# Patient Record
Sex: FEMALE | Race: WHITE | NOT HISPANIC OR LATINO | Employment: OTHER | URBAN - METROPOLITAN AREA
[De-identification: names, ages, dates, MRNs, and addresses within clinical notes are randomized per-mention and may not be internally consistent; named-entity substitution may affect disease eponyms.]

---

## 2018-05-07 LAB
T4 FREE SERPL-MCNC: 1.77 NG/DL (ref 0.6–1.7)
TSH SERPL DL<=0.05 MIU/L-ACNC: 0.84 UIU/M (ref 0.45–5.33)

## 2018-08-21 ENCOUNTER — APPOINTMENT (OUTPATIENT)
Dept: LAB | Facility: MEDICAL CENTER | Age: 73
End: 2018-08-21
Payer: MEDICARE

## 2018-08-21 ENCOUNTER — TRANSCRIBE ORDERS (OUTPATIENT)
Dept: LAB | Facility: MEDICAL CENTER | Age: 73
End: 2018-08-21

## 2018-08-21 DIAGNOSIS — E07.9 THYROID DYSFUNCTION: ICD-10-CM

## 2018-08-21 DIAGNOSIS — R73.9 HYPERGLYCEMIA: ICD-10-CM

## 2018-08-21 DIAGNOSIS — E78.5 HYPERLIPIDEMIA, UNSPECIFIED HYPERLIPIDEMIA TYPE: ICD-10-CM

## 2018-08-21 DIAGNOSIS — E78.5 HYPERLIPIDEMIA, UNSPECIFIED HYPERLIPIDEMIA TYPE: Primary | ICD-10-CM

## 2018-08-21 LAB
ALBUMIN SERPL BCP-MCNC: 4 G/DL (ref 3.5–5)
ALP SERPL-CCNC: 54 U/L (ref 46–116)
ALT SERPL W P-5'-P-CCNC: 28 U/L (ref 12–78)
ANION GAP SERPL CALCULATED.3IONS-SCNC: 6 MMOL/L (ref 4–13)
AST SERPL W P-5'-P-CCNC: 26 U/L (ref 5–45)
BILIRUB SERPL-MCNC: 0.93 MG/DL (ref 0.2–1)
BUN SERPL-MCNC: 14 MG/DL (ref 5–25)
CALCIUM SERPL-MCNC: 9.8 MG/DL (ref 8.3–10.1)
CHLORIDE SERPL-SCNC: 97 MMOL/L (ref 100–108)
CHOLEST SERPL-MCNC: 217 MG/DL (ref 50–200)
CO2 SERPL-SCNC: 28 MMOL/L (ref 21–32)
CREAT SERPL-MCNC: 1.23 MG/DL (ref 0.6–1.3)
ERYTHROCYTE [DISTWIDTH] IN BLOOD BY AUTOMATED COUNT: 11.9 % (ref 11.6–15.1)
EST. AVERAGE GLUCOSE BLD GHB EST-MCNC: 117 MG/DL
GFR SERPL CREATININE-BSD FRML MDRD: 43 ML/MIN/1.73SQ M
GLUCOSE P FAST SERPL-MCNC: 112 MG/DL (ref 65–99)
HBA1C MFR BLD: 5.7 % (ref 4.2–6.3)
HCT VFR BLD AUTO: 41.9 % (ref 34.8–46.1)
HDLC SERPL-MCNC: 84 MG/DL (ref 40–60)
HGB BLD-MCNC: 13.7 G/DL (ref 11.5–15.4)
LDLC SERPL CALC-MCNC: 89 MG/DL (ref 0–100)
MCH RBC QN AUTO: 33.4 PG (ref 26.8–34.3)
MCHC RBC AUTO-ENTMCNC: 32.7 G/DL (ref 31.4–37.4)
MCV RBC AUTO: 102 FL (ref 82–98)
NONHDLC SERPL-MCNC: 133 MG/DL
PLATELET # BLD AUTO: 238 THOUSANDS/UL (ref 149–390)
PMV BLD AUTO: 9.3 FL (ref 8.9–12.7)
POTASSIUM SERPL-SCNC: 4.1 MMOL/L (ref 3.5–5.3)
PROT SERPL-MCNC: 7.4 G/DL (ref 6.4–8.2)
RBC # BLD AUTO: 4.1 MILLION/UL (ref 3.81–5.12)
SODIUM SERPL-SCNC: 131 MMOL/L (ref 136–145)
T4 FREE SERPL-MCNC: 1.28 NG/DL (ref 0.76–1.46)
TRIGL SERPL-MCNC: 222 MG/DL
TSH SERPL DL<=0.05 MIU/L-ACNC: 0.2 UIU/ML (ref 0.36–3.74)
WBC # BLD AUTO: 6.11 THOUSAND/UL (ref 4.31–10.16)

## 2018-08-21 PROCEDURE — 80053 COMPREHEN METABOLIC PANEL: CPT

## 2018-08-21 PROCEDURE — 83036 HEMOGLOBIN GLYCOSYLATED A1C: CPT

## 2018-08-21 PROCEDURE — 84439 ASSAY OF FREE THYROXINE: CPT

## 2018-08-21 PROCEDURE — 80061 LIPID PANEL: CPT

## 2018-08-21 PROCEDURE — 85027 COMPLETE CBC AUTOMATED: CPT

## 2018-08-21 PROCEDURE — 84443 ASSAY THYROID STIM HORMONE: CPT

## 2018-08-21 PROCEDURE — 36415 COLL VENOUS BLD VENIPUNCTURE: CPT

## 2018-09-28 ENCOUNTER — APPOINTMENT (OUTPATIENT)
Dept: LAB | Facility: MEDICAL CENTER | Age: 73
End: 2018-09-28
Payer: MEDICARE

## 2018-09-28 ENCOUNTER — TRANSCRIBE ORDERS (OUTPATIENT)
Dept: LAB | Facility: MEDICAL CENTER | Age: 73
End: 2018-09-28

## 2018-09-28 DIAGNOSIS — E07.9 THYROID DISORDER: ICD-10-CM

## 2018-09-28 DIAGNOSIS — E07.9 THYROID DISORDER: Primary | ICD-10-CM

## 2018-09-28 LAB
T4 FREE SERPL-MCNC: 1.14 NG/DL (ref 0.76–1.46)
TSH SERPL DL<=0.05 MIU/L-ACNC: 0.23 UIU/ML (ref 0.36–3.74)

## 2018-09-28 PROCEDURE — 84443 ASSAY THYROID STIM HORMONE: CPT

## 2018-09-28 PROCEDURE — 84439 ASSAY OF FREE THYROXINE: CPT

## 2018-09-28 PROCEDURE — 36415 COLL VENOUS BLD VENIPUNCTURE: CPT

## 2018-11-12 ENCOUNTER — TRANSCRIBE ORDERS (OUTPATIENT)
Dept: LAB | Facility: MEDICAL CENTER | Age: 73
End: 2018-11-12

## 2018-11-12 ENCOUNTER — APPOINTMENT (OUTPATIENT)
Dept: LAB | Facility: MEDICAL CENTER | Age: 73
End: 2018-11-12
Payer: MEDICARE

## 2018-11-12 DIAGNOSIS — E07.9 THYROID DYSFUNCTION: ICD-10-CM

## 2018-11-12 DIAGNOSIS — E78.5 HYPERLIPIDEMIA, UNSPECIFIED HYPERLIPIDEMIA TYPE: ICD-10-CM

## 2018-11-12 DIAGNOSIS — E13.8 OTHER SPECIFIED DIABETES MELLITUS WITH COMPLICATION, WITHOUT LONG-TERM CURRENT USE OF INSULIN: ICD-10-CM

## 2018-11-12 DIAGNOSIS — E13.8 OTHER SPECIFIED DIABETES MELLITUS WITH COMPLICATION, WITHOUT LONG-TERM CURRENT USE OF INSULIN: Primary | ICD-10-CM

## 2018-11-12 LAB
EST. AVERAGE GLUCOSE BLD GHB EST-MCNC: 123 MG/DL
HBA1C MFR BLD: 5.9 % (ref 4.2–6.3)
T4 FREE SERPL-MCNC: 1.1 NG/DL (ref 0.76–1.46)
TSH SERPL DL<=0.05 MIU/L-ACNC: 0.94 UIU/ML (ref 0.36–3.74)

## 2018-11-12 PROCEDURE — 83036 HEMOGLOBIN GLYCOSYLATED A1C: CPT

## 2018-11-12 PROCEDURE — 84439 ASSAY OF FREE THYROXINE: CPT

## 2018-11-12 PROCEDURE — 84443 ASSAY THYROID STIM HORMONE: CPT

## 2018-11-12 PROCEDURE — 36415 COLL VENOUS BLD VENIPUNCTURE: CPT

## 2019-02-06 ENCOUNTER — APPOINTMENT (OUTPATIENT)
Dept: LAB | Facility: MEDICAL CENTER | Age: 74
End: 2019-02-06
Payer: MEDICARE

## 2019-02-06 ENCOUNTER — TRANSCRIBE ORDERS (OUTPATIENT)
Dept: LAB | Facility: MEDICAL CENTER | Age: 74
End: 2019-02-06

## 2019-02-06 DIAGNOSIS — E07.9 THYROID DISORDER: Primary | ICD-10-CM

## 2019-02-06 DIAGNOSIS — E07.9 THYROID DISORDER: ICD-10-CM

## 2019-02-06 LAB
T4 FREE SERPL-MCNC: 1.09 NG/DL (ref 0.76–1.46)
TSH SERPL DL<=0.05 MIU/L-ACNC: 2.04 UIU/ML (ref 0.36–3.74)

## 2019-02-06 PROCEDURE — 36415 COLL VENOUS BLD VENIPUNCTURE: CPT

## 2019-02-06 PROCEDURE — 84439 ASSAY OF FREE THYROXINE: CPT

## 2019-02-06 PROCEDURE — 84443 ASSAY THYROID STIM HORMONE: CPT

## 2019-05-10 ENCOUNTER — APPOINTMENT (OUTPATIENT)
Dept: LAB | Facility: MEDICAL CENTER | Age: 74
End: 2019-05-10
Payer: MEDICARE

## 2019-05-10 ENCOUNTER — TRANSCRIBE ORDERS (OUTPATIENT)
Dept: LAB | Facility: MEDICAL CENTER | Age: 74
End: 2019-05-10

## 2019-05-10 DIAGNOSIS — E07.9 THYROID DYSFUNCTION: Primary | ICD-10-CM

## 2019-05-10 DIAGNOSIS — E07.9 THYROID DYSFUNCTION: ICD-10-CM

## 2019-05-10 LAB
T4 FREE SERPL-MCNC: 1.26 NG/DL (ref 0.76–1.46)
TSH SERPL DL<=0.05 MIU/L-ACNC: 1.46 UIU/ML (ref 0.36–3.74)

## 2019-05-10 PROCEDURE — 84443 ASSAY THYROID STIM HORMONE: CPT

## 2019-05-10 PROCEDURE — 84439 ASSAY OF FREE THYROXINE: CPT

## 2019-05-10 PROCEDURE — 36415 COLL VENOUS BLD VENIPUNCTURE: CPT

## 2019-07-31 ENCOUNTER — APPOINTMENT (OUTPATIENT)
Dept: LAB | Facility: MEDICAL CENTER | Age: 74
End: 2019-07-31
Payer: MEDICARE

## 2019-07-31 ENCOUNTER — TRANSCRIBE ORDERS (OUTPATIENT)
Dept: LAB | Facility: MEDICAL CENTER | Age: 74
End: 2019-07-31

## 2019-07-31 DIAGNOSIS — E07.9 THYROID DYSFUNCTION: ICD-10-CM

## 2019-07-31 DIAGNOSIS — E07.9 THYROID DYSFUNCTION: Primary | ICD-10-CM

## 2019-07-31 LAB
T4 FREE SERPL-MCNC: 0.99 NG/DL (ref 0.76–1.46)
TSH SERPL DL<=0.05 MIU/L-ACNC: 0.67 UIU/ML (ref 0.36–3.74)

## 2019-07-31 PROCEDURE — 84443 ASSAY THYROID STIM HORMONE: CPT

## 2019-07-31 PROCEDURE — 84439 ASSAY OF FREE THYROXINE: CPT

## 2019-07-31 PROCEDURE — 36415 COLL VENOUS BLD VENIPUNCTURE: CPT

## 2019-11-13 ENCOUNTER — APPOINTMENT (OUTPATIENT)
Dept: LAB | Facility: MEDICAL CENTER | Age: 74
End: 2019-11-13
Payer: MEDICARE

## 2019-11-13 ENCOUNTER — TRANSCRIBE ORDERS (OUTPATIENT)
Dept: LAB | Facility: MEDICAL CENTER | Age: 74
End: 2019-11-13

## 2019-11-13 DIAGNOSIS — E78.5 HYPERLIPIDEMIA, UNSPECIFIED HYPERLIPIDEMIA TYPE: ICD-10-CM

## 2019-11-13 DIAGNOSIS — R73.09 ABNORMAL GLUCOSE: ICD-10-CM

## 2019-11-13 DIAGNOSIS — R73.09 ABNORMAL GLUCOSE: Primary | ICD-10-CM

## 2019-11-13 DIAGNOSIS — E07.9 THYROID DYSFUNCTION: ICD-10-CM

## 2019-11-13 LAB
ALBUMIN SERPL BCP-MCNC: 4.4 G/DL (ref 3.5–5)
ALP SERPL-CCNC: 62 U/L (ref 46–116)
ALT SERPL W P-5'-P-CCNC: 28 U/L (ref 12–78)
ANION GAP SERPL CALCULATED.3IONS-SCNC: 7 MMOL/L (ref 4–13)
AST SERPL W P-5'-P-CCNC: 29 U/L (ref 5–45)
BILIRUB SERPL-MCNC: 0.57 MG/DL (ref 0.2–1)
BUN SERPL-MCNC: 14 MG/DL (ref 5–25)
CALCIUM SERPL-MCNC: 9.7 MG/DL (ref 8.3–10.1)
CHLORIDE SERPL-SCNC: 103 MMOL/L (ref 100–108)
CHOLEST SERPL-MCNC: 194 MG/DL (ref 50–200)
CO2 SERPL-SCNC: 29 MMOL/L (ref 21–32)
CREAT SERPL-MCNC: 1.27 MG/DL (ref 0.6–1.3)
ERYTHROCYTE [DISTWIDTH] IN BLOOD BY AUTOMATED COUNT: 12.6 % (ref 11.6–15.1)
EST. AVERAGE GLUCOSE BLD GHB EST-MCNC: 111 MG/DL
GFR SERPL CREATININE-BSD FRML MDRD: 41 ML/MIN/1.73SQ M
GLUCOSE P FAST SERPL-MCNC: 107 MG/DL (ref 65–99)
HBA1C MFR BLD: 5.5 % (ref 4.2–6.3)
HCT VFR BLD AUTO: 41.7 % (ref 34.8–46.1)
HDLC SERPL-MCNC: 82 MG/DL
HGB BLD-MCNC: 13.6 G/DL (ref 11.5–15.4)
LDLC SERPL CALC-MCNC: 75 MG/DL (ref 0–100)
MCH RBC QN AUTO: 34.3 PG (ref 26.8–34.3)
MCHC RBC AUTO-ENTMCNC: 32.6 G/DL (ref 31.4–37.4)
MCV RBC AUTO: 105 FL (ref 82–98)
NONHDLC SERPL-MCNC: 112 MG/DL
PLATELET # BLD AUTO: 233 THOUSANDS/UL (ref 149–390)
PMV BLD AUTO: 9.2 FL (ref 8.9–12.7)
POTASSIUM SERPL-SCNC: 4.2 MMOL/L (ref 3.5–5.3)
PROT SERPL-MCNC: 7.4 G/DL (ref 6.4–8.2)
RBC # BLD AUTO: 3.97 MILLION/UL (ref 3.81–5.12)
SODIUM SERPL-SCNC: 139 MMOL/L (ref 136–145)
T4 FREE SERPL-MCNC: 1 NG/DL (ref 0.76–1.46)
TRIGL SERPL-MCNC: 183 MG/DL
TSH SERPL DL<=0.05 MIU/L-ACNC: 4.76 UIU/ML (ref 0.36–3.74)
WBC # BLD AUTO: 4.77 THOUSAND/UL (ref 4.31–10.16)

## 2019-11-13 PROCEDURE — 80061 LIPID PANEL: CPT

## 2019-11-13 PROCEDURE — 80053 COMPREHEN METABOLIC PANEL: CPT

## 2019-11-13 PROCEDURE — 36415 COLL VENOUS BLD VENIPUNCTURE: CPT

## 2019-11-13 PROCEDURE — 84443 ASSAY THYROID STIM HORMONE: CPT

## 2019-11-13 PROCEDURE — 85027 COMPLETE CBC AUTOMATED: CPT

## 2019-11-13 PROCEDURE — 83036 HEMOGLOBIN GLYCOSYLATED A1C: CPT

## 2019-11-13 PROCEDURE — 84439 ASSAY OF FREE THYROXINE: CPT

## 2020-02-25 ENCOUNTER — APPOINTMENT (OUTPATIENT)
Dept: LAB | Facility: MEDICAL CENTER | Age: 75
End: 2020-02-25
Payer: MEDICARE

## 2020-02-25 ENCOUNTER — TRANSCRIBE ORDERS (OUTPATIENT)
Dept: LAB | Facility: MEDICAL CENTER | Age: 75
End: 2020-02-25

## 2020-02-25 DIAGNOSIS — E07.9 THYROID DYSFUNCTION: Primary | ICD-10-CM

## 2020-02-25 DIAGNOSIS — E07.9 THYROID DYSFUNCTION: ICD-10-CM

## 2020-02-25 LAB
T4 FREE SERPL-MCNC: 0.91 NG/DL (ref 0.76–1.46)
TSH SERPL DL<=0.05 MIU/L-ACNC: 1.71 UIU/ML (ref 0.36–3.74)

## 2020-02-25 PROCEDURE — 84439 ASSAY OF FREE THYROXINE: CPT

## 2020-02-25 PROCEDURE — 36415 COLL VENOUS BLD VENIPUNCTURE: CPT

## 2020-02-25 PROCEDURE — 84443 ASSAY THYROID STIM HORMONE: CPT

## 2020-10-28 ENCOUNTER — TRANSCRIBE ORDERS (OUTPATIENT)
Dept: LAB | Facility: MEDICAL CENTER | Age: 75
End: 2020-10-28

## 2020-10-28 ENCOUNTER — LAB (OUTPATIENT)
Dept: LAB | Facility: MEDICAL CENTER | Age: 75
End: 2020-10-28
Payer: MEDICARE

## 2020-10-28 DIAGNOSIS — E03.9 HYPOTHYROIDISM, UNSPECIFIED TYPE: ICD-10-CM

## 2020-10-28 DIAGNOSIS — E78.5 HYPERLIPIDEMIA, UNSPECIFIED HYPERLIPIDEMIA TYPE: ICD-10-CM

## 2020-10-28 DIAGNOSIS — R73.9 HYPERGLYCEMIA: ICD-10-CM

## 2020-10-28 DIAGNOSIS — E78.5 HYPERLIPIDEMIA, UNSPECIFIED HYPERLIPIDEMIA TYPE: Primary | ICD-10-CM

## 2020-10-28 LAB
ALBUMIN SERPL BCP-MCNC: 4.4 G/DL (ref 3.5–5)
ALP SERPL-CCNC: 55 U/L (ref 46–116)
ALT SERPL W P-5'-P-CCNC: 31 U/L (ref 12–78)
ANION GAP SERPL CALCULATED.3IONS-SCNC: 5 MMOL/L (ref 4–13)
AST SERPL W P-5'-P-CCNC: 29 U/L (ref 5–45)
BILIRUB SERPL-MCNC: 0.64 MG/DL (ref 0.2–1)
BUN SERPL-MCNC: 14 MG/DL (ref 5–25)
CALCIUM SERPL-MCNC: 9.4 MG/DL (ref 8.3–10.1)
CHLORIDE SERPL-SCNC: 100 MMOL/L (ref 100–108)
CHOLEST SERPL-MCNC: 355 MG/DL (ref 50–200)
CO2 SERPL-SCNC: 28 MMOL/L (ref 21–32)
CREAT SERPL-MCNC: 1.14 MG/DL (ref 0.6–1.3)
ERYTHROCYTE [DISTWIDTH] IN BLOOD BY AUTOMATED COUNT: 12.9 % (ref 11.6–15.1)
GFR SERPL CREATININE-BSD FRML MDRD: 46 ML/MIN/1.73SQ M
GLUCOSE P FAST SERPL-MCNC: 114 MG/DL (ref 65–99)
HCT VFR BLD AUTO: 43.9 % (ref 34.8–46.1)
HDLC SERPL-MCNC: 91 MG/DL
HGB BLD-MCNC: 14.7 G/DL (ref 11.5–15.4)
LDLC SERPL CALC-MCNC: 224 MG/DL (ref 0–100)
MCH RBC QN AUTO: 35.3 PG (ref 26.8–34.3)
MCHC RBC AUTO-ENTMCNC: 33.5 G/DL (ref 31.4–37.4)
MCV RBC AUTO: 105 FL (ref 82–98)
NONHDLC SERPL-MCNC: 264 MG/DL
PLATELET # BLD AUTO: 216 THOUSANDS/UL (ref 149–390)
PMV BLD AUTO: 9.1 FL (ref 8.9–12.7)
POTASSIUM SERPL-SCNC: 3.9 MMOL/L (ref 3.5–5.3)
PROT SERPL-MCNC: 7.5 G/DL (ref 6.4–8.2)
RBC # BLD AUTO: 4.17 MILLION/UL (ref 3.81–5.12)
SODIUM SERPL-SCNC: 133 MMOL/L (ref 136–145)
T4 FREE SERPL-MCNC: 0.9 NG/DL (ref 0.76–1.46)
TRIGL SERPL-MCNC: 200 MG/DL
TSH SERPL DL<=0.05 MIU/L-ACNC: 2.48 UIU/ML (ref 0.36–3.74)
WBC # BLD AUTO: 6.77 THOUSAND/UL (ref 4.31–10.16)

## 2020-10-28 PROCEDURE — 84439 ASSAY OF FREE THYROXINE: CPT

## 2020-10-28 PROCEDURE — 85027 COMPLETE CBC AUTOMATED: CPT

## 2020-10-28 PROCEDURE — 83036 HEMOGLOBIN GLYCOSYLATED A1C: CPT

## 2020-10-28 PROCEDURE — 80061 LIPID PANEL: CPT

## 2020-10-28 PROCEDURE — 80053 COMPREHEN METABOLIC PANEL: CPT

## 2020-10-28 PROCEDURE — 84443 ASSAY THYROID STIM HORMONE: CPT

## 2020-10-28 PROCEDURE — 36415 COLL VENOUS BLD VENIPUNCTURE: CPT

## 2020-10-29 LAB
EST. AVERAGE GLUCOSE BLD GHB EST-MCNC: 114 MG/DL
HBA1C MFR BLD: 5.6 %

## 2021-01-20 DIAGNOSIS — Z23 ENCOUNTER FOR IMMUNIZATION: ICD-10-CM

## 2021-03-15 ENCOUNTER — IMMUNIZATIONS (OUTPATIENT)
Dept: FAMILY MEDICINE CLINIC | Facility: HOSPITAL | Age: 76
End: 2021-03-15

## 2021-03-15 DIAGNOSIS — Z23 ENCOUNTER FOR IMMUNIZATION: Primary | ICD-10-CM

## 2021-03-15 PROCEDURE — 91301 SARS-COV-2 / COVID-19 MRNA VACCINE (MODERNA) 100 MCG: CPT

## 2021-03-15 PROCEDURE — 0011A SARS-COV-2 / COVID-19 MRNA VACCINE (MODERNA) 100 MCG: CPT

## 2021-04-14 ENCOUNTER — IMMUNIZATIONS (OUTPATIENT)
Dept: FAMILY MEDICINE CLINIC | Facility: HOSPITAL | Age: 76
End: 2021-04-14

## 2021-04-14 DIAGNOSIS — Z23 ENCOUNTER FOR IMMUNIZATION: Primary | ICD-10-CM

## 2021-04-14 PROCEDURE — 91301 SARS-COV-2 / COVID-19 MRNA VACCINE (MODERNA) 100 MCG: CPT

## 2021-04-14 PROCEDURE — 0012A SARS-COV-2 / COVID-19 MRNA VACCINE (MODERNA) 100 MCG: CPT

## 2021-11-04 ENCOUNTER — APPOINTMENT (OUTPATIENT)
Dept: LAB | Facility: MEDICAL CENTER | Age: 76
End: 2021-11-04
Payer: MEDICARE

## 2021-11-04 DIAGNOSIS — E78.5 HYPERLIPIDEMIA, UNSPECIFIED HYPERLIPIDEMIA TYPE: ICD-10-CM

## 2021-11-04 DIAGNOSIS — Z00.00 GENERAL MEDICAL EXAM: ICD-10-CM

## 2021-11-04 DIAGNOSIS — R73.09 ABNORMAL GLUCOSE: ICD-10-CM

## 2021-11-04 DIAGNOSIS — E03.9 HYPOTHYROIDISM, UNSPECIFIED TYPE: ICD-10-CM

## 2021-11-04 LAB
ALBUMIN SERPL BCP-MCNC: 4 G/DL (ref 3.5–5)
ALP SERPL-CCNC: 56 U/L (ref 46–116)
ALT SERPL W P-5'-P-CCNC: 33 U/L (ref 12–78)
ANION GAP SERPL CALCULATED.3IONS-SCNC: 4 MMOL/L (ref 4–13)
AST SERPL W P-5'-P-CCNC: 35 U/L (ref 5–45)
BILIRUB SERPL-MCNC: 0.52 MG/DL (ref 0.2–1)
BUN SERPL-MCNC: 12 MG/DL (ref 5–25)
CALCIUM SERPL-MCNC: 9.7 MG/DL (ref 8.3–10.1)
CHLORIDE SERPL-SCNC: 100 MMOL/L (ref 100–108)
CHOLEST SERPL-MCNC: 273 MG/DL (ref 50–200)
CO2 SERPL-SCNC: 30 MMOL/L (ref 21–32)
CREAT SERPL-MCNC: 1.29 MG/DL (ref 0.6–1.3)
ERYTHROCYTE [DISTWIDTH] IN BLOOD BY AUTOMATED COUNT: 12.8 % (ref 11.6–15.1)
EST. AVERAGE GLUCOSE BLD GHB EST-MCNC: 117 MG/DL
GFR SERPL CREATININE-BSD FRML MDRD: 40 ML/MIN/1.73SQ M
GLUCOSE P FAST SERPL-MCNC: 98 MG/DL (ref 65–99)
HBA1C MFR BLD: 5.7 %
HCT VFR BLD AUTO: 42.3 % (ref 34.8–46.1)
HDLC SERPL-MCNC: 90 MG/DL
HGB BLD-MCNC: 14.2 G/DL (ref 11.5–15.4)
LDLC SERPL CALC-MCNC: 146 MG/DL (ref 0–100)
MCH RBC QN AUTO: 33.3 PG (ref 26.8–34.3)
MCHC RBC AUTO-ENTMCNC: 33.6 G/DL (ref 31.4–37.4)
MCV RBC AUTO: 99 FL (ref 82–98)
NONHDLC SERPL-MCNC: 183 MG/DL
PLATELET # BLD AUTO: 229 THOUSANDS/UL (ref 149–390)
PMV BLD AUTO: 9.3 FL (ref 8.9–12.7)
POTASSIUM SERPL-SCNC: 3.6 MMOL/L (ref 3.5–5.3)
PROT SERPL-MCNC: 7.8 G/DL (ref 6.4–8.2)
RBC # BLD AUTO: 4.27 MILLION/UL (ref 3.81–5.12)
SODIUM SERPL-SCNC: 134 MMOL/L (ref 136–145)
T4 FREE SERPL-MCNC: 0.96 NG/DL (ref 0.76–1.46)
TRIGL SERPL-MCNC: 183 MG/DL
TSH SERPL DL<=0.05 MIU/L-ACNC: 2.38 UIU/ML (ref 0.36–3.74)
WBC # BLD AUTO: 4.91 THOUSAND/UL (ref 4.31–10.16)

## 2021-11-04 PROCEDURE — 84439 ASSAY OF FREE THYROXINE: CPT

## 2021-11-04 PROCEDURE — 85027 COMPLETE CBC AUTOMATED: CPT

## 2021-11-04 PROCEDURE — 83036 HEMOGLOBIN GLYCOSYLATED A1C: CPT

## 2021-11-04 PROCEDURE — 80053 COMPREHEN METABOLIC PANEL: CPT

## 2021-11-04 PROCEDURE — 36415 COLL VENOUS BLD VENIPUNCTURE: CPT

## 2021-11-04 PROCEDURE — 80061 LIPID PANEL: CPT

## 2021-11-04 PROCEDURE — 84443 ASSAY THYROID STIM HORMONE: CPT

## 2022-05-11 ENCOUNTER — HOSPITAL ENCOUNTER (EMERGENCY)
Facility: HOSPITAL | Age: 77
Discharge: HOME | End: 2022-05-11
Attending: EMERGENCY MEDICINE
Payer: MEDICARE

## 2022-05-11 ENCOUNTER — APPOINTMENT (OUTPATIENT)
Dept: RADIOLOGY | Facility: CLINIC | Age: 77
End: 2022-05-11
Payer: MEDICARE

## 2022-05-11 ENCOUNTER — OFFICE VISIT (OUTPATIENT)
Dept: URGENT CARE | Facility: CLINIC | Age: 77
End: 2022-05-11
Payer: MEDICARE

## 2022-05-11 VITALS
OXYGEN SATURATION: 100 % | RESPIRATION RATE: 18 BRPM | DIASTOLIC BLOOD PRESSURE: 65 MMHG | HEART RATE: 78 BPM | SYSTOLIC BLOOD PRESSURE: 146 MMHG | TEMPERATURE: 97.8 F

## 2022-05-11 VITALS
TEMPERATURE: 98 F | HEART RATE: 76 BPM | DIASTOLIC BLOOD PRESSURE: 80 MMHG | SYSTOLIC BLOOD PRESSURE: 130 MMHG | OXYGEN SATURATION: 98 % | RESPIRATION RATE: 20 BRPM | BODY MASS INDEX: 20.49 KG/M2 | WEIGHT: 130.8 LBS

## 2022-05-11 DIAGNOSIS — R23.0 EXTREMITY CYANOSIS: Primary | ICD-10-CM

## 2022-05-11 DIAGNOSIS — M79.641 BILATERAL HAND PAIN: ICD-10-CM

## 2022-05-11 DIAGNOSIS — M79.642 BILATERAL HAND PAIN: ICD-10-CM

## 2022-05-11 DIAGNOSIS — M79.642 BILATERAL HAND PAIN: Primary | ICD-10-CM

## 2022-05-11 DIAGNOSIS — E08.40 DIABETES MELLITUS DUE TO UNDERLYING CONDITION WITH DIABETIC NEUROPATHY, WITHOUT LONG-TERM CURRENT USE OF INSULIN (HCC): ICD-10-CM

## 2022-05-11 DIAGNOSIS — Z86.39 HISTORY OF DIABETES MELLITUS: ICD-10-CM

## 2022-05-11 DIAGNOSIS — M79.641 BILATERAL HAND PAIN: Primary | ICD-10-CM

## 2022-05-11 DIAGNOSIS — Z86.79 HISTORY OF RAYNAUD'S SYNDROME: ICD-10-CM

## 2022-05-11 LAB
GLUCOSE SERPL-MCNC: 134 MG/DL (ref 65–140)
SL AMB POCT GLUCOSE BLD: 134

## 2022-05-11 PROCEDURE — 99284 EMERGENCY DEPT VISIT MOD MDM: CPT | Performed by: EMERGENCY MEDICINE

## 2022-05-11 PROCEDURE — G0463 HOSPITAL OUTPT CLINIC VISIT: HCPCS | Performed by: NURSE PRACTITIONER

## 2022-05-11 PROCEDURE — 99214 OFFICE O/P EST MOD 30 MIN: CPT | Performed by: NURSE PRACTITIONER

## 2022-05-11 PROCEDURE — 82948 REAGENT STRIP/BLOOD GLUCOSE: CPT | Performed by: NURSE PRACTITIONER

## 2022-05-11 PROCEDURE — 99283 EMERGENCY DEPT VISIT LOW MDM: CPT

## 2022-05-11 PROCEDURE — 73130 X-RAY EXAM OF HAND: CPT

## 2022-05-11 RX ORDER — ATORVASTATIN CALCIUM 40 MG/1
TABLET, FILM COATED ORAL
COMMUNITY
Start: 2022-04-29

## 2022-05-11 RX ORDER — LEVOTHYROXINE SODIUM 0.07 MG/1
TABLET ORAL
COMMUNITY
Start: 2022-04-29

## 2022-05-11 RX ORDER — GABAPENTIN 100 MG/1
100 CAPSULE ORAL ONCE
Status: COMPLETED | OUTPATIENT
Start: 2022-05-11 | End: 2022-05-11

## 2022-05-11 RX ORDER — GABAPENTIN 100 MG/1
100 CAPSULE ORAL 3 TIMES DAILY
Qty: 42 CAPSULE | Refills: 0 | Status: SHIPPED | OUTPATIENT
Start: 2022-05-11 | End: 2022-05-25

## 2022-05-11 RX ADMIN — GABAPENTIN 100 MG: 100 CAPSULE ORAL at 12:45

## 2022-05-11 NOTE — PATIENT INSTRUCTIONS
Your xray was preliminarily read by your provider  A radiologist will read the xray and you will be notified if it is abnormal     You are to rest, use warm compress, compression (ace wrap, splint), elevate  Take tylenol or motrin as needed for pain  You are to see your PCP TODAY at 3:15pm  To enter the office, go around the back of the building to the ramp  You may need to see rheumatology for further treatment and management  Go to the ED if symptoms worsen  You have decided while in the office to go to the ED  Follow-up with your PCP after the ED visit

## 2022-05-11 NOTE — ED PROVIDER NOTES
History  Chief Complaint   Patient presents with    Hand Pain     Patient reports bilateral hand pain for two weeks  Patient is a 70-year-old female presents for evaluation of bilateral hand pain  Patient says she has had the symptoms for the past 1-2 weeks  She says that she feels like she is not able to use her hands due to the pain   She also admits to some paresthesias in the bilateral hands  She denies any swelling, redness of the hands  She also admits to some pain in her bilateral shoulders  She denies any chest pain, shortness of breath, lightheadedness, dizziness, nausea, vomiting  She denies a history of diabetes  According to lab work, she had an A1c done recently that was 5 7  On exam, there is no redness, swelling or deformities to the hands  Her bilateral extremities are neurovascularly intact  Prior to Admission Medications   Prescriptions Last Dose Informant Patient Reported? Taking?   atorvastatin (LIPITOR) 40 mg tablet   Yes No   levothyroxine 75 mcg tablet   Yes No   sertraline (ZOLOFT) 50 mg tablet   Yes No      Facility-Administered Medications: None       History reviewed  No pertinent past medical history  Past Surgical History:   Procedure Laterality Date    BOWEL RESECTION         History reviewed  No pertinent family history  I have reviewed and agree with the history as documented  E-Cigarette/Vaping    E-Cigarette Use Never User      E-Cigarette/Vaping Substances     Social History     Tobacco Use    Smoking status: Never Smoker    Smokeless tobacco: Never Used   Vaping Use    Vaping Use: Never used   Substance Use Topics    Drug use: Never       Review of Systems   Constitutional: Negative for chills, diaphoresis and fever  HENT: Negative for congestion, sinus pressure, sore throat and trouble swallowing  Eyes: Negative for pain, discharge and itching  Respiratory: Negative for cough, chest tightness, shortness of breath and wheezing  Cardiovascular: Negative for chest pain, palpitations and leg swelling  Gastrointestinal: Negative for abdominal distention, abdominal pain, blood in stool, diarrhea, nausea and vomiting  Endocrine: Negative for polyphagia and polyuria  Genitourinary: Negative for difficulty urinating, dysuria, flank pain, hematuria, pelvic pain and vaginal bleeding  Musculoskeletal: Positive for arthralgias (b/l hands)  Negative for back pain  Skin: Negative for rash  Neurological: Negative for dizziness, syncope, weakness, light-headedness and headaches  Physical Exam  Physical Exam  Vitals and nursing note reviewed  Constitutional:       General: She is not in acute distress  Appearance: She is well-developed  HENT:      Head: Normocephalic and atraumatic  Right Ear: External ear normal       Left Ear: External ear normal       Nose: Nose normal       Mouth/Throat:      Mouth: Mucous membranes are moist       Pharynx: No oropharyngeal exudate  Eyes:      Conjunctiva/sclera: Conjunctivae normal       Pupils: Pupils are equal, round, and reactive to light  Cardiovascular:      Rate and Rhythm: Normal rate and regular rhythm  Heart sounds: Normal heart sounds  No murmur heard  No friction rub  No gallop  Pulmonary:      Effort: Pulmonary effort is normal  No respiratory distress  Breath sounds: Normal breath sounds  No wheezing or rales  Abdominal:      General: There is no distension  Palpations: Abdomen is soft  Tenderness: There is no abdominal tenderness  There is no guarding  Musculoskeletal:         General: No swelling, tenderness or deformity  Normal range of motion  Cervical back: Normal range of motion and neck supple  Lymphadenopathy:      Cervical: No cervical adenopathy  Skin:     General: Skin is warm and dry  Neurological:      General: No focal deficit present  Mental Status: She is alert and oriented to person, place, and time   Mental status is at baseline  Cranial Nerves: No cranial nerve deficit  Sensory: No sensory deficit  Motor: No weakness or abnormal muscle tone  Coordination: Coordination normal          Vital Signs  ED Triage Vitals [05/11/22 1139]   Temperature Pulse Respirations Blood Pressure SpO2   97 8 °F (36 6 °C) 78 18 146/65 100 %      Temp Source Heart Rate Source Patient Position - Orthostatic VS BP Location FiO2 (%)   Oral Monitor Sitting Left arm --      Pain Score       10 - Worst Possible Pain           Vitals:    05/11/22 1139   BP: 146/65   Pulse: 78   Patient Position - Orthostatic VS: Sitting         Visual Acuity      ED Medications  Medications   gabapentin (NEURONTIN) capsule 100 mg (has no administration in time range)       Diagnostic Studies  Results Reviewed     None                 No orders to display              Procedures  Procedures         ED Course                                             MDM  Number of Diagnoses or Management Options  Bilateral hand pain  Diagnosis management comments: 61-year-old female presenting for bilateral hand pain x2 weeks  Burning in nature  Also admits to paresthesias  Denies any trauma to the hands  Arms are neurovascularly intact  No redness, swelling of the hands  No history of diabetes  Vitals within normal limits  Do not believe imaging or lab work is required at this time  Believe symptoms are neuropathic in nature  Will start on gabapentin, will have patient follow up with family doctor          Disposition  Final diagnoses:   Bilateral hand pain     Time reflects when diagnosis was documented in both MDM as applicable and the Disposition within this note     Time User Action Codes Description Comment    5/11/2022 12:36 PM Zoey Richey [I98 [id number],  [id number]] Bilateral hand pain       ED Disposition     ED Disposition   Discharge    Condition   Stable    Date/Time   Wed May 11, 2022 12:35 PM    Andra Bustamante discharge to home/self care  Follow-up Information     Follow up With Specialties Details Why Contact Info    your family doctor  Schedule an appointment as soon as possible for a visit  For follow up of hand pain           Patient's Medications   Discharge Prescriptions    GABAPENTIN (NEURONTIN) 100 MG CAPSULE    Take 1 capsule (100 mg total) by mouth in the morning and 1 capsule (100 mg total) in the evening and 1 capsule (100 mg total) before bedtime  Do all this for 14 days  Start Date: 5/11/2022 End Date: 5/25/2022       Order Dose: 100 mg       Quantity: 42 capsule    Refills: 0       No discharge procedures on file      PDMP Review       Value Time User    PDMP Reviewed  Yes 5/11/2022 10:12 AM Velvet Melton          ED Provider  Electronically Signed by           Mikal Burgos DO  05/11/22 0980

## 2022-05-11 NOTE — PROGRESS NOTES
[facility]        NAME: Rabia Knapp is a 64 y o  female  : 1945    MRN: [id number]  DATE: May 11, 2022  TIME: 11:16 AM    Assessment and Plan   Extremity cyanosis [R23 0]  1  Extremity cyanosis  Transfer to other facility    of bilateral hands   2  History of Raynaud's syndrome  Transfer to other facility    personal   3  Bilateral hand pain  XR hand 3+ vw right    XR hand 3+ vw left    Transfer to other facility   4  History of diabetes mellitus  POCT blood glucose    Transfer to other facility   5  Diabetes mellitus due to underlying condition with diabetic neuropathy, without long-term current use of insulin ([facility])   POCT blood glucose    Transfer to other facility         Patient Instructions   Your xray was preliminarily read by your provider  A radiologist will read the xray and you will be notified if it is abnormal     You are to rest, use warm compress, compression (ace wrap, splint), elevate  Take tylenol or motrin as needed for pain  You are to see your PCP TODAY at 3:15pm  To enter the office, go around the back of the building to the ramp  You may need to see rheumatology for further treatment and management  Go to the ED if symptoms worsen  Follow up with PCP in 3-5 days  Proceed to  ER if symptoms worsen  Chief Complaint     Chief Complaint   Patient presents with    Hand Pain     Gage hand pain x 2 weeks          History of Present Illness       This is a 64year old female who presents for evaluation of bilateral hand pain x1 week  Reports sudden onset of constant burning, stinging pain rated 8/10 that starts in elbows and radiates to fingertips  Pain in right hand is worse than left  Patient states pain makes it difficult for her to hold a pen and use hairbrush  There is an associated cold feeling to both hands  She has tried Tylenol 1,000mg by mouth with no relief  No known injury         Review of Systems   Review of Systems   Constitutional: Positive for activity change  Negative for chills, diaphoresis and fever  HENT: Negative for congestion, rhinorrhea and sore throat  Eyes: Negative  Respiratory: Negative for cough and shortness of breath  Cardiovascular: Negative for chest pain  Gastrointestinal: Negative for abdominal pain, diarrhea, nausea and vomiting  Endocrine: Negative  Genitourinary: Negative  Musculoskeletal:        Bilateral hand pain     Skin: Positive for color change (blue in color)  Allergic/Immunologic: Negative  Neurological: Negative  Hematological: Negative  Psychiatric/Behavioral: The patient is nervous/anxious  Current Medications       Current Outpatient Medications:     atorvastatin (LIPITOR) 40 mg tablet, , Disp: , Rfl:     levothyroxine 75 mcg tablet, , Disp: , Rfl:     sertraline (ZOLOFT) 50 mg tablet, , Disp: , Rfl:     Current Allergies     Allergies as of 05/11/2022    (No Known Allergies)            The following portions of the patient's history were reviewed and updated as appropriate: allergies, current medications, past family history, past medical history, past social history, past surgical history and problem list      History reviewed  No pertinent past medical history  History reviewed  No pertinent surgical history  History reviewed  No pertinent family history  Medications have been verified  Objective   /80   Pulse 76   Temp 98 °F (36 7 °C)   Resp 20   Wt 59 3 kg (130 lb 12 8 oz)   SpO2 98%   BMI 20 49 kg/m²   No LMP recorded  Physical Exam     Physical Exam  Vitals and nursing note reviewed  Constitutional:       General: She is not in acute distress  Appearance: She is normal weight  She is not toxic-appearing  HENT:      Head: Normocephalic and atraumatic  Right Ear: External ear normal       Left Ear: External ear normal       Nose: Nose normal    Eyes:      Extraocular Movements: Extraocular movements intact        Conjunctiva/sclera: Conjunctivae normal       Pupils: Pupils are equal, round, and reactive to light  Cardiovascular:      Rate and Rhythm: Normal rate and regular rhythm  Pulses: Normal pulses  Heart sounds: Normal heart sounds  Pulmonary:      Effort: Pulmonary effort is normal  No respiratory distress  Breath sounds: Normal breath sounds  No wheezing  Musculoskeletal:         General: No deformity or signs of injury  Normal range of motion  Right hand: Bony tenderness present  No swelling or deformity  Normal strength  Normal capillary refill  Normal pulse  Left hand: Bony tenderness present  No swelling or deformity  Normal strength  Normal capillary refill  Normal pulse  Cervical back: Normal range of motion and neck supple  Skin:     General: Skin is dry  Capillary Refill: Capillary refill takes less than 2 seconds  Coloration: Skin is cyanotic (bilateral hands) and pale  Comments: Bilateral hands pale, cool, dry  Neurological:      General: No focal deficit present  Mental Status: She is alert and oriented to person, place, and time  Mental status is at baseline  Psychiatric:         Attention and Perception: Attention and perception normal          Mood and Affect: Mood is depressed  Affect is tearful  Speech: Speech normal          Cognition and Memory: Cognition and memory normal          Judgment: Judgment normal       Comments: Patient anxious, tearful, histrionic, demanding pain medication however refused Tylenol and Motrin in office  NP discussed narcotic safe use with patient and informed patient that x-rays were normal  Hand assessment is normal with no indication for narcotic use  Patient breaks down, cries, and pleads with NP for narcotic prescription  Preliminary XR read - questionable gas to right hand 3rd finger  Left hand negative for acute process  Waiting for rad read         Spoke with Chapo Diez from Dr Martín Lomas office   Discussed patient with her as this is a mutual patient  Last visit on 11/21/21  Their office also recommends no narcotic prescription at this time  Appt for 3:15 today made, patient refused to go and begs  to take her to the ED   agreeable to take her by private car to ED for pain control  Patient had originally told NP there were numerous steps she needed to climb to get into PCP office, however, office has 5 steps to enter the front and a ramp in the back for entrance

## 2022-09-09 ENCOUNTER — APPOINTMENT (OUTPATIENT)
Dept: RADIOLOGY | Facility: HOSPITAL | Age: 77
End: 2022-09-09
Payer: MEDICARE

## 2022-09-09 ENCOUNTER — HOSPITAL ENCOUNTER (EMERGENCY)
Facility: HOSPITAL | Age: 77
Discharge: HOME | End: 2022-09-09
Attending: EMERGENCY MEDICINE
Payer: MEDICARE

## 2022-09-09 VITALS
OXYGEN SATURATION: 98 % | HEIGHT: 67 IN | SYSTOLIC BLOOD PRESSURE: 150 MMHG | WEIGHT: 130 LBS | HEART RATE: 67 BPM | BODY MASS INDEX: 20.4 KG/M2 | RESPIRATION RATE: 18 BRPM | DIASTOLIC BLOOD PRESSURE: 67 MMHG | TEMPERATURE: 97.6 F

## 2022-09-09 DIAGNOSIS — S62.101A CLOSED FRACTURE OF RIGHT WRIST, INITIAL ENCOUNTER: ICD-10-CM
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
## 2022-09-09 PROCEDURE — 99284 EMERGENCY DEPT VISIT MOD MDM: CPT

## 2022-09-09 PROCEDURE — 25605 CLTX DST RDL FX/EPHYS SEP W/: CPT

## 2022-09-09 PROCEDURE — 73110 X-RAY EXAM OF WRIST: CPT

## 2022-09-09 PROCEDURE — 96374 THER/PROPH/DIAG INJ IV PUSH: CPT

## 2022-09-09 RX ORDER — BUPIVACAINE HYDROCHLORIDE 2.5 MG/ML
15 INJECTION, SOLUTION EPIDURAL; INFILTRATION; INTRACAUDAL ONCE
Status: COMPLETED | OUTPATIENT
Start: 2022-09-09 | End: 2022-09-09

## 2022-09-09 RX ORDER — LIDOCAINE HYDROCHLORIDE 10 MG/ML
10 INJECTION, SOLUTION EPIDURAL; INFILTRATION; INTRACAUDAL; PERINEURAL ONCE
Status: COMPLETED | OUTPATIENT
Start: 2022-09-09 | End: 2022-09-09

## 2022-09-09 RX ORDER — OXYCODONE HYDROCHLORIDE AND ACETAMINOPHEN 5; 325 MG/1; MG/1
1 TABLET ORAL EVERY 4 HOURS PRN
Qty: 18 TABLET | Refills: 0 | Status: SHIPPED | OUTPATIENT
Start: 2022-09-09 | End: 2022-09-12

## 2022-09-09 RX ORDER — FENTANYL CITRATE 50 UG/ML
100 INJECTION, SOLUTION INTRAMUSCULAR; INTRAVENOUS ONCE
Status: COMPLETED | OUTPATIENT
Start: 2022-09-09 | End: 2022-09-09

## 2022-09-09 RX ADMIN — LIDOCAINE HYDROCHLORIDE 10 ML: 10 INJECTION, SOLUTION EPIDURAL; INFILTRATION; INTRACAUDAL; PERINEURAL at 13:17

## 2022-09-09 RX ADMIN — FENTANYL CITRATE 50 MCG: 50 INJECTION, SOLUTION INTRAMUSCULAR; INTRAVENOUS at 14:26

## 2022-09-09 RX ADMIN — BUPIVACAINE HYDROCHLORIDE 15 ML: 2.5 INJECTION, SOLUTION EPIDURAL; INFILTRATION; INTRACAUDAL; PERINEURAL at 13:30

## 2022-09-09 NOTE — ED NOTES
Per provider, only 50mcg to be given at this time for reduction       Malgorzata García RN  09/09/22 6634

## 2022-09-09 NOTE — DISCHARGE INSTRUCTIONS
Please follow-up with orthopedics for further evaluation symptoms  Continue wearing the splint and sling until cleared by Orthopedics  If you develop any new, concerning, worsening symptoms please return to the emergency department for re-evaluation

## 2022-09-09 NOTE — ED PROVIDER NOTES
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 and was reaching for her refrigerator but missed and fell backwards  She fell on her hands and buttocks  Patient does report that she hit her head slightly but denies headache, dizziness, vision disturbances, LOC, amnesia, N/V  No thinners or aspirin  Patient has an obvious right wrist deformity that is splinted by EMS  Patient has not had anything for pain  Denies abdominal pain, back pain, neck pain, chest pain, SOB, difficulty breathing, bladder/bowel incontinence, nausea, vomiting  History provided by:  Patient   used: No    Fall  Mechanism of injury: fall    Injury location:  Hand  Hand injury location:  R wrist  Incident location:  Home  Arrived directly from scene: yes    Fall:     Fall occurred:  Standing    Height of fall:  From standing    Impact surface:  Hard floor    Point of impact:  Outstretched arms and buttocks    Entrapped after fall: no    Suspicion of alcohol use: no    Suspicion of drug use: no    Prior to arrival data:     Bystander interventions:  Splinting    Patient ambulatory at scene: yes      Blood loss:  None    Responsiveness at scene:  Alert    Loss of consciousness: no      Amnesic to event: no    Associated symptoms: no abdominal pain, no back pain, no chest pain, no difficulty breathing, no headaches, no loss of consciousness, no nausea, no neck pain, no seizures and no vomiting    Risk factors: no anticoagulation therapy        Prior to Admission Medications   Prescriptions Last Dose Informant Patient Reported?  Taking?   atorvastatin (LIPITOR) 40 mg tablet   Yes No   gabapentin (Neurontin) 100 mg capsule   No No   Sig: Take 1 capsule (100 mg total) by mouth in the morning and 1 capsule (100 mg total) in the evening and 1 capsule (100 mg total) before bedtime  Do all this for 14 days  levothyroxine 75 mcg tablet   Yes No   sertraline (ZOLOFT) 50 mg tablet   Yes No      Facility-Administered Medications: None       Past Medical History:   Diagnosis Date    Depression     Disease of thyroid gland     Hyperlipidemia     Neuropathy        Past Surgical History:   Procedure Laterality Date    BOWEL RESECTION         History reviewed  No pertinent family history  I have reviewed and agree with the history as documented  E-Cigarette/Vaping    E-Cigarette Use Never User      E-Cigarette/Vaping Substances     Social History     Tobacco Use    Smoking status: Never Smoker    Smokeless tobacco: Never Used   Vaping Use    Vaping Use: Never used   Substance Use Topics    Drug use: Never       Review of Systems   Constitutional: Negative for chills and fever  HENT: Negative for ear pain and sore throat  Eyes: Negative for pain and visual disturbance  Respiratory: Negative for cough and shortness of breath  Cardiovascular: Negative for chest pain and palpitations  Gastrointestinal: Negative for abdominal pain, nausea and vomiting  Genitourinary: Negative for dysuria and hematuria  Musculoskeletal: Positive for arthralgias (Right wrist)  Negative for back pain and neck pain  Skin: Negative for color change and rash  Neurological: Negative for seizures, loss of consciousness, syncope and headaches  All other systems reviewed and are negative  Physical Exam  Physical Exam  Vitals and nursing note reviewed  Constitutional:       General: She is not in acute distress  Appearance: Normal appearance  She is well-developed and normal weight  HENT:      Head: Normocephalic and atraumatic        Right Ear: Tympanic membrane and external ear normal       Left Ear: Tympanic membrane and external ear normal       Nose: Nose normal       Mouth/Throat:      Mouth: Mucous membranes are moist       Pharynx: Oropharynx is clear  Eyes:      General: No scleral icterus  Right eye: No discharge  Left eye: No discharge  Conjunctiva/sclera: Conjunctivae normal       Pupils: Pupils are equal, round, and reactive to light  Cardiovascular:      Rate and Rhythm: Normal rate and regular rhythm  Pulses: Normal pulses  Heart sounds: Normal heart sounds  No murmur heard  Pulmonary:      Effort: Pulmonary effort is normal  No respiratory distress  Breath sounds: Normal breath sounds  No wheezing or rales  Chest:      Chest wall: No tenderness  Abdominal:      Palpations: Abdomen is soft  Tenderness: There is no abdominal tenderness  Musculoskeletal:         General: Deformity (Obvious deformity to right wrist concerning for fracture; neurovascularly intact proximal and distal to the affected joint) and signs of injury present  Cervical back: Normal range of motion and neck supple  No tenderness  Skin:     General: Skin is warm and dry  Capillary Refill: Capillary refill takes less than 2 seconds  Neurological:      General: No focal deficit present  Mental Status: She is alert and oriented to person, place, and time  Mental status is at baseline  Motor: No weakness  Psychiatric:         Mood and Affect: Mood normal          Behavior: Behavior normal          Thought Content:  Thought content normal          Vital Signs  ED Triage Vitals [09/09/22 1259]   Temperature Pulse Respirations Blood Pressure SpO2   97 6 °F (36 4 °C) 74 16 (!) 174/74 98 %      Temp Source Heart Rate Source Patient Position - Orthostatic VS BP Location FiO2 (%)   Tympanic Monitor Lying Left arm --      Pain Score       10 - Worst Possible Pain           Vitals:    09/09/22 1343 09/09/22 1400 09/09/22 1430 09/09/22 1500   BP: 168/78 166/73 158/72 150/67   Pulse: 66 72 76 67   Patient Position - Orthostatic VS: Sitting Sitting Sitting Sitting         Visual Acuity  Visual Acuity    Flowsheet Row Most Recent Value   L Pupil Size (mm) 3   R Pupil Size (mm) 3          ED Medications  Medications   bupivacaine (PF) (MARCAINE) 0 25 % injection 15 mL (15 mL Intra-articular Given by Other 9/9/22 1330)   lidocaine (PF) (XYLOCAINE-MPF) 1 % injection 10 mL (10 mL Infiltration Given by Other 9/9/22 1317)   fentanyl citrate (PF) 100 MCG/2ML 100 mcg (50 mcg Intravenous Given 9/9/22 1426)       Diagnostic Studies  Results Reviewed     None                 XR wrist 3+ views RIGHT   Final Result by Thor Jama MD (09/09 1517)      Unchanged alignment at the site of acute dorsally displaced, dorsally angulated, slightly impacted fracture of the right distal radial metaphysis with intra-articular extension of fracture fragments  As per comments in electronic health record, findings are concordant with preliminary interpretation provided by the emergency room physician  Workstation performed: [id number]         XR wrist 3+ views RIGHT   Final Result by Thor Jama MD (09/09 1516)      Slightly improved alignment at the site of acute dorsally displaced, dorsally angulated, slightly impacted fracture of the right distal radial metaphysis with intra-articular extension of fracture fragments  As per comments in electronic health record, findings are concordant with preliminary interpretation provided by the emergency room physician  Workstation performed: [id number]         XR wrist 3+ views RIGHT   Final Result by Thor Jama MD (09/09 1515)      Acute dorsally displaced, dorsally angulated, slightly impacted fracture of the right distal radial metaphysis with intra-articular extension of fracture fragments  An ulnar styloid fracture is also noted        As per comments in electronic health record, findings are concordant with preliminary interpretation provided by the emergency room physician  Workstation performed: [id number]                    Procedures  Orthopedic injury treatment    Date/Time: 9/11/2022 3:52 PM  Performed by: Kojo Norman PA-C  Authorized by: Kojo Norman PA-C     Patient Location:  ED  [place] Protocol:  Consent: The procedure was performed in an emergent situation  Risks and benefits: risks, benefits and alternatives were discussed    Injury location:  Wrist  Location details:  Right wrist  Injury type:  Fracture  Fracture type: distal radius and ulnar styloid    Fracture type: distal radius and ulnar styloid    Neurovascular status: Neurovascularly intact    Distal perfusion: normal    Neurological function: normal    Range of motion: reduced    Local anesthesia used?: Yes    Anesthesia:  Hematoma block  Local anesthetic:  Lidocaine 1% with epinephrine  Anesthetic total (ml):  5  Manipulation performed?: Yes    Confirmation: Reduction confirmed by x-ray    Immobilization:  Splint  Splint type:  Sugar tong  Supplies used:  Cotton padding and Ortho-Glass  Neurovascular status: Neurovascularly intact    Distal perfusion: normal    Neurological function: normal    Patient tolerance:  Patient tolerated the procedure well with no immediate complications             ED Course  ED Course as of 09/11/22 1603   Fri Sep 09, 2022   1330 From Dr Baires in ortho: Doesn't change anything at this point  Yes do a reduction, place in a sugar tong splint, and follow up as an outpatient office  Thank you                               SBIRT 22yo+    Flowsheet Row Most Recent Value   SBIRT (23 yo +)    In order to provide better care to our patients, we are screening all of our patients for alcohol and drug use  Would it be okay to ask you these screening questions?  Yes Filed at: 09/09/2022 1305   Initial Alcohol Screen: US AUDIT-C     1  How often do you have a drink containing alcohol? 0 Filed at: 09/09/2022 1305   2  How many drinks containing alcohol do you have on a typical day you are drinking? 0 Filed at: 09/09/2022 1305   3a  Male UNDER 65: How often do you have five or more drinks on one occasion? 0 Filed at: 09/09/2022 1305   3b  FEMALE Any Age, or MALE 65+: How often do you have 4 or more drinks on one occassion? 0 Filed at: 09/09/2022 1305   Audit-C Score 0 Filed at: 09/09/2022 1305   [place]: How many times in the past year have you    Used an illegal drug or used a prescription medication for non-medical reasons? Never Filed at: 09/09/2022 1305                    MDM  Number of Diagnoses or Management Options  Closed fracture of right wrist, initial encounter: new and requires workup  Fall, initial encounter: new and requires workup  Diagnosis management comments: 57-year-old female presents emergency department with the right wrist deformity and pain status post fall on outstretched hand  Patient does not meet criteria for trauma activation at this time  Neurological exam unremarkable  Obvious deformity noted to the right wrist concerning for fracture  Confirmed distal radial fracture with plain films  Distal radial fracture displaced dorsally with ulnar styloid fracture  Discussed with Dr Jabari Dominique in Orthopedics who recommended reduction and splinting in the emergency department and sent for outpatient follow-up with Orthopedics  Slightly improved alignment of the fracture status post reduction  Splinted the right wrist in a sugar-tong splint and placed patient in an arm sling  Neurovascularly intact proximal and distal to effect weight before and after the procedure  Patient tolerated the procedure well with no immediate complications  Patient verbalizes understanding the plan for outpatient follow-up with Orthopedics  Discussed that she should remain in the splint until cleared by Orthopedics  Strict return precautions discussed  Sent a small course of narcotics to patient's pharmacy at the patient's request   Recommended she use Tylenol and Motrin for mild-to-moderate pain and save narcotics for severe or refractory pain  Patient stable at time of discharge  Amount and/or Complexity of Data Reviewed  Tests in the radiology section of CPT®: ordered and reviewed  Review and summarize past medical records: yes  Discuss the patient with other providers: yes  Independent visualization of images, tracings, or specimens: yes    Patient Progress  Patient progress: improved      Disposition  Final diagnoses:   Fall, initial encounter   Closed fracture of right wrist, initial encounter     Time reflects when diagnosis was documented in both MDM as applicable and the Disposition within this note     Time User Action Codes Description Comment    9/9/2022  3:08 PM Freddrick Bunting Add [W19  RZSH] Fall, initial encounter     9/9/2022  3:13 PM Freddrick Bunting Add [S62 101A] Closed fracture of right wrist, initial encounter     9/9/2022  3:14 PM Freddrick Bunting Modify [A83  EBUF] Fall, initial encounter     9/9/2022  3:14 PM Freddrick Bunting Modify [S62 101A] Closed fracture of right wrist, initial encounter       ED Disposition     ED Disposition   Discharge    Condition   Stable    Date/Time   Fri Sep 9, 2022  3:08 PM    Comment   Anny Tomlinson discharge to home/self care                 Follow-up Information     Follow up With Specialties Details Why Contact Info Additional [address]  Emergency Department Emergency Medicine Go to  If symptoms worsen [address] Aletha Ley[place] [id number] [phone] [facility] Emergency Department, [address] [place] [address] [address] [facility] Specialists [facility] [facility] Orthopedic Surgery Call in 3 days follow up for further evaluation of symptoms [address] [address]  [address] [place] [address] Specialists [place], [address] [facility] [place], [place], [place], [place] [address]          Discharge Medication List as of 9/9/2022  3:15 PM      START taking these medications    Details   oxyCODONE-acetaminophen (Percocet) 5-325 mg per tablet Take 1 tablet by mouth every 4 (four) hours as needed for moderate pain for up to 3 days Max Daily Amount: 6 tablets, Starting Fri 9/9/2022, Until Mon 9/12/2022 at 2359, Normal         CONTINUE these medications which have NOT CHANGED    Details   atorvastatin (LIPITOR) 40 mg tablet Starting Fri 4/29/2022, Historical Med      gabapentin (Neurontin) 100 mg capsule Take 1 capsule (100 mg total) by mouth in the morning and 1 capsule (100 mg total) in the evening and 1 capsule (100 mg total) before bedtime  Do all this for 14 days  , Starting Wed 5/11/2022, Until Wed 5/25/2022, Normal      levothyroxine 75 mcg tablet Starting Fri 4/29/2022, Historical Med      sertraline (ZOLOFT) 50 mg tablet Starting Fri 4/29/2022, Historical Med                 PDMP Review       Value Time User    PDMP Reviewed  Yes 5/11/2022 10:12 AM Velvet Lim          ED Provider  Electronically Signed by           Joanne Hunt PA-C  09/11/22 3386

## 2022-09-15 ENCOUNTER — TELEPHONE (OUTPATIENT)
Dept: OBGYN CLINIC | Facility: HOSPITAL | Age: 77
End: 2022-09-15

## 2022-09-25 NOTE — ED PROCEDURE NOTE
The orthopedic wrist reduction in the emergency department was performed on 9/9/22  Please see separate ED note on that date for further description of the procedure        Esteban Evangelista PA-C  09/24/22 1396

## 2022-10-03 ENCOUNTER — APPOINTMENT (OUTPATIENT)
Dept: RADIOLOGY | Facility: CLINIC | Age: 77
End: 2022-10-03
Payer: MEDICARE

## 2022-10-03 ENCOUNTER — OFFICE VISIT (OUTPATIENT)
Dept: OBGYN CLINIC | Facility: CLINIC | Age: 77
End: 2022-10-03
Payer: MEDICARE

## 2022-10-03 VITALS
DIASTOLIC BLOOD PRESSURE: 68 MMHG | WEIGHT: 137 LBS | BODY MASS INDEX: 21.5 KG/M2 | HEIGHT: 67 IN | SYSTOLIC BLOOD PRESSURE: 128 MMHG | HEART RATE: 83 BPM
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
## 2022-10-03 DIAGNOSIS — S52.501A FRACTURE OF LOWER END OF BOTH ULNA AND RADIUS, RIGHT, CLOSED, INITIAL ENCOUNTER: Primary | ICD-10-CM

## 2022-10-03 DIAGNOSIS — S52.601A FRACTURE OF LOWER END OF BOTH ULNA AND RADIUS, RIGHT, CLOSED, INITIAL ENCOUNTER: Primary | ICD-10-CM

## 2022-10-03 DIAGNOSIS — S62.101A CLOSED FRACTURE OF RIGHT WRIST, INITIAL ENCOUNTER: ICD-10-CM

## 2022-10-03 PROCEDURE — 99204 OFFICE O/P NEW MOD 45 MIN: CPT | Performed by: FAMILY MEDICINE

## 2022-10-03 PROCEDURE — 73110 X-RAY EXAM OF WRIST: CPT

## 2022-10-03 NOTE — PATIENT INSTRUCTIONS
F/u 3 wks  Begin wearing wrist brace  May take off for bathing  Icing/heat/OTC pain meds as needed  D/w Dr Mckeon Aubrie- not surgical at this time due to length of time in splint  Patient will have some ulnar sided wrist pain due to shortening due to fracture  Patient had weakness in fingers before the fall

## 2022-10-03 NOTE — PROGRESS NOTES
[facility] SPECIALISTS [place] [address]  [facility] [address] [phone]      Chief Complaint:  Chief Complaint   Patient presents with    Right Wrist - Pain       Vitals:  /68   Pulse 83   Ht 5' 7" (1 702 m)   Wt 62 1 kg (137 lb)   BMI 21 46 kg/m²     The following portions of the patient's history were reviewed and updated as appropriate: allergies, current medications, past family history, past medical history, past social history, past surgical history, and problem list       Subjective:   Patient ID: Saroj Mohan is a 76 y o  female  Here for f/u R wrist fx  Seen in ER- XR shows displaced fx  She fell on her R hand 4 wks later  Splinted and here wanting splint removed  Denies n/t  No pain  No pain meds  RIGHT WRIST     INDICATION:   fall, injury      COMPARISON:  May 11, 2022     VIEWS:  XR WRIST 3+ VW RIGHT         FINDINGS:     There is an acute dorsally displaced, dorsally angulated, slightly impacted fracture of the right distal radial metaphysis with intra-articular extension of fracture fragments  An ulnar styloid fracture is also noted  Soft tissue deformity and soft   tissue swelling is noted         IMPRESSION:     Acute dorsally displaced, dorsally angulated, slightly impacted fracture of the right distal radial metaphysis with intra-articular extension of fracture fragments  An ulnar styloid fracture is also noted      As per comments in electronic health record, findings are concordant with preliminary interpretation provided by the emergency room physician          Review of Systems   Constitutional: Negative for fatigue and fever  Respiratory: Negative for shortness of breath  Cardiovascular: Negative for chest pain  Gastrointestinal: Negative for abdominal pain and nausea  Genitourinary: Negative for dysuria  Musculoskeletal: Negative for arthralgias  Skin: Negative for rash and wound     Neurological: Negative for weakness and headaches  Objective:  Ortho Exam    Physical Exam  Vitals and nursing note reviewed  Constitutional:       Appearance: Normal appearance  She is well-developed  HENT:      Head: Normocephalic  Mouth/Throat:      Mouth: Mucous membranes are moist    Eyes:      Extraocular Movements: Extraocular movements intact  Cardiovascular:      Rate and Rhythm: Normal rate and regular rhythm  Heart sounds: Normal heart sounds  Pulmonary:      Effort: Pulmonary effort is normal       Breath sounds: Normal breath sounds  Abdominal:      General: Bowel sounds are normal       Palpations: Abdomen is soft  Musculoskeletal:         General: No tenderness  Normal range of motion  Cervical back: Normal range of motion  Comments: R hand- dec motor with finger abduction  NVI   no TTP   Skin:     General: Skin is warm and dry  Neurological:      General: No focal deficit present  Mental Status: She is alert and oriented to person, place, and time  Psychiatric:         Mood and Affect: Mood normal          Behavior: Behavior normal          Thought Content: Thought content normal          I have personally reviewed pertinent films in PACS and my interpretation is XR-  R wrist- no change in alignment of distal radius fx/ulnar styloid fx   some callus formation  Assessment/Plan:  Assessment/Plan   Diagnoses and all orders for this visit:    Fracture of lower end of both ulna and radius, right, closed, initial encounter  -     XR wrist 3+ vw right; Future  -     Brace    Fall, initial encounter  -     Ambulatory Referral to Orthopedic Surgery  -     XR wrist 3+ vw right; Future  -     Brace        Return in about 3 weeks (around 10/24/2022) for Recheck       Milton Rodriguez

## 2022-10-24 ENCOUNTER — OFFICE VISIT (OUTPATIENT)
Dept: OBGYN CLINIC | Facility: CLINIC | Age: 77
End: 2022-10-24
Payer: MEDICARE

## 2022-10-24 ENCOUNTER — APPOINTMENT (OUTPATIENT)
Dept: RADIOLOGY | Facility: CLINIC | Age: 77
End: 2022-10-24
Payer: MEDICARE

## 2022-10-24 VITALS
WEIGHT: 137 LBS | HEIGHT: 67 IN | BODY MASS INDEX: 21.5 KG/M2 | HEART RATE: 78 BPM | SYSTOLIC BLOOD PRESSURE: 136 MMHG | DIASTOLIC BLOOD PRESSURE: 72 MMHG

## 2022-10-24 DIAGNOSIS — S52.601D FRACTURE OF RADIUS, DISTAL, WITH ULNA, RIGHT, CLOSED, WITH ROUTINE HEALING, SUBSEQUENT ENCOUNTER: Primary | ICD-10-CM

## 2022-10-24 DIAGNOSIS — S52.501A FRACTURE OF LOWER END OF BOTH ULNA AND RADIUS, RIGHT, CLOSED, INITIAL ENCOUNTER: ICD-10-CM

## 2022-10-24 DIAGNOSIS — S52.501D FRACTURE OF RADIUS, DISTAL, WITH ULNA, RIGHT, CLOSED, WITH ROUTINE HEALING, SUBSEQUENT ENCOUNTER: Primary | ICD-10-CM

## 2022-10-24 DIAGNOSIS — S52.601A FRACTURE OF LOWER END OF BOTH ULNA AND RADIUS, RIGHT, CLOSED, INITIAL ENCOUNTER: ICD-10-CM

## 2022-10-24 PROCEDURE — 73110 X-RAY EXAM OF WRIST: CPT

## 2022-10-24 PROCEDURE — 99214 OFFICE O/P EST MOD 30 MIN: CPT | Performed by: FAMILY MEDICINE

## 2022-10-24 NOTE — PROGRESS NOTES
[facility] SPECIALISTS [place]  [address]  [facility] [address]  [phone]      Chief Complaint:  Chief Complaint   Patient presents with   • Right Wrist - Follow-up       Vitals:  /72 (BP Location: Left arm, Patient Position: Sitting, Cuff Size: Standard)   Pulse 78   Ht 5' 7" (1 702 m)   Wt 62 1 kg (137 lb)   BMI 21 46 kg/m²     The following portions of the patient's history were reviewed and updated as appropriate: allergies, current medications, past family history, past medical history, past social history, past surgical history, and problem list       Subjective:   Patient ID: Anna Ewing is a 76 y o  female  Here for f/u R wrist fx    She fell on her R hand 4 wks later  Wearing splint  Denies n/t  Chronic weakness  No pain  No pain meds  Review of Systems   Constitutional: Negative for fatigue and fever  Respiratory: Negative for shortness of breath  Cardiovascular: Negative for chest pain  Gastrointestinal: Negative for abdominal pain and nausea  Genitourinary: Negative for dysuria  Musculoskeletal: Positive for arthralgias  Skin: Negative for rash and wound  Neurological: Negative for weakness and headaches  Objective:  Ortho Exam    Physical Exam  Constitutional:       Appearance: Normal appearance  She is normal weight  HENT:      Head: Normocephalic  Eyes:      Extraocular Movements: Extraocular movements intact  Pulmonary:      Effort: Pulmonary effort is normal    Musculoskeletal:      Cervical back: Normal range of motion  Comments: R wrist- distal ulna TTP  Dec strength- chronic  Vascularly intact  Skin:     General: Skin is warm and dry  Neurological:      General: No focal deficit present  Mental Status: She is alert and oriented to person, place, and time  Mental status is at baseline     Psychiatric:         Mood and Affect: Mood normal          Behavior: Behavior normal  Thought Content: Thought content normal          Judgment: Judgment normal          I have personally reviewed pertinent films in PACS and my interpretation is XR-  R wrist- healing distal displaced radial fx, avulsion fx ulna styoid  Assessment/Plan:  Assessment/Plan   Diagnoses and all orders for this visit:    Fracture of radius, distal, with ulna, right, closed, with routine healing, subsequent encounter  -     XR wrist 3+ vw right; Future  -     Ambulatory Referral to Occupational Therapy; Future        Return in about 4 weeks (around 11/21/2022) for Recheck       Sukhdev Zelaya

## 2022-10-24 NOTE — PATIENT INSTRUCTIONS
F/u 4 wks  Begin occupational therapy  Icing/heat/OTC pain meds as needed  Begin taking off the brace for 1/2 day for 1 wk, then begin wearing only as needed  She has been casted and braced for 7 wks- want to avoid stiffness    DEXA scan

## 2022-12-29 ENCOUNTER — APPOINTMENT (OUTPATIENT)
Dept: LAB | Facility: CLINIC | Age: 77
End: 2022-12-29

## 2022-12-29 DIAGNOSIS — E78.5 HYPERLIPIDEMIA, UNSPECIFIED HYPERLIPIDEMIA TYPE: ICD-10-CM

## 2022-12-29 DIAGNOSIS — R73.01 IMPAIRED FASTING GLUCOSE: ICD-10-CM

## 2022-12-29 DIAGNOSIS — E03.9 MYXEDEMA HEART DISEASE: ICD-10-CM

## 2022-12-29 DIAGNOSIS — G62.9 NEUROPATHY: ICD-10-CM

## 2022-12-29 DIAGNOSIS — I51.9 MYXEDEMA HEART DISEASE: ICD-10-CM

## 2022-12-29 LAB
BASOPHILS # BLD AUTO: 0.05 THOUSANDS/ÂΜL (ref 0–0.1)
BASOPHILS NFR BLD AUTO: 1 % (ref 0–1)
CHOLEST SERPL-MCNC: 192 MG/DL
EOSINOPHIL # BLD AUTO: 0.33 THOUSAND/ÂΜL (ref 0–0.61)
EOSINOPHIL NFR BLD AUTO: 5 % (ref 0–6)
ERYTHROCYTE [DISTWIDTH] IN BLOOD BY AUTOMATED COUNT: 12.6 % (ref 11.6–15.1)
FERRITIN SERPL-MCNC: 89 NG/ML (ref 8–388)
FOLATE SERPL-MCNC: >20 NG/ML (ref 3.1–17.5)
HCT VFR BLD AUTO: 44.2 % (ref 34.8–46.1)
HDLC SERPL-MCNC: 91 MG/DL
HGB BLD-MCNC: 14.4 G/DL (ref 11.5–15.4)
IMM GRANULOCYTES # BLD AUTO: 0.01 THOUSAND/UL (ref 0–0.2)
IMM GRANULOCYTES NFR BLD AUTO: 0 % (ref 0–2)
IRON SATN MFR SERPL: 49 % (ref 15–50)
IRON SERPL-MCNC: 157 UG/DL (ref 50–170)
LDLC SERPL CALC-MCNC: 72 MG/DL (ref 0–100)
LYMPHOCYTES # BLD AUTO: 3.33 THOUSANDS/ÂΜL (ref 0.6–4.47)
LYMPHOCYTES NFR BLD AUTO: 54 % (ref 14–44)
MCH RBC QN AUTO: 32.4 PG (ref 26.8–34.3)
MCHC RBC AUTO-ENTMCNC: 32.6 G/DL (ref 31.4–37.4)
MCV RBC AUTO: 99 FL (ref 82–98)
MONOCYTES # BLD AUTO: 0.65 THOUSAND/ÂΜL (ref 0.17–1.22)
MONOCYTES NFR BLD AUTO: 10 % (ref 4–12)
NEUTROPHILS # BLD AUTO: 1.91 THOUSANDS/ÂΜL (ref 1.85–7.62)
NEUTS SEG NFR BLD AUTO: 30 % (ref 43–75)
NONHDLC SERPL-MCNC: 101 MG/DL
NRBC BLD AUTO-RTO: 0 /100 WBCS
PLATELET # BLD AUTO: 282 THOUSANDS/UL (ref 149–390)
PMV BLD AUTO: 9.2 FL (ref 8.9–12.7)
RBC # BLD AUTO: 4.45 MILLION/UL (ref 3.81–5.12)
T4 FREE SERPL-MCNC: 1.24 NG/DL (ref 0.76–1.46)
TIBC SERPL-MCNC: 321 UG/DL (ref 250–450)
TRIGL SERPL-MCNC: 147 MG/DL
TSH SERPL DL<=0.05 MIU/L-ACNC: 1.53 UIU/ML (ref 0.45–4.5)
VIT B12 SERPL-MCNC: 639 PG/ML (ref 100–900)
WBC # BLD AUTO: 6.28 THOUSAND/UL (ref 4.31–10.16)

## 2022-12-30 LAB
EST. AVERAGE GLUCOSE BLD GHB EST-MCNC: 131 MG/DL
HBA1C MFR BLD: 6.2 %

## 2023-02-17 ENCOUNTER — HOSPITAL ENCOUNTER (OUTPATIENT)
Dept: NON INVASIVE DIAGNOSTICS | Facility: CLINIC | Age: 78
Discharge: HOME | End: 2023-02-17

## 2023-02-17 VITALS
WEIGHT: 137 LBS | HEART RATE: 72 BPM | BODY MASS INDEX: 21.5 KG/M2 | SYSTOLIC BLOOD PRESSURE: 136 MMHG | DIASTOLIC BLOOD PRESSURE: 72 MMHG | HEIGHT: 67 IN

## 2023-02-17 DIAGNOSIS — R01.1 MURMUR: ICD-10-CM

## 2023-02-17 DIAGNOSIS — R06.02 SHORTNESS OF BREATH: ICD-10-CM

## 2023-02-17 LAB
AORTIC ROOT: 2.8 CM
AORTIC VALVE MEAN VELOCITY: 26.7 M/S
ASCENDING AORTA: 2.2 CM
AV AREA BY CONTINUOUS VTI: 1 CM2
AV AREA PEAK VELOCITY: 0.9 CM2
AV LVOT MEAN GRADIENT: 3 MMHG
AV LVOT PEAK GRADIENT: 5 MMHG
AV MEAN GRADIENT: 32 MMHG
AV PEAK GRADIENT: 55 MMHG
AV VALVE AREA: 1.02 CM2
AV VELOCITY RATIO: 0.29
DOP CALC AO PEAK VEL: 3.71 M/S
DOP CALC AO VTI: 85.95 CM
DOP CALC LVOT AREA: 3.14 CM2
DOP CALC LVOT DIAMETER: 2 CM
DOP CALC LVOT PEAK VEL VTI: 27.91 CM
DOP CALC LVOT PEAK VEL: 1.07 M/S
DOP CALC LVOT STROKE INDEX: 48.3 ML/M2
DOP CALC LVOT STROKE VOLUME: 87.64 CM3
E WAVE DECELERATION TIME: 279 MS
FRACTIONAL SHORTENING: 36 % (ref 28–44)
INTERVENTRICULAR SEPTUM IN DIASTOLE (PARASTERNAL SHORT AXIS VIEW): 1.1 CM
INTERVENTRICULAR SEPTUM: 1.1 CM (ref 0.6–1.1)
IVC: 13 MM
LAAS-AP2: 15 CM2
LAAS-AP4: 18.4 CM2
LEFT ATRIUM SIZE: 3.6 CM
LEFT INTERNAL DIMENSION IN SYSTOLE: 2.7 CM (ref 2.1–4)
LEFT VENTRICULAR INTERNAL DIMENSION IN DIASTOLE: 4.2 CM (ref 3.5–6)
LEFT VENTRICULAR POSTERIOR WALL IN END DIASTOLE: 1.1 CM
LEFT VENTRICULAR STROKE VOLUME: 54 ML
LVSV (TEICH): 54 ML
MV E'TISSUE VEL-SEP: 8 CM/S
MV PEAK A VEL: 1.19 M/S
MV PEAK E VEL: 98 CM/S
MV STENOSIS PRESSURE HALF TIME: 81 MS
MV VALVE AREA P 1/2 METHOD: 2.72 CM2
RIGHT ATRIUM AREA SYSTOLE A4C: 8.1 CM2
RIGHT VENTRICLE ID DIMENSION: 1.8 CM
SL CV LEFT ATRIUM LENGTH A2C: 4.2 CM
SL CV LV EF: 70
SL CV PED ECHO LEFT VENTRICLE DIASTOLIC VOLUME (MOD BIPLANE) 2D: 81 ML
SL CV PED ECHO LEFT VENTRICLE SYSTOLIC VOLUME (MOD BIPLANE) 2D: 27 ML
TR MAX PG: 16 MMHG
TR PEAK VELOCITY: 2 M/S
TRICUSPID VALVE PEAK REGURGITATION VELOCITY: 2.03 M/S

## 2023-03-21 ENCOUNTER — OFFICE VISIT (OUTPATIENT)
Dept: CARDIOLOGY CLINIC | Facility: CLINIC | Age: 78
End: 2023-03-21

## 2023-03-21 VITALS
WEIGHT: 143 LBS | DIASTOLIC BLOOD PRESSURE: 70 MMHG | BODY MASS INDEX: 22.44 KG/M2 | SYSTOLIC BLOOD PRESSURE: 142 MMHG | HEIGHT: 67 IN

## 2023-03-21 DIAGNOSIS — I35.0 NONRHEUMATIC AORTIC (VALVE) STENOSIS: Primary | ICD-10-CM

## 2023-03-21 DIAGNOSIS — E78.5 DYSLIPIDEMIA: ICD-10-CM

## 2023-03-21 RX ORDER — PREGABALIN 100 MG/1
100 CAPSULE ORAL 3 TIMES DAILY
COMMUNITY
Start: 2023-02-27

## 2023-03-21 NOTE — PATIENT INSTRUCTIONS
Call me for a visit if you develop exertional shortness of breath, chest pain or pressure, or lightheadedness/near fainting/fainting  Otherwise 1 year visit preceded by a repeat echocardiogram will be arranged

## 2023-03-21 NOTE — PROGRESS NOTES
Patient ID: Saroj Mohan is a 76 y o  female  Plan:      Nonrheumatic aortic (valve) stenosis  Not quite severe  No current symptoms  Will reassess by exam and echo in 1 year  Dyslipidemia  Tolerating statin therapy  Follow up Plan/Other summary comments:  Return in about 11 months (around 2/26/2024)  HPI: Patient is seen in consultation from Dr Olinda Ayala regarding aortic stenosis  Recent echo revealed moderate aortic stenosis  There has been no chest pain, chest pressure, shortness of breath, syncope or near syncope  Ambulation however is limited by neuropathy which she has developed within the past year  Results for orders placed or performed in visit on 03/21/23   POCT ECG    Impression    Normal sinus rhythm  Normal          Most recent or relevant cardiac/vascular testing:    TTE 2/17/2023: Normal LVEF  3 7 m/s flow across the aortic valve  Past Surgical History:   Procedure Laterality Date   • BOWEL RESECTION         Lipid Profile:   Lab Results   Component Value Date    TRIG 147 12/29/2022    HDL 91 12/29/2022         Review of Systems   10  point ROS  was otherwise non pertinent or negative except as per HPI or as below  Gait: Uses a walker  Objective:     /70   Ht 5' 7" (1 702 m)   Wt 64 9 kg (143 lb)   BMI 22 40 kg/m²     PHYSICAL EXAM:    General:  Normal appearance in no distress  Eyes:  Anicteric  Oral mucosa:  Moist   Neck:  No JVD  Carotid upstrokes are brisk but with transmitted murmur  No masses  Chest:  Clear to auscultation  Cardiac:  No palpable PMI  Normal S1 and S2   Grade 3-4 systolic murmur loudest at the base radiating to the neck  No gallop or rub  Abdomen:  Soft and nontender  No palpable organomegaly or aortic enlargement  Extremities:  No peripheral edema  Musculoskeletal:  Symmetric  Vascular:  Femoral pulses are brisk without bruits  Popliteal pulses are intact bilaterally  Pedal pulses are intact    Neuro: Grossly symmetric  Psych:  Alert and oriented x3          Current Outpatient Medications:   •  atorvastatin (LIPITOR) 40 mg tablet, Take 40 mg by mouth daily, Disp: , Rfl:   •  levothyroxine 75 mcg tablet, Take 1 tablet (75 mg) by oral route five days a week , Disp: , Rfl:   •  pregabalin (LYRICA) 100 mg capsule, Take 100 mg by mouth 3 (three) times a day, Disp: , Rfl:   No Known Allergies  Past Medical History:   Diagnosis Date   • Aortic stenosis    • Depression    • Disease of thyroid gland    • Hyperlipidemia    • Peripheral neuropathy            Social History     Tobacco Use   Smoking Status Never   Smokeless Tobacco Never

## 2023-09-21 ENCOUNTER — OFFICE VISIT (OUTPATIENT)
Dept: FAMILY MEDICINE CLINIC | Facility: CLINIC | Age: 78
End: 2023-09-21
Payer: MEDICARE

## 2023-09-21 ENCOUNTER — LAB (OUTPATIENT)
Age: 78
End: 2023-09-21
Payer: MEDICARE

## 2023-09-21 VITALS
BODY MASS INDEX: 23.14 KG/M2 | SYSTOLIC BLOOD PRESSURE: 112 MMHG | DIASTOLIC BLOOD PRESSURE: 80 MMHG | WEIGHT: 144 LBS | HEIGHT: 66 IN | TEMPERATURE: 95.1 F

## 2023-09-21 DIAGNOSIS — I35.0 NONRHEUMATIC AORTIC (VALVE) STENOSIS: ICD-10-CM

## 2023-09-21 DIAGNOSIS — E03.9 HYPOTHYROIDISM, UNSPECIFIED TYPE: ICD-10-CM

## 2023-09-21 DIAGNOSIS — K50.919 CROHN'S DISEASE WITH COMPLICATION, UNSPECIFIED GASTROINTESTINAL TRACT LOCATION (HCC): ICD-10-CM

## 2023-09-21 DIAGNOSIS — R73.09 ABNORMAL GLUCOSE: ICD-10-CM

## 2023-09-21 DIAGNOSIS — N18.30 STAGE 3 CHRONIC KIDNEY DISEASE, UNSPECIFIED WHETHER STAGE 3A OR 3B CKD (HCC): ICD-10-CM

## 2023-09-21 DIAGNOSIS — D53.9 ANEMIA ASSOCIATED WITH NUTRITIONAL DEFICIENCY: ICD-10-CM

## 2023-09-21 DIAGNOSIS — G62.9 NEUROPATHY: ICD-10-CM

## 2023-09-21 DIAGNOSIS — G62.9 NEUROPATHY: Primary | ICD-10-CM

## 2023-09-21 PROBLEM — E78.5 HYPERLIPIDEMIA: Status: ACTIVE | Noted: 2017-05-09

## 2023-09-21 LAB
ALBUMIN SERPL BCP-MCNC: 4.3 G/DL (ref 3.5–5)
ALP SERPL-CCNC: 56 U/L (ref 34–104)
ALT SERPL W P-5'-P-CCNC: 23 U/L (ref 7–52)
ANION GAP SERPL CALCULATED.3IONS-SCNC: 11 MMOL/L
AST SERPL W P-5'-P-CCNC: 37 U/L (ref 13–39)
BASOPHILS # BLD AUTO: 0.06 THOUSANDS/ÂΜL (ref 0–0.1)
BASOPHILS NFR BLD AUTO: 1 % (ref 0–1)
BILIRUB SERPL-MCNC: 0.88 MG/DL (ref 0.2–1)
BUN SERPL-MCNC: 17 MG/DL (ref 5–25)
CALCIUM SERPL-MCNC: 10.1 MG/DL (ref 8.4–10.2)
CHLORIDE SERPL-SCNC: 104 MMOL/L (ref 96–108)
CHOLEST SERPL-MCNC: 207 MG/DL
CO2 SERPL-SCNC: 26 MMOL/L (ref 21–32)
CREAT SERPL-MCNC: 1.13 MG/DL (ref 0.6–1.3)
EOSINOPHIL # BLD AUTO: 0.21 THOUSAND/ÂΜL (ref 0–0.61)
EOSINOPHIL NFR BLD AUTO: 4 % (ref 0–6)
ERYTHROCYTE [DISTWIDTH] IN BLOOD BY AUTOMATED COUNT: 13.9 % (ref 11.6–15.1)
EST. AVERAGE GLUCOSE BLD GHB EST-MCNC: 128 MG/DL
GFR SERPL CREATININE-BSD FRML MDRD: 46 ML/MIN/1.73SQ M
GLUCOSE P FAST SERPL-MCNC: 111 MG/DL (ref 65–99)
HBA1C MFR BLD: 6.1 %
HCT VFR BLD AUTO: 46.6 % (ref 34.8–46.1)
HDLC SERPL-MCNC: 72 MG/DL
HGB BLD-MCNC: 15.6 G/DL (ref 11.5–15.4)
IMM GRANULOCYTES # BLD AUTO: 0.01 THOUSAND/UL (ref 0–0.2)
IMM GRANULOCYTES NFR BLD AUTO: 0 % (ref 0–2)
LDLC SERPL CALC-MCNC: 103 MG/DL (ref 0–100)
LYMPHOCYTES # BLD AUTO: 2.69 THOUSANDS/ÂΜL (ref 0.6–4.47)
LYMPHOCYTES NFR BLD AUTO: 45 % (ref 14–44)
MCH RBC QN AUTO: 32.1 PG (ref 26.8–34.3)
MCHC RBC AUTO-ENTMCNC: 33.5 G/DL (ref 31.4–37.4)
MCV RBC AUTO: 96 FL (ref 82–98)
MONOCYTES # BLD AUTO: 0.58 THOUSAND/ÂΜL (ref 0.17–1.22)
MONOCYTES NFR BLD AUTO: 10 % (ref 4–12)
NEUTROPHILS # BLD AUTO: 2.35 THOUSANDS/ÂΜL (ref 1.85–7.62)
NEUTS SEG NFR BLD AUTO: 40 % (ref 43–75)
NRBC BLD AUTO-RTO: 0 /100 WBCS
PLATELET # BLD AUTO: 202 THOUSANDS/UL (ref 149–390)
PMV BLD AUTO: 10.1 FL (ref 8.9–12.7)
POTASSIUM SERPL-SCNC: 3.6 MMOL/L (ref 3.5–5.3)
PROT SERPL-MCNC: 6.8 G/DL (ref 6.4–8.4)
RBC # BLD AUTO: 4.86 MILLION/UL (ref 3.81–5.12)
SODIUM SERPL-SCNC: 141 MMOL/L (ref 135–147)
T4 FREE SERPL-MCNC: 1.18 NG/DL (ref 0.61–1.12)
TRIGL SERPL-MCNC: 159 MG/DL
TSH SERPL DL<=0.05 MIU/L-ACNC: 4.5 UIU/ML (ref 0.45–4.5)
VIT B12 SERPL-MCNC: 426 PG/ML (ref 180–914)
WBC # BLD AUTO: 5.9 THOUSAND/UL (ref 4.31–10.16)

## 2023-09-21 PROCEDURE — 83036 HEMOGLOBIN GLYCOSYLATED A1C: CPT

## 2023-09-21 PROCEDURE — 36415 COLL VENOUS BLD VENIPUNCTURE: CPT

## 2023-09-21 PROCEDURE — 99214 OFFICE O/P EST MOD 30 MIN: CPT | Performed by: FAMILY MEDICINE

## 2023-09-21 PROCEDURE — 80053 COMPREHEN METABOLIC PANEL: CPT

## 2023-09-21 PROCEDURE — 80061 LIPID PANEL: CPT

## 2023-09-21 PROCEDURE — 84443 ASSAY THYROID STIM HORMONE: CPT

## 2023-09-21 PROCEDURE — 84439 ASSAY OF FREE THYROXINE: CPT

## 2023-09-21 PROCEDURE — 82607 VITAMIN B-12: CPT

## 2023-09-21 PROCEDURE — 85025 COMPLETE CBC W/AUTO DIFF WBC: CPT

## 2023-09-21 RX ORDER — NORTRIPTYLINE HYDROCHLORIDE 25 MG/1
25 CAPSULE ORAL
Qty: 60 CAPSULE | Refills: 3 | Status: SHIPPED | OUTPATIENT
Start: 2023-09-21

## 2023-09-21 NOTE — ASSESSMENT & PLAN NOTE
Per patient request, switch Lyrica to nortriptyline. Start with low-dose at bedtime and titrate up to either twice daily dosing or 2 pills nightly at bedtime after 1 week. Discussed new findings today; hands appear cyanotic, cool to touch. Further work-up is indicated to rule out diagnoses such as thoracic outlet syndrome. Consider CT angiogram chest in addition to EMG studies of upper extremities that we had previously discussed. Patient declines at this time. Recheck symptoms, will revisit options for work-up at the time of the follow-up appointment--2 to 3 weeks.

## 2023-09-21 NOTE — PROGRESS NOTES
Name: Sosa Severino      : 1945      MRN: [id number]  Encounter Provider: Fish Vera DO  Encounter Date: 2023   Encounter department: [address] [facility]    Assessment & Plan     1. Neuropathy  Assessment & Plan:  Per patient request, switch Lyrica to nortriptyline. Start with low-dose at bedtime and titrate up to either twice daily dosing or 2 pills nightly at bedtime after 1 week. Discussed new findings today; hands appear cyanotic, cool to touch. Further work-up is indicated to rule out diagnoses such as thoracic outlet syndrome. Consider CT angiogram chest in addition to EMG studies of upper extremities that we had previously discussed. Patient declines at this time. Recheck symptoms, will revisit options for work-up at the time of the follow-up appointment--2 to 3 weeks. Orders:  -     nortriptyline (PAMELOR) 25 mg capsule; Take 1 capsule (25 mg total) by mouth daily at bedtime 1 PO QHS x 1 week then increase to 1 PO BID  -     Vitamin B12; Future    2. Hypothyroidism, unspecified type  Assessment & Plan:  Drawing thyroid function tests today. Orders:  -     TSH, 3rd generation; Future  -     T4, free; Future  -     Lipid Panel with Direct LDL reflex; Future    3. Nonrheumatic aortic (valve) stenosis  Assessment & Plan:  Discussed most recent echo. Again, patient hesitant to consider having a valve replaced. Upon further questioning she is more notably fatigued now versus 6 or 12 months ago. She will discuss management with her  and family. Can discuss management options again at her follow-up appointment in several weeks.       4. Stage 3 chronic kidney disease, unspecified whether stage 3a or 3b CKD [facility])  Assessment & Plan:  Lab Results   Component Value Date    EGFR 40 2021    EGFR 46 10/28/2020    EGFR 41 2019    CREATININE 1.29 2021    CREATININE 1.14 10/28/2020    CREATININE 1.27 2019   Order for routine labs placed today, review at follow-up appointment. Orders:  -     CBC and differential; Future  -     Comprehensive metabolic panel; Future    5. Crohn's disease with complication, unspecified gastrointestinal tract location ([address])    6. Abnormal glucose  -     Hemoglobin A1C; Future    7. Anemia associated with nutritional deficiency  -     Vitamin B12; Future        Depression Screening and Follow-up Plan: Patient was screened for depression during today's encounter. They screened negative with a PHQ-2 score of 2. Subjective      B/L UE pain/neuropathy-patient returns with symmetric bilateral upper extremity neuropathic pain. Complains of numbness tingling and pain bilateral hands for several years, progressive. Initially tried gabapentin at varying dosages, eventually ineffective and then switched to Lyrica. Lyrica dose has been adjusted several times, reports initially feels effective then gradually loses efficacy over time. Currently taking 150 mg 3 times daily. Patient's  has done some reading on nortriptyline and wondering if this is an option to treat her discomfort. Discussed in the past obtaining EMGs of upper extremities and/or imaging, Dopplers. Patient has always declined further work-up. Symptoms interfere with her quality of life at this point, hands feel weak, could no longer sew, fine motor skills are poor. AS-history of aortic stenosis, most recent echo performed spring this year. Mod to severe. Pt very hesitant to have valve replaced, sees Dr. Kenan White. Cardiology did advise she would need a valve replacement within 3 years. Patient denies significant dyspnea on exertion, palpitations, chest pain, presyncopal symptoms. Need to repeat echo in roughly 6 months. Review of Systems   Constitutional: Positive for activity change and fatigue. Negative for appetite change and fever. Respiratory: Negative for cough, chest tightness, shortness of breath and stridor. Cardiovascular: Negative for chest pain. Gastrointestinal: Negative for abdominal pain. Musculoskeletal: Positive for gait problem. Negative for joint swelling, myalgias, neck pain and neck stiffness. Neurological: Positive for numbness. Negative for dizziness and seizures. Psychiatric/Behavioral: Positive for decreased concentration and dysphoric mood. The patient is not nervous/anxious. Current Outpatient Medications on File Prior to Visit   Medication Sig   • atorvastatin (LIPITOR) 40 mg tablet Take 40 mg by mouth daily   • levothyroxine 75 mcg tablet Take 1 tablet (75 mg) by oral route five days a week. • [DISCONTINUED] pregabalin (LYRICA) 100 mg capsule Take 100 mg by mouth 3 (three) times a day       Objective     /80 (BP Location: Left arm, Patient Position: Sitting, Cuff Size: Standard)   Temp (!) 95.1 °F (35.1 °C)   Ht 5' 5.5" (1.664 m)   Wt 65.3 kg (144 lb)   BMI 23.60 kg/m²     Physical Exam  Vitals and nursing note reviewed. Constitutional:       General: She is not in acute distress. Appearance: Normal appearance. She is normal weight. She is not toxic-appearing. HENT:      Head: Normocephalic. Cardiovascular:      Rate and Rhythm: Normal rate and regular rhythm. Heart sounds: Murmur heard. Pulmonary:      Effort: Pulmonary effort is normal.      Breath sounds: Normal breath sounds. Musculoskeletal:      Right hand: No swelling. Normal range of motion. Decreased strength. Decreased capillary refill. Normal pulse. Left hand: No swelling. Normal range of motion. Decreased strength. Decreased capillary refill. Normal pulse. Cervical back: Normal range of motion. Comments:  strength decreased. Symmetric/intact radial pulses. Dusky appearance of hands, cyanotic nail beds. Neurological:      Mental Status: She is alert.    Psychiatric:         Mood and Affect: Mood normal.       Nadia Tomlinson DO

## 2023-09-21 NOTE — ASSESSMENT & PLAN NOTE
Discussed most recent echo. Again, patient hesitant to consider having a valve replaced. Upon further questioning she is more notably fatigued now versus 6 or 12 months ago. She will discuss management with her  and family. Can discuss management options again at her follow-up appointment in several weeks.

## 2023-09-21 NOTE — ASSESSMENT & PLAN NOTE
Lab Results   Component Value Date    EGFR 40 11/04/2021    EGFR 46 10/28/2020    EGFR 41 11/13/2019    CREATININE 1.29 11/04/2021    CREATININE 1.14 10/28/2020    CREATININE 1.27 11/13/2019   Order for routine labs placed today, review at follow-up appointment.

## 2023-10-03 ENCOUNTER — TELEPHONE (OUTPATIENT)
Dept: FAMILY MEDICINE CLINIC | Facility: CLINIC | Age: 78
End: 2023-10-03

## 2023-10-03 DIAGNOSIS — G62.9 NEUROPATHY: Primary | ICD-10-CM

## 2023-10-03 RX ORDER — PREGABALIN 200 MG/1
200 CAPSULE ORAL 3 TIMES DAILY
Qty: 90 CAPSULE | Refills: 1 | Status: SHIPPED | OUTPATIENT
Start: 2023-10-03

## 2023-10-03 NOTE — TELEPHONE ENCOUNTER
Spouse called and stated since starting the nortriptyline,she has been sleeping way to much, she's not eating,just totally out of it and also hallucinating. He read this can also interact with her thyroid meds. He stopped the medication yesterday.  He wants to know if they can restart her old medication and or increase that

## 2023-10-04 ENCOUNTER — RA CDI HCC (OUTPATIENT)
Dept: OTHER | Facility: HOSPITAL | Age: 78
End: 2023-10-04

## 2023-10-04 NOTE — PROGRESS NOTES
e11.22  [address] [facility] coding opportunities          Chart Reviewed number of suggestions sent to Provider: 1     Patients Insurance     Medicare Insurance: [employer]

## 2023-10-09 RX ORDER — PREGABALIN 150 MG/1
CAPSULE ORAL
COMMUNITY
Start: 2023-08-03 | End: 2023-10-12

## 2023-10-10 ENCOUNTER — TELEPHONE (OUTPATIENT)
Dept: ADMINISTRATIVE | Facility: OTHER | Age: 78
End: 2023-10-10

## 2023-10-10 NOTE — TELEPHONE ENCOUNTER
Upon review of the request/inquiry, we are reaching out to you to ask for assistance. We have reviewed all Chart Review tabs, Care Everywhere (CE), completed a chart search and were unable to locate requested item(s). If you feel this was an oversight, please respond with with location and date of service of the document(s). To respond with requested information, open this Encounter, navigate to the Routing section, add your response to the routing comments, add my name to the Recipient field, and select Send and Close Workspace.     Thank you  Lexi Lynch

## 2023-10-10 NOTE — TELEPHONE ENCOUNTER
----- Message from Ethel Little MA sent at 10/9/2023  2:51 PM EDT -----  Regarding: AWV  10/09/23 2:52 PM    Hello, our patient Vu Collier has had Medicare AWV completed/performed. Please assist in updating the patient chart by pulling the Care Everywhere (CE) document. The date of service is 12/08/2022.      Thank you,  [facility]

## 2023-10-12 ENCOUNTER — OFFICE VISIT (OUTPATIENT)
Dept: FAMILY MEDICINE CLINIC | Facility: CLINIC | Age: 78
End: 2023-10-12
Payer: MEDICARE

## 2023-10-12 VITALS
HEIGHT: 67 IN | SYSTOLIC BLOOD PRESSURE: 132 MMHG | DIASTOLIC BLOOD PRESSURE: 64 MMHG | WEIGHT: 141.2 LBS | TEMPERATURE: 97.5 F | BODY MASS INDEX: 22.16 KG/M2

## 2023-10-12 DIAGNOSIS — G62.9 NEUROPATHY: Primary | ICD-10-CM

## 2023-10-12 DIAGNOSIS — Z23 ENCOUNTER FOR IMMUNIZATION: ICD-10-CM

## 2023-10-12 PROCEDURE — 90471 IMMUNIZATION ADMIN: CPT

## 2023-10-12 PROCEDURE — 90662 IIV NO PRSV INCREASED AG IM: CPT

## 2023-10-12 PROCEDURE — 99213 OFFICE O/P EST LOW 20 MIN: CPT | Performed by: FAMILY MEDICINE

## 2023-10-12 PROCEDURE — G0008 ADMIN INFLUENZA VIRUS VAC: HCPCS

## 2023-10-12 NOTE — PROGRESS NOTES
Name: Oly Jauregui      : 1945      MRN: [id number]  Encounter Provider: Bo James DO  Encounter Date: 10/12/2023   Encounter department: [facility]     1. Neuropathy  Assessment & Plan:  I was able to talk patient into some EMG studies today. She declines further work-up at this time. Her symptoms are fairly stable on Lyrica 200 mg 3 times daily, however I did stress that we really cannot go any higher than this dose. Would be prudent to confirm a diagnosis so we can see if she has other options as far as treatment. Consider CT angiogram, further rheum w/u, UE arterial dopplers. Orders:  -     EMG 2 Limb Upper Extremity; Future    2. Encounter for immunization  -     influenza vaccine, high-dose, PF 0.7 mL (FLUZONE HIGH-DOSE)        Depression Screening and Follow-up Plan: Patient was screened for depression during today's encounter. They screened negative with a PHQ-2 score of 0. Subjective      Neuropathic pain B/L UEs-    here for recheck. At her last appointment Lyrica was switched to nortriptyline at the patient and 's request.  Had some side effects to nortriptyline (hallucinations, lethargy). Stopped the med immediately. Restarted the lyrica at a higher dose, 200mg TID. Seems to be helping some. Patient reports she is able to write with her right hand again and her  strength is a little better, however her hands still feel cool and clammy with some weakness. Up until this point patient has been very hesitant to allow me to work-up her neuropathic pain further. Review of Systems   Constitutional:  Positive for fatigue. Negative for activity change, appetite change and fever. HENT:  Negative for trouble swallowing. Respiratory:  Negative for apnea, cough, chest tightness, shortness of breath and stridor. Cardiovascular:  Negative for chest pain, palpitations and leg swelling. Gastrointestinal:  Negative for abdominal pain. Endocrine: Positive for cold intolerance. Musculoskeletal:  Positive for arthralgias. Negative for back pain, gait problem, joint swelling, myalgias, neck pain and neck stiffness. Skin:  Positive for pallor. Neurological:  Positive for numbness. Negative for dizziness, seizures and light-headedness. Psychiatric/Behavioral:  Positive for decreased concentration and dysphoric mood. The patient is not nervous/anxious. Current Outpatient Medications on File Prior to Visit   Medication Sig   • atorvastatin (LIPITOR) 40 mg tablet Take 40 mg by mouth daily   • levothyroxine 75 mcg tablet Take 1 tablet (75 mg) by oral route five days a week. • pregabalin (LYRICA) 200 MG capsule Take 1 capsule (200 mg total) by mouth 3 (three) times a day   • [DISCONTINUED] pregabalin (LYRICA) 150 mg capsule    • [DISCONTINUED] nortriptyline (PAMELOR) 25 mg capsule Take 1 capsule (25 mg total) by mouth daily at bedtime 1 PO QHS x 1 week then increase to 1 PO BID       Objective     /64 (BP Location: Left arm, Patient Position: Sitting, Cuff Size: Adult)   Temp 97.5 °F (36.4 °C) (Tympanic)   Ht 5' 7" (1.702 m)   Wt 64 kg (141 lb 3.2 oz)   BMI 22.12 kg/m²     Physical Exam  Vitals and nursing note reviewed. Constitutional:       General: She is not in acute distress. Appearance: Normal appearance. She is normal weight. She is not toxic-appearing. HENT:      Head: Normocephalic. Cardiovascular:      Rate and Rhythm: Normal rate and regular rhythm. Heart sounds: Murmur heard. Pulmonary:      Effort: Pulmonary effort is normal.      Breath sounds: Normal breath sounds. Musculoskeletal:      Right hand: No swelling. Normal range of motion. Decreased strength. Decreased capillary refill. Normal pulse. Left hand: No swelling. Normal range of motion. Decreased strength. Decreased capillary refill. Normal pulse.       Cervical back: Normal range of motion. Comments:  strength decreased. Symmetric/intact radial pulses. Dusky appearance of hands, cyanotic nail beds. Skin:     Capillary Refill: Capillary refill takes 2 to 3 seconds. Neurological:      General: No focal deficit present. Mental Status: She is alert. Psychiatric:         Mood and Affect: Mood normal.         Thought Content:  Thought content normal.         Judgment: Judgment normal.       Nadia Tomlinson, DO

## 2023-10-12 NOTE — ASSESSMENT & PLAN NOTE
I was able to talk patient into some EMG studies today. She declines further work-up at this time. Her symptoms are fairly stable on Lyrica 200 mg 3 times daily, however I did stress that we really cannot go any higher than this dose. Would be prudent to confirm a diagnosis so we can see if she has other options as far as treatment. Consider CT angiogram, further rheum w/u, UE arterial dopplers.

## 2023-11-04 DIAGNOSIS — G62.9 NEUROPATHY: ICD-10-CM

## 2023-11-06 RX ORDER — PREGABALIN 200 MG/1
200 CAPSULE ORAL 3 TIMES DAILY
Qty: 90 CAPSULE | Refills: 5 | Status: SHIPPED | OUTPATIENT
Start: 2023-11-06

## 2023-11-30 ENCOUNTER — RA CDI HCC (OUTPATIENT)
Dept: OTHER | Facility: HOSPITAL | Age: 78
End: 2023-11-30

## 2023-11-30 NOTE — PROGRESS NOTES
E11.22  [address] [facility] coding opportunities          Chart Reviewed number of suggestions sent to Provider: 1     Patients Insurance     Medicare Insurance: [employer]

## 2023-12-24 ENCOUNTER — HOSPITAL ENCOUNTER (INPATIENT)
Facility: HOSPITAL | Age: 78
LOS: 1 days | Discharge: HOME HEALTH | DRG: 690 | End: 2023-12-26
Attending: EMERGENCY MEDICINE | Admitting: HOSPITALIST
Payer: MEDICARE

## 2023-12-24 ENCOUNTER — APPOINTMENT (EMERGENCY)
Dept: CT IMAGING | Facility: HOSPITAL | Age: 78
DRG: 690 | End: 2023-12-24
Payer: MEDICARE

## 2023-12-24 DIAGNOSIS — N30.00 ACUTE CYSTITIS WITHOUT HEMATURIA: Primary | ICD-10-CM

## 2023-12-24 PROBLEM — R93.5 ABNORMAL CT SCAN, PELVIS: Status: ACTIVE | Noted: 2023-12-24

## 2023-12-24 PROBLEM — E83.52 HYPERCALCEMIA: Status: ACTIVE | Noted: 2023-12-24

## 2023-12-24 LAB
ALBUMIN SERPL BCP-MCNC: 4.3 G/DL (ref 3.5–5)
ALP SERPL-CCNC: 44 U/L (ref 34–104)
ALT SERPL W P-5'-P-CCNC: 15 U/L (ref 7–52)
ANION GAP SERPL CALCULATED.3IONS-SCNC: 10 MMOL/L
AST SERPL W P-5'-P-CCNC: 29 U/L (ref 13–39)
BACTERIA UR QL AUTO: ABNORMAL /HPF
BACTERIA UR QL AUTO: ABNORMAL /HPF
BASOPHILS # BLD AUTO: 0.03 THOUSANDS/ÂΜL (ref 0–0.1)
BASOPHILS NFR BLD AUTO: 0 % (ref 0–1)
BILIRUB SERPL-MCNC: 0.7 MG/DL (ref 0.2–1)
BILIRUB UR QL STRIP: ABNORMAL
BILIRUB UR QL STRIP: ABNORMAL
BUN SERPL-MCNC: 25 MG/DL (ref 5–25)
CALCIUM SERPL-MCNC: 10.6 MG/DL (ref 8.4–10.2)
CHLORIDE SERPL-SCNC: 103 MMOL/L (ref 96–108)
CLARITY UR: ABNORMAL
CLARITY UR: ABNORMAL
CO2 SERPL-SCNC: 24 MMOL/L (ref 21–32)
COLOR UR: ABNORMAL
COLOR UR: ABNORMAL
CREAT SERPL-MCNC: 1.06 MG/DL (ref 0.6–1.3)
EOSINOPHIL # BLD AUTO: 0.21 THOUSAND/ÂΜL (ref 0–0.61)
EOSINOPHIL NFR BLD AUTO: 3 % (ref 0–6)
ERYTHROCYTE [DISTWIDTH] IN BLOOD BY AUTOMATED COUNT: 12.8 % (ref 11.6–15.1)
GFR SERPL CREATININE-BSD FRML MDRD: 49 ML/MIN/1.73SQ M
GLUCOSE SERPL-MCNC: 108 MG/DL (ref 65–140)
GLUCOSE UR STRIP-MCNC: ABNORMAL MG/DL
GLUCOSE UR STRIP-MCNC: ABNORMAL MG/DL
HCT VFR BLD AUTO: 45.2 % (ref 34.8–46.1)
HGB BLD-MCNC: 14.9 G/DL (ref 11.5–15.4)
HGB UR QL STRIP.AUTO: ABNORMAL
HGB UR QL STRIP.AUTO: ABNORMAL
IMM GRANULOCYTES # BLD AUTO: 0.01 THOUSAND/UL (ref 0–0.2)
IMM GRANULOCYTES NFR BLD AUTO: 0 % (ref 0–2)
KETONES UR STRIP-MCNC: ABNORMAL MG/DL
KETONES UR STRIP-MCNC: ABNORMAL MG/DL
LEUKOCYTE ESTERASE UR QL STRIP: ABNORMAL
LEUKOCYTE ESTERASE UR QL STRIP: ABNORMAL
LIPASE SERPL-CCNC: 27 U/L (ref 11–82)
LYMPHOCYTES # BLD AUTO: 2.09 THOUSANDS/ÂΜL (ref 0.6–4.47)
LYMPHOCYTES NFR BLD AUTO: 25 % (ref 14–44)
MCH RBC QN AUTO: 32.5 PG (ref 26.8–34.3)
MCHC RBC AUTO-ENTMCNC: 33 G/DL (ref 31.4–37.4)
MCV RBC AUTO: 99 FL (ref 82–98)
MONOCYTES # BLD AUTO: 0.88 THOUSAND/ÂΜL (ref 0.17–1.22)
MONOCYTES NFR BLD AUTO: 10 % (ref 4–12)
NEUTROPHILS # BLD AUTO: 5.29 THOUSANDS/ÂΜL (ref 1.85–7.62)
NEUTS SEG NFR BLD AUTO: 62 % (ref 43–75)
NITRITE UR QL STRIP: ABNORMAL
NITRITE UR QL STRIP: ABNORMAL
NON-SQ EPI CELLS URNS QL MICRO: ABNORMAL /HPF
NON-SQ EPI CELLS URNS QL MICRO: ABNORMAL /HPF
NRBC BLD AUTO-RTO: 0 /100 WBCS
PH UR STRIP.AUTO: ABNORMAL [PH]
PH UR STRIP.AUTO: ABNORMAL [PH]
PLATELET # BLD AUTO: 188 THOUSANDS/UL (ref 149–390)
PMV BLD AUTO: 9.8 FL (ref 8.9–12.7)
POTASSIUM SERPL-SCNC: 3.7 MMOL/L (ref 3.5–5.3)
PROT SERPL-MCNC: 7 G/DL (ref 6.4–8.4)
PROT UR STRIP-MCNC: ABNORMAL MG/DL
PROT UR STRIP-MCNC: ABNORMAL MG/DL
RBC # BLD AUTO: 4.59 MILLION/UL (ref 3.81–5.12)
RBC #/AREA URNS AUTO: ABNORMAL /HPF
RBC #/AREA URNS AUTO: ABNORMAL /HPF
SODIUM SERPL-SCNC: 137 MMOL/L (ref 135–147)
SP GR UR STRIP.AUTO: 1.01
SP GR UR STRIP.AUTO: 1.03
URINE COMMENT: ABNORMAL
URINE COMMENT: ABNORMAL
UROBILINOGEN UR QL STRIP.AUTO: ABNORMAL E.U./DL
UROBILINOGEN UR QL STRIP.AUTO: ABNORMAL E.U./DL
WBC # BLD AUTO: 8.51 THOUSAND/UL (ref 4.31–10.16)
WBC #/AREA URNS AUTO: ABNORMAL /HPF
WBC #/AREA URNS AUTO: ABNORMAL /HPF

## 2023-12-24 PROCEDURE — 99285 EMERGENCY DEPT VISIT HI MDM: CPT | Performed by: EMERGENCY MEDICINE

## 2023-12-24 PROCEDURE — 85025 COMPLETE CBC W/AUTO DIFF WBC: CPT | Performed by: EMERGENCY MEDICINE

## 2023-12-24 PROCEDURE — 74177 CT ABD & PELVIS W/CONTRAST: CPT

## 2023-12-24 PROCEDURE — 96375 TX/PRO/DX INJ NEW DRUG ADDON: CPT

## 2023-12-24 PROCEDURE — 87086 URINE CULTURE/COLONY COUNT: CPT | Performed by: EMERGENCY MEDICINE

## 2023-12-24 PROCEDURE — 87040 BLOOD CULTURE FOR BACTERIA: CPT | Performed by: HOSPITALIST

## 2023-12-24 PROCEDURE — 87086 URINE CULTURE/COLONY COUNT: CPT | Performed by: HOSPITALIST

## 2023-12-24 PROCEDURE — 83690 ASSAY OF LIPASE: CPT | Performed by: EMERGENCY MEDICINE

## 2023-12-24 PROCEDURE — 36415 COLL VENOUS BLD VENIPUNCTURE: CPT | Performed by: EMERGENCY MEDICINE

## 2023-12-24 PROCEDURE — 81001 URINALYSIS AUTO W/SCOPE: CPT | Performed by: HOSPITALIST

## 2023-12-24 PROCEDURE — 99223 1ST HOSP IP/OBS HIGH 75: CPT | Performed by: HOSPITALIST

## 2023-12-24 PROCEDURE — 80053 COMPREHEN METABOLIC PANEL: CPT | Performed by: EMERGENCY MEDICINE

## 2023-12-24 PROCEDURE — 1124F ACP DISCUSS-NO DSCNMKR DOCD: CPT | Performed by: EMERGENCY MEDICINE

## 2023-12-24 PROCEDURE — 96361 HYDRATE IV INFUSION ADD-ON: CPT

## 2023-12-24 PROCEDURE — 96365 THER/PROPH/DIAG IV INF INIT: CPT

## 2023-12-24 PROCEDURE — 81001 URINALYSIS AUTO W/SCOPE: CPT | Performed by: EMERGENCY MEDICINE

## 2023-12-24 PROCEDURE — 99285 EMERGENCY DEPT VISIT HI MDM: CPT

## 2023-12-24 RX ORDER — ACETAMINOPHEN 325 MG/1
650 TABLET ORAL EVERY 6 HOURS PRN
Status: DISCONTINUED | OUTPATIENT
Start: 2023-12-24 | End: 2023-12-26 | Stop reason: HOSPADM

## 2023-12-24 RX ORDER — LEVOTHYROXINE SODIUM 0.07 MG/1
75 TABLET ORAL
Status: DISCONTINUED | OUTPATIENT
Start: 2023-12-25 | End: 2023-12-26 | Stop reason: HOSPADM

## 2023-12-24 RX ORDER — CEFTRIAXONE 1 G/50ML
1000 INJECTION, SOLUTION INTRAVENOUS EVERY 24 HOURS
Status: DISCONTINUED | OUTPATIENT
Start: 2023-12-25 | End: 2023-12-26

## 2023-12-24 RX ORDER — ATORVASTATIN CALCIUM 40 MG/1
40 TABLET, FILM COATED ORAL DAILY
Status: DISCONTINUED | OUTPATIENT
Start: 2023-12-24 | End: 2023-12-26 | Stop reason: HOSPADM

## 2023-12-24 RX ORDER — CEFTRIAXONE 1 G/50ML
1000 INJECTION, SOLUTION INTRAVENOUS ONCE
Status: COMPLETED | OUTPATIENT
Start: 2023-12-24 | End: 2023-12-24

## 2023-12-24 RX ORDER — PREGABALIN 100 MG/1
200 CAPSULE ORAL 3 TIMES DAILY
Status: DISCONTINUED | OUTPATIENT
Start: 2023-12-24 | End: 2023-12-26 | Stop reason: HOSPADM

## 2023-12-24 RX ORDER — SODIUM CHLORIDE, SODIUM GLUCONATE, SODIUM ACETATE, POTASSIUM CHLORIDE, MAGNESIUM CHLORIDE, SODIUM PHOSPHATE, DIBASIC, AND POTASSIUM PHOSPHATE .53; .5; .37; .037; .03; .012; .00082 G/100ML; G/100ML; G/100ML; G/100ML; G/100ML; G/100ML; G/100ML
75 INJECTION, SOLUTION INTRAVENOUS CONTINUOUS
Status: DISCONTINUED | OUTPATIENT
Start: 2023-12-24 | End: 2023-12-26

## 2023-12-24 RX ORDER — ENOXAPARIN SODIUM 100 MG/ML
40 INJECTION SUBCUTANEOUS DAILY
Status: DISCONTINUED | OUTPATIENT
Start: 2023-12-24 | End: 2023-12-26 | Stop reason: HOSPADM

## 2023-12-24 RX ORDER — HYDROMORPHONE HCL IN WATER/PF 6 MG/30 ML
0.2 PATIENT CONTROLLED ANALGESIA SYRINGE INTRAVENOUS ONCE
Status: COMPLETED | OUTPATIENT
Start: 2023-12-24 | End: 2023-12-24

## 2023-12-24 RX ORDER — OXYCODONE HYDROCHLORIDE AND ACETAMINOPHEN 5; 325 MG/1; MG/1
1 TABLET ORAL EVERY 4 HOURS PRN
Status: DISCONTINUED | OUTPATIENT
Start: 2023-12-24 | End: 2023-12-26 | Stop reason: HOSPADM

## 2023-12-24 RX ADMIN — PREGABALIN 200 MG: 100 CAPSULE ORAL at 20:02

## 2023-12-24 RX ADMIN — IOHEXOL 100 ML: 350 INJECTION, SOLUTION INTRAVENOUS at 14:36

## 2023-12-24 RX ADMIN — ATORVASTATIN CALCIUM 40 MG: 40 TABLET, FILM COATED ORAL at 15:59

## 2023-12-24 RX ADMIN — SODIUM CHLORIDE, SODIUM GLUCONATE, SODIUM ACETATE, POTASSIUM CHLORIDE, MAGNESIUM CHLORIDE, SODIUM PHOSPHATE, DIBASIC, AND POTASSIUM PHOSPHATE 75 ML/HR: .53; .5; .37; .037; .03; .012; .00082 INJECTION, SOLUTION INTRAVENOUS at 16:39

## 2023-12-24 RX ADMIN — HYDROMORPHONE HYDROCHLORIDE 0.2 MG: 0.2 INJECTION, SOLUTION INTRAMUSCULAR; INTRAVENOUS; SUBCUTANEOUS at 14:18

## 2023-12-24 RX ADMIN — OXYCODONE HYDROCHLORIDE AND ACETAMINOPHEN 1 TABLET: 5; 325 TABLET ORAL at 16:55

## 2023-12-24 RX ADMIN — CEFTRIAXONE 1000 MG: 1 INJECTION, SOLUTION INTRAVENOUS at 15:01

## 2023-12-24 RX ADMIN — ENOXAPARIN SODIUM 40 MG: 100 INJECTION SUBCUTANEOUS at 15:59

## 2023-12-24 RX ADMIN — SODIUM CHLORIDE 500 ML: 0.9 INJECTION, SOLUTION INTRAVENOUS at 14:11

## 2023-12-24 NOTE — PLAN OF CARE

## 2023-12-24 NOTE — ASSESSMENT & PLAN NOTE
Mild hypercalcemia noted-arrival calcium level 10.6  Question secondary to the possibility of dehydration  Will treat with IV fluid  We will recheck a calcium level in the a.m., if it is still abnormal, will start a more detailed workup

## 2023-12-24 NOTE — ASSESSMENT & PLAN NOTE
CT pelvis with the following results-Thickened endometrial stripe measuring 15 mm in a postmenopausal female. Consider pelvic ultrasound on an outpatient basis for further evaluation.  Findings of pelvic floor laxity and multicompartment pelvic prolapse.   Can follow-up in the outpatient setting with PCP, and/or GYN

## 2023-12-24 NOTE — ED NOTES
Patient encouraged to provide urine sample at this time. Patient reports unable, will reassess.     Allison A Schoener, RN  12/24/23 [id number]

## 2023-12-24 NOTE — H&P
[facility]  H&P  Name: Daily Schaeffer 78 y.o. female I MRN: [id number]  Unit/Bed#: ED 27 I Date of Admission: 12/24/2023   Date of Service: 12/24/2023 I Hospital Day: 0      Assessment/Plan   * Acute cystitis without hematuria  Assessment & Plan  Admit to [facility] observation  Initial CT abdomen-Thickened hyperemic urinary bladder concerning for acute cystitis.   Will repeat the initial urinalysis since parts of the initial urinalysis were unable to be analyzed due to interference  Will check 2 sets of blood cultures  Continue ceftriaxone as started in the ER  Will treat with IV fluid  Repeat blood work in the a.m.    Hypercalcemia  Assessment & Plan  Mild hypercalcemia noted-arrival calcium level 10.6  Question secondary to the possibility of dehydration  Will treat with IV fluid  We will recheck a calcium level in the a.m., if it is still abnormal, will start a more detailed workup    Dyslipidemia  Assessment & Plan  Continue Lipitor as the patient was taking at home    Hypothyroidism  Assessment & Plan  Continue Synthroid as the patient was taking at home    Neuropathy  Assessment & Plan  Continue home dosing of Lyrica    Abnormal CT scan, pelvis  Assessment & Plan  CT pelvis with the following results-Thickened endometrial stripe measuring 15 mm in a postmenopausal female. Consider pelvic ultrasound on an outpatient basis for further evaluation.  Findings of pelvic floor laxity and multicompartment pelvic prolapse.   Can follow-up in the outpatient setting with PCP, and/or GYN         VTE Pharmacologic Prophylaxis: VTE Score: 5 High Risk (Score >/= 5) - Pharmacological DVT Prophylaxis Ordered: enoxaparin (Lovenox). Sequential Compression Devices Ordered.  Code Status: Level 1 - Full Code full CODE STATUS level 1-reviewed with patient  Discussion with family: Patient declined call to .     Anticipated Length of Stay: Patient will be admitted on an observation basis with an  anticipated length of stay of less than 2 midnights secondary to need for IV fluid, and IV antibiotics.    Total Time Spent on Date of Encounter in care of patient: 65 mins. This time was spent on one or more of the following: performing physical exam; counseling and coordination of care; obtaining or reviewing history; documenting in the medical record; reviewing/ordering tests, medications or procedures; communicating with other healthcare professionals and discussing with patient's family/caregivers.    Chief Complaint: Generalized weakness, and burning with urination x 2 days    History of Present Illness:  Daily Schaeffer is a 78 y.o. female with a PMH as outlined below who presents with the aforementioned chief complaint.  Patient reports that she was in her usual state of health up until approximately 2 days ago.  2 days ago, she started noticing that she was having some painful urination and specifically burning with urination.  She also noted over the past 2 days that she was becoming more and more fatigued.  She denies any chest pain, nausea, vomiting, diarrhea, fevers, but endorses chills.  She denies any numbness, tingling, and/or palpitations.  She reports that her urine is a little darker than usual.  Today, she was having a difficult time completing her activities of daily living, secondary to fatigue and weakness.  She also complains of some suprapubic tenderness/abdominal discomfort that she rates as a 6 out of 10.  Quality is described as dull.  No aggravating, alleviating factors, and/or radiation.  She came into the emergency room to be evaluated, and was subsequently diagnosed with a suspected UTI, and a mild hypercalcemia hence the reason for admission.    Review of Systems:  Review of Systems   Constitutional:  Positive for chills and fatigue.   Genitourinary:  Positive for dysuria, frequency and urgency.   Musculoskeletal:  Positive for arthralgias.   All other systems reviewed and are  "negative.      Past Medical and Surgical History:   Past Medical History:   Diagnosis Date    Aortic stenosis     Depression     Disease of thyroid gland     Hyperlipidemia     Peripheral neuropathy        Past Surgical History:   Procedure Laterality Date    BOWEL RESECTION         Meds/Allergies:  Prior to Admission medications    Medication Sig Start Date End Date Taking? Authorizing Provider   atorvastatin (LIPITOR) 40 mg tablet Take 40 mg by mouth daily 4/29/22   Historical Provider, MD   levothyroxine 75 mcg tablet Take 1 tablet (75 mg) by oral route five days a week. 4/29/22   Historical Provider, MD   pregabalin (LYRICA) 200 MG capsule Take 1 capsule (200 mg total) by mouth 3 (three) times a day 11/6/23   Alanna Moreau,      I have reviewed home medications with patient personally.    Allergies: No Known Allergies    Social History:  Marital Status: /Civil Union   Occupation: Retired  Patient Pre-hospital Living Situation: Home  Patient Pre-hospital Level of Mobility: walks  Patient Pre-hospital Diet Restrictions: None  Substance Use History:   Social History     Substance and Sexual Activity   Alcohol Use Not Currently    Comment: occasional     Social History     Tobacco Use   Smoking Status Never   Smokeless Tobacco Never     Social History     Substance and Sexual Activity   Drug Use Never       Family History:  History reviewed. No pertinent family history.    Physical Exam:     Vitals:   Blood Pressure: 142/62 (12/24/23 1400)  Pulse: 79 (12/24/23 1400)  Temperature: (!) 97.4 °F (36.3 °C) (12/24/23 1310)  Temp Source: Temporal (12/24/23 1310)  Respirations: 20 (12/24/23 1400)  Height: 5' 7\" (170.2 cm) (12/24/23 1310)  Weight - Scale: 61.2 kg (135 lb) (12/24/23 1310)  SpO2: 93 % (12/24/23 1400)    Physical Exam  Constitutional:       General: She is not in acute distress.     Appearance: Normal appearance. She is normal weight. She is not ill-appearing.   HENT:      Head: " Normocephalic and atraumatic.      Nose: Nose normal.      Mouth/Throat:      Mouth: Mucous membranes are moist.   Eyes:      Extraocular Movements: Extraocular movements intact.      Pupils: Pupils are equal, round, and reactive to light.   Cardiovascular:      Rate and Rhythm: Normal rate and regular rhythm.      Pulses: Normal pulses.      Heart sounds: Normal heart sounds. No murmur heard.     No friction rub. No gallop.   Pulmonary:      Effort: Pulmonary effort is normal. No respiratory distress.      Breath sounds: Normal breath sounds. No wheezing, rhonchi or rales.   Abdominal:      General: There is no distension.      Palpations: Abdomen is soft. There is no mass.      Tenderness: There is no abdominal tenderness.      Hernia: No hernia is present.   Musculoskeletal:         General: No swelling or tenderness. Normal range of motion.      Cervical back: Normal range of motion and neck supple. No rigidity.      Right lower leg: No edema.      Left lower leg: No edema.   Skin:     General: Skin is warm.      Capillary Refill: Capillary refill takes less than 2 seconds.      Findings: No erythema or rash.   Neurological:      General: No focal deficit present.      Mental Status: She is alert and oriented to person, place, and time. Mental status is at baseline.      Cranial Nerves: No cranial nerve deficit.      Motor: No weakness.   Psychiatric:         Mood and Affect: Mood normal.         Behavior: Behavior normal.          Additional Data:     Lab Results:  Results from last 7 days   Lab Units 12/24/23  1318   WBC Thousand/uL 8.51   HEMOGLOBIN g/dL 14.9   HEMATOCRIT % 45.2   PLATELETS Thousands/uL 188   NEUTROS PCT % 62   LYMPHS PCT % 25   MONOS PCT % 10   EOS PCT % 3     Results from last 7 days   Lab Units 12/24/23  1318   SODIUM mmol/L 137   POTASSIUM mmol/L 3.7   CHLORIDE mmol/L 103   CO2 mmol/L 24   BUN mg/dL 25   CREATININE mg/dL 1.06   ANION GAP mmol/L 10   CALCIUM mg/dL 10.6*   ALBUMIN g/dL 4.3    TOTAL BILIRUBIN mg/dL 0.70   ALK PHOS U/L 44   ALT U/L 15   AST U/L 29   GLUCOSE RANDOM mg/dL 108                       Lines/Drains:  Invasive Devices       Peripheral Intravenous Line  Duration             Peripheral IV 12/24/23 Right Antecubital <1 day                        Imaging: Reviewed radiology reports from this admission including: abdominal/pelvic CT  CT abdomen pelvis with contrast   Final Result by Sang Haney MD (12/24 7481)      1.  Thickened hyperemic urinary bladder concerning for acute cystitis.   2.  Thickened endometrial stripe measuring 15 mm in a postmenopausal female. Consider pelvic ultrasound on an outpatient basis for further evaluation.   3.  Findings of pelvic floor laxity and multicompartment pelvic prolapse.      The study was marked in EPIC for significant notification.      Workstation performed: [id number]             EKG and Other Studies Reviewed on Admission:   EKG: No EKG obtained.    ** Please Note: This note has been constructed using a voice recognition system. **

## 2023-12-24 NOTE — ASSESSMENT & PLAN NOTE
Admit to [facility] observation  Initial CT abdomen-Thickened hyperemic urinary bladder concerning for acute cystitis.   Will repeat the initial urinalysis since parts of the initial urinalysis were unable to be analyzed due to interference  Will check 2 sets of blood cultures  Continue ceftriaxone as started in the ER  Will treat with IV fluid  Repeat blood work in the a.m.

## 2023-12-25 PROBLEM — E83.42 HYPOMAGNESEMIA: Status: ACTIVE | Noted: 2023-12-25

## 2023-12-25 LAB
ALBUMIN SERPL BCP-MCNC: 3.6 G/DL (ref 3.5–5)
ALP SERPL-CCNC: 36 U/L (ref 34–104)
ALT SERPL W P-5'-P-CCNC: 11 U/L (ref 7–52)
ANION GAP SERPL CALCULATED.3IONS-SCNC: 8 MMOL/L
AST SERPL W P-5'-P-CCNC: 22 U/L (ref 13–39)
BASOPHILS # BLD AUTO: 0.03 THOUSANDS/ÂΜL (ref 0–0.1)
BASOPHILS NFR BLD AUTO: 0 % (ref 0–1)
BILIRUB SERPL-MCNC: 0.64 MG/DL (ref 0.2–1)
BUN SERPL-MCNC: 20 MG/DL (ref 5–25)
CALCIUM SERPL-MCNC: 9.3 MG/DL (ref 8.4–10.2)
CHLORIDE SERPL-SCNC: 105 MMOL/L (ref 96–108)
CO2 SERPL-SCNC: 25 MMOL/L (ref 21–32)
CREAT SERPL-MCNC: 0.9 MG/DL (ref 0.6–1.3)
EOSINOPHIL # BLD AUTO: 0.33 THOUSAND/ÂΜL (ref 0–0.61)
EOSINOPHIL NFR BLD AUTO: 5 % (ref 0–6)
ERYTHROCYTE [DISTWIDTH] IN BLOOD BY AUTOMATED COUNT: 13 % (ref 11.6–15.1)
GFR SERPL CREATININE-BSD FRML MDRD: 60 ML/MIN/1.73SQ M
GLUCOSE SERPL-MCNC: 80 MG/DL (ref 65–140)
HCT VFR BLD AUTO: 40.4 % (ref 34.8–46.1)
HGB BLD-MCNC: 13.2 G/DL (ref 11.5–15.4)
IMM GRANULOCYTES # BLD AUTO: 0.01 THOUSAND/UL (ref 0–0.2)
IMM GRANULOCYTES NFR BLD AUTO: 0 % (ref 0–2)
LYMPHOCYTES # BLD AUTO: 2.92 THOUSANDS/ÂΜL (ref 0.6–4.47)
LYMPHOCYTES NFR BLD AUTO: 42 % (ref 14–44)
MAGNESIUM SERPL-MCNC: 1.6 MG/DL (ref 1.9–2.7)
MCH RBC QN AUTO: 32.8 PG (ref 26.8–34.3)
MCHC RBC AUTO-ENTMCNC: 32.7 G/DL (ref 31.4–37.4)
MCV RBC AUTO: 100 FL (ref 82–98)
MONOCYTES # BLD AUTO: 0.83 THOUSAND/ÂΜL (ref 0.17–1.22)
MONOCYTES NFR BLD AUTO: 12 % (ref 4–12)
NEUTROPHILS # BLD AUTO: 2.84 THOUSANDS/ÂΜL (ref 1.85–7.62)
NEUTS SEG NFR BLD AUTO: 41 % (ref 43–75)
NRBC BLD AUTO-RTO: 0 /100 WBCS
PHOSPHATE SERPL-MCNC: 3.7 MG/DL (ref 2.3–4.1)
PLATELET # BLD AUTO: 154 THOUSANDS/UL (ref 149–390)
PMV BLD AUTO: 9.5 FL (ref 8.9–12.7)
POTASSIUM SERPL-SCNC: 3.5 MMOL/L (ref 3.5–5.3)
PROT SERPL-MCNC: 6 G/DL (ref 6.4–8.4)
RBC # BLD AUTO: 4.03 MILLION/UL (ref 3.81–5.12)
SODIUM SERPL-SCNC: 138 MMOL/L (ref 135–147)
WBC # BLD AUTO: 6.96 THOUSAND/UL (ref 4.31–10.16)

## 2023-12-25 PROCEDURE — 84100 ASSAY OF PHOSPHORUS: CPT | Performed by: HOSPITALIST

## 2023-12-25 PROCEDURE — 83735 ASSAY OF MAGNESIUM: CPT | Performed by: HOSPITALIST

## 2023-12-25 PROCEDURE — 80053 COMPREHEN METABOLIC PANEL: CPT | Performed by: HOSPITALIST

## 2023-12-25 PROCEDURE — 99232 SBSQ HOSP IP/OBS MODERATE 35: CPT | Performed by: HOSPITALIST

## 2023-12-25 PROCEDURE — 85025 COMPLETE CBC W/AUTO DIFF WBC: CPT | Performed by: HOSPITALIST

## 2023-12-25 RX ORDER — LANOLIN ALCOHOL/MO/W.PET/CERES
400 CREAM (GRAM) TOPICAL 2 TIMES DAILY
Status: DISCONTINUED | OUTPATIENT
Start: 2023-12-25 | End: 2023-12-26 | Stop reason: HOSPADM

## 2023-12-25 RX ADMIN — Medication 400 MG: at 17:26

## 2023-12-25 RX ADMIN — OXYCODONE HYDROCHLORIDE AND ACETAMINOPHEN 1 TABLET: 5; 325 TABLET ORAL at 08:11

## 2023-12-25 RX ADMIN — PREGABALIN 200 MG: 100 CAPSULE ORAL at 17:26

## 2023-12-25 RX ADMIN — ACETAMINOPHEN 650 MG: 325 TABLET ORAL at 22:07

## 2023-12-25 RX ADMIN — LEVOTHYROXINE SODIUM 75 MCG: 75 TABLET ORAL at 05:04

## 2023-12-25 RX ADMIN — PREGABALIN 200 MG: 100 CAPSULE ORAL at 08:44

## 2023-12-25 RX ADMIN — ATORVASTATIN CALCIUM 40 MG: 40 TABLET, FILM COATED ORAL at 08:44

## 2023-12-25 RX ADMIN — CEFTRIAXONE 1000 MG: 1 INJECTION, SOLUTION INTRAVENOUS at 15:23

## 2023-12-25 RX ADMIN — PREGABALIN 200 MG: 100 CAPSULE ORAL at 22:00

## 2023-12-25 RX ADMIN — OXYCODONE HYDROCHLORIDE AND ACETAMINOPHEN 1 TABLET: 5; 325 TABLET ORAL at 03:37

## 2023-12-25 RX ADMIN — Medication 400 MG: at 11:03

## 2023-12-25 RX ADMIN — ENOXAPARIN SODIUM 40 MG: 100 INJECTION SUBCUTANEOUS at 08:44

## 2023-12-25 RX ADMIN — SODIUM CHLORIDE, SODIUM GLUCONATE, SODIUM ACETATE, POTASSIUM CHLORIDE, MAGNESIUM CHLORIDE, SODIUM PHOSPHATE, DIBASIC, AND POTASSIUM PHOSPHATE 75 ML/HR: .53; .5; .37; .037; .03; .012; .00082 INJECTION, SOLUTION INTRAVENOUS at 11:03

## 2023-12-25 NOTE — ASSESSMENT & PLAN NOTE
Mild hypercalcemia noted-arrival calcium level 10.6  Secondary to dehydration and decreased p.o. intake  Resolved with IV fluid

## 2023-12-25 NOTE — PLAN OF CARE
Problem: Potential for Falls  Goal: Patient will remain free of falls  Description: INTERVENTIONS:  - Educate patient/family on patient safety including physical limitations  - Instruct patient to call for assistance with activity   - Consult OT/PT to assist with strengthening/mobility   - Keep Call bell within reach  - Keep bed low and locked with side rails adjusted as appropriate  - Keep care items and personal belongings within reach  - Initiate and maintain comfort rounds  - Make Fall Risk Sign visible to staff  - Offer Toileting every 2 Hours, in advance of need  - Initiate/Maintain bed alarm  - Obtain necessary fall risk management equipment: walker  - Apply yellow socks and bracelet for high fall risk patients  - Consider moving patient to room near nurses station  Outcome: Progressing

## 2023-12-25 NOTE — ASSESSMENT & PLAN NOTE
Initial CT abdomen-Thickened hyperemic urinary bladder concerning for acute cystitis.   Continue IV ceftriaxone day 2  Patient is still unable to urinate, she has been straight cathed once and now once again has 550 cc of residual urine on bladder scan but is unable to urinate  Nursing to follow protocol-patient may need eventual King catheter placement  Will consult urology  Will check an ultrasound of the kidney and bladder with a postvoid residual  Since the patient will need to remain in-house for greater than 2 midnights, I will switch her from the observation to the inpatient classification  Will repeat blood work in the a.m.

## 2023-12-25 NOTE — ED PROVIDER NOTES
History  Chief Complaint   Patient presents with    Fatigue    Painful Urination     Patient states she is having lower abdominal pain, worsening for the past few days.  Was 7 out of 10 earlier.  Some nausea.  Patient reports significant increase in weakness and that she is having difficulty ambulating.  Patient states that she has a history of Crohn's on her chart but that she does not actually have Crohn's disease.  Pain worse with palpation.  Nothing makes it feel better.        Prior to Admission Medications   Prescriptions Last Dose Informant Patient Reported? Taking?   atorvastatin (LIPITOR) 40 mg tablet 12/23/2023  Yes Yes   Sig: Take 40 mg by mouth daily   levothyroxine 75 mcg tablet 12/24/2023  Yes Yes   Sig: Take 1 tablet (75 mg) by oral route five days a week.   pregabalin (LYRICA) 200 MG capsule Not Taking  No No   Sig: Take 1 capsule (200 mg total) by mouth 3 (three) times a day   Patient not taking: Reported on 12/24/2023      Facility-Administered Medications: None       Past Medical History:   Diagnosis Date    Aortic stenosis     Depression     Disease of thyroid gland     Hyperlipidemia     Peripheral neuropathy        Past Surgical History:   Procedure Laterality Date    BOWEL RESECTION         History reviewed. No pertinent family history.  I have reviewed and agree with the history as documented.    E-Cigarette/Vaping    E-Cigarette Use Never User      E-Cigarette/Vaping Substances    Nicotine No     THC No     CBD No     Flavoring No      Social History     Tobacco Use    Smoking status: Never    Smokeless tobacco: Never   Vaping Use    Vaping status: Never Used   Substance Use Topics    Alcohol use: Not Currently     Comment: occasional    Drug use: Never       Review of Systems   Constitutional:  Positive for activity change, chills and fatigue.   HENT:  Negative for congestion.    Eyes:  Negative for visual disturbance.   Respiratory:  Negative for cough, chest tightness and shortness of  breath.    Cardiovascular:  Negative for chest pain.   Gastrointestinal:  Negative for abdominal pain, diarrhea and vomiting.   Endocrine: Positive for polyuria.   Genitourinary:  Positive for dysuria.   Skin:  Negative for rash.   Neurological:  Negative for dizziness, weakness and numbness.       Physical Exam  Physical Exam  Constitutional:       General: She is not in acute distress.     Appearance: She is well-developed. She is ill-appearing. She is not toxic-appearing.   HENT:      Head: Normocephalic and atraumatic.      Right Ear: External ear normal.      Left Ear: External ear normal.      Nose: Nose normal.      Mouth/Throat:      Mouth: Mucous membranes are moist.      Pharynx: Oropharynx is clear.   Eyes:      Conjunctiva/sclera: Conjunctivae normal.      Pupils: Pupils are equal, round, and reactive to light.   Cardiovascular:      Rate and Rhythm: Normal rate and regular rhythm.      Heart sounds: Normal heart sounds.   Pulmonary:      Effort: Pulmonary effort is normal. No respiratory distress.      Breath sounds: Normal breath sounds.   Abdominal:      General: Bowel sounds are normal. There is no distension.      Palpations: Abdomen is soft.      Tenderness: There is abdominal tenderness. There is no right CVA tenderness, left CVA tenderness, guarding or rebound.   Musculoskeletal:         General: Normal range of motion.      Cervical back: Normal range of motion and neck supple.   Skin:     General: Skin is warm and dry.      Capillary Refill: Capillary refill takes less than 2 seconds.   Neurological:      General: No focal deficit present.      Mental Status: She is alert and oriented to person, place, and time.   Psychiatric:         Behavior: Behavior normal.         Vital Signs  ED Triage Vitals [12/24/23 1310]   Temperature Pulse Respirations Blood Pressure SpO2   (!) 97.4 °F (36.3 °C) 87 20 148/73 96 %      Temp Source Heart Rate Source Patient Position - Orthostatic VS BP Location FiO2  (%)   Temporal Monitor Lying Left arm --      Pain Score       No Pain           Vitals:    12/24/23 1310 12/24/23 1400 12/24/23 1615 12/24/23 2155   BP: 148/73 142/62 152/63 120/66   Pulse: 87 79 66 69   Patient Position - Orthostatic VS: Lying Lying           Visual Acuity  Visual Acuity      Flowsheet Row Most Recent Value   L Pupil Size (mm) 2   R Pupil Size (mm) 2            ED Medications  Medications   atorvastatin (LIPITOR) tablet 40 mg (40 mg Oral Given 12/24/23 1559)   levothyroxine tablet 75 mcg (has no administration in time range)   pregabalin (LYRICA) capsule 200 mg (200 mg Oral Given 12/24/23 2002)   multi-electrolyte (PLASMALYTE-A/ISOLYTE-S PH 7.4) IV solution (75 mL/hr Intravenous New Bag 12/24/23 1639)   enoxaparin (LOVENOX) subcutaneous injection 40 mg (40 mg Subcutaneous Given 12/24/23 1559)   cefTRIAXone (ROCEPHIN) IVPB (premix in dextrose) 1,000 mg 50 mL (has no administration in time range)   acetaminophen (TYLENOL) tablet 650 mg (has no administration in time range)   oxyCODONE-acetaminophen (PERCOCET) 5-325 mg per tablet 1 tablet (1 tablet Oral Given 12/24/23 1655)   sodium chloride 0.9 % bolus 500 mL (0 mL Intravenous Stopped 12/24/23 1604)   HYDROmorphone HCl (DILAUDID) injection 0.2 mg (0.2 mg Intravenous Given 12/24/23 1418)   iohexol (OMNIPAQUE) 350 MG/ML injection (MULTI-DOSE) 100 mL (100 mL Intravenous Given 12/24/23 1436)   cefTRIAXone (ROCEPHIN) IVPB (premix in dextrose) 1,000 mg 50 mL (0 mg Intravenous Stopped 12/24/23 1604)       Diagnostic Studies  Results Reviewed       Procedure Component Value Units Date/Time    UA w Reflex to Microscopic w Reflex to Culture [[id number]]  (Abnormal) Collected: 12/24/23 2007    Lab Status: Final result Specimen: Urine, Clean Catch Updated: 12/24/23 2019     Color, UA Orange     Clarity, UA Hazy     Specific Gravity, UA 1.010     pH, UA Interference-unable to analyze     Leukocytes, UA Interference- unable to analyze     Nitrite, UA Interference-  unable to analyze     Protein, UA       Interference- unable to analyze     mg/dl     Glucose, UA       Interference- unable to analyze     mg/dl     Ketones, UA       Interference- unable to analyze     mg/dl     Urobilinogen, UA       Interference-unable to analyze     E.U./dl     Bilirubin, UA Interference- unable to analyze     Occult Blood, UA Interference- unable to analyze     URINE COMMENT Color interference unable to analyze    Blood culture [[id number]] Collected: 12/24/23 1651    Lab Status: In process Specimen: Blood from Arm, Right Updated: 12/24/23 1656    Blood culture [[id number]] Collected: 12/24/23 1317    Lab Status: In process Specimen: Blood from Arm, Right Updated: 12/24/23 1609    Urine Microscopic [[id number]]  (Abnormal) Collected: 12/24/23 1407    Lab Status: Final result Specimen: Urine, Straight Cath Updated: 12/24/23 1445     RBC, UA 4-10 /hpf      WBC, UA 30-50 /hpf      Epithelial Cells Occasional /hpf      Bacteria, UA Occasional /hpf     Urine culture [[id number]] Collected: 12/24/23 1407    Lab Status: In process Specimen: Urine, Straight Cath Updated: 12/24/23 1445    UA w Reflex to Microscopic w Reflex to Culture [[id number]]  (Abnormal) Collected: 12/24/23 1407    Lab Status: Final result Specimen: Urine, Straight Cath Updated: 12/24/23 1429     Color, UA Orange     Clarity, UA Hazy     Specific Gravity, UA 1.030     pH, UA Interference-unable to analyze     Leukocytes, UA Interference- unable to analyze     Nitrite, UA Interference- unable to analyze     Protein, UA       Interference- unable to analyze     mg/dl     Glucose, UA       Interference- unable to analyze     mg/dl     Ketones, UA       Interference- unable to analyze     mg/dl     Urobilinogen, UA       Interference-unable to analyze     E.U./dl     Bilirubin, UA Interference- unable to analyze     Occult Blood, UA Interference- unable to analyze    CMP [[id number]]  (Abnormal) Collected: 12/24/23 1318    Lab Status: Final  result Specimen: Blood from Arm, Right Updated: 12/24/23 1347     Sodium 137 mmol/L      Potassium 3.7 mmol/L      Chloride 103 mmol/L      CO2 24 mmol/L      ANION GAP 10 mmol/L      BUN 25 mg/dL      Creatinine 1.06 mg/dL      Glucose 108 mg/dL      Calcium 10.6 mg/dL      AST 29 U/L      ALT 15 U/L      Alkaline Phosphatase 44 U/L      Total Protein 7.0 g/dL      Albumin 4.3 g/dL      Total Bilirubin 0.70 mg/dL      eGFR 49 ml/min/1.73sq m     Narrative:      National Kid[employer] Foundation guidelines for Chronic Kidney Disease (CKD):     Stage 1 with normal or high GFR (GFR > 90 mL/min/1.73 square meters)    Stage 2 Mild CKD (GFR = 60-89 mL/min/1.73 square meters)    Stage 3A Moderate CKD (GFR = 45-59 mL/min/1.73 square meters)    Stage 3B Moderate CKD (GFR = 30-44 mL/min/1.73 square meters)    Stage 4 Severe CKD (GFR = 15-29 mL/min/1.73 square meters)    Stage 5 End Stage CKD (GFR <15 mL/min/1.73 square meters)  Note: GFR calculation is accurate only with a steady state creatinine    Lipase [[id number]]  (Normal) Collected: 12/24/23 1318    Lab Status: Final result Specimen: Blood from Arm, Right Updated: 12/24/23 1347     Lipase 27 u/L     CBC and differential [[id number]]  (Abnormal) Collected: 12/24/23 1318    Lab Status: Final result Specimen: Blood from Arm, Right Updated: 12/24/23 1328     WBC 8.51 Thousand/uL      RBC 4.59 Million/uL      Hemoglobin 14.9 g/dL      Hematocrit 45.2 %      MCV 99 fL      MCH 32.5 pg      MCHC 33.0 g/dL      RDW 12.8 %      MPV 9.8 fL      Platelets 188 Thousands/uL      nRBC 0 /100 WBCs      Neutrophils Relative 62 %      Immat GRANS % 0 %      Lymphocytes Relative 25 %      Monocytes Relative 10 %      Eosinophils Relative 3 %      Basophils Relative 0 %      Neutrophils Absolute 5.29 Thousands/µL      Immature Grans Absolute 0.01 Thousand/uL      Lymphocytes Absolute 2.09 Thousands/µL      Monocytes Absolute 0.88 Thousand/µL      Eosinophils Absolute 0.21 Thousand/µL       Basophils Absolute 0.03 Thousands/µL                    CT abdomen pelvis with contrast   Final Result by Sang Haney MD (12/24 7384)      1.  Thickened hyperemic urinary bladder concerning for acute cystitis.   2.  Thickened endometrial stripe measuring 15 mm in a postmenopausal female. Consider pelvic ultrasound on an outpatient basis for further evaluation.   3.  Findings of pelvic floor laxity and multicompartment pelvic prolapse.      The study was marked in EPIC for significant notification.      Workstation performed: [id number]                    Procedures  Procedures         ED Course                               SBIRT 20yo+      Flowsheet Row Most Recent Value   Initial Alcohol Screen: US AUDIT-C     1. How often do you have a drink containing alcohol? 0 Filed at: 12/24/2023 1323   2. How many drinks containing alcohol do you have on a typical day you are drinking?  0 Filed at: 12/24/2023 1323   3a. Male UNDER 65: How often do you have five or more drinks on one occasion? 0 Filed at: 12/24/2023 1323   3b. FEMALE Any Age, or MALE 65+: How often do you have 4 or more drinks on one occassion? 0 Filed at: 12/24/2023 1323   Audit-C Score 0 Filed at: 12/24/2023 1323   [place]: How many times in the past year have you...    Used an illegal drug or used a prescription medication for non-medical reasons? Never Filed at: 12/24/2023 1323                      Medical Decision Making  78-year-old female with UTI and weakness.  Patient appears to be unable to care for herself safely.  Be observed for PT OT evaluation.  It is possible that she will improve with some fluids and UTI treatment.  Stable at time of admission.  States understanding and agreement with the plan.    Amount and/or Complexity of Data Reviewed  External Data Reviewed: notes.  Labs: ordered.  Radiology: ordered.    Risk  Prescription drug management.             Disposition  Final diagnoses:   None     ED Disposition       None           Follow-up Information    None         Current Discharge Medication List        CONTINUE these medications which have NOT CHANGED    Details   atorvastatin (LIPITOR) 40 mg tablet Take 40 mg by mouth daily      levothyroxine 75 mcg tablet Take 1 tablet (75 mg) by oral route five days a week.      pregabalin (LYRICA) 200 MG capsule Take 1 capsule (200 mg total) by mouth 3 (three) times a day  Qty: 90 capsule, Refills: 5    Associated Diagnoses: Neuropathy             No discharge procedures on file.    PDMP Review         Value Time User    PDMP Reviewed  Yes 5/11/2022 10:12 AM MAJOR Church            ED Provider  Electronically Signed by             Elton Aponte MD  12/25/23 0019

## 2023-12-25 NOTE — PLAN OF CARE
Problem: INFECTION - ADULT  Goal: Absence or prevention of progression during hospitalization  Description: INTERVENTIONS:  - Assess and monitor for signs and symptoms of infection  - Monitor lab/diagnostic results  - Monitor all insertion sites, i.e. indwelling lines, tubes, and drains  - Monitor endotracheal if appropriate and nasal secretions for changes in amount and color  - [place] appropriate cooling/warming therapies per order  - Administer medications as ordered  - Instruct and encourage patient and family to use good hand hygiene technique  - Identify and instruct in appropriate isolation precautions for identified infection/condition  Outcome: Progressing     Problem: Knowledge Deficit  Goal: Patient/family/caregiver demonstrates understanding of disease process, treatment plan, medications, and discharge instructions  Description: Complete learning assessment and assess knowledge base.  Interventions:  - Provide teaching at level of understanding  - Provide teaching via preferred learning methods  Outcome: Progressing     Problem: GASTROINTESTINAL - ADULT  Goal: Minimal or absence of nausea and/or vomiting  Description: INTERVENTIONS:  - Administer IV fluids if ordered to ensure adequate hydration  - Maintain NPO status until nausea and vomiting are resolved  - Nasogastric tube if ordered  - Administer ordered antiemetic medications as needed  - Provide nonpharmacologic comfort measures as appropriate  - Advance diet as tolerated, if ordered  - Consider nutrition services referral to assist patient with adequate nutrition and appropriate food choices  Outcome: Progressing     Problem: METABOLIC, FLUID AND ELECTROLYTES - ADULT  Goal: Fluid balance maintained  Description: INTERVENTIONS:  - Monitor labs   - Monitor I/O and WT  - Instruct patient on fluid and nutrition as appropriate  - Assess for signs & symptoms of volume excess or deficit  Outcome: Progressing

## 2023-12-25 NOTE — PROGRESS NOTES
[facility]  Progress Note  Name: Daily Schaeffer I  MRN: [id number]  Unit/Bed#: -01 I Date of Admission: 12/24/2023   Date of Service: 12/25/2023 I Hospital Day: 0    Assessment/Plan   * Acute cystitis without hematuria  Assessment & Plan  Initial CT abdomen-Thickened hyperemic urinary bladder concerning for acute cystitis.   Continue IV ceftriaxone day 2  Patient is still unable to urinate, she has been straight cathed once and now once again has 550 cc of residual urine on bladder scan but is unable to urinate  Nursing to follow protocol-patient may need eventual King catheter placement  Will consult urology  Will check an ultrasound of the kidney and bladder with a postvoid residual  Since the patient will need to remain in-house for greater than 2 midnights, I will switch her from the observation to the inpatient classification  Will repeat blood work in the a.m.    Hypercalcemia  Assessment & Plan  Mild hypercalcemia noted-arrival calcium level 10.6  Secondary to dehydration and decreased p.o. intake  Resolved with IV fluid    Dyslipidemia  Assessment & Plan  Continue Lipitor as the patient was taking at home    Hypothyroidism  Assessment & Plan  Continue Synthroid as the patient was taking at home    Neuropathy  Assessment & Plan  Continue home dosing of Lyrica    Hypomagnesemia  Assessment & Plan  Replete and recheck    Abnormal CT scan, pelvis  Assessment & Plan  CT pelvis with the following results-Thickened endometrial stripe measuring 15 mm in a postmenopausal female. Consider pelvic ultrasound on an outpatient basis for further evaluation.  Findings of pelvic floor laxity and multicompartment pelvic prolapse.   Can follow-up in the outpatient setting with PCP, and/or GYN               VTE Pharmacologic Prophylaxis: VTE Score: 5 High Risk (Score >/= 5) - Pharmacological DVT Prophylaxis Ordered: enoxaparin (Lovenox). Sequential Compression Devices Ordered.    Mobility:    Basic Mobility Inpatient Raw Score: 15  -HLM Goal: 4: Move to chair/commode  JH-HLM Achieved: 4: Move to chair/commode  HLM Goal NOT achieved. Continue with multidisciplinary rounding and encourage appropriate mobility to improve upon HLM goals.    Patient Centered Rounds: I performed bedside rounds with nursing staff today.   Discussions with Specialists or Other Care Team Provider: Urology    Education and Discussions with Family / Patient: Patient declined call to .     Total Time Spent on Date of Encounter in care of patient: 40 mins. This time was spent on one or more of the following: performing physical exam; counseling and coordination of care; obtaining or reviewing history; documenting in the medical record; reviewing/ordering tests, medications or procedures; communicating with other healthcare professionals and discussing with patient's family/caregivers.    Current Length of Stay: 0 day(s)  Current Patient Status: Inpatient   Certification Statement: The patient, admitted on an observation basis, will now require > 2 midnight hospital stay due to need for continued IV antibiotics, and a urology evaluation  Discharge Plan: Anticipate discharge in 24-48 hrs to home.    Code Status: Level 1 - Full Code    Subjective:   Patient seen, complains of urinary retention but has no other complaints.  Continues to feel weak as she was feeling at time of arrival.    Objective:     Vitals:   Temp (24hrs), Av.6 °F (36.4 °C), Min:97.4 °F (36.3 °C), Max:97.8 °F (36.6 °C)    Temp:  [97.4 °F (36.3 °C)-97.8 °F (36.6 °C)] 97.4 °F (36.3 °C)  HR:  [53-87] 53  Resp:  [16-20] 16  BP: (120-152)/(58-73) 127/58  SpO2:  [93 %-98 %] 98 %  Body mass index is 21.49 kg/m².     Input and Output Summary (last 24 hours):     Intake/Output Summary (Last 24 hours) at 2023 1219  Last data filed at 2023 1103  Gross per 24 hour   Intake 1930 ml   Output 850 ml   Net 1080 ml       Physical Exam:   Physical  Exam  Constitutional:       General: She is not in acute distress.     Appearance: Normal appearance. She is normal weight. She is not ill-appearing.   HENT:      Head: Normocephalic and atraumatic.      Nose: Nose normal.      Mouth/Throat:      Mouth: Mucous membranes are moist.   Eyes:      Extraocular Movements: Extraocular movements intact.      Pupils: Pupils are equal, round, and reactive to light.   Cardiovascular:      Rate and Rhythm: Normal rate and regular rhythm.      Pulses: Normal pulses.      Heart sounds: Normal heart sounds. No murmur heard.     No friction rub. No gallop.   Pulmonary:      Effort: Pulmonary effort is normal. No respiratory distress.      Breath sounds: Normal breath sounds. No wheezing, rhonchi or rales.   Abdominal:      General: There is no distension.      Palpations: Abdomen is soft. There is no mass.      Tenderness: There is no abdominal tenderness.      Hernia: No hernia is present.   Musculoskeletal:         General: No swelling or tenderness. Normal range of motion.      Cervical back: Normal range of motion and neck supple. No rigidity.      Right lower leg: No edema.      Left lower leg: No edema.   Skin:     General: Skin is warm.      Capillary Refill: Capillary refill takes less than 2 seconds.      Findings: No erythema or rash.   Neurological:      General: No focal deficit present.      Mental Status: She is alert and oriented to person, place, and time. Mental status is at baseline.      Cranial Nerves: No cranial nerve deficit.      Motor: No weakness.   Psychiatric:         Mood and Affect: Mood normal.         Behavior: Behavior normal.          Additional Data:     Labs:  Results from last 7 days   Lab Units 12/25/23  0436   WBC Thousand/uL 6.96   HEMOGLOBIN g/dL 13.2   HEMATOCRIT % 40.4   PLATELETS Thousands/uL 154   NEUTROS PCT % 41*   LYMPHS PCT % 42   MONOS PCT % 12   EOS PCT % 5     Results from last 7 days   Lab Units 12/25/23  0436   SODIUM mmol/L 138    POTASSIUM mmol/L 3.5   CHLORIDE mmol/L 105   CO2 mmol/L 25   BUN mg/dL 20   CREATININE mg/dL 0.90   ANION GAP mmol/L 8   CALCIUM mg/dL 9.3   ALBUMIN g/dL 3.6   TOTAL BILIRUBIN mg/dL 0.64   ALK PHOS U/L 36   ALT U/L 11   AST U/L 22   GLUCOSE RANDOM mg/dL 80                       Lines/Drains:  Invasive Devices       Peripheral Intravenous Line  Duration             Peripheral IV 12/24/23 Right Antecubital <1 day                          Imaging: No pertinent imaging reviewed.    Recent Cultures (last 7 days):   Results from last 7 days   Lab Units 12/24/23  1651 12/24/23  1317   BLOOD CULTURE  Received in Microbiology Lab. Culture in Progress. Received in Microbiology Lab. Culture in Progress.       Last 24 Hours Medication List:   Current Facility-Administered Medications   Medication Dose Route Frequency Provider Last Rate    acetaminophen  650 mg Oral Q6H PRN Delmis Chan MD      atorvastatin  40 mg Oral Daily Delmis Chan MD      cefTRIAXone  1,000 mg Intravenous Q24H Delmis Chan MD      enoxaparin  40 mg Subcutaneous Daily Delmis Chan MD      levothyroxine  75 mcg Oral Early Morning Delmis Chan MD      magnesium Oxide  400 mg Oral BID Delmis Chan MD      multi-electrolyte  75 mL/hr Intravenous Continuous Delmis Chan MD 75 mL/hr (12/25/23 1103)    oxyCODONE-acetaminophen  1 tablet Oral Q4H PRN Delmis Chan MD      pregabalin  200 mg Oral TID Delmis Chan MD          Today, Patient Was Seen By: Delmis Chan MD    **Please Note: This note may have been constructed using a voice recognition system.**

## 2023-12-26 ENCOUNTER — APPOINTMENT (INPATIENT)
Dept: ULTRASOUND IMAGING | Facility: HOSPITAL | Age: 78
DRG: 690 | End: 2023-12-26
Payer: MEDICARE

## 2023-12-26 ENCOUNTER — HOME HEALTH ADMISSION (OUTPATIENT)
Dept: HOME HEALTH SERVICES | Facility: HOME HEALTHCARE | Age: 78
End: 2023-12-26
Payer: MEDICARE

## 2023-12-26 VITALS
OXYGEN SATURATION: 99 % | HEIGHT: 66 IN | DIASTOLIC BLOOD PRESSURE: 67 MMHG | TEMPERATURE: 97.5 F | BODY MASS INDEX: 21.54 KG/M2 | WEIGHT: 134.04 LBS | SYSTOLIC BLOOD PRESSURE: 137 MMHG | HEART RATE: 71 BPM | RESPIRATION RATE: 18 BRPM

## 2023-12-26 LAB
ANION GAP SERPL CALCULATED.3IONS-SCNC: 8 MMOL/L
BACTERIA UR CULT: NORMAL
BACTERIA UR CULT: NORMAL
BASOPHILS # BLD AUTO: 0.03 THOUSANDS/ÂΜL (ref 0–0.1)
BASOPHILS NFR BLD AUTO: 1 % (ref 0–1)
BUN SERPL-MCNC: 15 MG/DL (ref 5–25)
CALCIUM SERPL-MCNC: 9.1 MG/DL (ref 8.4–10.2)
CHLORIDE SERPL-SCNC: 105 MMOL/L (ref 96–108)
CO2 SERPL-SCNC: 26 MMOL/L (ref 21–32)
CREAT SERPL-MCNC: 0.72 MG/DL (ref 0.6–1.3)
EOSINOPHIL # BLD AUTO: 0.27 THOUSAND/ÂΜL (ref 0–0.61)
EOSINOPHIL NFR BLD AUTO: 5 % (ref 0–6)
ERYTHROCYTE [DISTWIDTH] IN BLOOD BY AUTOMATED COUNT: 12.9 % (ref 11.6–15.1)
GFR SERPL CREATININE-BSD FRML MDRD: 79 ML/MIN/1.73SQ M
GLUCOSE SERPL-MCNC: 103 MG/DL (ref 65–140)
HCT VFR BLD AUTO: 39.7 % (ref 34.8–46.1)
HGB BLD-MCNC: 12.9 G/DL (ref 11.5–15.4)
IMM GRANULOCYTES # BLD AUTO: 0.01 THOUSAND/UL (ref 0–0.2)
IMM GRANULOCYTES NFR BLD AUTO: 0 % (ref 0–2)
LYMPHOCYTES # BLD AUTO: 2.53 THOUSANDS/ÂΜL (ref 0.6–4.47)
LYMPHOCYTES NFR BLD AUTO: 43 % (ref 14–44)
MCH RBC QN AUTO: 32.7 PG (ref 26.8–34.3)
MCHC RBC AUTO-ENTMCNC: 32.5 G/DL (ref 31.4–37.4)
MCV RBC AUTO: 101 FL (ref 82–98)
MONOCYTES # BLD AUTO: 0.63 THOUSAND/ÂΜL (ref 0.17–1.22)
MONOCYTES NFR BLD AUTO: 11 % (ref 4–12)
NEUTROPHILS # BLD AUTO: 2.35 THOUSANDS/ÂΜL (ref 1.85–7.62)
NEUTS SEG NFR BLD AUTO: 40 % (ref 43–75)
NRBC BLD AUTO-RTO: 0 /100 WBCS
PLATELET # BLD AUTO: 150 THOUSANDS/UL (ref 149–390)
PMV BLD AUTO: 9.7 FL (ref 8.9–12.7)
POTASSIUM SERPL-SCNC: 3.9 MMOL/L (ref 3.5–5.3)
RBC # BLD AUTO: 3.95 MILLION/UL (ref 3.81–5.12)
SODIUM SERPL-SCNC: 139 MMOL/L (ref 135–147)
WBC # BLD AUTO: 5.82 THOUSAND/UL (ref 4.31–10.16)

## 2023-12-26 PROCEDURE — 97167 OT EVAL HIGH COMPLEX 60 MIN: CPT

## 2023-12-26 PROCEDURE — 85025 COMPLETE CBC W/AUTO DIFF WBC: CPT | Performed by: HOSPITALIST

## 2023-12-26 PROCEDURE — 80048 BASIC METABOLIC PNL TOTAL CA: CPT | Performed by: HOSPITALIST

## 2023-12-26 PROCEDURE — 0T9B70Z DRAINAGE OF BLADDER WITH DRAINAGE DEVICE, VIA NATURAL OR ARTIFICIAL OPENING: ICD-10-PCS | Performed by: HOSPITALIST

## 2023-12-26 PROCEDURE — 99239 HOSP IP/OBS DSCHRG MGMT >30: CPT | Performed by: HOSPITALIST

## 2023-12-26 PROCEDURE — 97163 PT EVAL HIGH COMPLEX 45 MIN: CPT

## 2023-12-26 PROCEDURE — 88112 CYTOPATH CELL ENHANCE TECH: CPT | Performed by: PATHOLOGY

## 2023-12-26 PROCEDURE — 76770 US EXAM ABDO BACK WALL COMP: CPT

## 2023-12-26 RX ADMIN — ATORVASTATIN CALCIUM 40 MG: 40 TABLET, FILM COATED ORAL at 09:06

## 2023-12-26 RX ADMIN — Medication 400 MG: at 09:06

## 2023-12-26 RX ADMIN — ENOXAPARIN SODIUM 40 MG: 100 INJECTION SUBCUTANEOUS at 09:06

## 2023-12-26 RX ADMIN — LEVOTHYROXINE SODIUM 75 MCG: 75 TABLET ORAL at 05:27

## 2023-12-26 RX ADMIN — PREGABALIN 200 MG: 100 CAPSULE ORAL at 09:06

## 2023-12-26 NOTE — PROGRESS NOTES
Patient:    MRN:  [id number]    Denis Request ID:  [id number]    Level of care reserved:  Home Health Agency    Partner Reserved:  [facility], [place] [address] [phone]    Clinical needs requested:    Geography searched:  [phone]    Start of Service:    Request sent:  2:47pm EST on 12/26/2023 by Estefanía Melchor    Partner reserved:  3:29pm EST on 12/26/2023 by Estefanía Melchor    Choice list shared:  3:21pm EST on 12/26/2023 by Estefanía Melchor

## 2023-12-26 NOTE — CONSULTS
Consultation - Urology   Daily Schaeffer 78 y.o. female MRN: [id number]  Unit/Bed#: -01 Encounter: [id number]      Assessment/Plan      Assessment:  Cystitis with abnormal urinalysis, but urine culture negative. Urinary retention with moderate PVRs on urinary retention protocol. CT with contrast shows NUTS but thickened hyperemic bladder. Symptoms improved today.  Plan:  Continue complete course of antibiotics empirically. Continue retention protocol. Urine cytology. I will follow with you both as inpatient and outpatient to continue lower urinary tract evaluation.    History of Present Illness   Attending: Delmis Chan MD  Reason for Consult / Principal Problem: urinary retention, cystitis  HPI: Daily Schaeffer is a 78 y.o. year old female who presents with two days of dysuria and SP discomfort. Denies difficulty voiding, frequency, urgency, nocturia or flank pain. Denies fever or chills. Urinalysis with RBCs and WBCs but only occasional bacteria, and culture coming back negative. Initially required straight cath for 525 ml, but since then, bladder scan PVRs 212-290 ml. She was started on ceftriaxone. Today, she feels much better, her dysuria has improved and her SP discomfort has resolved. She has a history of only a remote UTI.     Inpatient consult to Urology  Consult performed by: Brodie Tomas MD  Consult ordered by: Delmis Chan MD          Review of Systems   Gastrointestinal:  Negative for abdominal distention and abdominal pain.   Genitourinary:  Positive for dysuria. Negative for difficulty urinating, flank pain, frequency, hematuria and urgency.       Historical Information   Past Medical History:   Diagnosis Date    Aortic stenosis     Depression     Disease of thyroid gland     Hyperlipidemia     Peripheral neuropathy      Past Surgical History:   Procedure Laterality Date    BOWEL RESECTION       Social History   Social History     Substance and Sexual Activity   Alcohol Use  "Not Currently    Comment: occasional     @DRUGHX  E-Cigarette/Vaping    E-Cigarette Use Never User      E-Cigarette/Vaping Substances    Nicotine No     THC No     CBD No     Flavoring No      Social History     Tobacco Use   Smoking Status Never   Smokeless Tobacco Never     Family History: non-contributory    Meds/Allergies   current meds:   Current Facility-Administered Medications   Medication Dose Route Frequency    acetaminophen (TYLENOL) tablet 650 mg  650 mg Oral Q6H PRN    atorvastatin (LIPITOR) tablet 40 mg  40 mg Oral Daily    cefTRIAXone (ROCEPHIN) IVPB (premix in dextrose) 1,000 mg 50 mL  1,000 mg Intravenous Q24H    enoxaparin (LOVENOX) subcutaneous injection 40 mg  40 mg Subcutaneous Daily    levothyroxine tablet 75 mcg  75 mcg Oral Early Morning    magnesium Oxide (MAG-OX) tablet 400 mg  400 mg Oral BID    multi-electrolyte (PLASMALYTE-A/ISOLYTE-S PH 7.4) IV solution  75 mL/hr Intravenous Continuous    oxyCODONE-acetaminophen (PERCOCET) 5-325 mg per tablet 1 tablet  1 tablet Oral Q4H PRN    pregabalin (LYRICA) capsule 200 mg  200 mg Oral TID     No Known Allergies    Objective   Vitals: Blood pressure 137/67, pulse 71, temperature 97.5 °F (36.4 °C), resp. rate 18, height 5' 6\" (1.676 m), weight 60.8 kg (134 lb 0.6 oz), SpO2 99%.    I/O last 24 hours:  In: 1380 [I.V.:1380]  Out: 2475 [Urine:2475]    Invasive Devices       Peripheral Intravenous Line  Duration             Peripheral IV 12/24/23 Right Antecubital 1 day                  Lab Results: CBC:   Lab Results   Component Value Date    WBC 5.82 12/26/2023    HGB 12.9 12/26/2023    HCT 39.7 12/26/2023     (H) 12/26/2023     12/26/2023    RBC 3.95 12/26/2023    MCH 32.7 12/26/2023    MCHC 32.5 12/26/2023    RDW 12.9 12/26/2023    MPV 9.7 12/26/2023    NRBC 0 12/26/2023     CMP:   Lab Results   Component Value Date    SODIUM 139 12/26/2023     12/26/2023    CO2 26 12/26/2023    BUN 15 12/26/2023    CREATININE 0.72 12/26/2023    " "CALCIUM 9.1 12/26/2023    EGFR 79 12/26/2023     Urinalysis: No results found for: \"COLORU\", \"CLARITYU\", \"SPECGRAV\", \"PHUR\", \"LEUKOCYTESUR\", \"NITRITE\", \"PROTEINUA\", \"GLUCOSEU\", \"KETONESU\", \"BILIRUBINUR\", \"BLOODU\"  Urine Culture: No results found for: \"URINECX\"  Imaging Studies: I have personally reviewed pertinent reports.    EKG, Pathology, and Other Studies: I have personally reviewed pertinent reports.    VTE Prophylaxis: Sequential compression device (Venodyne)     Code Status: Level 1 - Full Code  Advance Directive and Living Will:      Power of :    POLST:      Counseling / Coordination of Care  Total floor / unit time spent today 30 minutes. Greater than 50% of total time was spent with the patient and / or family counseling and / or coordination of care. A description of the counseling / coordination of care: I have discussed the case with the hospitalist.  "

## 2023-12-26 NOTE — DISCHARGE SUMMARY
[facility]  Discharge- Daily Schaeffer 1945, 78 y.o. female MRN: [id number]  Unit/Bed#: -Be Encounter: [id number]  Primary Care Provider: Alanna Moreau DO   Date and time admitted to hospital: 12/24/2023  1:07 PM    * Acute cystitis without hematuria  Assessment & Plan  Initial CT abdomen-Thickened hyperemic urinary bladder concerning for acute cystitis.   Final urine culture revealed no growth  No further antibiotics needed  King catheter placed for recurrent urinary retention  Patient seen by Dr. Tomas of urology, pura for discharge with King in place, his office will be contacting the patient in the near future to set up an outpatient follow-up appointment for voiding trial  No further inpatient testing, treatment, and/or workup is needed  Renal ultrasound is grossly within normal limits  Discharge blood work is reassuring  DC home today  Outpatient follow-up with PCP also    Hypercalcemia  Assessment & Plan  Mild hypercalcemia noted-arrival calcium level 10.6  Secondary to dehydration and decreased p.o. intake  Resolved with IV fluid  Will need continued periodic outpatient BMP monitoring with her PCP    Dyslipidemia  Assessment & Plan  Continue Lipitor as the patient was taking at home post discharge    Hypothyroidism  Assessment & Plan  Continue Synthroid as the patient was taking at home    Neuropathy  Assessment & Plan  Continue home dosing of Lyrica    Hypomagnesemia  Assessment & Plan  Repleted    Abnormal CT scan, pelvis  Assessment & Plan  CT pelvis with the following results-Thickened endometrial stripe measuring 15 mm in a postmenopausal female. Consider pelvic ultrasound on an outpatient basis for further evaluation.  Findings of pelvic floor laxity and multicompartment pelvic prolapse.   Can follow-up in the outpatient setting with PCP, and/or GYN        Medical Problems       Resolved Problems  Date Reviewed: 9/21/2023   None       Discharging  Physician / Practitioner: Delmis Chan MD  PCP: Alanna Moreau DO  Admission Date:   Admission Orders (From admission, onward)       Ordered        12/25/23 1213  Inpatient Admission  Once            12/24/23 1532  Place in Observation  Once                          Discharge Date: 12/26/23    Consultations During Hospital Stay:  Urology    Procedures Performed:   None    Significant Findings / Test Results:   CT abdomen pelvis with contrast-1.  Thickened hyperemic urinary bladder concerning for acute cystitis.   2.  Thickened endometrial stripe measuring 15 mm in a postmenopausal female. Consider pelvic ultrasound on an outpatient basis for further evaluation.   3.  Findings of pelvic floor laxity and multicompartment pelvic prolapse.   Ultrasound kidney and bladder-Sonographically normal kidneys. Overdistended bladder with trabeculated wall. Large prevoid volume, 478 mL.         Incidental Findings:   Thickened endometrial stripe measuring 15 mm in a postmenopausal female as outlined above  I reviewed the above mentioned incidental findings with the patient and/or family and they expressed understanding.    Test Results Pending at Discharge (will require follow up):   None     Outpatient Tests Requested:  None    Complications: None    Reason for Admission: Acute cystitis, urinary retention, generalized weakness    Hospital Course:   Daily Schaeffer is a 78 y.o. female patient who originally presented to the hospital on 12/24/2023 due to generalized weakness, and UTI-like symptoms.  Please refer to my initial history and physical examination for the initial presenting features and complaints.  In brief, the patient is an 78-year-old female, who was admitted to the hospital after she presented to the ER with the complaints as outlined above.  She was diagnosed with a suspected UTI bacterial etiology and started on IV ceftriaxone.  She was having recurrent periods of urinary retention.  Imaging  "on this admission included a CAT scan of the abdomen and pelvis, along with an ultrasound of the kidney and bladder with the results as outlined above.  Patient was seen in conjunction with urology.  Ultimately, the patient was discharged home with a King catheter, and urology will be following up in the near future in the outpatient setting for a voiding trial.  Antibiotics were discontinued since the final urine culture revealed no growth.  No other changes were made to any of her preadmission medications, and/or to the preadmission doses.  Please refer to the assessment/plan portion of this discharge summary as outlined above for additional details regarding her stay.        Please see above list of diagnoses and related plan for additional information.     Condition at Discharge: good    Discharge Day Visit / Exam:   Subjective: Patient seen, reports feeling okay, no new complaints  Vitals: Blood Pressure: 137/67 (12/26/23 0932)  Pulse: 71 (12/26/23 0932)  Temperature: 97.5 °F (36.4 °C) (12/26/23 0932)  Temp Source: Temporal (12/24/23 1310)  Respirations: 18 (12/26/23 0932)  Height: 5' 6\" (167.6 cm) (12/24/23 1616)  Weight - Scale: 60.8 kg (134 lb 0.6 oz) (12/26/23 0600)  SpO2: 99 % (12/26/23 0932)  Exam:   Physical Exam  Constitutional:       General: She is not in acute distress.     Appearance: Normal appearance. She is normal weight. She is not ill-appearing.   HENT:      Head: Normocephalic and atraumatic.      Nose: Nose normal.      Mouth/Throat:      Mouth: Mucous membranes are moist.   Eyes:      Extraocular Movements: Extraocular movements intact.      Pupils: Pupils are equal, round, and reactive to light.   Cardiovascular:      Rate and Rhythm: Normal rate and regular rhythm.      Pulses: Normal pulses.      Heart sounds: Normal heart sounds. No murmur heard.     No friction rub. No gallop.   Pulmonary:      Effort: Pulmonary effort is normal. No respiratory distress.      Breath sounds: Normal " breath sounds. No wheezing, rhonchi or rales.   Abdominal:      General: There is no distension.      Palpations: Abdomen is soft. There is no mass.      Tenderness: There is no abdominal tenderness.      Hernia: No hernia is present.   Musculoskeletal:         General: No swelling or tenderness. Normal range of motion.      Cervical back: Normal range of motion and neck supple. No rigidity.      Right lower leg: No edema.      Left lower leg: No edema.   Skin:     General: Skin is warm.      Capillary Refill: Capillary refill takes less than 2 seconds.      Findings: No erythema or rash.   Neurological:      General: No focal deficit present.      Mental Status: She is alert and oriented to person, place, and time. Mental status is at baseline.      Cranial Nerves: No cranial nerve deficit.      Motor: No weakness.   Psychiatric:         Mood and Affect: Mood normal.         Behavior: Behavior normal.          Discussion with Family: Patient declined call to .     Discharge instructions/Information to patient and family:   See after visit summary for information provided to patient and family.      Provisions for Follow-Up Care:  See after visit summary for information related to follow-up care and any pertinent home health orders.      Mobility at time of Discharge:   Basic Mobility Inpatient Raw Score: 14  -HLM Goal: 4: Move to chair/commode  JH-HLM Achieved: 6: Walk 10 steps or more  HLM Goal achieved. Continue to encourage appropriate mobility.     Disposition:   Home    Planned Readmission: None     Discharge Statement:  I spent 45 minutes discharging the patient. This time was spent on the day of discharge. I had direct contact with the patient on the day of discharge. Greater than 50% of the total time was spent examining patient, answering all patient questions, arranging and discussing plan of care with patient as well as directly providing post-discharge instructions.  Additional time then  spent on discharge activities.    Discharge Medications:  See after visit summary for reconciled discharge medications provided to patient and/or family.      **Please Note: This note may have been constructed using a voice recognition system**

## 2023-12-26 NOTE — CASE MANAGEMENT
Case Management Assessment & Discharge Planning Note    Patient name Daily Schaeffer  Location /-01 MRN [id number]  : 1945 Date 2023       Current Admission Date: 2023  Current Admission Diagnosis:Acute cystitis without hematuria   Patient Active Problem List    Diagnosis Date Noted    Hypomagnesemia 2023    Acute cystitis without hematuria 2023    Hypercalcemia 2023    Abnormal CT scan, pelvis 2023    Stage 3 chronic kidney disease, unspecified whether stage 3a or 3b CKD (HCC) 2023    Neuropathy 2023    Nonrheumatic aortic (valve) stenosis 2023    Dyslipidemia 2023    Hyperlipidemia 2017    Hypothyroidism 2017    Crohn disease (HCC) 2016    Generalized anxiety disorder 2016    Hyperglycemia 2016      LOS (days): 1  Geometric Mean LOS (GMLOS) (days): 2.9  Days to GMLOS:1.8     OBJECTIVE:    Risk of Unplanned Readmission Score: 7.21         Current admission status: Inpatient  Referral Reason: Other (d/c planning)    Preferred Pharmacy:   [facility] - [facility] [place] [address] [address]  Phone: [phone] Fax: [phone]    Primary Care Provider: Alanna Moreau DO    Primary Insurance: MEDICARE  Secondary Insurance: CIGNA    ASSESSMENT:  Active Health Care Proxies       [facility] Representative - Spouse   Primary Phone: [phone] (Home)                 Advance Directives  Does patient have a Health Care POA?: Yes (family needs to bring in paperwork)  Does patient have Advance Directives?: Yes (family needs to bring in paperwork)         Readmission Root Cause  30 Day Readmission: No    Patient Information  Admitted from:: Home  Mental Status: Alert  During Assessment patient was accompanied by: Not accompanied during assessment  Assessment information provided by:: Patient  Primary Caregiver: Self  Support Systems:  Spouse/significant other  County of Residence: [place]  What city do you live in?: [place]  Home entry access options. Select all that apply.: Stairs  Number of steps to enter home.: 2  Do the steps have railings?: Yes  Living Arrangements: Lives w/ Spouse/significant other  Is patient a ?: No    Activities of Daily Living Prior to Admission  Functional Status: Independent  Completes ADLs independently?: Yes  Ambulates independently?: Yes  Does patient use assisted devices?: Yes  Assisted Devices (DME) used: Rollator, Walker  Does patient currently own DME?: Yes  What DME does the patient currently own?: Walker, Rollator, Straight Cane, Shower Chair, Other (Comment) (bp cuff)  Does patient have a history of Outpatient Therapy (PT/OT)?: No  Does the patient have a history of Short-Term Rehab?: No  Does patient have a history of HHC?: No  Does patient currently have HHC?: No         Patient Information Continued  Income Source: Pension/shelter  Does patient have prescription coverage?: Yes (Ottoniel Zendejas)  Does patient have a history of substance abuse?: No  Does patient have a history of Mental Health Diagnosis?: No         Means of Transportation  Means of Transport to Appts:: Family transport      Housing Stability: Low Risk  (12/26/2023)    Housing Stability Vital Sign     Unable to Pay for Housing in the Last Year: No     Number of Places Lived in the Last Year: 1     Unstable Housing in the Last Year: No   Food Insecurity: No Food Insecurity (12/26/2023)    Hunger Vital Sign     Worried About Running Out of Food in the Last Year: Never true     Ran Out of Food in the Last Year: Never true   Transportation Needs: No Transportation Needs (12/26/2023)    PRAPARE - Transportation     Lack of Transportation (Medical): No     Lack of Transportation (Non-Medical): No   Utilities: Not At Risk (12/26/2023)    [facility] Utilities     Threatened with loss of utilities: No       DISCHARGE DETAILS:    Discharge planning  discussed with:: murray & spouse at the bedside  Freedom of Choice: Yes  Comments - Freedom of Choice: recommendation is for hhc - cm met earlier mckenzie herrerah pt & she had declined hhc- she would like hhc now- cm was given permission to place a blanket referral for hhc  CM contacted family/caregiver?: Yes  Were Treatment Team discharge recommendations reviewed with patient/caregiver?: Yes  Did patient/caregiver verbalize understanding of patient care needs?: Yes  Were patient/caregiver advised of the risks associated with not following Treatment Team discharge recommendations?: Yes    Contacts  Patient Contacts: Gilmar Schaeffer  Relationship to Patient:: Family (spouse)  Contact Method: Phone  Phone Number: [phone]  Reason/Outcome: Discharge Planning    Requested Home Health Care         Is the patient interested in HHC at discharge?: Yes (pt is now interested)  Home Health Discipline requested:: Nursing, Occupational Therapy, Physical Therapy  Home Health Agency Name:: Other  Home Health Services Needed:: Strengthening/Theraputic Exercises to Improve Function, Urinary Incontinence Catheter Management  Homebound Criteria Met:: Requires the Assistance of Another Person for Safe Ambulation or to Leave the Home, Uses an Assist Device (i.e. cane, walker, etc)  Supporting Clincal Findings:: Limited Endurance         Other Referral/Resources/Interventions Provided:  Interventions: HHC  Referral Comments: pt changed her mind about hhc- she had declined hhc when asked and when I met with her spouse the pt did request  for hhc- referrals were sent via tony with permission    Would you like to participate in our [facility] service program?  : No - Declined    Treatment Team Recommendation: Home with Home Health Care (home with spouse & hhc & outp follow up-briana)                                         Family notified:: spouse  at the bedside

## 2023-12-26 NOTE — DISCHARGE INSTR - AVS FIRST PAGE
Dear Daily Schaeffer,     It was our pleasure to care for you here at [facility].  It is our hope that we were always able to exceed the expected standards for your care during your stay.  You were hospitalized due to urinary retention and a suspected UTI.  You were cared for on the medical/surgical floor by the [facility] who covers for your primary care physician (PCP), Alanna Moreau DO, while you were hospitalized.  You were additionally seen by Dr. Brodie Tomas of urology.  If you have any questions or concerns related to this hospitalization, you may contact us at .  For follow up as well as any medication refills, we recommend that you follow up with your primary care physician.  A registered nurse will reach out to you by phone within a few days after your discharge to answer any additional questions that you may have after going home.  However, at this time we provide for you here, the most important instructions / recommendations at discharge:     Notable Medication Adjustments -   Okay to resume all preadmission medications at the preadmission doses  Testing Required after Discharge -   To be further determined in the outpatient setting by your primary care provider, and/or urology  Important follow up information -   Please follow-up with your PCP, your PCP will guide you towards a local outpatient OB/GYN doctor  Other Instructions -   Please keep the King catheter in place, a voiding trial will be performed in Dr. Tomas's office in the near future  Please review this entire after visit summary as additional general instructions including medication list, appointments, activity, diet, any pertinent wound care, and other additional recommendations from your care team that may be provided for you.      Sincerely,     Delmis Chan MD

## 2023-12-26 NOTE — PHYSICAL THERAPY NOTE
"Physical Therapy Evaluation     Patient's Name: Daily Schaeffer    Admitting Diagnosis  Fatigue [R53.83]  Painful urination [R30.9]    Problem List  Patient Active Problem List   Diagnosis    Nonrheumatic aortic (valve) stenosis    Dyslipidemia    Crohn disease (HCC)    Generalized anxiety disorder    Hyperlipidemia    Hyperglycemia    Hypothyroidism    Stage 3 chronic kidney disease, unspecified whether stage 3a or 3b CKD (HCC)    Neuropathy    Acute cystitis without hematuria    Hypercalcemia    Abnormal CT scan, pelvis    Hypomagnesemia       Past Medical History  Past Medical History:   Diagnosis Date    Aortic stenosis     Depression     Disease of thyroid gland     Hyperlipidemia     Peripheral neuropathy        Past Surgical History  Past Surgical History:   Procedure Laterality Date    BOWEL RESECTION          12/26/23 0759   PT Last Visit   PT Visit Date 12/26/23   Note Type   Note type Evaluation   Pain Assessment   Pain Assessment Tool 0-10   Pain Score No Pain  (denies at rest, reports some discomfort when pressure applied to lower abdomen)   Restrictions/Precautions   Weight Bearing Precautions Per Order No   Braces or Orthoses   (R wrist brace)   Other Precautions Chair Alarm;Bed Alarm;Fall Risk;Multiple lines   Home Living   Type of Home House   Home Layout Two level;Able to live on main level with bedroom/bathroom;Performs ADLs on one level;Stairs to enter with rails   Bathroom Shower/Tub Tub/shower unit   Bathroom Toilet Standard   Bathroom Equipment Other (Comment)  (electric shower chair that raises/lowers)   Bathroom Accessibility Accessible   Home Equipment Walker  (baseline RW usage)   Prior Function   Level of [place] Needs assistance with ADLs;Needs assistance with functional mobility;Needs assistance with IADLS   Lives With Spouse  (\"caretaker\", \"he takes good care of me\")   Receives Help From Family   IADLs Family/Friend/Other provides transportation;Family/Friend/Other provides " meals;Family/Friend/Other provides medication management   Falls in the last 6 months 0  (none in last 6 months, however fell approximately 1 year ago)   Vocational Retired   General   Additional Pertinent History Myrisa OT present for co-assessment due to medical complexity, required skilled interventions of 2 clinicians for care delivery.   Family/Caregiver Present No   Cognition   Overall Cognitive Status Impaired   Arousal/Participation Alert   Orientation Level Oriented to person;Oriented to time;Disoriented to situation;Disoriented to place   Memory Within functional limits   Following Commands Follows one step commands with increased time or repetition   Comments Daily was agreeable to PT assessment.   RLE Assessment   RLE Assessment X  (3/5 gross musculature)   LLE Assessment   LLE Assessment X  (3/5 gross musculature)   Vision-Basic Assessment   Current Vision Wears glasses only for reading   Vestibular   Spontaneous Nystagmus (-) no evidence of nystagmus at rest in room light   Gaze Holding Nystagmus (-) no evidence of nystagmus   Coordination   Movements are Fluid and Coordinated 0   Coordination and Movement Description Incremental mobility requiring increased time and tactile facilitation.   Sharp/Dull   RLE Sharp/Dull Impaired  (B hand and feet neuropathy)   LLE Sharp/Dull Impaired   Bed Mobility   Supine to Sit 4  Minimal assistance   Additional items Assist x 1;HOB elevated;Bedrails;Increased time required;Verbal cues;LE management   Sit to Supine   (DNT as Daily was sitting out of bed on the recliner upon conclusion.)   Additional Comments Verbal cues for bedrail utilization and proper body mechanics.   Transfers   Sit to Stand 4  Minimal assistance   Additional items Assist x 1;Bedrails;Increased time required;Verbal cues  (RW utilization)   Stand to Sit 4  Minimal assistance   Additional items Assist x 1;Bedrails;Increased time required;Verbal cues   Stand pivot 4  Minimal assistance    Additional items Assist x 1;Increased time required;Verbal cues   Toilet transfer 4  Minimal assistance   Additional items Assist x 1;Armrests;Increased time required;Verbal cues;Commode   Additional Comments Verbal cues for proper BUE placement with transitional movements, safety while turning.   Ambulation/Elevation   Gait pattern Improper Weight shift;Narrow ANAID;Forward Flexion;Decreased foot clearance;Short stride;Step to   Gait Assistance 4  Minimal assist   Additional items Assist x 1;Verbal cues;Tactile cues   Assistive Device Rolling walker   Distance 20 feet total  (to bedside commode and then around bed to recliner)   Stair Management Assistance Not tested   Ambulation/Elevation Additional Comments Verbal cues for base of support widening, environmental awareness, and proper AD utilization.   Balance   Static Sitting Good   Dynamic Sitting Fair +   Static Standing Fair -   Dynamic Standing Poor +   Ambulatory Poor +   Endurance Deficit   Endurance Deficit Yes   Activity Tolerance   Activity Tolerance Patient limited by fatigue   Medical Staff Made Aware Yes, CM was informed of d/c disposition recommendation.   Nurse Made Aware yesFabby PCA   Assessment   Prognosis Fair   Problem List Decreased strength;Decreased endurance;Impaired balance;Decreased mobility;Decreased coordination;Decreased cognition;Impaired judgement;Decreased safety awareness;Impaired sensation   Assessment Pt is 78 y.o. female seen for PT evaluation s/p admit to  [facility]  on 12/24/2023 w/ Acute cystitis without hematuria. PT consulted to assess pt's functional mobility and d/c needs. Order placed for PT eval and tx, w/ up and OOB as tolerated order. Comorbidities affecting pt's physical performance at time of assessment include: weakness, acute cystitis without hematuria,hypercalcemia, hypomagnesemia,neuropathy,hypothyroidism. PTA, pt was independent w/ all functional mobility w/ AD usage . Personal factors affecting  pt at time of IE include: stairs to enter home, inability to navigate community distances, inability to navigate level surfaces w/o external assistance, unable to perform dynamic tasks in community, decreased cognition, positive fall history, unable to perform physical activity, limited insight into impairments, and inability to perform ADLs. Please find objective findings from PT assessment regarding body systems outlined above with impairments and limitations including weakness, impaired balance, decreased endurance, impaired coordination, gait deviations, decreased activity tolerance, decreased functional mobility tolerance, decreased safety awareness, impaired judgement, fall risk, and decreased cognition. The following objective measures performed on IE also reveal limitations: AM-PAC 6-Clicks: 14/24. From PT/mobility standpoint, recommendation at time of d/c would be of minimal resource intensity pending progress in order to facilitate return to PLOF.   Goals   Patient Goals to use the bathroom  (assisted during session)   LTG Expiration Date 01/05/24   Long Term Goal #1 1.)Patient will complete bed mobility supervision of 1 for decrease need for caregiver assistance, decrease burden of care. 2.) Patient will complete transfers supervision of 1 to decrease risk of falls, facilitate upright standing posture. 3.) BLE strength to greater than/equal to 4/5 gross musculature to increase ability to safely transfer, control descent to chair. 4.) Patient will exhibit increase dynamic standing to Good 3-5 minutes without LOB supervision of 1 to improve activity tolerance. 5.) Patient will exhibit increase dynamic ambulatory balance to Fair > 50 feet w/AD supervision of 1 to improve ability to mobilize to toilet, chair and decrease risk for additional medical complications. 6.) Patient will exhibit good self monitoring and ability to follow 2 step commands to increase complexity of tasks and resume ADL's without LOB.    PT Treatment Day 0   Plan   Treatment/Interventions Functional transfer training;LE strengthening/ROM;Elevations;Therapeutic exercise;Endurance training;Cognitive reorientation;Patient/family training;Equipment eval/education;Bed mobility;Gait training;Spoke to nursing;Spoke to case management   PT Frequency 2-3x/wk   Discharge Recommendation   Rehab Resource Intensity Level, PT III (Minimum Resource Intensity)   Equipment Recommended Walker  (patient has own)   Walker Package Recommended Wheeled walker   Change/add to Walker Package? No   Additional Comments Upon conclusion, Daily was resting out of bed on the recliner. The chair alarm was engaged and all needs within reach.   AM-PAC Basic Mobility Inpatient   Turning in Flat Bed Without Bedrails 3   Lying on Back to Sitting on Edge of Flat Bed Without Bedrails 3   Moving Bed to Chair 2   Standing Up From Chair Using Arms 2   Walk in Room 2   Climb 3-5 Stairs With Railing 2   Basic Mobility Inpatient Raw Score 14   Basic Mobility Standardized Score 35.55   Highest Level Of Mobility   -HLM Goal 4: Move to chair/commode   JH-HLM Achieved 6: Walk 10 steps or more     History/Personal Factors/Comorbidities: : weakness, acute cystitis without hematuria,hypercalcemia, hypomagnesemia,neuropathy,hypothyroidism    # of body structures/limitations: muscle weakness, activity intolerance,decreased endurance, impaired balance, gait deviations,impaired cognition    Clinical presentation: unstable as seen in impaired sensation, high fall risk, progressive symptoms prior to hospitalization, impaired cognition    Initial Assessment Time: 4825-4665    Yvonne Gilmore, PT

## 2023-12-26 NOTE — PLAN OF CARE
Problem: PHYSICAL THERAPY ADULT  Goal: Performs mobility at highest level of function for planned discharge setting.  See evaluation for individualized goals.  Description: Treatment/Interventions: Functional transfer training, LE strengthening/ROM, Elevations, Therapeutic exercise, Endurance training, Cognitive reorientation, Patient/family training, Equipment eval/education, Bed mobility, Gait training, Spoke to nursing, Spoke to case management  Equipment Recommended: Walker (patient has own)       See flowsheet documentation for full assessment, interventions and recommendations.  Note: Prognosis: Fair  Problem List: Decreased strength, Decreased endurance, Impaired balance, Decreased mobility, Decreased coordination, Decreased cognition, Impaired judgement, Decreased safety awareness, Impaired sensation  Assessment: Pt is 78 y.o. female seen for PT evaluation s/p admit to  [facility]  on 12/24/2023 w/ Acute cystitis without hematuria. PT consulted to assess pt's functional mobility and d/c needs. Order placed for PT eval and tx, w/ up and OOB as tolerated order. Comorbidities affecting pt's physical performance at time of assessment include: weakness, acute cystitis without hematuria,hypercalcemia, hypomagnesemia,neuropathy,hypothyroidism. PTA, pt was independent w/ all functional mobility w/ AD usage . Personal factors affecting pt at time of IE include: stairs to enter home, inability to navigate community distances, inability to navigate level surfaces w/o external assistance, unable to perform dynamic tasks in community, decreased cognition, positive fall history, unable to perform physical activity, limited insight into impairments, and inability to perform ADLs. Please find objective findings from PT assessment regarding body systems outlined above with impairments and limitations including weakness, impaired balance, decreased endurance, impaired coordination, gait deviations, decreased  activity tolerance, decreased functional mobility tolerance, decreased safety awareness, impaired judgement, fall risk, and decreased cognition. The following objective measures performed on IE also reveal limitations: AM-PAC 6-Clicks: 14/24. From PT/mobility standpoint, recommendation at time of d/c would be of minimal resource intensity pending progress in order to facilitate return to PLOF.        Rehab Resource Intensity Level, PT: III (Minimum Resource Intensity)    See flowsheet documentation for full assessment.

## 2023-12-26 NOTE — INCIDENTAL FINDINGS
The following findings require follow up:  Radiographic finding   Finding: Thickened endometrial stripe measuring 15 mm in a postmenopausal female. Consider pelvic ultrasound on an outpatient basis for further evaluation. Findings of pelvic floor laxity and multicompartment pelvic prolapse.    Follow up required: Pelvic ultrasound, and GYN follow-up   Follow up should be done within 1 month   Please notify the following clinician to assist with the follow up:   Dr. Alanna Moreau

## 2023-12-26 NOTE — CASE MANAGEMENT
Case Management Discharge Planning Note    Patient name Daily Schaeffer  Location /-01 MRN [id number]  : 1945 Date 2023       Current Admission Date: 2023  Current Admission Diagnosis:Acute cystitis without hematuria   Patient Active Problem List    Diagnosis Date Noted    Hypomagnesemia 2023    Acute cystitis without hematuria 2023    Hypercalcemia 2023    Abnormal CT scan, pelvis 2023    Stage 3 chronic kidney disease, unspecified whether stage 3a or 3b CKD (HCC) 2023    Neuropathy 2023    Nonrheumatic aortic (valve) stenosis 2023    Dyslipidemia 2023    Hyperlipidemia 2017    Hypothyroidism 2017    Crohn disease (HCC) 2016    Generalized anxiety disorder 2016    Hyperglycemia 2016      LOS (days): 1  Geometric Mean LOS (GMLOS) (days): 2.9  Days to GMLOS:1.8     OBJECTIVE:  Risk of Unplanned Readmission Score: 7.21         Current admission status: Inpatient   Preferred Pharmacy:   [facility] - [place] [address] [address]  Phone: [phone] Fax: [phone]    Primary Care Provider: Alanna Moreau DO    Primary Insurance: MEDICARE  Secondary Insurance: CIGNA    DISCHARGE DETAILS:    Discharge planning discussed with:: patient & spouse at the bedside  Freedom of Choice: Yes  Comments - Freedom of Choice: cm reviewed the the list of accepting hhc- family & pt's choice is [facility]  CM contacted family/caregiver?: Yes  Were Treatment Team discharge recommendations reviewed with patient/caregiver?: Yes  Did patient/caregiver verbalize understanding of patient care needs?: Yes  Were patient/caregiver advised of the risks associated with not following Treatment Team discharge recommendations?: Yes    Contacts  Patient Contacts: Gilmar Schaeffer  Relationship to Patient:: Family  Contact Method: In Person  Phone Number:  [phone]  Reason/Outcome: Discharge Planning    Requested Home Health Care         Is the patient interested in HHC at discharge?: Yes  Home Health Discipline requested:: Nursing, Occupational Therapy, Physical Therapy  Home Health Agency Name:: Other  Home Health Services Needed:: Strengthening/Theraputic Exercises to Improve Function, Urinary Incontinence Catheter Management  Homebound Criteria Met:: Requires the Assistance of Another Person for Safe Ambulation or to Leave the Home, Uses an Assist Device (i.e. cane, walker, etc)  Supporting Clincal Findings:: Limited Endurance         Other Referral/Resources/Interventions Provided:  Interventions: HHC  Referral Comments: pt accepted by [facility] - radha bee give orellana teaching    Would you like to participate in our [facility] service program?  : No - Declined    Treatment Team Recommendation: Home with Home Health Care (home with spouse & [facility]& outp t follow up- spouse)  Discharge Destination Plan:: Home with Home Health Care (home with spouse & [facility] & outpt follow up)   Pt & family are in agreement with the d/c & d/c plan.                                   Family notified:: spouse  at the bedside

## 2023-12-26 NOTE — PLAN OF CARE
Problem: OCCUPATIONAL THERAPY ADULT  Goal: Performs self-care activities at highest level of function for planned discharge setting.  See evaluation for individualized goals.  Description: Treatment Interventions: ADL retraining, Functional transfer training, Endurance training, Cognitive reorientation, Patient/family training, Equipment evaluation/education, Compensatory technique education, Energy conservation, Activityengagement    See flowsheet documentation for full assessment, interventions and recommendations.   Note: Limitation: Decreased ADL status, Decreased Safe judgement during ADL, Decreased cognition, Decreased endurance, Decreased self-care trans, Decreased high-level ADLs  Prognosis: Good  Assessment: Pt is a 78 y.o. female seen for OT evaluation s/p admit to [facility] on 12/24/2023 w/ Acute cystitis without hematuria.  Comorbidities affecting pt's functional performance at time of assessment include:  dyslipidemia, Hypothyroidism, Hypomagnesemia . Personal factors affecting pt at time of IE include:steps to enter environment, difficulty performing ADLS, and limited insight into deficits. Prior to admission, pt required A with ADLs. Upon evaluation: the following deficits impact occupational performance: decreased balance, decreased tolerance, impaired initiation, impaired memory, impaired sequencing, and impaired problem solving. Pt to benefit from continued skilled OT tx while in the hospital to address deficits as defined above and maximize level of functional independence w ADL's and functional mobility. Occupational Performance areas to address include: bathing/shower, toilet hygiene, dressing, functional mobility, and clothing management. From OT standpoint, recommendation at time of d/c would be Level III (Minimum Resource Intensity).     Rehab Resource Intensity Level, OT: III (Minimum Resource Intensity)     George Tapia MS, OTR/L

## 2023-12-26 NOTE — OCCUPATIONAL THERAPY NOTE
"Occupational Therapy Evaluation      Daily Darbyth    12/26/2023    Principal Problem:    Acute cystitis without hematuria  Active Problems:    Dyslipidemia    Hypothyroidism    Neuropathy    Hypercalcemia    Abnormal CT scan, pelvis    Hypomagnesemia      Past Medical History:   Diagnosis Date    Aortic stenosis     Depression     Disease of thyroid gland     Hyperlipidemia     Peripheral neuropathy        Past Surgical History:   Procedure Laterality Date    BOWEL RESECTION          12/26/23 0816   OT Last Visit   OT Visit Date 12/26/23   Note Type   Note type Evaluation   Pain Assessment   Pain Assessment Tool 0-10   Pain Score No Pain  (denies at rest, reports some discomfort when pressure applied to lower abdomen)   Restrictions/Precautions   Weight Bearing Precautions Per Order No   Braces or Orthoses   (R wrist brace, attmepted to place correctly however Pt declines. Wrist brace left on forearm)   Other Precautions Chair Alarm;Bed Alarm;Fall Risk;Multiple lines;Cognitive   Home Living   Type of Home House   Home Layout Two level;Able to live on main level with bedroom/bathroom;Performs ADLs on one level;Stairs to enter with rails   Bathroom Shower/Tub Tub/shower unit   Bathroom Toilet Standard   Bathroom Equipment Other (Comment)  (electric shower chair that raises/lowers)   Bathroom Accessibility Accessible   Home Equipment Walker  (baseline RW usage)   Prior Function   Level of [place] Needs assistance with ADLs;Needs assistance with functional mobility;Needs assistance with IADLS   Lives With Spouse  (\"caretaker\", \"he takes good care of me\")   Receives Help From Family   IADLs Family/Friend/Other provides transportation;Family/Friend/Other provides meals;Family/Friend/Other provides medication management   Falls in the last 6 months 0  (none in last 6 months, however fell approximately 1 year ago)   Vocational Retired   ADL   UB Bathing Assistance 5  Supervision/Setup   LB Bathing Assistance 3  " Moderate Assistance   UB Dressing Assistance 5  Supervision/Setup   LB Dressing Assistance 3  Moderate Assistance   Toileting Assistance  4  Minimal Assistance   Bed Mobility   Supine to Sit 4  Minimal assistance   Additional items Assist x 1;HOB elevated;Bedrails;Increased time required;Verbal cues   Additional Comments Pt remained OOB in recliner upon conclusion   Transfers   Sit to Stand 4  Minimal assistance   Additional items Assist x 1;Bedrails;Increased time required;Verbal cues   Stand to Sit 4  Minimal assistance   Additional items Assist x 1;Armrests;Increased time required;Verbal cues   Stand pivot 4  Minimal assistance   Additional items Assist x 1;Increased time required;Verbal cues  (RW)   Toilet transfer 4  Minimal assistance   Additional items Assist x 1;Increased time required;Commode   Functional Mobility   Functional Mobility 4  Minimal assistance   Additional Comments A x1   Additional items Rolling walker   Balance   Static Sitting Good   Dynamic Sitting Fair +   Static Standing Fair -   Dynamic Standing Poor +   Ambulatory Poor +   Activity Tolerance   Activity Tolerance Patient limited by fatigue   Medical Staff Made Aware CM notified   Nurse Made Aware PCA Fab[place]   RUE Assessment   RUE Assessment WFL   LUE Assessment   LUE Assessment WFL   Vision-Basic Assessment   Current Vision Wears glasses only for reading   Cognition   Overall Cognitive Status Impaired   Arousal/Participation Alert;Cooperative   Attention Attends with cues to redirect   Orientation Level Oriented to person;Oriented to time;Disoriented to situation;Disoriented to place   Memory Within functional limits   Following Commands Follows one step commands with increased time or repetition   Assessment   Limitation Decreased ADL status;Decreased Safe judgement during ADL;Decreased cognition;Decreased endurance;Decreased self-care trans;Decreased high-level ADLs   Prognosis Good   Assessment Pt is a 78 y.o. female seen for OT  evaluation s/p admit to [facility] on 12/24/2023 w/ Acute cystitis without hematuria.  Comorbidities affecting pt's functional performance at time of assessment include:  dyslipidemia, Hypothyroidism, Hypomagnesemia . Personal factors affecting pt at time of IE include:steps to enter environment, difficulty performing ADLS, and limited insight into deficits. Prior to admission, pt required A with ADLs. Upon evaluation: the following deficits impact occupational performance: decreased balance, decreased tolerance, impaired initiation, impaired memory, impaired sequencing, and impaired problem solving. Pt to benefit from continued skilled OT tx while in the hospital to address deficits as defined above and maximize level of functional independence w ADL's and functional mobility. Occupational Performance areas to address include: bathing/shower, toilet hygiene, dressing, functional mobility, and clothing management. From OT standpoint, recommendation at time of d/c would be Level III (Minimum Resource Intensity).   Goals   Patient Goals To go to the bathroom  (completed during session)   Plan   Treatment Interventions ADL retraining;Functional transfer training;Endurance training;Cognitive reorientation;Patient/family training;Equipment evaluation/education;Compensatory technique education;Energy conservation;Activityengagement   Goal Expiration Date 01/05/24   OT Treatment Day 0   OT Frequency 1-2x/wk   Discharge Recommendation   Rehab Resource Intensity Level, OT III (Minimum Resource Intensity)   Additional Comments  Pt seen as a co-eval with PT due to the patient's co-morbidities, clinically unstable presentation, and present impairments which are a regression from the patient's baseline.   Additional Comments 2 The patient's raw score on the AM-PAC Daily Activity Inpatient Short Form is 18. A raw score of less than 19 suggests the patient may benefit from discharge to post-acute rehabilitation services, however  Pt received A with ADLs prior. Please refer to the recommendation of the Occupational Therapist for safe discharge planning.   AM-PAC Daily Activity Inpatient   Lower Body Dressing 2   Bathing 2   Toileting 3   Upper Body Dressing 3   Grooming 4   Eating 4   Daily Activity Raw Score 18   Daily Activity Standardized Score (Calc for Raw Score >=11) 38.66   AM-PAC Applied Cognition Inpatient   Following a Speech/Presentation 3   Understanding Ordinary Conversation 3   Taking Medications 2   Remembering Where Things Are Placed or Put Away 2   Remembering List of 4-5 Errands 2   Taking Care of Complicated Tasks 2   Applied Cognition Raw Score 14   Applied Cognition Standardized Score 32.02     GOALS:    Pt will achieve the following within specified time frame: STG  Pt will achieve the following goals within 5 days    *ADL transfers with CGA for inc'd independence with ADLs/purposeful tasks    *UB ADL with (I) for inc'd independence with self cares    *LB ADL with Min (A) using AE prn for inc'd independence with self cares    *Toileting with CGA for clothing management and hygiene for return to PLOF with personal care    *Increase static stand balance to F and dyn stand balance to F- for inc'd safety with standing purposeful tasks    *Increase stand tolerance x3 m for inc'd tolerance with standing purposeful tasks    *Participate in 10m UE therex to increase overall stamina/activity tolerance for purposeful tasks    *Bed mobility- CGA for inc'd independence to manage own comfort and initiate EOB & OOB purposeful tasks    Pt will achieve the following within specified time frame: LTG  Pt will achieve the following goals within 10 days    *ADL transfers with (S) for inc'd independence with ADLs/purposeful tasks    *LB ADL with CGA using AE prn for inc'd independence with self cares    *Toileting with (S) for clothing management and hygiene for return to PLOF with personal care    *Increase static stand balance to F+ and  dyn stand balance to F for inc'd safety with standing purposeful tasks    *Increase stand tolerance x5 m for inc'd tolerance with standing purposeful tasks    *Bed mobility- (S) for inc'd independence to manage own comfort and initiate EOB & OOB purposeful tasks      George Tapia MS, OTR/L

## 2023-12-26 NOTE — ASSESSMENT & PLAN NOTE
Initial CT abdomen-Thickened hyperemic urinary bladder concerning for acute cystitis.   Final urine culture revealed no growth  No further antibiotics needed  King catheter placed for recurrent urinary retention  Patient seen by Dr. Tomas of urology, pura for discharge with King in place, his office will be contacting the patient in the near future to set up an outpatient follow-up appointment for voiding trial  No further inpatient testing, treatment, and/or workup is needed  Renal ultrasound is grossly within normal limits  Discharge blood work is reassuring  DC home today  Outpatient follow-up with PCP also

## 2023-12-26 NOTE — ASSESSMENT & PLAN NOTE
Mild hypercalcemia noted-arrival calcium level 10.6  Secondary to dehydration and decreased p.o. intake  Resolved with IV fluid  Will need continued periodic outpatient BMP monitoring with her PCP

## 2023-12-26 NOTE — PLAN OF CARE
Problem: INFECTION - ADULT  Goal: Absence or prevention of progression during hospitalization  Description: INTERVENTIONS:  - Assess and monitor for signs and symptoms of infection  - Monitor lab/diagnostic results  - Monitor all insertion sites, i.e. indwelling lines, tubes, and drains  - Monitor endotracheal if appropriate and nasal secretions for changes in amount and color  - [place] appropriate cooling/warming therapies per order  - Administer medications as ordered  - Instruct and encourage patient and family to use good hand hygiene technique  - Identify and instruct in appropriate isolation precautions for identified infection/condition  Outcome: Progressing  Goal: Absence of fever/infection during neutropenic period  Description: INTERVENTIONS:  - Monitor WBC    Outcome: Progressing     Problem: PAIN - ADULT  Goal: Verbalizes/displays adequate comfort level or baseline comfort level  Description: Interventions:  - Encourage patient to monitor pain and request assistance  - Assess pain using appropriate pain scale  - Administer analgesics based on type and severity of pain and evaluate response  - Implement non-pharmacological measures as appropriate and evaluate response  - Consider cultural and social influences on pain and pain management  - Notify physician/advanced practitioner if interventions unsuccessful or patient reports new pain  Outcome: Progressing

## 2023-12-27 ENCOUNTER — HOME CARE VISIT (OUTPATIENT)
Dept: HOME HEALTH SERVICES | Facility: HOME HEALTHCARE | Age: 78
End: 2023-12-27

## 2023-12-27 ENCOUNTER — TRANSITIONAL CARE MANAGEMENT (OUTPATIENT)
Dept: FAMILY MEDICINE CLINIC | Facility: CLINIC | Age: 78
End: 2023-12-27

## 2023-12-28 ENCOUNTER — HOME CARE VISIT (OUTPATIENT)
Dept: HOME HEALTH SERVICES | Facility: HOME HEALTHCARE | Age: 78
End: 2023-12-28
Payer: MEDICARE

## 2023-12-28 ENCOUNTER — HOME CARE VISIT (OUTPATIENT)
Dept: HOME HEALTH SERVICES | Facility: HOME HEALTHCARE | Age: 78
End: 2023-12-28

## 2023-12-28 PROCEDURE — 10330081 VN NO-PAY CLAIM PROCEDURE

## 2023-12-28 PROCEDURE — 400013 VN SOC

## 2023-12-28 PROCEDURE — G0299 HHS/HOSPICE OF RN EA 15 MIN: HCPCS

## 2023-12-30 LAB
BACTERIA BLD CULT: NORMAL
BACTERIA BLD CULT: NORMAL

## 2024-01-01 ENCOUNTER — HOSPITAL ENCOUNTER (EMERGENCY)
Facility: HOSPITAL | Age: 79
Discharge: HOME | End: 2024-01-01
Attending: EMERGENCY MEDICINE
Payer: MEDICARE

## 2024-01-01 VITALS
HEART RATE: 72 BPM | TEMPERATURE: 98.3 F | BODY MASS INDEX: 20.98 KG/M2 | WEIGHT: 130 LBS | RESPIRATION RATE: 22 BRPM | OXYGEN SATURATION: 98 % | DIASTOLIC BLOOD PRESSURE: 63 MMHG | SYSTOLIC BLOOD PRESSURE: 133 MMHG

## 2024-01-01 DIAGNOSIS — R20.2 PARESTHESIAS: Primary | ICD-10-CM

## 2024-01-01 DIAGNOSIS — N30.90 CYSTITIS: ICD-10-CM

## 2024-01-01 LAB
ALBUMIN SERPL BCP-MCNC: 4.4 G/DL (ref 3.5–5)
ALP SERPL-CCNC: 46 U/L (ref 34–104)
ALT SERPL W P-5'-P-CCNC: 20 U/L (ref 7–52)
ANION GAP SERPL CALCULATED.3IONS-SCNC: 9 MMOL/L
AST SERPL W P-5'-P-CCNC: 29 U/L (ref 13–39)
BACTERIA UR QL AUTO: ABNORMAL /HPF
BASOPHILS # BLD AUTO: 0.04 THOUSANDS/ÂΜL (ref 0–0.1)
BASOPHILS NFR BLD AUTO: 1 % (ref 0–1)
BILIRUB SERPL-MCNC: 0.76 MG/DL (ref 0.2–1)
BILIRUB UR QL STRIP: NEGATIVE
BUN SERPL-MCNC: 12 MG/DL (ref 5–25)
CALCIUM SERPL-MCNC: 10.1 MG/DL (ref 8.4–10.2)
CHLORIDE SERPL-SCNC: 101 MMOL/L (ref 96–108)
CLARITY UR: ABNORMAL
CO2 SERPL-SCNC: 28 MMOL/L (ref 21–32)
COLOR UR: YELLOW
CREAT SERPL-MCNC: 0.83 MG/DL (ref 0.6–1.3)
EOSINOPHIL # BLD AUTO: 0.21 THOUSAND/ÂΜL (ref 0–0.61)
EOSINOPHIL NFR BLD AUTO: 3 % (ref 0–6)
ERYTHROCYTE [DISTWIDTH] IN BLOOD BY AUTOMATED COUNT: 12.4 % (ref 11.6–15.1)
GFR SERPL CREATININE-BSD FRML MDRD: 66 ML/MIN/1.73SQ M
GLUCOSE SERPL-MCNC: 101 MG/DL (ref 65–140)
GLUCOSE UR STRIP-MCNC: NEGATIVE MG/DL
HCT VFR BLD AUTO: 45.3 % (ref 34.8–46.1)
HGB BLD-MCNC: 14.9 G/DL (ref 11.5–15.4)
HGB UR QL STRIP.AUTO: NEGATIVE
IMM GRANULOCYTES # BLD AUTO: 0.02 THOUSAND/UL (ref 0–0.2)
IMM GRANULOCYTES NFR BLD AUTO: 0 % (ref 0–2)
KETONES UR STRIP-MCNC: NEGATIVE MG/DL
LEUKOCYTE ESTERASE UR QL STRIP: ABNORMAL
LYMPHOCYTES # BLD AUTO: 2.34 THOUSANDS/ÂΜL (ref 0.6–4.47)
LYMPHOCYTES NFR BLD AUTO: 30 % (ref 14–44)
MAGNESIUM SERPL-MCNC: 1.8 MG/DL (ref 1.9–2.7)
MCH RBC QN AUTO: 32.5 PG (ref 26.8–34.3)
MCHC RBC AUTO-ENTMCNC: 32.9 G/DL (ref 31.4–37.4)
MCV RBC AUTO: 99 FL (ref 82–98)
MONOCYTES # BLD AUTO: 0.65 THOUSAND/ÂΜL (ref 0.17–1.22)
MONOCYTES NFR BLD AUTO: 8 % (ref 4–12)
NEUTROPHILS # BLD AUTO: 4.46 THOUSANDS/ÂΜL (ref 1.85–7.62)
NEUTS SEG NFR BLD AUTO: 58 % (ref 43–75)
NITRITE UR QL STRIP: NEGATIVE
NON-SQ EPI CELLS URNS QL MICRO: ABNORMAL /HPF
NRBC BLD AUTO-RTO: 0 /100 WBCS
OTHER STN SPEC: ABNORMAL
PH UR STRIP.AUTO: 6.5 [PH]
PHOSPHATE SERPL-MCNC: 3 MG/DL (ref 2.3–4.1)
PLATELET # BLD AUTO: 190 THOUSANDS/UL (ref 149–390)
PMV BLD AUTO: 9.8 FL (ref 8.9–12.7)
POTASSIUM SERPL-SCNC: 4 MMOL/L (ref 3.5–5.3)
PROT SERPL-MCNC: 7.4 G/DL (ref 6.4–8.4)
PROT UR STRIP-MCNC: NEGATIVE MG/DL
RBC # BLD AUTO: 4.58 MILLION/UL (ref 3.81–5.12)
RBC #/AREA URNS AUTO: ABNORMAL /HPF
SODIUM SERPL-SCNC: 138 MMOL/L (ref 135–147)
SP GR UR STRIP.AUTO: 1.02
UROBILINOGEN UR QL STRIP.AUTO: 0.2 E.U./DL
WBC # BLD AUTO: 7.72 THOUSAND/UL (ref 4.31–10.16)
WBC #/AREA URNS AUTO: ABNORMAL /HPF

## 2024-01-01 PROCEDURE — 81001 URINALYSIS AUTO W/SCOPE: CPT | Performed by: EMERGENCY MEDICINE

## 2024-01-01 PROCEDURE — 81003 URINALYSIS AUTO W/O SCOPE: CPT | Performed by: EMERGENCY MEDICINE

## 2024-01-01 PROCEDURE — 93005 ELECTROCARDIOGRAM TRACING: CPT

## 2024-01-01 PROCEDURE — 85025 COMPLETE CBC W/AUTO DIFF WBC: CPT | Performed by: EMERGENCY MEDICINE

## 2024-01-01 PROCEDURE — 83735 ASSAY OF MAGNESIUM: CPT | Performed by: EMERGENCY MEDICINE

## 2024-01-01 PROCEDURE — 99285 EMERGENCY DEPT VISIT HI MDM: CPT

## 2024-01-01 PROCEDURE — 96365 THER/PROPH/DIAG IV INF INIT: CPT

## 2024-01-01 PROCEDURE — 87077 CULTURE AEROBIC IDENTIFY: CPT | Performed by: EMERGENCY MEDICINE

## 2024-01-01 PROCEDURE — 99285 EMERGENCY DEPT VISIT HI MDM: CPT | Performed by: EMERGENCY MEDICINE

## 2024-01-01 PROCEDURE — 87147 CULTURE TYPE IMMUNOLOGIC: CPT | Performed by: EMERGENCY MEDICINE

## 2024-01-01 PROCEDURE — 84100 ASSAY OF PHOSPHORUS: CPT | Performed by: EMERGENCY MEDICINE

## 2024-01-01 PROCEDURE — 87186 SC STD MICRODIL/AGAR DIL: CPT | Performed by: EMERGENCY MEDICINE

## 2024-01-01 PROCEDURE — 36415 COLL VENOUS BLD VENIPUNCTURE: CPT | Performed by: EMERGENCY MEDICINE

## 2024-01-01 PROCEDURE — 87086 URINE CULTURE/COLONY COUNT: CPT | Performed by: EMERGENCY MEDICINE

## 2024-01-01 PROCEDURE — 96361 HYDRATE IV INFUSION ADD-ON: CPT

## 2024-01-01 PROCEDURE — 80053 COMPREHEN METABOLIC PANEL: CPT | Performed by: EMERGENCY MEDICINE

## 2024-01-01 RX ORDER — PHENAZOPYRIDINE HYDROCHLORIDE 200 MG/1
200 TABLET, FILM COATED ORAL 3 TIMES DAILY
Qty: 6 TABLET | Refills: 0 | Status: SHIPPED | OUTPATIENT
Start: 2024-01-01

## 2024-01-01 RX ORDER — CEFPODOXIME PROXETIL 200 MG/1
200 TABLET, FILM COATED ORAL ONCE
Status: COMPLETED | OUTPATIENT
Start: 2024-01-01 | End: 2024-01-01

## 2024-01-01 RX ORDER — CEFPODOXIME PROXETIL 200 MG/1
200 TABLET, FILM COATED ORAL 2 TIMES DAILY
Qty: 10 TABLET | Refills: 0 | Status: SHIPPED | OUTPATIENT
Start: 2024-01-01 | End: 2024-01-06

## 2024-01-01 RX ORDER — MAGNESIUM SULFATE 1 G/100ML
1 INJECTION INTRAVENOUS ONCE
Status: COMPLETED | OUTPATIENT
Start: 2024-01-01 | End: 2024-01-01

## 2024-01-01 RX ORDER — PHENAZOPYRIDINE HYDROCHLORIDE 100 MG/1
100 TABLET, FILM COATED ORAL ONCE
Status: COMPLETED | OUTPATIENT
Start: 2024-01-01 | End: 2024-01-01

## 2024-01-01 RX ADMIN — SODIUM CHLORIDE 1000 ML: 0.9 INJECTION, SOLUTION INTRAVENOUS at 14:34

## 2024-01-01 RX ADMIN — MAGNESIUM SULFATE HEPTAHYDRATE 1 G: 1 INJECTION, SOLUTION INTRAVENOUS at 15:34

## 2024-01-01 RX ADMIN — PHENAZOPYRIDINE 100 MG: 100 TABLET ORAL at 16:57

## 2024-01-01 RX ADMIN — CEFPODOXIME PROXETIL 200 MG: 200 TABLET, FILM COATED ORAL at 16:57

## 2024-01-01 NOTE — ED NOTES
Patient refusing to ambulate in ER stating she just doesn't feel like it but would like to go home. Patient's  agreeable at bedside. Dr. Branch aware.      Rosangela Wei RN  01/01/24 [id number]

## 2024-01-01 NOTE — ED PROVIDER NOTES
History  Chief Complaint   Patient presents with    Numbness     Patient arrives with complaints of bilateral numbness and tingling to arms. States has been an on going issue for 2 years but states it felt worse upon awakening and felt scared. Was recently admitted for UTI, orellana cath in place and draining.      78-year-old female notes that she was recently hospitalized around [place] for cystitis.  Patient had a Orellana catheter placed and had antibiotics while in the hospital, but is no longer on antibiotics.  The patient feels that over the past few days the neuropathy that she has in her hands and feet has gotten worse, and it is now becoming difficult to walk with her walker.  Patient feels that her skin is wrinkly and that she needs fluid, and is not sure if her urinary infection is still needing care.  Patient notes she is weak and is having difficulty walking.        Prior to Admission Medications   Prescriptions Last Dose Informant Patient Reported? Taking?   atorvastatin (LIPITOR) 40 mg tablet   Yes No   Sig: Take 40 mg by mouth daily   levothyroxine 75 mcg tablet   Yes No   Sig: Take 1 tablet (75 mg) by oral route five days a week.   pregabalin (LYRICA) 200 MG capsule   No No   Sig: Take 1 capsule (200 mg total) by mouth 3 (three) times a day   Patient not taking: Reported on 12/24/2023      Facility-Administered Medications: None       Past Medical History:   Diagnosis Date    Aortic stenosis     Depression     Disease of thyroid gland     Hyperlipidemia     Peripheral neuropathy        Past Surgical History:   Procedure Laterality Date    BOWEL RESECTION         History reviewed. No pertinent family history.  I have reviewed and agree with the history as documented.    E-Cigarette/Vaping    E-Cigarette Use Never User      E-Cigarette/Vaping Substances    Nicotine No     THC No     CBD No     Flavoring No      Social History     Tobacco Use    Smoking status: Never    Smokeless tobacco: Never   Vaping  Use    Vaping status: Never Used   Substance Use Topics    Alcohol use: Not Currently     Comment: occasional    Drug use: Never       Review of Systems   Constitutional:  Positive for activity change. Negative for chills and fever.   HENT:  Negative for ear pain and sore throat.    Eyes:  Negative for pain and visual disturbance.   Respiratory:  Negative for cough and shortness of breath.    Cardiovascular:  Negative for chest pain and palpitations.   Gastrointestinal:  Negative for abdominal pain and vomiting.   Genitourinary:  Negative for dysuria and hematuria.   Musculoskeletal:  Negative for arthralgias and back pain.   Skin:  Negative for color change and rash.   Neurological:  Positive for weakness and numbness. Negative for dizziness, seizures and syncope.   All other systems reviewed and are negative.      Physical Exam  Physical Exam  Vitals and nursing note reviewed.   Constitutional:       General: She is not in acute distress.     Appearance: Normal appearance. She is well-developed. She is ill-appearing.   HENT:      Head: Normocephalic and atraumatic.      Right Ear: External ear normal.      Left Ear: External ear normal.      Nose: Nose normal.      Mouth/Throat:      Mouth: Mucous membranes are moist.   Eyes:      Conjunctiva/sclera: Conjunctivae normal.   Cardiovascular:      Rate and Rhythm: Normal rate and regular rhythm.      Pulses: Normal pulses.      Heart sounds: Normal heart sounds. No murmur heard.  Pulmonary:      Effort: Pulmonary effort is normal. No respiratory distress.      Breath sounds: Normal breath sounds.   Abdominal:      General: Bowel sounds are normal.      Palpations: Abdomen is soft.      Tenderness: There is no abdominal tenderness.   Musculoskeletal:         General: No swelling or deformity.      Cervical back: Neck supple.   Skin:     General: Skin is warm and dry.      Capillary Refill: Capillary refill takes less than 2 seconds.      Coloration: Skin is pale.    Neurological:      General: No focal deficit present.      Mental Status: She is alert and oriented to person, place, and time. Mental status is at baseline.      Sensory: Sensory deficit present.      Comments: Patient complains of sensory deficits from her fingertips to her elbows on both arms.  Patient notes that she has a history of chronic upper and lower extremity neuropathy, but she notes that this seems more prominent than usual.         Vital Signs  ED Triage Vitals [01/01/24 1327]   Temperature Pulse Respirations Blood Pressure SpO2   98.3 °F (36.8 °C) 83 18 150/68 90 %      Temp Source Heart Rate Source Patient Position - Orthostatic VS BP Location FiO2 (%)   Temporal Monitor Sitting Left arm --      Pain Score       No Pain           Vitals:    01/01/24 1327 01/01/24 1500 01/01/24 1530 01/01/24 1600   BP: 150/68 141/65 152/68 133/63   Pulse: 83 74 64 72   Patient Position - Orthostatic VS: Sitting            Visual Acuity      ED Medications  Medications   sodium chloride 0.9 % bolus 1,000 mL (0 mL Intravenous Stopped 1/1/24 1536)   magnesium sulfate IVPB (premix) SOLN 1 g (0 g Intravenous Stopped 1/1/24 1616)   cefpodoxime (VANTIN) tablet 200 mg (200 mg Oral Given 1/1/24 1657)   phenazopyridine (PYRIDIUM) tablet 100 mg (100 mg Oral Given 1/1/24 1657)       Diagnostic Studies  Results Reviewed       Procedure Component Value Units Date/Time    Urine Microscopic [[id number]]  (Abnormal) Collected: 01/01/24 1432    Lab Status: Final result Specimen: Urine, Indwelling King Catheter Updated: 01/01/24 1616     RBC, UA 2-4 /hpf      WBC, UA 30-50 /hpf      Epithelial Cells Moderate /hpf      Bacteria, UA Innumerable /hpf      OTHER OBSERVATIONS Yeast Cells Present    Urine culture [[id number]] Collected: 01/01/24 1432    Lab Status: In process Specimen: Urine, Indwelling King Catheter Updated: 01/01/24 1616    Comprehensive metabolic panel [[id number]] Collected: 01/01/24 1432    Lab Status: Final result  Specimen: Blood from Arm, Right Updated: 01/01/24 1504     Sodium 138 mmol/L      Potassium 4.0 mmol/L      Chloride 101 mmol/L      CO2 28 mmol/L      ANION GAP 9 mmol/L      BUN 12 mg/dL      Creatinine 0.83 mg/dL      Glucose 101 mg/dL      Calcium 10.1 mg/dL      AST 29 U/L      ALT 20 U/L      Alkaline Phosphatase 46 U/L      Total Protein 7.4 g/dL      Albumin 4.4 g/dL      Total Bilirubin 0.76 mg/dL      eGFR 66 ml/min/1.73sq m     Narrative:      National Kidney Disease Foundation guidelines for Chronic Kidney Disease (CKD):     Stage 1 with normal or high GFR (GFR > 90 mL/min/1.73 square meters)    Stage 2 Mild CKD (GFR = 60-89 mL/min/1.73 square meters)    Stage 3A Moderate CKD (GFR = 45-59 mL/min/1.73 square meters)    Stage 3B Moderate CKD (GFR = 30-44 mL/min/1.73 square meters)    Stage 4 Severe CKD (GFR = 15-29 mL/min/1.73 square meters)    Stage 5 End Stage CKD (GFR <15 mL/min/1.73 square meters)  Note: GFR calculation is accurate only with a steady state creatinine    Magnesium [[id number]]  (Abnormal) Collected: 01/01/24 1432    Lab Status: Final result Specimen: Blood from Arm, Right Updated: 01/01/24 1504     Magnesium 1.8 mg/dL     Phosphorus [[id number]]  (Normal) Collected: 01/01/24 1432    Lab Status: Final result Specimen: Blood from Arm, Right Updated: 01/01/24 1504     Phosphorus 3.0 mg/dL     UA w Reflex to Microscopic w Reflex to Culture [[id number]]  (Abnormal) Collected: 01/01/24 1432    Lab Status: Final result Specimen: Urine, Indwelling King Catheter Updated: 01/01/24 1447     Color, UA Yellow     Clarity, UA Cloudy     Specific Gravity, UA 1.025     pH, UA 6.5     Leukocytes, UA 1+     Nitrite, UA Negative     Protein, UA Negative mg/dl      Glucose, UA Negative mg/dl      Ketones, UA Negative mg/dl      Urobilinogen, UA 0.2 E.U./dl      Bilirubin, UA Negative     Occult Blood, UA Negative    CBC and differential [[id number]]  (Abnormal) Collected: 01/01/24 1432    Lab Status: Final  result Specimen: Blood from Arm, Right Updated: 01/01/24 1445     WBC 7.72 Thousand/uL      RBC 4.58 Million/uL      Hemoglobin 14.9 g/dL      Hematocrit 45.3 %      MCV 99 fL      MCH 32.5 pg      MCHC 32.9 g/dL      RDW 12.4 %      MPV 9.8 fL      Platelets 190 Thousands/uL      nRBC 0 /100 WBCs      Neutrophils Relative 58 %      Immat GRANS % 0 %      Lymphocytes Relative 30 %      Monocytes Relative 8 %      Eosinophils Relative 3 %      Basophils Relative 1 %      Neutrophils Absolute 4.46 Thousands/µL      Immature Grans Absolute 0.02 Thousand/uL      Lymphocytes Absolute 2.34 Thousands/µL      Monocytes Absolute 0.65 Thousand/µL      Eosinophils Absolute 0.21 Thousand/µL      Basophils Absolute 0.04 Thousands/µL                    No orders to display              Procedures  ECG 12 Lead Documentation Only    Date/Time: 1/1/2024 4:56 PM    Performed by: Raza Branch Jr., DO  Authorized by: Raza Branch Jr., DO    ECG reviewed by me, the ED Provider: yes    Patient location:  ED  Comments:      EKG is a sinus rhythm at 77 beats a minute with a indeterminate axis.  There is no definitive acute ST or T wave changes present.  There is first-degree AV block noted.           ED Course  ED Course as of 01/02/24 0006   Mon Jan 01, 2024   1629 WBC, UA(!): 30-50   1644 Had a long discussion with the patient/family.  Cameron with her that her urine tests, although they look infected have not grown out any bacteria.  The patient though is having significant discomfort, and is unclear whether it is due to the cystitis versus the King catheter.  The patient has not heard back from Dr. Tomas as of yet.  The patient, with IV fluids states that her neuropathy sensation is improved.  The patient does not want to be admitted.  I discussed that I will put the patient on antibiotics for 3 days although it will likely not show an infection again.  I will also start the patient on Pyridium.  The patient needs to  follow-up with urology as soon as possible.  Patient will be discharged                               SBIRT 22yo+      Flowsheet Row Most Recent Value   Initial Alcohol Screen: US AUDIT-C     1. How often do you have a drink containing alcohol? 0 Filed at: 01/01/2024 1329   2. How many drinks containing alcohol do you have on a typical day you are drinking?  0 Filed at: 01/01/2024 1329   3a. Male UNDER 65: How often do you have five or more drinks on one occasion? 0 Filed at: 01/01/2024 1329   3b. FEMALE Any Age, or MALE 65+: How often do you have 4 or more drinks on one occassion? 0 Filed at: 01/01/2024 1329   Audit-C Score 0 Filed at: 01/01/2024 1329   [place]: How many times in the past year have you...    Used an illegal drug or used a prescription medication for non-medical reasons? Never Filed at: 01/01/2024 1329                      Medical Decision Making  Patient is an 78-year-old female who presented to the emergency department complaining of worsening upper extremity neuropathy.  Patient already has paresthesias of her hands but this is slowly progressed up towards her elbows.  Patient notes that she was also treated for a bladder infection and has a catheter for urinary retention.  The patient was discharged not long ago but notes that she feels weak and dehydrated.  The patient notes that with her neuropathy it is difficult for her to hold onto her walker.  Patient feels that she is having pain in her suprapubic region and is concerned that she may be having worsening infection.  Differential diagnosis at the time of evaluation is urinary tract infection, cystitis, dehydration, electrolyte abnormalities, or an infection.  Lab work shows no significant acute abnormalities with exception of elevated white blood cells in the patient's catheterized urine sample.  Patient's old lab work was reviewed and the patient had a similar urine prior to her catheterization however the urine did not grow out any bacteria.   This is the reason that the patient was not discharged on antibiotics.  The patient's cystitis is likely caused by a different etiology other than infectious.  The patient did get a liter of fluids and felt much better and states that it feels like her neuropathy has improved after fluids.  The patient was offered admission due to having some difficulty with walking however the patient notes that she would rather go home and not spent time in the emergency department or hospital.  Patient is asking for medication though to help with her suprapubic pain.  Patiently placed on Pyridium for the next 2 days and was urged to follow-up with the urologist from her inpatient stay.  The patient was told that symptoms are likely not from an infection however until the culture results are back, I have chosen to treat her with Vantin until she follows up.  Patient was discharged home    Amount and/or Complexity of Data Reviewed  Labs: ordered. Decision-making details documented in ED Course.    Risk  Prescription drug management.             Disposition  Final diagnoses:   Paresthesias   Cystitis     Time reflects when diagnosis was documented in both MDM as applicable and the Disposition within this note       Time User Action Codes Description Comment    1/1/2024  4:47 PM Raza Branch Add [R20.2] Paresthesias     1/1/2024  4:47 PM Raza Branch Add [N30.90] Cystitis           ED Disposition       ED Disposition   Discharge    Condition   Stable    Date/Time   Mon Jan 1, 2024 1647    Comment   Daily Schaeffer discharge to home/self care.                   Follow-up Information       Follow up With Specialties Details Why Contact Iván Moreau,  Family Medicine On 1/5/2024  [address] [place] [id number] [phone]              Discharge Medication List as of 1/1/2024  4:51 PM        START taking these medications    Details   cefpodoxime (VANTIN) 200 mg tablet Take 1 tablet (200 mg  total) by mouth 2 (two) times a day for 5 days, Starting Mon 1/1/2024, Until Sat 1/6/2024, Normal      phenazopyridine (PYRIDIUM) 200 mg tablet Take 1 tablet (200 mg total) by mouth 3 (three) times a day, Starting Mon 1/1/2024, Normal           CONTINUE these medications which have NOT CHANGED    Details   atorvastatin (LIPITOR) 40 mg tablet Take 40 mg by mouth daily, Starting Fri 4/29/2022, Historical Med      levothyroxine 75 mcg tablet Take 1 tablet (75 mg) by oral route five days a week., Starting Fri 4/29/2022, Historical Med      pregabalin (LYRICA) 200 MG capsule Take 1 capsule (200 mg total) by mouth 3 (three) times a day, Starting Mon 11/6/2023, Normal             No discharge procedures on file.    PDMP Review         Value Time User    PDMP Reviewed  Yes 5/11/2022 10:12 AM MAJOR Church            ED Provider  Electronically Signed by             Raza Branch Jr.,   01/02/24 0006

## 2024-01-01 NOTE — DISCHARGE INSTRUCTIONS
Drink plenty of fluids.    Take Pyridium 1 pill 3 times a day.  This will make your urine orange.  That is normal.    Take cefpodoxime 1 pill twice a day x 5 days.    It is likely that your urine will not grow out a bacteria again as your first test was negative.

## 2024-01-02 ENCOUNTER — HOME CARE VISIT (OUTPATIENT)
Dept: HOME HEALTH SERVICES | Facility: HOME HEALTHCARE | Age: 79
End: 2024-01-02
Payer: MEDICARE

## 2024-01-02 VITALS
TEMPERATURE: 98.3 F | RESPIRATION RATE: 20 BRPM | SYSTOLIC BLOOD PRESSURE: 128 MMHG | OXYGEN SATURATION: 97 % | HEART RATE: 68 BPM | DIASTOLIC BLOOD PRESSURE: 70 MMHG

## 2024-01-02 LAB
ATRIAL RATE: 77 BPM
P AXIS: 53 DEGREES
PR INTERVAL: 212 MS
QRS AXIS: -3 DEGREES
QRSD INTERVAL: 66 MS
QT INTERVAL: 404 MS
QTC INTERVAL: 457 MS
T WAVE AXIS: 63 DEGREES
VENTRICULAR RATE: 77 BPM

## 2024-01-02 PROCEDURE — G0299 HHS/HOSPICE OF RN EA 15 MIN: HCPCS

## 2024-01-02 PROCEDURE — 10330064 CATH SECURE, STATLOCK FOLEY SWIVEL SIL P

## 2024-01-02 PROCEDURE — 88112 CYTOPATH CELL ENHANCE TECH: CPT | Performed by: PATHOLOGY

## 2024-01-02 NOTE — CASE COMMUNICATION
Contacted patient to schedule OT evaluation.  Spoke with patient's spouse who reports that patient is getting around fine and performing ADL's without difficulty.  Did not feel that OT was needed at this time.  OT eval cancelled per request.

## 2024-01-02 NOTE — PROGRESS NOTES
Spoke to patient and she wants to see Dr. Tomas first. They have not heard from him yet. The home care nurse called and left a message also.  I told her that it is important to followup with the PCP also.  She said she can't do at this time because her  wasn't home and will call back to schedule

## 2024-01-02 NOTE — CASE COMMUNICATION
Phys ther called pt to schedule and received VM from  informing they declined home PT.     cancel home PT eval per pt/family request.    Thank you, Amy Kelsey PT

## 2024-01-03 ENCOUNTER — HOME CARE VISIT (OUTPATIENT)
Dept: HOME HEALTH SERVICES | Facility: HOME HEALTHCARE | Age: 79
End: 2024-01-03
Payer: MEDICARE

## 2024-01-03 LAB — BACTERIA UR CULT: ABNORMAL

## 2024-01-03 RX ORDER — AMPICILLIN 500 MG/1
500 CAPSULE ORAL 3 TIMES DAILY
Qty: 21 CAPSULE | Refills: 0 | Status: SHIPPED | OUTPATIENT
Start: 2024-01-03 | End: 2024-01-10

## 2024-01-04 VITALS
HEART RATE: 72 BPM | TEMPERATURE: 98 F | OXYGEN SATURATION: 95 % | SYSTOLIC BLOOD PRESSURE: 144 MMHG | RESPIRATION RATE: 20 BRPM | DIASTOLIC BLOOD PRESSURE: 80 MMHG

## 2024-01-05 ENCOUNTER — HOME CARE VISIT (OUTPATIENT)
Dept: HOME HEALTH SERVICES | Facility: HOME HEALTHCARE | Age: 79
End: 2024-01-05
Payer: MEDICARE

## 2024-01-05 VITALS
OXYGEN SATURATION: 99 % | DIASTOLIC BLOOD PRESSURE: 76 MMHG | SYSTOLIC BLOOD PRESSURE: 132 MMHG | RESPIRATION RATE: 18 BRPM | TEMPERATURE: 97.5 F | HEART RATE: 70 BPM

## 2024-01-05 PROCEDURE — G0299 HHS/HOSPICE OF RN EA 15 MIN: HCPCS

## 2024-01-08 PROCEDURE — G0180 MD CERTIFICATION HHA PATIENT: HCPCS | Performed by: HOSPITALIST

## 2024-01-09 ENCOUNTER — HOME CARE VISIT (OUTPATIENT)
Dept: HOME HEALTH SERVICES | Facility: HOME HEALTHCARE | Age: 79
End: 2024-01-09
Payer: MEDICARE

## 2024-01-09 PROCEDURE — G0299 HHS/HOSPICE OF RN EA 15 MIN: HCPCS

## 2024-01-11 ENCOUNTER — HOME CARE VISIT (OUTPATIENT)
Dept: HOME HEALTH SERVICES | Facility: HOME HEALTHCARE | Age: 79
End: 2024-01-11
Payer: MEDICARE

## 2024-01-11 PROCEDURE — G0299 HHS/HOSPICE OF RN EA 15 MIN: HCPCS

## 2024-01-15 VITALS
RESPIRATION RATE: 20 BRPM | DIASTOLIC BLOOD PRESSURE: 72 MMHG | TEMPERATURE: 97.2 F | HEART RATE: 72 BPM | TEMPERATURE: 97.7 F | HEART RATE: 64 BPM | OXYGEN SATURATION: 100 % | DIASTOLIC BLOOD PRESSURE: 68 MMHG | RESPIRATION RATE: 16 BRPM | SYSTOLIC BLOOD PRESSURE: 124 MMHG | OXYGEN SATURATION: 98 % | SYSTOLIC BLOOD PRESSURE: 118 MMHG

## 2024-01-17 ENCOUNTER — HOME CARE VISIT (OUTPATIENT)
Dept: HOME HEALTH SERVICES | Facility: HOME HEALTHCARE | Age: 79
End: 2024-01-17
Payer: MEDICARE

## 2024-01-17 VITALS
HEART RATE: 70 BPM | SYSTOLIC BLOOD PRESSURE: 104 MMHG | RESPIRATION RATE: 20 BRPM | OXYGEN SATURATION: 100 % | DIASTOLIC BLOOD PRESSURE: 56 MMHG | TEMPERATURE: 97.8 F

## 2024-01-17 PROCEDURE — G0300 HHS/HOSPICE OF LPN EA 15 MIN: HCPCS

## 2024-01-23 ENCOUNTER — HOME CARE VISIT (OUTPATIENT)
Dept: HOME HEALTH SERVICES | Facility: HOME HEALTHCARE | Age: 79
End: 2024-01-23
Payer: MEDICARE

## 2024-01-23 PROCEDURE — G0299 HHS/HOSPICE OF RN EA 15 MIN: HCPCS

## 2024-01-29 VITALS
TEMPERATURE: 97.4 F | RESPIRATION RATE: 20 BRPM | HEART RATE: 76 BPM | SYSTOLIC BLOOD PRESSURE: 134 MMHG | DIASTOLIC BLOOD PRESSURE: 76 MMHG | OXYGEN SATURATION: 95 %

## 2024-01-30 ENCOUNTER — TELEPHONE (OUTPATIENT)
Dept: FAMILY MEDICINE CLINIC | Facility: CLINIC | Age: 79
End: 2024-01-30

## 2024-02-21 ENCOUNTER — HOSPITAL ENCOUNTER (OUTPATIENT)
Dept: NON INVASIVE DIAGNOSTICS | Facility: CLINIC | Age: 79
Discharge: HOME | End: 2024-02-21
Payer: MEDICARE

## 2024-02-21 VITALS
BODY MASS INDEX: 20.89 KG/M2 | WEIGHT: 130 LBS | HEART RATE: 63 BPM | DIASTOLIC BLOOD PRESSURE: 76 MMHG | SYSTOLIC BLOOD PRESSURE: 134 MMHG | HEIGHT: 66 IN

## 2024-02-21 DIAGNOSIS — I35.0 NONRHEUMATIC AORTIC (VALVE) STENOSIS: ICD-10-CM

## 2024-02-21 PROBLEM — N30.00 ACUTE CYSTITIS WITHOUT HEMATURIA: Status: RESOLVED | Noted: 2023-12-24 | Resolved: 2024-02-21

## 2024-02-21 LAB
AORTIC ROOT: 2.8 CM
AORTIC VALVE MEAN VELOCITY: 28.8 M/S
APICAL FOUR CHAMBER EJECTION FRACTION: 83 %
ASCENDING AORTA: 2.7 CM
AV AREA BY CONTINUOUS VTI: 0.8 CM2
AV AREA PEAK VELOCITY: 0.8 CM2
AV LVOT MEAN GRADIENT: 2 MMHG
AV LVOT PEAK GRADIENT: 4 MMHG
AV MEAN GRADIENT: 38 MMHG
AV PEAK GRADIENT: 63 MMHG
AV VALVE AREA: 0.84 CM2
AV VELOCITY RATIO: 0.25
BSA FOR ECHO PROCEDURE: 1.67 M2
DOP CALC AO PEAK VEL: 4 M/S
DOP CALC AO VTI: 97.34 CM
DOP CALC LVOT AREA: 3.14 CM2
DOP CALC LVOT CARDIAC INDEX: 3.05 L/MIN/M2
DOP CALC LVOT CARDIAC OUTPUT: 5.09 L/MIN
DOP CALC LVOT DIAMETER: 2 CM
DOP CALC LVOT PEAK VEL VTI: 26.04 CM
DOP CALC LVOT PEAK VEL: 1 M/S
DOP CALC LVOT STROKE INDEX: 47.3 ML/M2
DOP CALC LVOT STROKE VOLUME: 79
DOP CALC MV VTI: 44.74 CM
E WAVE DECELERATION TIME: 289 MS
E/A RATIO: 0.9
FRACTIONAL SHORTENING: 43 (ref 28–44)
INTERVENTRICULAR SEPTUM IN DIASTOLE (PARASTERNAL SHORT AXIS VIEW): 1 CM
INTERVENTRICULAR SEPTUM: 1 CM (ref 0.6–1.1)
LAAS-AP2: 16.9 CM2
LAAS-AP4: 11.3 CM2
LEFT ATRIUM SIZE: 3.4 CM
LEFT ATRIUM VOLUME (MOD BIPLANE): 38 ML
LEFT ATRIUM VOLUME INDEX (MOD BIPLANE): 22.8 ML/M2
LEFT INTERNAL DIMENSION IN SYSTOLE: 2.6 CM (ref 2.1–4)
LEFT VENTRICULAR INTERNAL DIMENSION IN DIASTOLE: 4.6 CM (ref 3.5–6)
LEFT VENTRICULAR POSTERIOR WALL IN END DIASTOLE: 0.9 CM
LEFT VENTRICULAR STROKE VOLUME: 71 ML
LVSV (TEICH): 71 ML
MV E'TISSUE VEL-SEP: 9 CM/S
MV MEAN GRADIENT: 3 MMHG
MV PEAK A VEL: 1.15 M/S
MV PEAK E VEL: 104 CM/S
MV PEAK GRADIENT: 6 MMHG
MV VALVE AREA BY CONTINUITY EQUATION: 1.83 CM2
RIGHT ATRIUM AREA SYSTOLE A4C: 7.3 CM2
RIGHT VENTRICLE ID DIMENSION: 2.9 CM
SL CV LEFT ATRIUM LENGTH A2C: 4.5 CM
SL CV LV EF: 65
SL CV PED ECHO LEFT VENTRICLE DIASTOLIC VOLUME (MOD BIPLANE) 2D: 95 ML
SL CV PED ECHO LEFT VENTRICLE SYSTOLIC VOLUME (MOD BIPLANE) 2D: 24 ML
TR MAX PG: 19 MMHG
TR PEAK VELOCITY: 2.2 M/S
TRICUSPID ANNULAR PLANE SYSTOLIC EXCURSION: 2.2 CM
TRICUSPID VALVE PEAK REGURGITATION VELOCITY: 2.19 M/S

## 2024-02-21 PROCEDURE — 93306 TTE W/DOPPLER COMPLETE: CPT | Performed by: INTERNAL MEDICINE

## 2024-02-21 PROCEDURE — 93306 TTE W/DOPPLER COMPLETE: CPT

## 2024-03-04 ENCOUNTER — OFFICE VISIT (OUTPATIENT)
Dept: CARDIOLOGY CLINIC | Facility: CLINIC | Age: 79
End: 2024-03-04
Payer: MEDICARE

## 2024-03-04 VITALS
WEIGHT: 128 LBS | DIASTOLIC BLOOD PRESSURE: 76 MMHG | SYSTOLIC BLOOD PRESSURE: 128 MMHG | HEART RATE: 76 BPM | HEIGHT: 68 IN | BODY MASS INDEX: 19.4 KG/M2

## 2024-03-04 DIAGNOSIS — I35.0 NONRHEUMATIC AORTIC (VALVE) STENOSIS: Primary | ICD-10-CM

## 2024-03-04 DIAGNOSIS — R06.09 EXERTIONAL DYSPNEA: ICD-10-CM

## 2024-03-04 PROCEDURE — 99214 OFFICE O/P EST MOD 30 MIN: CPT | Performed by: INTERNAL MEDICINE

## 2024-03-04 NOTE — PROGRESS NOTES
" Patient ID: Daily Schaeffer is a 78 y.o. female.        Plan:      Nonrheumatic aortic (valve) stenosis  Now severe.  Likely with symptoms.  Will  refer for TAVR.    Exertional dyspnea  Moderate worsening from prior.  Likely related to valve but can't be sure.     Follow up Plan/Other summary comments:  After TAVR evaluation.    HPI: Patient seen in f/u regarding the above issues.  She feels a little more short of breath with effort but attributes this to aging.  Her  who is also a patient of mine thinks this is a little bit more than.  No chest pain.  No syncope.  Echo recently revealed 4 m/s flow across the aortic valve.          Most recent or relevant cardiac/vascular testing:      TTE 2/17/2023: Normal LVEF.  3.7 m/s flow across the aortic valve.  TTE 2/21/2024: Normal LVEF.  4.0 m/s flow across the valve.  Past Surgical History:   Procedure Laterality Date    BOWEL RESECTION         Lipid Profile: Reviewed      Review of Systems   10  point ROS  was otherwise non pertinent or negative except as per HPI or as below.   Gait: Using a walker.  .        Objective:     /76   Pulse 76   Ht 5' 8\" (1.727 m)   Wt 58.1 kg (128 lb)   BMI 19.46 kg/m²     PHYSICAL EXAM:       General:  Normal appearance in no distress.  Eyes:  Anicteric.  Oral mucosa:  Moist.  Neck:  No JVD. Carotid upstrokes are diminished t with transmitted murmur.   No masses.  Chest:  Clear to auscultation.  Cardiac:  No palpable PMI.  Normal S1 and S2.  Grade 4 systolic murmur loudest at the base radiating to the neck.  No gallop or rub.  Abdomen:  Soft and nontender. No palpable organomegaly or aortic enlargement.  Extremities:  No peripheral edema.  Musculoskeletal:  Symmetric.   Vascular:  Femoral pulses are brisk without bruits.  Popliteal pulses are intact bilaterally.   Pedal pulses are intact.  Neuro:  Grossly symmetric.  Psych:  Alert and oriented x3.      Meds reviewed.    Past Medical History:   Diagnosis Date    Aortic " stenosis     Depression     Disease of thyroid gland     Hyperlipidemia     Peripheral neuropathy            Social History     Tobacco Use   Smoking Status Never   Smokeless Tobacco Never

## 2024-03-05 ENCOUNTER — TELEPHONE (OUTPATIENT)
Dept: CARDIAC SURGERY | Facility: CLINIC | Age: 79
End: 2024-03-05

## 2024-03-05 NOTE — TELEPHONE ENCOUNTER
Patients  called in wanting to cancel the appt that was made on 4/17/24 w/ Dr. Hammonds. They have decided they do not want to move forward with the TAVR procedure and work up. Told him I would cancel the appt and let Dr. Henao know this.

## 2024-03-24 ENCOUNTER — RA CDI HCC (OUTPATIENT)
Dept: OTHER | Facility: HOSPITAL | Age: 79
End: 2024-03-24

## 2024-04-01 ENCOUNTER — APPOINTMENT (OUTPATIENT)
Age: 79
End: 2024-04-01
Payer: MEDICARE

## 2024-04-01 ENCOUNTER — OFFICE VISIT (OUTPATIENT)
Dept: FAMILY MEDICINE CLINIC | Facility: CLINIC | Age: 79
End: 2024-04-01
Payer: MEDICARE

## 2024-04-01 VITALS
TEMPERATURE: 97.6 F | WEIGHT: 130.4 LBS | HEART RATE: 70 BPM | BODY MASS INDEX: 20.96 KG/M2 | DIASTOLIC BLOOD PRESSURE: 60 MMHG | HEIGHT: 66 IN | SYSTOLIC BLOOD PRESSURE: 114 MMHG | OXYGEN SATURATION: 99 %

## 2024-04-01 DIAGNOSIS — E03.9 HYPOTHYROIDISM, UNSPECIFIED TYPE: ICD-10-CM

## 2024-04-01 DIAGNOSIS — F41.8 ANXIETY WITH DEPRESSION: ICD-10-CM

## 2024-04-01 DIAGNOSIS — Z00.00 ROUTINE GENERAL MEDICAL EXAMINATION AT A HEALTH CARE FACILITY: Primary | ICD-10-CM

## 2024-04-01 DIAGNOSIS — G62.9 NEUROPATHY: ICD-10-CM

## 2024-04-01 DIAGNOSIS — R73.9 HYPERGLYCEMIA: ICD-10-CM

## 2024-04-01 DIAGNOSIS — K50.90 CROHN'S DISEASE WITHOUT COMPLICATION, UNSPECIFIED GASTROINTESTINAL TRACT LOCATION (HCC): ICD-10-CM

## 2024-04-01 DIAGNOSIS — E78.5 DYSLIPIDEMIA: ICD-10-CM

## 2024-04-01 DIAGNOSIS — I35.0 NONRHEUMATIC AORTIC (VALVE) STENOSIS: ICD-10-CM

## 2024-04-01 DIAGNOSIS — N18.2 CKD (CHRONIC KIDNEY DISEASE) STAGE 2, GFR 60-89 ML/MIN: ICD-10-CM

## 2024-04-01 PROBLEM — N18.30 STAGE 3 CHRONIC KIDNEY DISEASE, UNSPECIFIED WHETHER STAGE 3A OR 3B CKD (HCC): Status: ACTIVE | Noted: 2024-04-01

## 2024-04-01 PROBLEM — K57.90 DIVERTICULAR DISEASE: Status: RESOLVED | Noted: 2017-05-09 | Resolved: 2024-04-01

## 2024-04-01 LAB
ANION GAP SERPL CALCULATED.3IONS-SCNC: 11 MMOL/L (ref 4–13)
BUN SERPL-MCNC: 14 MG/DL (ref 5–25)
CALCIUM SERPL-MCNC: 9.7 MG/DL (ref 8.4–10.2)
CHLORIDE SERPL-SCNC: 98 MMOL/L (ref 96–108)
CO2 SERPL-SCNC: 28 MMOL/L (ref 21–32)
CREAT SERPL-MCNC: 1.02 MG/DL (ref 0.6–1.3)
EST. AVERAGE GLUCOSE BLD GHB EST-MCNC: 117 MG/DL
GFR SERPL CREATININE-BSD FRML MDRD: 52 ML/MIN/1.73SQ M
GLUCOSE SERPL-MCNC: 91 MG/DL (ref 65–140)
HBA1C MFR BLD: 5.7 %
POTASSIUM SERPL-SCNC: 4.2 MMOL/L (ref 3.5–5.3)
SODIUM SERPL-SCNC: 137 MMOL/L (ref 135–147)
T4 FREE SERPL-MCNC: 1.43 NG/DL (ref 0.61–1.12)
TSH SERPL DL<=0.05 MIU/L-ACNC: 5.63 UIU/ML (ref 0.45–4.5)

## 2024-04-01 PROCEDURE — G0439 PPPS, SUBSEQ VISIT: HCPCS | Performed by: FAMILY MEDICINE

## 2024-04-01 PROCEDURE — 36415 COLL VENOUS BLD VENIPUNCTURE: CPT | Performed by: FAMILY MEDICINE

## 2024-04-01 PROCEDURE — 99212 OFFICE O/P EST SF 10 MIN: CPT | Performed by: FAMILY MEDICINE

## 2024-04-01 PROCEDURE — 80048 BASIC METABOLIC PNL TOTAL CA: CPT | Performed by: FAMILY MEDICINE

## 2024-04-01 PROCEDURE — 84443 ASSAY THYROID STIM HORMONE: CPT | Performed by: FAMILY MEDICINE

## 2024-04-01 PROCEDURE — 84439 ASSAY OF FREE THYROXINE: CPT | Performed by: FAMILY MEDICINE

## 2024-04-01 PROCEDURE — 83036 HEMOGLOBIN GLYCOSYLATED A1C: CPT | Performed by: FAMILY MEDICINE

## 2024-04-01 RX ORDER — ATORVASTATIN CALCIUM 40 MG/1
40 TABLET, FILM COATED ORAL DAILY
Qty: 90 TABLET | Refills: 3 | Status: SHIPPED | OUTPATIENT
Start: 2024-04-01

## 2024-04-01 RX ORDER — LEVOTHYROXINE SODIUM 0.07 MG/1
75 TABLET ORAL
Qty: 90 TABLET | Refills: 3 | Status: SHIPPED | OUTPATIENT
Start: 2024-04-01

## 2024-04-01 RX ORDER — DULOXETIN HYDROCHLORIDE 30 MG/1
30 CAPSULE, DELAYED RELEASE ORAL DAILY
Qty: 30 CAPSULE | Refills: 5 | Status: SHIPPED | OUTPATIENT
Start: 2024-04-01

## 2024-04-01 NOTE — PROGRESS NOTES
Assessment and Plan:     Problem List Items Addressed This Visit     Nonrheumatic aortic (valve) stenosis     Severe AS.  Dyspnea likely related.  Patient states she was contacted by CT surgery to schedule an appointment to discuss possible TAVR procedure.  Patient declined appointment at that time.  Apprehensive, anxious, feels she does not have enough information.  We discussed this today; encouraged to discuss her concerns with cardiothoracic surgeon.  She will reconsider, I will discuss again at her follow-up appointment in 1 month's time.         Dyslipidemia    Relevant Medications    atorvastatin (LIPITOR) 40 mg tablet    Crohn disease (HCC)    Anxiety with depression     We added Cymbalta 30 mg today.  Discussed potential side effects.  Perhaps this will help with her neuropathic pain as well, recheck in 1 month.         Relevant Medications    DULoxetine (CYMBALTA) 30 mg delayed release capsule    Hyperglycemia    Relevant Orders    Hemoglobin A1C    Hypothyroidism     Currently on Synthroid.          Relevant Medications    levothyroxine 75 mcg tablet    Other Relevant Orders    Basic metabolic panel    TSH, 3rd generation    T4, free    Neuropathy     We have tried several agents without relief of symptoms.  Etiology is unclear.  I have ordered EMG studies but patient has not followed through.  Perhaps Cymbalta will help.  Recheck in 1 month.         Relevant Medications    DULoxetine (CYMBALTA) 30 mg delayed release capsule    CKD (chronic kidney disease) stage 2, GFR 60-89 ml/min     Lab Results   Component Value Date    EGFR 66 01/01/2024    EGFR 79 12/26/2023    EGFR 60 12/25/2023    CREATININE 0.83 01/01/2024    CREATININE 0.72 12/26/2023    CREATININE 0.90 12/25/2023   Recheck renal function today.        Other Visit Diagnoses     Routine general medical examination at a health care facility    -  Primary          Depression Screening and Follow-up Plan: Patient was screened for depression during  "today's encounter. They screened negative with a PHQ-2 score of 0.    Falls Plan of Care: balance, strength, and gait training instructions were provided.     Urinary Incontinence Plan of Care: counseling topics discussed: practice Kegel (pelvic floor strengthening) exercises.       Preventive health issues were discussed with patient, and age appropriate screening tests were ordered as noted in patient's After Visit Summary.  Personalized health advice and appropriate referrals for health education or preventive services given if needed, as noted in patient's After Visit Summary.     History of Present Illness:     Patient presents for a Medicare Wellness Visit    Aortic stenosis- had recent appt with cardio, echo revealed worsening AS. She was referred to CT surg. Schedulers called but pt declined appt. States today she \"feels fine\". Has baseline SOB and exertional dyspnea but states not sure if any worse than prior. Concerned about making the trip to the [facility] [address].     Hypothyroidism- due for TFTs.     Neuropathy- recently stopped her lyrica. She felt it was ineffective. Has also failed on mindy. Had sig side effects to Pamelor.     Generalized anxiety disorder w depressive symptoms- situational. But has issues leaving the house, typically somewhat anxious in our office and hospital setting.     Dyslipidemia- on statin. Most recent FLP 6 months ago, .      Hyperglycemia- due for A1C.     Immunizations- declines further COVID vaccines, believes the COVID series caused neuropathy B/L hands.   Mammography- declines.   DEXA- declines.          Patient Care Team:  Alanna Moreau DO as PCP - General (Family Medicine)     Review of Systems:     Review of Systems   Constitutional:  Positive for activity change. Negative for chills, fever and unexpected weight change.   HENT:  Negative for ear pain and sore throat.    Eyes:  Negative for pain and visual disturbance.   Respiratory:  Positive for " shortness of breath. Negative for cough, chest tightness and wheezing.    Cardiovascular:  Negative for chest pain and palpitations.   Gastrointestinal:  Negative for abdominal pain and vomiting.   Genitourinary:  Negative for dysuria and hematuria.   Musculoskeletal:  Positive for arthralgias and gait problem. Negative for back pain.   Skin:  Positive for pallor. Negative for color change and rash.   Neurological:  Positive for weakness. Negative for seizures and syncope.   All other systems reviewed and are negative.       Problem List:     Patient Active Problem List   Diagnosis   • Nonrheumatic aortic (valve) stenosis   • Dyslipidemia   • Crohn disease (HCC)   • Anxiety with depression   • Hyperglycemia   • Hypothyroidism   • Neuropathy   • Hypercalcemia   • Abnormal CT scan, pelvis   • Hypomagnesemia   • Exertional dyspnea   • Crohn's disease of jejunum (HCC)   • CKD (chronic kidney disease) stage 2, GFR 60-89 ml/min      Past Medical and Surgical History:     Past Medical History:   Diagnosis Date   • Aortic stenosis    • Depression    • Disease of thyroid gland    • Diverticular disease 05/09/2017   • Essential hypertension 05/20/2016   • Hyperlipidemia    • Major depression 05/20/2016   • Peripheral neuropathy    • Type 2 diabetes mellitus (HCC) 05/20/2016   • Visual impairment      Past Surgical History:   Procedure Laterality Date   • BOWEL RESECTION     • COLON SURGERY     • EYE SURGERY     • SMALL INTESTINE SURGERY     • TUBAL LIGATION        Family History:     History reviewed. No pertinent family history.   Social History:     Social History     Socioeconomic History   • Marital status: /Civil Union     Spouse name: None   • Number of children: None   • Years of education: None   • Highest education level: None   Occupational History   • None   Tobacco Use   • Smoking status: Never     Passive exposure: Never   • Smokeless tobacco: Never   Vaping Use   • Vaping status: Never Used   Substance  and Sexual Activity   • Alcohol use: Not Currently     Comment: occasional   • Drug use: Never   • Sexual activity: Not Currently     Partners: Male     Birth control/protection: Female Sterilization   Other Topics Concern   • None   Social History Narrative   • None     Social Determinants of Health     Financial Resource Strain: Low Risk  (12/4/2023)    Overall Financial Resource Strain (CARDIA)    • Difficulty of Paying Living Expenses: Not hard at all   Food Insecurity: No Food Insecurity (3/25/2024)    Hunger Vital Sign    • Worried About Running Out of Food in the Last Year: Never true    • Ran Out of Food in the Last Year: Never true   Transportation Needs: No Transportation Needs (3/25/2024)    PRAPARE - Transportation    • Lack of Transportation (Medical): No    • Lack of Transportation (Non-Medical): No   Physical Activity: Not on file   Stress: Not on file   Social Connections: Not on file   Intimate Partner Violence: Not on file   Housing Stability: Low Risk  (3/25/2024)    Housing Stability Vital Sign    • Unable to Pay for Housing in the Last Year: No    • Number of Places Lived in the Last Year: 1    • Unstable Housing in the Last Year: No      Medications and Allergies:     Current Outpatient Medications   Medication Sig Dispense Refill   • atorvastatin (LIPITOR) 40 mg tablet Take 1 tablet (40 mg total) by mouth daily 90 tablet 3   • DULoxetine (CYMBALTA) 30 mg delayed release capsule Take 1 capsule (30 mg total) by mouth daily 30 capsule 5   • levothyroxine 75 mcg tablet Take 1 tablet (75 mcg total) by mouth daily in the early morning Take 1 tablet (75 mg) by oral route five days a week. 90 tablet 3     No current facility-administered medications for this visit.     Allergies   Allergen Reactions   • Wound Dressing Adhesive Rash      Immunizations:     Immunization History   Administered Date(s) Administered   • COVID-19 MODERNA VACC 0.5 ML IM 03/15/2021, 04/14/2021   • Influenza Split High Dose  Preservative Free IM 11/09/2017, 11/14/2018, 11/18/2019, 10/28/2020, 11/04/2021, 12/08/2022   • Influenza, high dose seasonal 0.7 mL 10/12/2023   • Pneumococcal Conjugate 13-Valent 11/09/2017   • Pneumococcal Polysaccharide PPV23 11/14/2018      Health Maintenance:     There are no preventive care reminders to display for this patient.      Topic Date Due   • COVID-19 Vaccine (3 - 2023-24 season) 09/01/2023      Medicare Screening Tests and Risk Assessments:     Daily is here for her Subsequent Wellness visit.     Health Risk Assessment:   Patient rates overall health as good. Patient feels that their physical health rating is same. Patient is satisfied with their life. Eyesight was rated as same. Hearing was rated as same. Patient feels that their emotional and mental health rating is same. Patients states they are never, rarely angry. Patient states they are sometimes unusually tired/fatigued. Pain experienced in the last 7 days has been some. Patient's pain rating has been 6/10. Patient states that she has experienced no weight loss or gain in last 6 months.     Depression Screening:   PHQ-2 Score: 0      Fall Risk Screening:   In the past year, patient has experienced: history of falling in past year    Number of falls: 2 or more  Injured during fall?: No    Feels unsteady when standing or walking?: Yes    Worried about falling?: Yes      Urinary Incontinence Screening:   Patient has leaked urine accidently in the last six months.     Home Safety:  Patient has trouble with stairs inside or outside of their home. Patient has working smoke alarms and has no working carbon monoxide detector. Home safety hazards include: none.     Nutrition:   Current diet is Regular.     Medications:   Patient is not currently taking any over-the-counter supplements. Patient is able to manage medications.     Activities of Daily Living (ADLs)/Instrumental Activities of Daily Living (IADLs):   Walk and transfer into and out of bed  and chair?: Yes  Dress and groom yourself?: Yes    Bathe or shower yourself?: Yes    Feed yourself? Yes  Do your laundry/housekeeping?: Yes  Manage your money, pay your bills and track your expenses?: Yes  Make your own meals?: Yes    Do your own shopping?: Yes    Previous Hospitalizations:   Any hospitalizations or ED visits within the last 12 months?: Yes    How many hospitalizations have you had in the last year?: 1-2    Advance Care Planning:   Living will: No    Durable POA for healthcare: No    Advanced directive: No    Advanced directive counseling given: Yes    ACP document given: Yes    Patient declined ACP directive: No    End of Life Decisions reviewed with patient: Yes    Provider agrees with end of life decisions: Yes      Cognitive Screening:   Provider or family/friend/caregiver concerned regarding cognition?: No    PREVENTIVE SCREENINGS      Cardiovascular Screening:    General: Screening Not Indicated and History Lipid Disorder    Due for: Lipid Panel      Diabetes Screening:     General: Screening Current    Due for: Blood Glucose      Colorectal Cancer Screening:     General: Screening Not Indicated      Breast Cancer Screening:     General: Patient Declines      Cervical Cancer Screening:    General: Screening Not Indicated      Osteoporosis Screening:    General: Patient Declines      Abdominal Aortic Aneurysm (AAA) Screening:        General: Screening Not Indicated      Lung Cancer Screening:     General: Screening Not Indicated      Hepatitis C Screening:    General: Patient Declines    Screening, Brief Intervention, and Referral to Treatment (SBIRT)    Screening  Typical number of drinks in a day: 0  Typical number of drinks in a week: 0  Interpretation: Low risk drinking behavior.    AUDIT-C Screenin) How often did you have a drink containing alcohol in the past year? monthly or less  2) How many drinks did you have on a typical day when you were drinking in the past year? 0  3) How  "often did you have 6 or more drinks on one occasion in the past year? never    AUDIT-C Score: 1  Interpretation: Score 0-2 (female): Negative screen for alcohol misuse    Single Item Drug Screening:  How often have you used an illegal drug (including marijuana) or a prescription medication for non-medical reasons in the past year? never    Single Item Drug Screen Score: 0  Interpretation: Negative screen for possible drug use disorder    No results found.     Physical Exam:     /60 (BP Location: Left arm, Patient Position: Sitting, Cuff Size: Adult)   Pulse 70   Temp 97.6 °F (36.4 °C) (Tympanic)   Ht 5' 6.25\" (1.683 m)   Wt 59.1 kg (130 lb 6.4 oz)   SpO2 99%   BMI 20.89 kg/m²     Physical Exam  Vitals and nursing note reviewed.   Constitutional:       Appearance: Normal appearance. She is normal weight.      Comments: Frail appearing   HENT:      Head: Normocephalic.   Eyes:      Pupils: Pupils are equal, round, and reactive to light.   Cardiovascular:      Rate and Rhythm: Normal rate and regular rhythm.      Heart sounds: Murmur heard.      Systolic murmur is present with a grade of 4/6.   Pulmonary:      Effort: Pulmonary effort is normal.      Breath sounds: Normal breath sounds.   Abdominal:      General: Abdomen is flat.   Musculoskeletal:      Cervical back: Normal range of motion and neck supple.   Skin:     Capillary Refill: Capillary refill takes less than 2 seconds.      Comments: cool   Neurological:      Mental Status: She is alert.   Psychiatric:         Mood and Affect: Mood normal.         Behavior: Behavior normal.         Thought Content: Thought content normal.         Judgment: Judgment normal.          Alanna Moreau, DO  "

## 2024-04-01 NOTE — ASSESSMENT & PLAN NOTE
Severe AS.  Dyspnea likely related.  Patient states she was contacted by CT surgery to schedule an appointment to discuss possible TAVR procedure.  Patient declined appointment at that time.  Apprehensive, anxious, feels she does not have enough information.  We discussed this today; encouraged to discuss her concerns with cardiothoracic surgeon.  She will reconsider, I will discuss again at her follow-up appointment in 1 month's time.

## 2024-04-01 NOTE — ASSESSMENT & PLAN NOTE
We added Cymbalta 30 mg today.  Discussed potential side effects.  Perhaps this will help with her neuropathic pain as well, recheck in 1 month.

## 2024-04-01 NOTE — ASSESSMENT & PLAN NOTE
We have tried several agents without relief of symptoms.  Etiology is unclear.  I have ordered EMG studies but patient has not followed through.  Perhaps Cymbalta will help.  Recheck in 1 month.

## 2024-04-01 NOTE — ASSESSMENT & PLAN NOTE
Lab Results   Component Value Date    EGFR 66 01/01/2024    EGFR 79 12/26/2023    EGFR 60 12/25/2023    CREATININE 0.83 01/01/2024    CREATININE 0.72 12/26/2023    CREATININE 0.90 12/25/2023   Recheck renal function today.

## 2024-04-01 NOTE — PATIENT INSTRUCTIONS
Medicare Preventive Visit Patient Instructions  Thank you for completing your Welcome to Medicare Visit or Medicare Annual Wellness Visit today. Your next wellness visit will be due in one year (4/2/2025).  The screening/preventive services that you may require over the next 5-10 years are detailed below. Some tests may not apply to you based off risk factors and/or age. Screening tests ordered at today's visit but not completed yet may show as past due. Also, please note that scanned in results may not display below.  Preventive Screenings:  Service Recommendations Previous Testing/Comments   Colorectal Cancer Screening  * Colonoscopy    * Fecal Occult Blood Test (FOBT)/Fecal Immunochemical Test (FIT)  * Fecal DNA/Cologuard Test  * Flexible Sigmoidoscopy Age: 45-75 years old   Colonoscopy: every 10 years (may be performed more frequently if at higher risk)  OR  FOBT/FIT: every 1 year  OR  Cologuard: every 3 years  OR  Sigmoidoscopy: every 5 years  Screening may be recommended earlier than age 45 if at higher risk for colorectal cancer. Also, an individualized decision between you and your healthcare provider will decide whether screening between the ages of 76-85 would be appropriate. Colonoscopy: Not on file  FOBT/FIT: Not on file  Cologuard: Not on file  Sigmoidoscopy: Not on file          Breast Cancer Screening Age: 40+ years old  Frequency: every 1-2 years  Not required if history of left and right mastectomy Mammogram: Not on file        Cervical Cancer Screening Between the ages of 21-29, pap smear recommended once every 3 years.   Between the ages of 30-65, can perform pap smear with HPV co-testing every 5 years.   Recommendations may differ for women with a history of total hysterectomy, cervical cancer, or abnormal pap smears in past. Pap Smear: Not on file    Screening Not Indicated   Hepatitis C Screening Once for adults born between 1945 and 1965  More frequently in patients at high risk for Hepatitis  C Hep C Antibody: Not on file        Diabetes Screening 1-2 times per year if you're at risk for diabetes or have pre-diabetes Fasting glucose: 111 mg/dL (9/21/2023)  A1C: 6.1 % (9/21/2023)  Screening Current   Cholesterol Screening Once every 5 years if you don't have a lipid disorder. May order more often based on risk factors. Lipid panel: 09/21/2023    Screening Not Indicated  History Lipid Disorder     Other Preventive Screenings Covered by Medicare:  Abdominal Aortic Aneurysm (AAA) Screening: covered once if your at risk. You're considered to be at risk if you have a family history of AAA.  Lung Cancer Screening: covers low dose CT scan once per year if you meet all of the following conditions: (1) Age 55-77; (2) No signs or symptoms of lung cancer; (3) Current smoker or have quit smoking within the last 15 years; (4) You have a tobacco smoking history of at least 20 pack years (packs per day multiplied by number of years you smoked); (5) You get a written order from a healthcare provider.  Glaucoma Screening: covered annually if you're considered high risk: (1) You have diabetes OR (2) Family history of glaucoma OR (3)  aged 50 and older OR (4)  American aged 65 and older  Osteoporosis Screening: covered every 2 years if you meet one of the following conditions: (1) You're estrogen deficient and at risk for osteoporosis based off medical history and other findings; (2) Have a vertebral abnormality; (3) On glucocorticoid therapy for more than 3 months; (4) Have primary hyperparathyroidism; (5) On osteoporosis medications and need to assess response to drug therapy.   Last bone density test (DXA Scan): Not on file.  HIV Screening: covered annually if you're between the age of 15-65. Also covered annually if you are younger than 15 and older than 65 with risk factors for HIV infection. For pregnant patients, it is covered up to 3 times per pregnancy.    Immunizations:  Immunization  Recommendations   Influenza Vaccine Annual influenza vaccination during flu season is recommended for all persons aged >= 6 months who do not have contraindications   Pneumococcal Vaccine   * Pneumococcal conjugate vaccine = PCV13 (Prevnar 13), PCV15 (Vaxneuvance), PCV20 (Prevnar 20)  * Pneumococcal polysaccharide vaccine = PPSV23 (Pneumovax) Adults 19-65 yo with certain risk factors or if 65+ yo  If never received any pneumonia vaccine: recommend Prevnar 20 (PCV20)  Give PCV20 if previously received 1 dose of PCV13 or PPSV23   Hepatitis B Vaccine 3 dose series if at intermediate or high risk (ex: diabetes, end stage renal disease, liver disease)   Respiratory syncytial virus (RSV) Vaccine - COVERED BY MEDICARE PART D  * RSVPreF3 (Arexvy) CDC recommends that adults 60 years of age and older may receive a single dose of RSV vaccine using shared clinical decision-making (SCDM)   Tetanus (Td) Vaccine - COST NOT COVERED BY MEDICARE PART B Following completion of primary series, a booster dose should be given every 10 years to maintain immunity against tetanus. Td may also be given as tetanus wound prophylaxis.   Tdap Vaccine - COST NOT COVERED BY MEDICARE PART B Recommended at least once for all adults. For pregnant patients, recommended with each pregnancy.   Shingles Vaccine (Shingrix) - COST NOT COVERED BY MEDICARE PART B  2 shot series recommended in those 19 years and older who have or will have weakened immune systems or those 50 years and older     Health Maintenance Due:  There are no preventive care reminders to display for this patient.  Immunizations Due:      Topic Date Due   • COVID-19 Vaccine (3 - 2023-24 season) 09/01/2023     Advance Directives   What are advance directives?  Advance directives are legal documents that state your wishes and plans for medical care. These plans are made ahead of time in case you lose your ability to make decisions for yourself. Advance directives can apply to any medical  decision, such as the treatments you want, and if you want to donate organs.   What are the types of advance directives?  There are many types of advance directives, and each state has rules about how to use them. You may choose a combination of any of the following:  Living will:  This is a written record of the treatment you want. You can also choose which treatments you do not want, which to limit, and which to stop at a certain time. This includes surgery, medicine, IV fluid, and tube feedings.   Durable power of  for healthcare (DPAHC):  This is a written record that states who you want to make healthcare choices for you when you are unable to make them for yourself. This person, called a proxy, is usually a family member or a friend. You may choose more than 1 proxy.  Do not resuscitate (DNR) order:  A DNR order is used in case your heart stops beating or you stop breathing. It is a request not to have certain forms of treatment, such as CPR. A DNR order may be included in other types of advance directives.  Medical directive:  This covers the care that you want if you are in a coma, near death, or unable to make decisions for yourself. You can list the treatments you want for each condition. Treatment may include pain medicine, surgery, blood transfusions, dialysis, IV or tube feedings, and a ventilator (breathing machine).  Values history:  This document has questions about your views, beliefs, and how you feel and think about life. This information can help others choose the care that you would choose.  Why are advance directives important?  An advance directive helps you control your care. Although spoken wishes may be used, it is better to have your wishes written down. Spoken wishes can be misunderstood, or not followed. Treatments may be given even if you do not want them. An advance directive may make it easier for your family to make difficult choices about your care.   Fall Prevention    Fall  prevention  includes ways to make your home and other areas safer. It also includes ways you can move more carefully to prevent a fall. Health conditions that cause changes in your blood pressure, vision, or muscle strength and coordination may increase your risk for falls. Medicines may also increase your risk for falls if they make you dizzy, weak, or sleepy.   Fall prevention tips:   Stand or sit up slowly.    Use assistive devices as directed.    Wear shoes that fit well and have soles that .    Wear a personal alarm.    Stay active.    Manage your medical conditions.    Home Safety Tips:  Add items to prevent falls in the bathroom.    Keep paths clear.    Install bright lights in your home.    Keep items you use often on shelves within reach.    Paint or place reflective tape on the edges of your stairs.    Urinary Incontinence   Urinary incontinence (UI)  is when you lose control of your bladder. UI develops because your bladder cannot store or empty urine properly. The 3 most common types of UI are stress incontinence, urge incontinence, or both.  Medicines:   May be given to help strengthen your bladder control. Report any side effects of medication to your healthcare provider.  Do pelvic muscle exercises often:  Your pelvic muscles help you stop urinating. Squeeze these muscles tight for 5 seconds, then relax for 5 seconds. Gradually work up to squeezing for 10 seconds. Do 3 sets of 15 repetitions a day, or as directed. This will help strengthen your pelvic muscles and improve bladder control.  Train your bladder:  Go to the bathroom at set times, such as every 2 hours, even if you do not feel the urge to go. You can also try to hold your urine when you feel the urge to go. For example, hold your urine for 5 minutes when you feel the urge to go. As that becomes easier, hold your urine for 10 minutes.   Self-care:   Keep a UI record.  Write down how often you leak urine and how much you leak. Make a note  of what you were doing when you leaked urine.  Drink liquids as directed. You may need to limit the amount of liquid you drink to help control your urine leakage. Do not drink any liquid right before you go to bed. Limit or do not have drinks that contain caffeine or alcohol.   Prevent constipation.  Eat a variety of high-fiber foods. Good examples are high-fiber cereals, beans, vegetables, and whole-grain breads. Walking is the best way to trigger your intestines to have a bowel movement.  Exercise regularly and maintain a healthy weight.  Weight loss and exercise will decrease pressure on your bladder and help you control your leakage.   Use a catheter as directed  to help empty your bladder. A catheter is a tiny, plastic tube that is put into your bladder to drain your urine.   Go to behavior therapy as directed.  Behavior therapy may be used to help you learn to control your urge to urinate.     © Copyright [employer] 2018 Information is for End User's use only and may not be sold, redistributed or otherwise used for commercial purposes. All illustrations and images included in CareNotes® are the copyrighted property of A.D.A.M., Inc. or [employer]

## 2024-05-07 ENCOUNTER — OFFICE VISIT (OUTPATIENT)
Dept: FAMILY MEDICINE CLINIC | Facility: CLINIC | Age: 79
End: 2024-05-07
Payer: MEDICARE

## 2024-05-07 VITALS
HEIGHT: 66 IN | BODY MASS INDEX: 21.21 KG/M2 | HEART RATE: 68 BPM | SYSTOLIC BLOOD PRESSURE: 130 MMHG | WEIGHT: 132 LBS | DIASTOLIC BLOOD PRESSURE: 70 MMHG | OXYGEN SATURATION: 99 %

## 2024-05-07 DIAGNOSIS — E78.5 DYSLIPIDEMIA: ICD-10-CM

## 2024-05-07 DIAGNOSIS — N18.30 STAGE 3 CHRONIC KIDNEY DISEASE, UNSPECIFIED WHETHER STAGE 3A OR 3B CKD (HCC): ICD-10-CM

## 2024-05-07 DIAGNOSIS — I35.0 NONRHEUMATIC AORTIC (VALVE) STENOSIS: Primary | ICD-10-CM

## 2024-05-07 DIAGNOSIS — F41.8 ANXIETY WITH DEPRESSION: ICD-10-CM

## 2024-05-07 DIAGNOSIS — G62.9 NEUROPATHY: ICD-10-CM

## 2024-05-07 DIAGNOSIS — E03.9 HYPOTHYROIDISM, UNSPECIFIED TYPE: ICD-10-CM

## 2024-05-07 PROCEDURE — 99214 OFFICE O/P EST MOD 30 MIN: CPT | Performed by: FAMILY MEDICINE

## 2024-05-07 PROCEDURE — G2211 COMPLEX E/M VISIT ADD ON: HCPCS | Performed by: FAMILY MEDICINE

## 2024-05-07 RX ORDER — DULOXETIN HYDROCHLORIDE 60 MG/1
60 CAPSULE, DELAYED RELEASE ORAL DAILY
Qty: 30 CAPSULE | Refills: 5 | Status: SHIPPED | OUTPATIENT
Start: 2024-05-07

## 2024-05-07 NOTE — ASSESSMENT & PLAN NOTE
Several agents tried in the past without relief of symptoms, including gabapentin and Lyrica.  Etiology is unclear, I had ordered several studies including EMGs which the patient did not have performed.  Cymbalta does seem to be helping, however.  I will increase the dose to 60 mg a day, recheck symptoms again in 1 month.

## 2024-05-07 NOTE — PROGRESS NOTES
Name: Daily Schaeffer      : 1945      MRN: [id number]  Encounter Provider: Alanna Moreau DO  Encounter Date: 2024   Encounter department: [facility] PRIMARY CARE    Assessment & Plan     1. Nonrheumatic aortic (valve) stenosis  Assessment & Plan:  We discussed her surgical options again today.  Did encourage her to reconsider a consultation appointment with CT surgery.  Patient remains apprehensive about surgery, and the trip to the [facility].  But she is considering reaching out to her daughter who lives in [place] who may assist with transportation in the future.      2. Neuropathy  Assessment & Plan:  Several agents tried in the past without relief of symptoms, including gabapentin and Lyrica.  Etiology is unclear, I had ordered several studies including EMGs which the patient did not have performed.  Cymbalta does seem to be helping, however.  I will increase the dose to 60 mg a day, recheck symptoms again in 1 month.    Orders:  -     DULoxetine (CYMBALTA) 60 mg delayed release capsule; Take 1 capsule (60 mg total) by mouth daily    3. Anxiety with depression  Assessment & Plan:  Cymbalta appears to be helping.  Dosage increase, recheck 1 month.    Orders:  -     DULoxetine (CYMBALTA) 60 mg delayed release capsule; Take 1 capsule (60 mg total) by mouth daily    4. Stage 3 chronic kidney disease, unspecified whether stage 3a or 3b CKD (HCC)    5. Hypothyroidism, unspecified type  Assessment & Plan:  Continue current dose of Synthroid.      6. Dyslipidemia  Assessment & Plan:  On statin therapy.             Subjective      Aortic stenosis- had fairly recent appt with cardio, echo revealed worsening AS. She was referred to CT surg. Schedulers called but pt declined appt. Has baseline SOB and exertional dyspnea but states not sure if any worse than prior. Concerned about making the trip to the [facility].  states today she tries to do too much.      Hypothyroidism-  TFTs are stable.     Neuropathy-  stopped her lyrica several months ago. She felt it was ineffective. Has also failed on mindy. Had sig side effects to Pamelor. Declines further COVID vaccines, believes the COVID series caused neuropathy B/L hands. She was started on cymbalta at last OV and feels it is helping her symptoms.  Paresthesias have improved.  She feels her ambulation is actually better now, also.    Generalized anxiety disorder w depressive symptoms-previously with issues leaving the house, typically somewhat anxious in our office and hospital setting.  He feels the Cymbalta has helped lessen her anxiety stabilize her mood.    Dyslipidemia- on statin. Most recent FLP 7 months ago, .      Hyperglycemia- had recent A1C, decreased.             Review of Systems   Constitutional:  Positive for activity change. Negative for chills, fever and unexpected weight change.   HENT:  Negative for ear pain and sore throat.    Eyes:  Negative for pain and visual disturbance.   Respiratory:  Positive for shortness of breath. Negative for cough, chest tightness and wheezing.    Cardiovascular:  Negative for chest pain and palpitations.   Gastrointestinal:  Negative for abdominal pain and vomiting.   Genitourinary:  Negative for dysuria and hematuria.   Musculoskeletal:  Positive for arthralgias and gait problem. Negative for back pain.   Skin:  Positive for pallor. Negative for color change and rash.   Neurological:  Positive for weakness. Negative for seizures and syncope.   All other systems reviewed and are negative.      Current Outpatient Medications on File Prior to Visit   Medication Sig   • atorvastatin (LIPITOR) 40 mg tablet Take 1 tablet (40 mg total) by mouth daily   • levothyroxine 75 mcg tablet Take 1 tablet (75 mcg total) by mouth daily in the early morning Take 1 tablet (75 mg) by oral route five days a week.   • [DISCONTINUED] DULoxetine (CYMBALTA) 30 mg delayed release capsule  "Take 1 capsule (30 mg total) by mouth daily       Objective     /70 (BP Location: Left arm, Patient Position: Sitting, Cuff Size: Adult)   Pulse 68   Ht 5' 6.25\" (1.683 m)   Wt 59.9 kg (132 lb)   SpO2 99%   BMI 21.14 kg/m²     Physical Exam  Vitals and nursing note reviewed.   Constitutional:       General: She is not in acute distress.     Appearance: Normal appearance. She is normal weight. She is not toxic-appearing.   HENT:      Head: Normocephalic.   Cardiovascular:      Rate and Rhythm: Normal rate and regular rhythm.      Heart sounds: Murmur heard.   Pulmonary:      Effort: Pulmonary effort is normal.      Breath sounds: Normal breath sounds.   Musculoskeletal:      Right hand: No swelling. Normal range of motion. Decreased strength. Decreased capillary refill. Normal pulse.      Left hand: No swelling. Normal range of motion. Decreased strength. Decreased capillary refill. Normal pulse.      Cervical back: Normal range of motion.      Comments:  strength decreased. Symmetric/intact radial pulses. Dusky appearance of hands, cyanotic nail beds.    Skin:     Capillary Refill: Capillary refill takes 2 to 3 seconds.   Neurological:      General: No focal deficit present.      Mental Status: She is alert.   Psychiatric:         Mood and Affect: Mood normal.         Thought Content: Thought content normal.         Judgment: Judgment normal.       Alanna Moreau, DO    "

## 2024-05-07 NOTE — ASSESSMENT & PLAN NOTE
We discussed her surgical options again today.  Did encourage her to reconsider a consultation appointment with CT surgery.  Patient remains apprehensive about surgery, and the trip to the [facility].  But she is considering reaching out to her daughter who lives in [place] who may assist with transportation in the future.

## 2024-06-06 ENCOUNTER — HOSPITAL ENCOUNTER (EMERGENCY)
Facility: HOSPITAL | Age: 79
Discharge: HOME | DRG: 689 | End: 2024-06-06
Attending: EMERGENCY MEDICINE
Payer: MEDICARE

## 2024-06-06 VITALS
SYSTOLIC BLOOD PRESSURE: 143 MMHG | DIASTOLIC BLOOD PRESSURE: 69 MMHG | HEIGHT: 66 IN | TEMPERATURE: 97.4 F | OXYGEN SATURATION: 96 % | WEIGHT: 130 LBS | RESPIRATION RATE: 20 BRPM | BODY MASS INDEX: 20.89 KG/M2 | HEART RATE: 79 BPM

## 2024-06-06 DIAGNOSIS — F41.0 PANIC ATTACKS: Primary | ICD-10-CM

## 2024-06-06 PROCEDURE — 93005 ELECTROCARDIOGRAM TRACING: CPT

## 2024-06-06 PROCEDURE — 96374 THER/PROPH/DIAG INJ IV PUSH: CPT

## 2024-06-06 PROCEDURE — 99284 EMERGENCY DEPT VISIT MOD MDM: CPT | Performed by: FAMILY MEDICINE

## 2024-06-06 PROCEDURE — 99283 EMERGENCY DEPT VISIT LOW MDM: CPT

## 2024-06-06 RX ORDER — LORAZEPAM 2 MG/ML
0.5 INJECTION INTRAMUSCULAR ONCE
Status: COMPLETED | OUTPATIENT
Start: 2024-06-06 | End: 2024-06-06

## 2024-06-06 RX ORDER — LORAZEPAM 1 MG/1
0.5 TABLET ORAL
Qty: 3 TABLET | Refills: 0 | Status: SHIPPED | OUTPATIENT
Start: 2024-06-06 | End: 2024-06-13

## 2024-06-06 RX ADMIN — LORAZEPAM 0.5 MG: 2 INJECTION INTRAMUSCULAR; INTRAVENOUS at 16:58

## 2024-06-06 NOTE — ED NOTES
"Daily Schaeffer is a 79 y/o female diagnosed with Anxiety who presented to ED due to:    Pt was with her  while he is being treated in the ED and began hyperventilating. Pt notes recent increase in stress with taking care of her spouse   Pt accompanied by her daughter both calm and cooperative. Pt oriented x4. Currently living with her . No mental health history reported. Pt states he  was in a car accident and since then he has been having some back issues. Pt states lately he required more assistance but is stubborn with following advise. Pt states this makes her very upset as well as worried. Pt states at times she is anxious. Pt denies any breathing problems, or tight chest. Pt states \"I have full control, we have been  for 60 years\".   Pt states she talked with her primary physician about it but no medications has been prescribed.  Pt denies suicidal ideations.  Pt denies homicidal ideations. No self harming behaviors. No hallucinations. No issues with appetite or sleep reported. No pass trauma reported.  "

## 2024-06-06 NOTE — DISCHARGE INSTRUCTIONS
This writer discussed the patients current presentation and recommended discharge plan with .  They agree with the patient being discharged at this time.    The patient was provided with outpatient mental health resources.    This writer and the patient completed a safety plan.  The patient was provided with a copy of their safety plan with encouragement to utilize the plan following discharge.   In addition, the patient was instructed to call local [facility] crisis, other crisis services, 911 or to go to the nearest ER immediately if their situation changes at any time.       This writer discussed discharge plans with the patient and family, who agrees with and understands the discharge plans.         SAFETY PLAN  Warning Signs (thoughts, images, mood, behavior, situations) of a potential crisis: feeling overwhelmed, feeling hopelessness, mood swings, hyperventilating, unable to focus.    Coping Skills (what can I do to take my mind off the problem, or to keep myself safe): take deep breaths, talk to someone, sit down for a minute or so.      Outside Support (who can I reach out to for support and help): [facility]        National Suicide Prevention Hotline:  988      [facility] [phone] - Crisis   [facility] [phone] - LVF Crisis/Mobile Crisis   [phone] - SLPF Crisis   [facility]: [phone]  [facility]: [phone]   [facility] [phone] - Crisis   [facility] [phone] - Crisis     [facility] [phone] - Crisis   [facility] [phone] - Crisis   [phone] - Peer Support Talk Line (1-9pm daily)  [phone] - Teen Support Talk Line (1-9pm daily)  [phone] - [facility]   [facility] [phone]- Crisis    [facility] [phone] - Crisis   [facility] [phone] - Crisis    [facility]) [phone] - [facility] Crisis

## 2024-06-06 NOTE — ED NOTES
This writer discussed the patients current presentation and recommended discharge plan with .  They agree with the patient being discharged at this time.    The patient was provided with outpatient mental health resources.    This writer and the patient completed a safety plan.  The patient was provided with a copy of their safety plan with encouragement to utilize the plan following discharge.   In addition, the patient was instructed to call local [facility] crisis, other crisis services, 911 or to go to the nearest ER immediately if their situation changes at any time.       This writer discussed discharge plans with the patient and family, who agrees with and understands the discharge plans.         SAFETY PLAN  Warning Signs (thoughts, images, mood, behavior, situations) of a potential crisis: feeling overwhelmed, feeling hopelessness, mood swings, hyperventilating, unable to focus.    Coping Skills (what can I do to take my mind off the problem, or to keep myself safe): take deep breaths, talk to someone, sit down for a minute or so.      Outside Support (who can I reach out to for support and help): [facility]        National Suicide Prevention Hotline:  988      [facility] [phone] - Crisis   [facility] [phone] - LVF Crisis/Mobile Crisis   [phone] - SLPF Crisis   [facility]: [phone]  [facility]: [phone]   [facility] [phone] - Crisis   [facility] [phone] - Crisis     [facility] [phone] - Crisis   [facility] [phone] - Crisis   [phone] - Peer Support Talk Line (1-9pm daily)  [phone] - Teen Support Talk Line (1-9pm daily)  [phone] - [facility]   [facility] [phone]- Crisis    [facility] [phone] - Crisis   [facility] [phone] - Crisis    [facility]) [phone] - [facility] Crisis

## 2024-06-06 NOTE — ED PROVIDER NOTES
History  Chief Complaint   Patient presents with    Anxiety     Pt was with her  while he is being treated in the ED and began hyperventilating. Pt notes recent increase in stress with taking care of her spouse       Anxiety  Associated symptoms: anxiety    Associated symptoms: no abdominal pain, no chest pain and no headaches    This Is a 78-year-old female started to have a panic attack while in the ED with her .  Patient states that she is going through a lot with her  has increased stress taking care of her  who is noncompliant and back in the ED.  Patient is hyperventilating and tearful.  She is requesting for anxiety medication.  Denies any chest pain shortness of breath.  Denies any suicidal homicidal ideation.  Prior to Admission Medications   Prescriptions Last Dose Informant Patient Reported? Taking?   DULoxetine (CYMBALTA) 60 mg delayed release capsule   No No   Sig: Take 1 capsule (60 mg total) by mouth daily   atorvastatin (LIPITOR) 40 mg tablet   No No   Sig: Take 1 tablet (40 mg total) by mouth daily   levothyroxine 75 mcg tablet   No No   Sig: Take 1 tablet (75 mcg total) by mouth daily in the early morning Take 1 tablet (75 mg) by oral route five days a week.      Facility-Administered Medications: None       Past Medical History:   Diagnosis Date    Aortic stenosis     Depression     Disease of thyroid gland     Diverticular disease 05/09/2017    Essential hypertension 05/20/2016    Hyperlipidemia     Major depression 05/20/2016    Peripheral neuropathy     Type 2 diabetes mellitus (HCC) 05/20/2016    Visual impairment        Past Surgical History:   Procedure Laterality Date    BOWEL RESECTION      COLON SURGERY      EYE SURGERY      SMALL INTESTINE SURGERY      TUBAL LIGATION         History reviewed. No pertinent family history.  I have reviewed and agree with the history as documented.    E-Cigarette/Vaping    E-Cigarette Use Never User      E-Cigarette/Vaping  Substances    Nicotine No     THC No     CBD No     Flavoring No      Social History     Tobacco Use    Smoking status: Never     Passive exposure: Never    Smokeless tobacco: Never   Vaping Use    Vaping status: Never Used   Substance Use Topics    Alcohol use: Yes     Comment: occasional    Drug use: Never       Review of Systems   Constitutional:  Negative for chills and fever.   HENT:  Negative for rhinorrhea and sore throat.    Eyes:  Negative for visual disturbance.   Respiratory:  Negative for cough and shortness of breath.    Cardiovascular:  Negative for chest pain and leg swelling.   Gastrointestinal:  Negative for abdominal pain, diarrhea, nausea and vomiting.   Genitourinary:  Negative for dysuria.   Musculoskeletal:  Negative for back pain and myalgias.   Skin:  Negative for rash.   Neurological:  Negative for dizziness and headaches.   Psychiatric/Behavioral:  Negative for confusion. The patient is nervous/anxious.    All other systems reviewed and are negative.      Physical Exam  Physical Exam  Vitals and nursing note reviewed.   Constitutional:       Appearance: She is well-developed.   HENT:      Head: Normocephalic and atraumatic.      Right Ear: External ear normal.      Left Ear: External ear normal.      Nose: Nose normal.      Mouth/Throat:      Mouth: Mucous membranes are moist.      Pharynx: No oropharyngeal exudate.   Eyes:      General: No scleral icterus.        Right eye: No discharge.         Left eye: No discharge.      Conjunctiva/sclera: Conjunctivae normal.      Pupils: Pupils are equal, round, and reactive to light.   Cardiovascular:      Rate and Rhythm: Normal rate and regular rhythm.      Pulses: Normal pulses.      Heart sounds: Normal heart sounds.   Pulmonary:      Effort: Pulmonary effort is normal. No respiratory distress.      Breath sounds: Normal breath sounds. No wheezing.   Abdominal:      General: Bowel sounds are normal.      Palpations: Abdomen is soft.    Musculoskeletal:         General: Normal range of motion.      Cervical back: Normal range of motion and neck supple.   Lymphadenopathy:      Cervical: No cervical adenopathy.   Skin:     General: Skin is warm and dry.      Capillary Refill: Capillary refill takes less than 2 seconds.   Neurological:      General: No focal deficit present.      Mental Status: She is alert and oriented to person, place, and time.   Psychiatric:         Mood and Affect: Mood is anxious.         Behavior: Behavior is agitated.         Vital Signs  ED Triage Vitals   Temperature Pulse Respirations Blood Pressure SpO2   06/06/24 1637 06/06/24 1637 06/06/24 1637 06/06/24 1637 06/06/24 1637   (!) 97.4 °F (36.3 °C) 91 (!) 26 (!) 186/87 98 %      Temp Source Heart Rate Source Patient Position - Orthostatic VS BP Location FiO2 (%)   06/06/24 1637 06/06/24 1637 06/06/24 1645 06/06/24 1637 --   Temporal Monitor Sitting Left arm       Pain Score       06/06/24 1637       No Pain           Vitals:    06/06/24 1637 06/06/24 1645 06/06/24 1700 06/06/24 1715   BP: (!) 186/87 (!) 184/88 (!) 182/75 166/59   Pulse: 91 78 81 82   Patient Position - Orthostatic VS:  Sitting Sitting Sitting         Visual Acuity      ED Medications  Medications   LORazepam (ATIVAN) injection 0.5 mg (0.5 mg Intravenous Given 6/6/24 1658)       Diagnostic Studies  Results Reviewed       None                   No orders to display              Procedures  Procedures         ED Course                               SBIRT 22yo+      Flowsheet Row Most Recent Value   Initial Alcohol Screen: US AUDIT-C     1. How often do you have a drink containing alcohol? 0 Filed at: 06/06/2024 1640   2. How many drinks containing alcohol do you have on a typical day you are drinking?  0 Filed at: 06/06/2024 1640   3a. Male UNDER 65: How often do you have five or more drinks on one occasion? 0 Filed at: 06/06/2024 1640   3b. FEMALE Any Age, or MALE 65+: How often do you have 4 or more drinks  on one occassion? 0 Filed at: 06/06/2024 1640   Audit-C Score 0 Filed at: 06/06/2024 1640   [place]: How many times in the past year have you...    Used an illegal drug or used a prescription medication for non-medical reasons? Never Filed at: 06/06/2024 1640                      Medical Decision Making  This Is a 78-year-old female started to have a panic attack while in the ED with her .  Patient states that she is going through a lot with her  has increased stress taking care of her  who is noncompliant and back in the ED.  Patient is hyperventilating and tearful.  She is requesting for anxiety medication.  Denies any chest pain shortness of breath.  Denies any suicidal homicidal ideation.  History taken from patient and daughter who is at bedside  Patient is having panic attack at this time.  Will treated with Ativan per patient request.  Patient was assessed at bedside feeling much better recommended to continue taking the medication at home and follow-up with PCP.  Will give her 2 days supply to help with her anxiety.  Disposition discharge home with the strict precaution to return to the ED if notice any worsening symptoms have any suicidal thoughts or return back to the ED.  Patient verbalized understand the plan discharge home.    Risk  Prescription drug management.             Disposition  Final diagnoses:   Panic attacks     Time reflects when diagnosis was documented in both MDM as applicable and the Disposition within this note       Time User Action Codes Description Comment    6/6/2024  5:21 PM Ventura Ibarra Add [F41.0] Panic attacks           ED Disposition       ED Disposition   Discharge    Condition   Stable    Date/Time   Thu Jun 6, 2024 1721    Comment   Daily Schaeffer discharge to home/self care.                   Follow-up Information       Follow up With Specialties Details Why Contact Iván Moreau, DO Family Medicine Call today  [address]  [place] [address]  [phone]              Patient's Medications   Discharge Prescriptions    LORAZEPAM (ATIVAN) 1 MG TABLET    Take 0.5 tablets (0.5 mg total) by mouth daily at bedtime for 10 days       Start Date: 6/6/2024  End Date: 6/16/2024       Order Dose: 0.5 mg       Quantity: 3 tablet    Refills: 0       No discharge procedures on file.    PDMP Review         Value Time User    PDMP Reviewed  Yes 5/11/2022 10:12 AM MAJOR Church            ED Provider  Electronically Signed by             Ventura Ibarra MD  06/06/24 4844

## 2024-06-07 ENCOUNTER — VBI (OUTPATIENT)
Dept: FAMILY MEDICINE CLINIC | Facility: CLINIC | Age: 79
End: 2024-06-07

## 2024-06-07 LAB
ATRIAL RATE: 87 BPM
P AXIS: 86 DEGREES
PR INTERVAL: 216 MS
QRS AXIS: 72 DEGREES
QRSD INTERVAL: 70 MS
QT INTERVAL: 410 MS
QTC INTERVAL: 493 MS
T WAVE AXIS: 74 DEGREES
VENTRICULAR RATE: 87 BPM

## 2024-06-07 PROCEDURE — 93010 ELECTROCARDIOGRAM REPORT: CPT | Performed by: INTERNAL MEDICINE

## 2024-06-07 NOTE — TELEPHONE ENCOUNTER
06/07/24 12:19 PM    Patient contacted post ED visit, first outreach attempt made. Message was left for patient to return a call to the [facility] Department at [facility]: Phone [phone].    Thank you.  [facility]  [facility]

## 2024-06-08 ENCOUNTER — APPOINTMENT (EMERGENCY)
Dept: CT IMAGING | Facility: HOSPITAL | Age: 79
DRG: 689 | End: 2024-06-08
Payer: MEDICARE

## 2024-06-08 ENCOUNTER — HOSPITAL ENCOUNTER (INPATIENT)
Facility: HOSPITAL | Age: 79
LOS: 4 days | Discharge: HOME HEALTH | DRG: 689 | End: 2024-06-13
Attending: EMERGENCY MEDICINE | Admitting: HOSPITALIST
Payer: MEDICARE

## 2024-06-08 ENCOUNTER — APPOINTMENT (OUTPATIENT)
Dept: RADIOLOGY | Facility: HOSPITAL | Age: 79
DRG: 689 | End: 2024-06-08
Payer: MEDICARE

## 2024-06-08 DIAGNOSIS — E83.42 HYPOMAGNESEMIA: ICD-10-CM

## 2024-06-08 DIAGNOSIS — F41.9 ANXIETY AND DEPRESSION: ICD-10-CM

## 2024-06-08 DIAGNOSIS — R41.82 ALTERED MENTAL STATUS: Primary | ICD-10-CM

## 2024-06-08 DIAGNOSIS — R82.71 BACTERIA IN URINE: ICD-10-CM

## 2024-06-08 DIAGNOSIS — J02.0 STREP PHARYNGITIS: ICD-10-CM

## 2024-06-08 DIAGNOSIS — F32.A ANXIETY AND DEPRESSION: ICD-10-CM

## 2024-06-08 DIAGNOSIS — R53.81 DEBILITY: ICD-10-CM

## 2024-06-08 PROBLEM — G93.40 ACUTE ENCEPHALOPATHY: Status: ACTIVE | Noted: 2024-06-08

## 2024-06-08 LAB
ANION GAP SERPL CALCULATED.3IONS-SCNC: 11 MMOL/L (ref 4–13)
APTT PPP: 29 SECONDS (ref 23–37)
BACTERIA UR QL AUTO: ABNORMAL /HPF
BASOPHILS # BLD AUTO: 0.02 THOUSANDS/ÂΜL (ref 0–0.1)
BASOPHILS NFR BLD AUTO: 0 % (ref 0–1)
BILIRUB UR QL STRIP: NEGATIVE
BUN SERPL-MCNC: 13 MG/DL (ref 5–25)
CALCIUM SERPL-MCNC: 9.7 MG/DL (ref 8.4–10.2)
CHLORIDE SERPL-SCNC: 101 MMOL/L (ref 96–108)
CLARITY UR: ABNORMAL
CO2 SERPL-SCNC: 25 MMOL/L (ref 21–32)
COLOR UR: YELLOW
CREAT SERPL-MCNC: 0.97 MG/DL (ref 0.6–1.3)
EOSINOPHIL # BLD AUTO: 0.01 THOUSAND/ÂΜL (ref 0–0.61)
EOSINOPHIL NFR BLD AUTO: 0 % (ref 0–6)
ERYTHROCYTE [DISTWIDTH] IN BLOOD BY AUTOMATED COUNT: 13.3 % (ref 11.6–15.1)
GFR SERPL CREATININE-BSD FRML MDRD: 55 ML/MIN/1.73SQ M
GLUCOSE SERPL-MCNC: 138 MG/DL (ref 65–140)
GLUCOSE UR STRIP-MCNC: NEGATIVE MG/DL
HCT VFR BLD AUTO: 38.2 % (ref 34.8–46.1)
HGB BLD-MCNC: 12.7 G/DL (ref 11.5–15.4)
HGB UR QL STRIP.AUTO: NEGATIVE
IMM GRANULOCYTES # BLD AUTO: 0.04 THOUSAND/UL (ref 0–0.2)
IMM GRANULOCYTES NFR BLD AUTO: 0 % (ref 0–2)
INR PPP: 1.04 (ref 0.84–1.19)
KETONES UR STRIP-MCNC: NEGATIVE MG/DL
LEUKOCYTE ESTERASE UR QL STRIP: ABNORMAL
LYMPHOCYTES # BLD AUTO: 0.6 THOUSANDS/ÂΜL (ref 0.6–4.47)
LYMPHOCYTES NFR BLD AUTO: 6 % (ref 14–44)
MAGNESIUM SERPL-MCNC: 1.8 MG/DL (ref 1.9–2.7)
MCH RBC QN AUTO: 33.3 PG (ref 26.8–34.3)
MCHC RBC AUTO-ENTMCNC: 33.2 G/DL (ref 31.4–37.4)
MCV RBC AUTO: 100 FL (ref 82–98)
MONOCYTES # BLD AUTO: 1.16 THOUSAND/ÂΜL (ref 0.17–1.22)
MONOCYTES NFR BLD AUTO: 12 % (ref 4–12)
NEUTROPHILS # BLD AUTO: 8.11 THOUSANDS/ÂΜL (ref 1.85–7.62)
NEUTS SEG NFR BLD AUTO: 82 % (ref 43–75)
NITRITE UR QL STRIP: NEGATIVE
NON-SQ EPI CELLS URNS QL MICRO: ABNORMAL /HPF
NRBC BLD AUTO-RTO: 0 /100 WBCS
PH UR STRIP.AUTO: 6.5 [PH]
PLATELET # BLD AUTO: 193 THOUSANDS/UL (ref 149–390)
PMV BLD AUTO: 8.9 FL (ref 8.9–12.7)
POTASSIUM SERPL-SCNC: 3.7 MMOL/L (ref 3.5–5.3)
PROT UR STRIP-MCNC: NEGATIVE MG/DL
PROTHROMBIN TIME: 13.7 SECONDS (ref 11.6–14.5)
RBC # BLD AUTO: 3.81 MILLION/UL (ref 3.81–5.12)
RBC #/AREA URNS AUTO: ABNORMAL /HPF
SODIUM SERPL-SCNC: 137 MMOL/L (ref 135–147)
SP GR UR STRIP.AUTO: 1.01
UROBILINOGEN UR QL STRIP.AUTO: 0.2 E.U./DL
WBC # BLD AUTO: 9.94 THOUSAND/UL (ref 4.31–10.16)
WBC #/AREA URNS AUTO: ABNORMAL /HPF

## 2024-06-08 PROCEDURE — 93005 ELECTROCARDIOGRAM TRACING: CPT

## 2024-06-08 PROCEDURE — 99223 1ST HOSP IP/OBS HIGH 75: CPT | Performed by: HOSPITALIST

## 2024-06-08 PROCEDURE — 81003 URINALYSIS AUTO W/O SCOPE: CPT | Performed by: EMERGENCY MEDICINE

## 2024-06-08 PROCEDURE — 99285 EMERGENCY DEPT VISIT HI MDM: CPT | Performed by: EMERGENCY MEDICINE

## 2024-06-08 PROCEDURE — 87186 SC STD MICRODIL/AGAR DIL: CPT | Performed by: EMERGENCY MEDICINE

## 2024-06-08 PROCEDURE — 80048 BASIC METABOLIC PNL TOTAL CA: CPT | Performed by: EMERGENCY MEDICINE

## 2024-06-08 PROCEDURE — 73030 X-RAY EXAM OF SHOULDER: CPT

## 2024-06-08 PROCEDURE — 87086 URINE CULTURE/COLONY COUNT: CPT | Performed by: EMERGENCY MEDICINE

## 2024-06-08 PROCEDURE — 87077 CULTURE AEROBIC IDENTIFY: CPT | Performed by: EMERGENCY MEDICINE

## 2024-06-08 PROCEDURE — 85730 THROMBOPLASTIN TIME PARTIAL: CPT | Performed by: EMERGENCY MEDICINE

## 2024-06-08 PROCEDURE — 36415 COLL VENOUS BLD VENIPUNCTURE: CPT | Performed by: EMERGENCY MEDICINE

## 2024-06-08 PROCEDURE — 83735 ASSAY OF MAGNESIUM: CPT | Performed by: EMERGENCY MEDICINE

## 2024-06-08 PROCEDURE — 99285 EMERGENCY DEPT VISIT HI MDM: CPT

## 2024-06-08 PROCEDURE — 85610 PROTHROMBIN TIME: CPT | Performed by: EMERGENCY MEDICINE

## 2024-06-08 PROCEDURE — 85025 COMPLETE CBC W/AUTO DIFF WBC: CPT | Performed by: EMERGENCY MEDICINE

## 2024-06-08 PROCEDURE — 81001 URINALYSIS AUTO W/SCOPE: CPT | Performed by: EMERGENCY MEDICINE

## 2024-06-08 PROCEDURE — 96365 THER/PROPH/DIAG IV INF INIT: CPT

## 2024-06-08 PROCEDURE — 71045 X-RAY EXAM CHEST 1 VIEW: CPT

## 2024-06-08 PROCEDURE — 70450 CT HEAD/BRAIN W/O DYE: CPT

## 2024-06-08 RX ORDER — CEFTRIAXONE 1 G/50ML
1000 INJECTION, SOLUTION INTRAVENOUS EVERY 24 HOURS
Status: DISCONTINUED | OUTPATIENT
Start: 2024-06-09 | End: 2024-06-10

## 2024-06-08 RX ORDER — ATORVASTATIN CALCIUM 40 MG/1
40 TABLET, FILM COATED ORAL DAILY
Status: DISCONTINUED | OUTPATIENT
Start: 2024-06-08 | End: 2024-06-13 | Stop reason: HOSPADM

## 2024-06-08 RX ORDER — ACETAMINOPHEN 325 MG/1
650 TABLET ORAL EVERY 6 HOURS PRN
Status: DISCONTINUED | OUTPATIENT
Start: 2024-06-08 | End: 2024-06-13 | Stop reason: HOSPADM

## 2024-06-08 RX ORDER — SODIUM CHLORIDE, SODIUM GLUCONATE, SODIUM ACETATE, POTASSIUM CHLORIDE, MAGNESIUM CHLORIDE, SODIUM PHOSPHATE, DIBASIC, AND POTASSIUM PHOSPHATE .53; .5; .37; .037; .03; .012; .00082 G/100ML; G/100ML; G/100ML; G/100ML; G/100ML; G/100ML; G/100ML
75 INJECTION, SOLUTION INTRAVENOUS CONTINUOUS
Status: DISCONTINUED | OUTPATIENT
Start: 2024-06-08 | End: 2024-06-09

## 2024-06-08 RX ORDER — DULOXETIN HYDROCHLORIDE 60 MG/1
60 CAPSULE, DELAYED RELEASE ORAL DAILY
Status: DISCONTINUED | OUTPATIENT
Start: 2024-06-08 | End: 2024-06-13 | Stop reason: HOSPADM

## 2024-06-08 RX ORDER — HALOPERIDOL 5 MG/ML
2 INJECTION INTRAMUSCULAR ONCE
Status: COMPLETED | OUTPATIENT
Start: 2024-06-08 | End: 2024-06-08

## 2024-06-08 RX ORDER — LORAZEPAM 0.5 MG/1
0.5 TABLET ORAL
Status: DISCONTINUED | OUTPATIENT
Start: 2024-06-08 | End: 2024-06-10

## 2024-06-08 RX ORDER — LEVOTHYROXINE SODIUM 0.07 MG/1
75 TABLET ORAL
Status: DISCONTINUED | OUTPATIENT
Start: 2024-06-09 | End: 2024-06-13 | Stop reason: HOSPADM

## 2024-06-08 RX ORDER — ENOXAPARIN SODIUM 100 MG/ML
40 INJECTION SUBCUTANEOUS DAILY
Status: DISCONTINUED | OUTPATIENT
Start: 2024-06-08 | End: 2024-06-13 | Stop reason: HOSPADM

## 2024-06-08 RX ORDER — CEFTRIAXONE 1 G/50ML
1000 INJECTION, SOLUTION INTRAVENOUS ONCE
Status: COMPLETED | OUTPATIENT
Start: 2024-06-08 | End: 2024-06-08

## 2024-06-08 RX ORDER — DOCUSATE SODIUM 100 MG/1
100 CAPSULE, LIQUID FILLED ORAL 2 TIMES DAILY
Status: DISCONTINUED | OUTPATIENT
Start: 2024-06-08 | End: 2024-06-13 | Stop reason: HOSPADM

## 2024-06-08 RX ADMIN — HALOPERIDOL LACTATE 2 MG: 5 INJECTION, SOLUTION INTRAMUSCULAR at 17:28

## 2024-06-08 RX ADMIN — CEFTRIAXONE 1000 MG: 1 INJECTION, SOLUTION INTRAVENOUS at 12:49

## 2024-06-08 RX ADMIN — SODIUM CHLORIDE, SODIUM GLUCONATE, SODIUM ACETATE, POTASSIUM CHLORIDE, MAGNESIUM CHLORIDE, SODIUM PHOSPHATE, DIBASIC, AND POTASSIUM PHOSPHATE 75 ML/HR: .53; .5; .37; .037; .03; .012; .00082 INJECTION, SOLUTION INTRAVENOUS at 16:38

## 2024-06-08 RX ADMIN — LORAZEPAM 0.5 MG: 0.5 TABLET ORAL at 21:16

## 2024-06-08 RX ADMIN — ATORVASTATIN CALCIUM 40 MG: 40 TABLET, FILM COATED ORAL at 16:38

## 2024-06-08 RX ADMIN — DULOXETINE HYDROCHLORIDE 60 MG: 60 CAPSULE, DELAYED RELEASE ORAL at 16:38

## 2024-06-08 RX ADMIN — ENOXAPARIN SODIUM 40 MG: 40 INJECTION SUBCUTANEOUS at 16:37

## 2024-06-08 NOTE — ASSESSMENT & PLAN NOTE
Patient with an initial toxic metabolic encephalopathy   initial CT scan of the head with the following results-1.  No intracranial hemorrhage or calvarial fracture.  Advanced, chronic microangiopathy   We will monitor closely with continued antibiotic treatment to look for resolution  Suspect that the UTI is possibly playing a role in her mentation change  Additional etiology could be related to lorazepam, the patient was recently prescribed some lorazepam from a prior ER visit, the patient's grandson reports that the bottle is already empty.  She was prescribed 3 pills.
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
## 2024-06-08 NOTE — NURSING NOTE
Pt is very agitated and un-redirectable.  Multiple attempts were made to calm and redirect the pt but ineffective.  New order for one to one observation, IM haldol 2mg, and restraints noted.

## 2024-06-08 NOTE — ED PROVIDER NOTES
"History  Chief Complaint   Patient presents with    Fall    Altered Mental Status     Patient arrives via ems from home after police had to break door down for welfare check. Patient was found on the floor after a fall. No headstrike. Patient was conscious.   Unknown how long patient was on floor. Patient is soiled in urine.ems reports family does not think she can go back home and would need placement somewhere.  Patient is confused and hallucinating      78-year-old female presents with altered mental status.  When asked why patient came today she started talking about her 's illness.  Patient had to be redirected multiple times.  She says that her friends wanted her to be \"checked out\" and she denies medical complaints.  Shortly after that she started saying that 4 people broke into her house yesterday, she does not believe they stole anything, and she also said that EMS witnessed the evidence of break-in.  I discussed it with EMS who said that there is no evidence of break-in and they brought this concern up to them while they are there      Fall  Altered Mental Status      Prior to Admission Medications   Prescriptions Last Dose Informant Patient Reported? Taking?   DULoxetine (CYMBALTA) 60 mg delayed release capsule Unknown  No No   Sig: Take 1 capsule (60 mg total) by mouth daily   LORazepam (Ativan) 1 mg tablet Unknown  No No   Sig: Take 0.5 tablets (0.5 mg total) by mouth daily at bedtime for 10 days   atorvastatin (LIPITOR) 40 mg tablet Unknown  No No   Sig: Take 1 tablet (40 mg total) by mouth daily   levothyroxine 75 mcg tablet Unknown  No No   Sig: Take 1 tablet (75 mcg total) by mouth daily in the early morning Take 1 tablet (75 mg) by oral route five days a week.      Facility-Administered Medications: None       Past Medical History:   Diagnosis Date    Aortic stenosis     Depression     Disease of thyroid gland     Diverticular disease 05/09/2017    Essential hypertension 05/20/2016    " Hyperlipidemia     Major depression 05/20/2016    Peripheral neuropathy     Type 2 diabetes mellitus (HCC) 05/20/2016    Visual impairment        Past Surgical History:   Procedure Laterality Date    BOWEL RESECTION      COLON SURGERY      EYE SURGERY      SMALL INTESTINE SURGERY      TUBAL LIGATION         History reviewed. No pertinent family history.  I have reviewed and agree with the history as documented.    E-Cigarette/Vaping    E-Cigarette Use Never User      E-Cigarette/Vaping Substances    Nicotine No     THC No     CBD No     Flavoring No      Social History     Tobacco Use    Smoking status: Never     Passive exposure: Never    Smokeless tobacco: Never   Vaping Use    Vaping status: Never Used   Substance Use Topics    Alcohol use: Yes     Comment: occasional    Drug use: Never       Review of Systems    Physical Exam  Physical Exam  Vitals and nursing note reviewed.   Constitutional:       Appearance: Normal appearance. She is well-developed.   HENT:      Head: Normocephalic and atraumatic.   Eyes:      Conjunctiva/sclera: Conjunctivae normal.      Pupils: Pupils are equal, round, and reactive to light.   Neck:      Trachea: No tracheal deviation.   Cardiovascular:      Rate and Rhythm: Normal rate and regular rhythm.      Heart sounds: Normal heart sounds. No murmur heard.  Pulmonary:      Effort: Pulmonary effort is normal. No respiratory distress.      Breath sounds: Normal breath sounds. No wheezing or rales.   Abdominal:      General: Bowel sounds are normal. There is no distension.      Palpations: Abdomen is soft.      Tenderness: There is no abdominal tenderness.   Musculoskeletal:         General: No deformity.      Cervical back: Normal range of motion and neck supple.   Skin:     General: Skin is warm and dry.      Capillary Refill: Capillary refill takes less than 2 seconds.   Neurological:      General: No focal deficit present.      Mental Status: She is alert and oriented to person,  place, and time.      Sensory: No sensory deficit.   Psychiatric:         Behavior: Behavior is cooperative.         Thought Content: Thought content is delusional.         Judgment: Judgment normal.         Vital Signs  ED Triage Vitals   Temperature Pulse Respirations Blood Pressure SpO2   06/08/24 1217 06/08/24 1215 06/08/24 1215 06/08/24 1215 06/08/24 1215   97.7 °F (36.5 °C) 89 17 161/70 98 %      Temp Source Heart Rate Source Patient Position - Orthostatic VS BP Location FiO2 (%)   06/08/24 1217 06/08/24 1215 06/08/24 1215 06/08/24 1215 --   Tympanic Monitor Sitting Left arm       Pain Score       06/08/24 1517       No Pain           Vitals:    06/08/24 1245 06/08/24 1405 06/08/24 1415 06/08/24 1517   BP: 147/91 161/72 161/72 129/76   Pulse: 84 78 74 87   Patient Position - Orthostatic VS:   Lying Lying         Visual Acuity      ED Medications  Medications   atorvastatin (LIPITOR) tablet 40 mg (40 mg Oral Given 6/8/24 1638)   DULoxetine (CYMBALTA) delayed release capsule 60 mg (60 mg Oral Given 6/8/24 1638)   levothyroxine tablet 75 mcg (has no administration in time range)   LORazepam (ATIVAN) tablet 0.5 mg (has no administration in time range)   multi-electrolyte (PLASMALYTE-A/ISOLYTE-S PH 7.4) IV solution (0 mL/hr Intravenous Stopped 6/8/24 1718)   acetaminophen (TYLENOL) tablet 650 mg (has no administration in time range)   docusate sodium (COLACE) capsule 100 mg (100 mg Oral Not Given 6/8/24 1704)   enoxaparin (LOVENOX) subcutaneous injection 40 mg (40 mg Subcutaneous Given 6/8/24 1637)   cefTRIAXone (ROCEPHIN) IVPB (premix in dextrose) 1,000 mg 50 mL (has no administration in time range)   cefTRIAXone (ROCEPHIN) IVPB (premix in dextrose) 1,000 mg 50 mL (0 mg Intravenous Stopped 6/8/24 1319)   haloperidol lactate (HALDOL) injection 2 mg (2 mg Intramuscular Given 6/8/24 1728)       Diagnostic Studies  Results Reviewed       Procedure Component Value Units Date/Time    Basic metabolic panel [[id number]]  Collected: 06/08/24 1227    Lab Status: Final result Specimen: Blood from Arm, Right Updated: 06/08/24 1253     Sodium 137 mmol/L      Potassium 3.7 mmol/L      Chloride 101 mmol/L      CO2 25 mmol/L      ANION GAP 11 mmol/L      BUN 13 mg/dL      Creatinine 0.97 mg/dL      Glucose 138 mg/dL      Calcium 9.7 mg/dL      eGFR 55 ml/min/1.73sq m     Narrative:      National Kid[employer] Disease Foundation guidelines for Chronic Kidney Disease (CKD):     Stage 1 with normal or high GFR (GFR > 90 mL/min/1.73 square meters)    Stage 2 Mild CKD (GFR = 60-89 mL/min/1.73 square meters)    Stage 3A Moderate CKD (GFR = 45-59 mL/min/1.73 square meters)    Stage 3B Moderate CKD (GFR = 30-44 mL/min/1.73 square meters)    Stage 4 Severe CKD (GFR = 15-29 mL/min/1.73 square meters)    Stage 5 End Stage CKD (GFR <15 mL/min/1.73 square meters)  Note: GFR calculation is accurate only with a steady state creatinine    Magnesium [[id number]]  (Abnormal) Collected: 06/08/24 1227    Lab Status: Final result Specimen: Blood from Arm, Right Updated: 06/08/24 1253     Magnesium 1.8 mg/dL     Protime-INR [[id number]]  (Normal) Collected: 06/08/24 1227    Lab Status: Final result Specimen: Blood from Arm, Right Updated: 06/08/24 1246     Protime 13.7 seconds      INR 1.04    APTT [[id number]]  (Normal) Collected: 06/08/24 1227    Lab Status: Final result Specimen: Blood from Arm, Right Updated: 06/08/24 1246     PTT 29 seconds     Urine Microscopic [[id number]]  (Abnormal) Collected: 06/08/24 1227    Lab Status: Final result Specimen: Urine, Straight Cath Updated: 06/08/24 1241     RBC, UA 0-5 /hpf      WBC, UA 30-50 /hpf      Epithelial Cells Occasional /hpf      Bacteria, UA Innumerable /hpf     Urine culture [[id number]] Collected: 06/08/24 1227    Lab Status: In process Specimen: Urine, Straight Cath Updated: 06/08/24 1241    UA w Reflex to Microscopic w Reflex to Culture [[id number]]  (Abnormal) Collected: 06/08/24 6686    Lab Status: Final result  Specimen: Urine, Straight Cath Updated: 06/08/24 1234     Color, UA Yellow     Clarity, UA Hazy     Specific Gravity, UA 1.015     pH, UA 6.5     Leukocytes, UA 2+     Nitrite, UA Negative     Protein, UA Negative mg/dl      Glucose, UA Negative mg/dl      Ketones, UA Negative mg/dl      Urobilinogen, UA 0.2 E.U./dl      Bilirubin, UA Negative     Occult Blood, UA Negative    CBC and differential [[id number]]  (Abnormal) Collected: 06/08/24 1227    Lab Status: Final result Specimen: Blood from Arm, Right Updated: 06/08/24 1233     WBC 9.94 Thousand/uL      RBC 3.81 Million/uL      Hemoglobin 12.7 g/dL      Hematocrit 38.2 %       fL      MCH 33.3 pg      MCHC 33.2 g/dL      RDW 13.3 %      MPV 8.9 fL      Platelets 193 Thousands/uL      nRBC 0 /100 WBCs      Segmented % 82 %      Immature Grans % 0 %      Lymphocytes % 6 %      Monocytes % 12 %      Eosinophils Relative 0 %      Basophils Relative 0 %      Absolute Neutrophils 8.11 Thousands/µL      Absolute Immature Grans 0.04 Thousand/uL      Absolute Lymphocytes 0.60 Thousands/µL      Absolute Monocytes 1.16 Thousand/µL      Eosinophils Absolute 0.01 Thousand/µL      Basophils Absolute 0.02 Thousands/µL                    XR chest 1 view portable   Final Result by India Lynch MD (06/08 2049)      No acute cardiopulmonary disease.      No acute displaced fracture.      Workstation performed: [id number]         CT head without contrast   Final Result by Sang Pozo MD (06/08 1346)      1.  No intracranial hemorrhage or calvarial fracture.   2.  Advanced, chronic microangiopathy                  Workstation performed: [id number]         XR shoulder 2+ views LEFT    (Results Pending)              Procedures  ECG 12 Lead Documentation Only    Date/Time: 6/8/2024 12:44 PM    Performed by: Chivo Squires DO  Authorized by: Chivo Squires DO    Indications / Diagnosis:  AMS  ECG reviewed by me, the ED Provider: yes    Patient location:   ED  Previous ECG:     Previous ECG:  Compared to current    Similarity:  No change    Comparison to cardiac monitor: Yes    Interpretation:     Interpretation: normal    Rate:     ECG rate:  89    ECG rate assessment: normal    Rhythm:     Rhythm: sinus rhythm    Ectopy:     Ectopy: none    QRS:     QRS axis:  Normal  Conduction:     Conduction: normal    ST segments:     ST segments:  Non-specific  T waves:     T waves: normal    Comments:      No evidence of acute cardiac ischemia           ED Course  ED Course as of 06/08/24 2057   Sat Jun 08, 2024   1245 Bacteria, UA(!): Innumerable  Enterobacteria, will give Rocephin.   1304 CT negative   1441 Discussed with nurse, patient complained of left shoulder pain, will get x-ray will get x-ray of left shoulder and patient denies fall although does have some swelling at that location some bruising.                                 SBIRT 20yo+      Flowsheet Row Most Recent Value   Initial Alcohol Screen: US AUDIT-C     1. How often do you have a drink containing alcohol? 0 Filed at: 06/08/2024 1214   2. How many drinks containing alcohol do you have on a typical day you are drinking?  0 Filed at: 06/08/2024 1214   3a. Male UNDER 65: How often do you have five or more drinks on one occasion? 0 Filed at: 06/08/2024 1214   3b. FEMALE Any Age, or MALE 65+: How often do you have 4 or more drinks on one occassion? 0 Filed at: 06/08/2024 1214   Audit-C Score 0 Filed at: 06/08/2024 1214   [place]: How many times in the past year have you...    Used an illegal drug or used a prescription medication for non-medical reasons? Never Filed at: 06/08/2024 1214                      Medical Decision Making  78-year-old female presents with altered mental status.  Has delusions about people breaking into her house.  She says that EMS did see the signs of breaking and entering however I discussed with EMS and they said there is no evidence of any force injury or disturbance inside the house  and she brought this up to them while they were there and they did not see anything to warrant that.    No trauma.     Differential includes electrolyte abnormality such as hyponatremia, infection, possible urinary tract infection, will get a UA, chemistry, CBC.  If no source may require CT of the head.    Shoulder and chest x-ray is also reassuring.    Problems Addressed:  Altered mental status: acute illness or injury  Bacteria in urine: acute illness or injury    Amount and/or Complexity of Data Reviewed  Labs: ordered. Decision-making details documented in ED Course.  Radiology: ordered and independent interpretation performed. Decision-making details documented in ED Course.    Risk  Prescription drug management.  Decision regarding hospitalization.             Disposition  Final diagnoses:   Altered mental status   Bacteria in urine     Time reflects when diagnosis was documented in both MDM as applicable and the Disposition within this note       Time User Action Codes Description Comment    6/8/2024  2:05 PM Chivo Squires Add [R41.82] Altered mental status     6/8/2024  2:05 PM Chivo Squires Add [R82.71] Bacteria in urine           ED Disposition       ED Disposition   Admit    Condition   Stable    Date/Time   Sat Jun 8, 2024 8485    Comment   Case was discussed with mayuri and the patient's admission status was agreed to be Admission Status: observation status to the service of Dr. messina .               Follow-up Information    None         Current Discharge Medication List        CONTINUE these medications which have NOT CHANGED    Details   atorvastatin (LIPITOR) 40 mg tablet Take 1 tablet (40 mg total) by mouth daily  Qty: 90 tablet, Refills: 3    Associated Diagnoses: Dyslipidemia      DULoxetine (CYMBALTA) 60 mg delayed release capsule Take 1 capsule (60 mg total) by mouth daily  Qty: 30 capsule, Refills: 5    Associated Diagnoses: Neuropathy; Anxiety with depression      levothyroxine 75 mcg  tablet Take 1 tablet (75 mcg total) by mouth daily in the early morning Take 1 tablet (75 mg) by oral route five days a week.  Qty: 90 tablet, Refills: 3    Associated Diagnoses: Hypothyroidism, unspecified type      LORazepam (Ativan) 1 mg tablet Take 0.5 tablets (0.5 mg total) by mouth daily at bedtime for 10 days  Qty: 3 tablet, Refills: 0    Associated Diagnoses: Panic attacks             No discharge procedures on file.    PDMP Review         Value Time User    PDMP Reviewed  Yes 5/11/2022 10:12 AM MAJOR Church            ED Provider  Electronically Signed by             Chivo Squires DO  06/08/24 2057

## 2024-06-08 NOTE — PLAN OF CARE

## 2024-06-08 NOTE — ASSESSMENT & PLAN NOTE
Admit to observation medicine  Initial urinalysis was suspect for a UTI  Will treat with IV ceftriaxone -patient received 1 dose in the ED  Patient is otherwise afebrile, and does not have a leukocytosis  We will follow-up on the cultures and optimize medications accordingly

## 2024-06-08 NOTE — ASSESSMENT & PLAN NOTE
Patient was found down at home  Will obtain a PT and OT evaluation  Patient may benefit from rehab

## 2024-06-09 PROBLEM — D64.9 LOW HEMOGLOBIN: Status: ACTIVE | Noted: 2024-06-09

## 2024-06-09 LAB
ANION GAP SERPL CALCULATED.3IONS-SCNC: 7 MMOL/L (ref 4–13)
ATRIAL RATE: 89 BPM
BASOPHILS # BLD AUTO: 0.03 THOUSANDS/ÂΜL (ref 0–0.1)
BASOPHILS NFR BLD AUTO: 0 % (ref 0–1)
BUN SERPL-MCNC: 12 MG/DL (ref 5–25)
CALCIUM SERPL-MCNC: 8.6 MG/DL (ref 8.4–10.2)
CHLORIDE SERPL-SCNC: 104 MMOL/L (ref 96–108)
CO2 SERPL-SCNC: 26 MMOL/L (ref 21–32)
CREAT SERPL-MCNC: 0.81 MG/DL (ref 0.6–1.3)
EOSINOPHIL # BLD AUTO: 0.23 THOUSAND/ÂΜL (ref 0–0.61)
EOSINOPHIL NFR BLD AUTO: 3 % (ref 0–6)
ERYTHROCYTE [DISTWIDTH] IN BLOOD BY AUTOMATED COUNT: 13.3 % (ref 11.6–15.1)
GFR SERPL CREATININE-BSD FRML MDRD: 68 ML/MIN/1.73SQ M
GLUCOSE SERPL-MCNC: 82 MG/DL (ref 65–140)
HCT VFR BLD AUTO: 32.1 % (ref 34.8–46.1)
HGB BLD-MCNC: 10.6 G/DL (ref 11.5–15.4)
IMM GRANULOCYTES # BLD AUTO: 0.02 THOUSAND/UL (ref 0–0.2)
IMM GRANULOCYTES NFR BLD AUTO: 0 % (ref 0–2)
LYMPHOCYTES # BLD AUTO: 2.09 THOUSANDS/ÂΜL (ref 0.6–4.47)
LYMPHOCYTES NFR BLD AUTO: 28 % (ref 14–44)
MCH RBC QN AUTO: 33 PG (ref 26.8–34.3)
MCHC RBC AUTO-ENTMCNC: 33 G/DL (ref 31.4–37.4)
MCV RBC AUTO: 100 FL (ref 82–98)
MONOCYTES # BLD AUTO: 0.59 THOUSAND/ÂΜL (ref 0.17–1.22)
MONOCYTES NFR BLD AUTO: 8 % (ref 4–12)
NEUTROPHILS # BLD AUTO: 4.44 THOUSANDS/ÂΜL (ref 1.85–7.62)
NEUTS SEG NFR BLD AUTO: 61 % (ref 43–75)
NRBC BLD AUTO-RTO: 0 /100 WBCS
P AXIS: 83 DEGREES
PLATELET # BLD AUTO: 150 THOUSANDS/UL (ref 149–390)
PMV BLD AUTO: 8.8 FL (ref 8.9–12.7)
POTASSIUM SERPL-SCNC: 3.4 MMOL/L (ref 3.5–5.3)
PR INTERVAL: 230 MS
QRS AXIS: 59 DEGREES
QRSD INTERVAL: 70 MS
QT INTERVAL: 402 MS
QTC INTERVAL: 489 MS
RBC # BLD AUTO: 3.21 MILLION/UL (ref 3.81–5.12)
SODIUM SERPL-SCNC: 137 MMOL/L (ref 135–147)
T WAVE AXIS: 79 DEGREES
VENTRICULAR RATE: 89 BPM
WBC # BLD AUTO: 7.4 THOUSAND/UL (ref 4.31–10.16)

## 2024-06-09 PROCEDURE — 85025 COMPLETE CBC W/AUTO DIFF WBC: CPT | Performed by: HOSPITALIST

## 2024-06-09 PROCEDURE — 93010 ELECTROCARDIOGRAM REPORT: CPT | Performed by: INTERNAL MEDICINE

## 2024-06-09 PROCEDURE — 99232 SBSQ HOSP IP/OBS MODERATE 35: CPT | Performed by: HOSPITALIST

## 2024-06-09 PROCEDURE — 80048 BASIC METABOLIC PNL TOTAL CA: CPT | Performed by: HOSPITALIST

## 2024-06-09 RX ORDER — QUETIAPINE FUMARATE 25 MG/1
25 TABLET, FILM COATED ORAL
Status: DISCONTINUED | OUTPATIENT
Start: 2024-06-09 | End: 2024-06-13 | Stop reason: HOSPADM

## 2024-06-09 RX ADMIN — DOCUSATE SODIUM 100 MG: 100 CAPSULE, LIQUID FILLED ORAL at 18:45

## 2024-06-09 RX ADMIN — DULOXETINE HYDROCHLORIDE 60 MG: 60 CAPSULE, DELAYED RELEASE ORAL at 10:18

## 2024-06-09 RX ADMIN — LEVOTHYROXINE SODIUM 75 MCG: 75 TABLET ORAL at 05:41

## 2024-06-09 RX ADMIN — CEFTRIAXONE 1000 MG: 1 INJECTION, SOLUTION INTRAVENOUS at 12:55

## 2024-06-09 RX ADMIN — ACETAMINOPHEN 650 MG: 325 TABLET ORAL at 22:38

## 2024-06-09 RX ADMIN — ACETAMINOPHEN 650 MG: 325 TABLET ORAL at 16:06

## 2024-06-09 RX ADMIN — DOCUSATE SODIUM 100 MG: 100 CAPSULE, LIQUID FILLED ORAL at 10:18

## 2024-06-09 RX ADMIN — ENOXAPARIN SODIUM 40 MG: 40 INJECTION SUBCUTANEOUS at 10:18

## 2024-06-09 RX ADMIN — ATORVASTATIN CALCIUM 40 MG: 40 TABLET, FILM COATED ORAL at 10:18

## 2024-06-09 RX ADMIN — LORAZEPAM 0.5 MG: 0.5 TABLET ORAL at 21:16

## 2024-06-09 RX ADMIN — QUETIAPINE FUMARATE 25 MG: 25 TABLET ORAL at 21:18

## 2024-06-09 NOTE — PLAN OF CARE
Problem: Potential for Falls  Goal: Patient will remain free of falls  Description: INTERVENTIONS:  - Educate patient/family on patient safety including physical limitations  - Instruct patient to call for assistance with activity   - Consult OT/PT to assist with strengthening/mobility   - Keep Call bell within reach  - Keep bed low and locked with side rails adjusted as appropriate  - Keep care items and personal belongings within reach  - Initiate and maintain comfort rounds  - Make Fall Risk Sign visible to staff  - Offer Toileting every 2 Hours, in advance of need  - Initiate/Maintain bed alarm  - Obtain necessary fall risk management equipment: non skid socks  - Apply yellow socks and bracelet for high fall risk patients  - Consider moving patient to room near nurses station  Outcome: Progressing     Problem: PAIN - ADULT  Goal: Verbalizes/displays adequate comfort level or baseline comfort level  Description: Interventions:  - Encourage patient to monitor pain and request assistance  - Assess pain using appropriate pain scale  - Administer analgesics based on type and severity of pain and evaluate response  - Implement non-pharmacological measures as appropriate and evaluate response  - Consider cultural and social influences on pain and pain management  - Notify physician/advanced practitioner if interventions unsuccessful or patient reports new pain  Outcome: Progressing     Problem: INFECTION - ADULT  Goal: Absence or prevention of progression during hospitalization  Description: INTERVENTIONS:  - Assess and monitor for signs and symptoms of infection  - Monitor lab/diagnostic results  - Monitor all insertion sites, i.e. indwelling lines, tubes, and drains  - Monitor endotracheal if appropriate and nasal secretions for changes in amount and color  - [place] appropriate cooling/warming therapies per order  - Administer medications as ordered  - Instruct and encourage patient and family to use good hand  hygiene technique  - Identify and instruct in appropriate isolation precautions for identified infection/condition  Outcome: Progressing  Goal: Absence of fever/infection during neutropenic period  Description: INTERVENTIONS:  - Monitor WBC    Outcome: Progressing

## 2024-06-09 NOTE — QUICK NOTE
Ambulated patient to see  (also a patient here) for lunch to encourage intake. Patient ate 0% for breakfast, ate 50% for lunch with . Both patient and  were pleased to sit with each other. Patient was calm and direct-able, using call bell appropriately. RN aware.

## 2024-06-09 NOTE — PLAN OF CARE
Problem: Potential for Falls  Goal: Patient will remain free of falls  Description: INTERVENTIONS:  - Educate patient/family on patient safety including physical limitations  - Instruct patient to call for assistance with activity   - Consult OT/PT to assist with strengthening/mobility   - Keep Call bell within reach  - Keep bed low and locked with side rails adjusted as appropriate  - Keep care items and personal belongings within reach  - Initiate and maintain comfort rounds  - Make Fall Risk Sign visible to staff  - Offer Toileting every 2 Hours, in advance of need  - Initiate/Maintain bed alarm  - Obtain necessary fall risk management equipment: non skid socks  - Apply yellow socks and bracelet for high fall risk patients  - Consider moving patient to room near nurses station  Outcome: Progressing     Problem: INFECTION - ADULT  Goal: Absence or prevention of progression during hospitalization  Description: INTERVENTIONS:  - Assess and monitor for signs and symptoms of infection  - Monitor lab/diagnostic results  - Monitor all insertion sites, i.e. indwelling lines, tubes, and drains  - [place] appropriate cooling/warming therapies per order  - Administer medications as ordered  - Instruct and encourage patient and family to use good hand hygiene technique  - Identify and instruct in appropriate isolation precautions for identified infection/condition  Outcome: Progressing  Goal: Absence of fever/infection during neutropenic period  Description: INTERVENTIONS:  - Monitor WBC  Outcome: Progressing     Problem: NEUROSENSORY - ADULT  Goal: Achieves stable or improved neurological status  Description: INTERVENTIONS  - Monitor and report changes in neurological status  - Monitor vital signs such as temperature, blood pressure, glucose, and any other labs ordered   - Initiate measures to prevent increased intracranial pressure  - Monitor for seizure activity and implement precautions if appropriate    Outcome:  Progressing  Goal: Achieves maximal functionality and self care  Description: INTERVENTIONS  - Monitor swallowing and airway patency with patient fatigue and changes in neurological status  - Encourage and assist patient to increase activity and self care.   - Encourage visually impaired, hearing impaired and aphasic patients to use assistive/communication devices  Outcome: Progressing

## 2024-06-09 NOTE — ASSESSMENT & PLAN NOTE
Hemoglobin drop noted from 12.7-10.6  No evidence of bleeding  Suspect that this is a dilutional effect in the setting of the patient receiving IV fluid  Will discontinue IV fluids, and repeat a CBC in the a.m.

## 2024-06-09 NOTE — ASSESSMENT & PLAN NOTE
Patient with an initial toxic metabolic encephalopathy   initial CT scan of the head with the following result- No intracranial hemorrhage or calvarial fracture.  Advanced, chronic microangiopathy   Suspect that the UTI is possibly playing a role in her mentation change  Additional etiology could be related to lorazepam, the patient was recently prescribed some lorazepam from a prior ER visit, the patient's grandson reports that the bottle is already empty.  She was prescribed 3 pills.  Finally, suspect that the patient may also have an element of cognitive decline/Alzheimer's dementia, patient was noted to be sundowning last evening, she required some Haldol, and currently is on a one-to-one  Will add trazodone at night  Will consult psychiatry

## 2024-06-09 NOTE — ASSESSMENT & PLAN NOTE
Inpatient Anticoagulation Service Note    Date: 2019    Reason for Anticoagulation: Atrial Fibrillation   Target INR: 2.0 to 3.0  [id number] VASc Score: 3  HAS-BLED Score: 2   Hemoglobin Value: (!) 9.4  Hematocrit Value: (!) 31.4    INR from last 7 days     Date/Time INR Value    19 1340  (!) 1.98        Dose from last 7 days     Date/Time Dose (mg)    19 0835  7.5    19 2005  5    199  5        Average Dose (mg): (5 mg on Tues, Thurs, Sun. 7.5 mg on Mon, Wed, Sat. 10 mg on Fri)  Significant Interactions: Thyroid Medications  Bridge Therapy: No       Comments/ Plan: Patient admitted for observation due to syncopal episode and UTI. INR relatively stable on admission. H/H low but right around baseline. No concerns for bleeding at this time. No new meds or concerns for DDI. Next INR to be drawn on 19. Continue home dose and follow up INR      Education Material Provided?: No    Pharmacist suggested discharge dosin mg on Tues, Thurs, Sun.   7.5 mg on Mon, Wed, Sat.   10 mg on Fri     Donna William, PharmD  Reviewed by Gloria Torres, PharmD             Patient was found down at home  Formal PT and OT evaluation is pending  Patient may benefit from rehab

## 2024-06-09 NOTE — PROGRESS NOTES
Per virtual sitter, Pt's behaviors have been appropriate. Plan of care discussed with provider. Video monitor discontinued at this time. Will continue to monitor Pt and increase comfort rounds. Bed/chair alarms activated.

## 2024-06-09 NOTE — PROGRESS NOTES
[facility]  Progress Note  Name: Daily Schaeffer I  MRN: [id number]  Unit/Bed#: -01 I Date of Admission: 6/8/2024   Date of Service: 6/9/2024 I Hospital Day: 0    Assessment & Plan   * Acute cystitis without hematuria  Assessment & Plan  Initial urinalysis was suspect for a UTI  Continue IV ceftriaxone day 2  Urine cultures are pending  Patient remains afebrile, and does not have a leukocytosis  Optimize antibiotics based on final cultures    Acute encephalopathy  Assessment & Plan  Patient with an initial toxic metabolic encephalopathy   initial CT scan of the head with the following result- No intracranial hemorrhage or calvarial fracture.  Advanced, chronic microangiopathy   Suspect that the UTI is possibly playing a role in her mentation change  Additional etiology could be related to lorazepam, the patient was recently prescribed some lorazepam from a prior ER visit, the patient's grandson reports that the bottle is already empty.  She was prescribed 3 pills.  Finally, suspect that the patient may also have an element of cognitive decline/Alzheimer's dementia, patient was noted to be sundowning last evening, she required some Haldol, and currently is on a one-to-one  Will add Seroquel at night  Will consult psychiatry      Dyslipidemia  Assessment & Plan  Continue home dosing of Lipitor      Hypothyroidism  Assessment & Plan  Continue levothyroxine    Debility  Assessment & Plan  Patient was found down at home  Formal PT and OT evaluation is pending  Patient may benefit from rehab    Anxiety and depression  Assessment & Plan  Continue home dosing of Cymbalta  Continue as needed Ativan at night-0.5 mg only    Low hemoglobin  Assessment & Plan  Hemoglobin drop noted from 12.7-10.6  No evidence of bleeding  Suspect that this is a dilutional effect in the setting of the patient receiving IV fluid  Will discontinue IV fluids, and repeat a CBC in the a.m.               VTE Pharmacologic  Prophylaxis: VTE Score: 5 High Risk (Score >/= 5) - Pharmacological DVT Prophylaxis Ordered: enoxaparin (Lovenox). Sequential Compression Devices Ordered.    Mobility:   Basic Mobility Inpatient Raw Score: 17  JH-HLM Goal: 5: Stand one or more mins  JH-HLM Achieved: 5: Stand (1 or more minutes)  JH-HLM Goal achieved. Continue to encourage appropriate mobility.    Patient Centered Rounds: I performed bedside rounds with nursing staff today.   Discussions with Specialists or Other Care Team Provider: Case management    Education and Discussions with Family / Patient:  Will attempt to update the patient's family members as the day progresses, they plan on visiting later this afternoon.     Total Time Spent on Date of Encounter in care of patient: 45 mins. This time was spent on one or more of the following: performing physical exam; counseling and coordination of care; obtaining or reviewing history; documenting in the medical record; reviewing/ordering tests, medications or procedures; communicating with other healthcare professionals and discussing with patient's family/caregivers.    Current Length of Stay: 0 day(s)  Current Patient Status: Observation   Certification Statement: The patient, admitted on an observation basis, will now require > 2 midnight hospital stay due to continued need for IV antibiotics, and a PT OT evaluation along with a psych evaluation  Discharge Plan: Anticipate discharge in 48-72 hrs to discharge location to be determined pending rehab evaluations.    Code Status: Level 1 - Full Code    Subjective:   Patient seen, resting in bed, remains alert, awake, and oriented to person, but is confused to place and time.  She is calm and cooperative at this time.    Objective:     Vitals:   Temp (24hrs), Av °F (36.7 °C), Min:97.7 °F (36.5 °C), Max:98.3 °F (36.8 °C)    Temp:  [97.7 °F (36.5 °C)-98.3 °F (36.8 °C)] 98.1 °F (36.7 °C)  HR:  [73-89] 73  Resp:  [16-19] 16  BP: (121-161)/(52-91)  129/52  SpO2:  [96 %-99 %] 96 %  Body mass index is 20.85 kg/m².     Input and Output Summary (last 24 hours):     Intake/Output Summary (Last 24 hours) at 6/9/2024 0736  Last data filed at 6/8/2024 1700  Gross per 24 hour   Intake 50 ml   Output 900 ml   Net -850 ml       Physical Exam:   Physical Exam  Constitutional:       General: She is not in acute distress.     Appearance: Normal appearance. She is normal weight. She is not ill-appearing.   HENT:      Head: Normocephalic and atraumatic.      Nose: Nose normal.      Mouth/Throat:      Mouth: Mucous membranes are moist.   Eyes:      Extraocular Movements: Extraocular movements intact.      Pupils: Pupils are equal, round, and reactive to light.   Cardiovascular:      Rate and Rhythm: Normal rate and regular rhythm.      Pulses: Normal pulses.      Heart sounds: Normal heart sounds. No murmur heard.     No friction rub. No gallop.   Pulmonary:      Effort: Pulmonary effort is normal. No respiratory distress.      Breath sounds: Normal breath sounds. No wheezing, rhonchi or rales.   Abdominal:      General: There is no distension.      Palpations: Abdomen is soft. There is no mass.      Tenderness: There is no abdominal tenderness.      Hernia: No hernia is present.   Musculoskeletal:         General: No swelling or tenderness. Normal range of motion.      Cervical back: Normal range of motion and neck supple. No rigidity.      Right lower leg: No edema.      Left lower leg: No edema.   Skin:     General: Skin is warm.      Capillary Refill: Capillary refill takes less than 2 seconds.      Findings: No erythema or rash.   Neurological:      General: No focal deficit present.      Mental Status: She is alert. Mental status is at baseline. She is disoriented.      Cranial Nerves: No cranial nerve deficit.      Motor: No weakness.      Comments: AAO x 1 maybe 2, pleasant and calm at this time   Psychiatric:         Mood and Affect: Mood normal.         Behavior:  Behavior normal.          Additional Data:     Labs:  Results from last 7 days   Lab Units 06/09/24  0540   WBC Thousand/uL 7.40   HEMOGLOBIN g/dL 10.6*   HEMATOCRIT % 32.1*   PLATELETS Thousands/uL 150   SEGS PCT % 61   LYMPHO PCT % 28   MONO PCT % 8   EOS PCT % 3     Results from last 7 days   Lab Units 06/09/24  0540   SODIUM mmol/L 137   POTASSIUM mmol/L 3.4*   CHLORIDE mmol/L 104   CO2 mmol/L 26   BUN mg/dL 12   CREATININE mg/dL 0.81   ANION GAP mmol/L 7   CALCIUM mg/dL 8.6   GLUCOSE RANDOM mg/dL 82     Results from last 7 days   Lab Units 06/08/24  1227   INR  1.04                   Lines/Drains:  Invasive Devices       Peripheral Intravenous Line  Duration             Peripheral IV 06/08/24 Right Antecubital 1 day                          Imaging: No pertinent imaging reviewed.    Recent Cultures (last 7 days):         Last 24 Hours Medication List:   Current Facility-Administered Medications   Medication Dose Route Frequency Provider Last Rate    acetaminophen  650 mg Oral Q6H PRN Delmis Chan MD      atorvastatin  40 mg Oral Daily Delmis Chan MD      cefTRIAXone  1,000 mg Intravenous Q24H Delmis Chan MD      docusate sodium  100 mg Oral BID Delmis Chan MD      DULoxetine  60 mg Oral Daily Delmis Chan MD      enoxaparin  40 mg Subcutaneous Daily Delmis Chan MD      levothyroxine  75 mcg Oral Early Morning Delmis Chan MD      LORazepam  0.5 mg Oral HS Delmis Chan MD          Today, Patient Was Seen By: Delmis Chan MD    **Please Note: This note may have been constructed using a voice recognition system.**

## 2024-06-09 NOTE — ASSESSMENT & PLAN NOTE
Initial urinalysis was suspect for a UTI  Continue IV ceftriaxone day 2  Urine cultures are pending  Patient remains afebrile, and does not have a leukocytosis  Optimize antibiotics based on final cultures

## 2024-06-10 LAB
ANION GAP SERPL CALCULATED.3IONS-SCNC: 7 MMOL/L (ref 4–13)
BASOPHILS # BLD AUTO: 0.04 THOUSANDS/ÂΜL (ref 0–0.1)
BASOPHILS NFR BLD AUTO: 1 % (ref 0–1)
BUN SERPL-MCNC: 12 MG/DL (ref 5–25)
CALCIUM SERPL-MCNC: 8.6 MG/DL (ref 8.4–10.2)
CHLORIDE SERPL-SCNC: 106 MMOL/L (ref 96–108)
CO2 SERPL-SCNC: 25 MMOL/L (ref 21–32)
CREAT SERPL-MCNC: 0.78 MG/DL (ref 0.6–1.3)
EOSINOPHIL # BLD AUTO: 0.39 THOUSAND/ÂΜL (ref 0–0.61)
EOSINOPHIL NFR BLD AUTO: 6 % (ref 0–6)
ERYTHROCYTE [DISTWIDTH] IN BLOOD BY AUTOMATED COUNT: 13.7 % (ref 11.6–15.1)
GFR SERPL CREATININE-BSD FRML MDRD: 72 ML/MIN/1.73SQ M
GLUCOSE SERPL-MCNC: 99 MG/DL (ref 65–140)
HCT VFR BLD AUTO: 32.2 % (ref 34.8–46.1)
HGB BLD-MCNC: 10.5 G/DL (ref 11.5–15.4)
IMM GRANULOCYTES # BLD AUTO: 0.02 THOUSAND/UL (ref 0–0.2)
IMM GRANULOCYTES NFR BLD AUTO: 0 % (ref 0–2)
LYMPHOCYTES # BLD AUTO: 2.5 THOUSANDS/ÂΜL (ref 0.6–4.47)
LYMPHOCYTES NFR BLD AUTO: 40 % (ref 14–44)
MCH RBC QN AUTO: 33.1 PG (ref 26.8–34.3)
MCHC RBC AUTO-ENTMCNC: 32.6 G/DL (ref 31.4–37.4)
MCV RBC AUTO: 102 FL (ref 82–98)
MONOCYTES # BLD AUTO: 0.8 THOUSAND/ÂΜL (ref 0.17–1.22)
MONOCYTES NFR BLD AUTO: 13 % (ref 4–12)
NEUTROPHILS # BLD AUTO: 2.49 THOUSANDS/ÂΜL (ref 1.85–7.62)
NEUTS SEG NFR BLD AUTO: 40 % (ref 43–75)
NRBC BLD AUTO-RTO: 0 /100 WBCS
PLATELET # BLD AUTO: 157 THOUSANDS/UL (ref 149–390)
PMV BLD AUTO: 8.8 FL (ref 8.9–12.7)
POTASSIUM SERPL-SCNC: 3.2 MMOL/L (ref 3.5–5.3)
RBC # BLD AUTO: 3.17 MILLION/UL (ref 3.81–5.12)
SODIUM SERPL-SCNC: 138 MMOL/L (ref 135–147)
WBC # BLD AUTO: 6.24 THOUSAND/UL (ref 4.31–10.16)

## 2024-06-10 PROCEDURE — 99232 SBSQ HOSP IP/OBS MODERATE 35: CPT | Performed by: INTERNAL MEDICINE

## 2024-06-10 PROCEDURE — 80048 BASIC METABOLIC PNL TOTAL CA: CPT | Performed by: HOSPITALIST

## 2024-06-10 PROCEDURE — 85025 COMPLETE CBC W/AUTO DIFF WBC: CPT | Performed by: HOSPITALIST

## 2024-06-10 PROCEDURE — 99222 1ST HOSP IP/OBS MODERATE 55: CPT | Performed by: INTERNAL MEDICINE

## 2024-06-10 RX ORDER — POTASSIUM CHLORIDE 20 MEQ/1
40 TABLET, EXTENDED RELEASE ORAL
Status: COMPLETED | OUTPATIENT
Start: 2024-06-10 | End: 2024-06-10

## 2024-06-10 RX ORDER — LORAZEPAM 0.5 MG/1
0.5 TABLET ORAL
Status: DISCONTINUED | OUTPATIENT
Start: 2024-06-10 | End: 2024-06-13 | Stop reason: HOSPADM

## 2024-06-10 RX ADMIN — LORAZEPAM 0.5 MG: 0.5 TABLET ORAL at 23:04

## 2024-06-10 RX ADMIN — ENOXAPARIN SODIUM 40 MG: 40 INJECTION SUBCUTANEOUS at 08:29

## 2024-06-10 RX ADMIN — LEVOTHYROXINE SODIUM 75 MCG: 75 TABLET ORAL at 04:57

## 2024-06-10 RX ADMIN — POTASSIUM CHLORIDE 40 MEQ: 1500 TABLET, EXTENDED RELEASE ORAL at 08:29

## 2024-06-10 RX ADMIN — DOCUSATE SODIUM 100 MG: 100 CAPSULE, LIQUID FILLED ORAL at 08:29

## 2024-06-10 RX ADMIN — QUETIAPINE FUMARATE 25 MG: 25 TABLET ORAL at 21:11

## 2024-06-10 RX ADMIN — DOCUSATE SODIUM 100 MG: 100 CAPSULE, LIQUID FILLED ORAL at 17:16

## 2024-06-10 RX ADMIN — DULOXETINE HYDROCHLORIDE 60 MG: 60 CAPSULE, DELAYED RELEASE ORAL at 08:29

## 2024-06-10 RX ADMIN — POTASSIUM CHLORIDE 40 MEQ: 1500 TABLET, EXTENDED RELEASE ORAL at 10:15

## 2024-06-10 RX ADMIN — ATORVASTATIN CALCIUM 40 MG: 40 TABLET, FILM COATED ORAL at 08:29

## 2024-06-10 NOTE — ASSESSMENT & PLAN NOTE
Patient with an initial toxic metabolic encephalopathy   Back to this is likely due to polypharmacy rather than infectious related  Patient is alert and oriented this morning and actually recalls speaking to me 2 weeks prior regarding her   CT brain (6/8): No intracranial hemorrhage or calvarial fracture.   Will change Ativan to at bedtime as needed only at this time  Seroquel added in the evening for some agitation

## 2024-06-10 NOTE — ARC ADMISSION
Referral received for consideration of patient for inpatient acute rehab. Will review patient's case and update CM as able.    As discussed with CM, currently awaiting for PT/OT evaluations to be completed (first need MD orders). However, patient likely is more appropriate for SNF/subacute rehab at this time, as patient has no significant acute medical necessity requiring frequent physician oversight. CM has been updated. Will continue to follow patient's case.

## 2024-06-10 NOTE — ASSESSMENT & PLAN NOTE
Hemoglobin drop noted from 12.7 on admission to-10.6  No evidence of bleeding  Hemoglobin remains stable at 10.5  Continue to monitor for active bleeding or further decrease

## 2024-06-10 NOTE — ASSESSMENT & PLAN NOTE
Patient was found down at home  PT/OT recommending postacute rehab placement  Patient's daughter would like her mother and father to be placed in the same facility

## 2024-06-10 NOTE — CASE MANAGEMENT
Case Management Assessment & Discharge Planning Note    Patient name Daily Schaeffer  Location /-01 MRN [id number]  : 1945 Date 6/10/2024       Current Admission Date: 2024  Current Admission Diagnosis:Acute cystitis without hematuria   Patient Active Problem List    Diagnosis Date Noted Date Diagnosed    Low hemoglobin 2024     Acute encephalopathy 2024     Debility 2024     Stage 3 chronic kidney disease, unspecified whether stage 3a or 3b CKD (HCC) 2024     Exertional dyspnea 2024     Hypomagnesemia 2023     Acute cystitis without hematuria 2023     Hypercalcemia 2023     Abnormal CT scan, pelvis 2023     Neuropathy 2023     Nonrheumatic aortic (valve) stenosis 2023     Hypothyroidism 2017    Dyslipidemia 2016    Crohn disease (HCC) 2016    Anxiety and depression 2016    Hyperglycemia 2016    Crohn's disease of jejunum (HCC) 2016     CKD (chronic kidney disease) stage 2, GFR 60-89 ml/min 2016       LOS (days): 1  Geometric Mean LOS (GMLOS) (days):   Days to GMLOS:     OBJECTIVE:    Risk of Unplanned Readmission Score: 17.03         Current admission status: Inpatient  Referral Reason: Other (D/C planning)    Preferred Pharmacy:   [facility] [place] [facility] [address]  [place] [address]  Phone: [phone] Fax: [phone]    Primary Care Provider: Alanna Moreau DO    Primary Insurance: MEDICARE  Secondary Insurance: CIG[employer]    ASSESSMENT:  Active Health Care Proxies       Gilmar Schaeffer [facility] Care Representative - Spouse   Primary Phone: [phone] (Home)                 Advance Directives  Does patient have a Health Care POA?: Yes  Does patient have Advance Directives?: Yes  Primary Contact: Kaila Marte         Readmission Root Cause  30 Day Readmission:  No    Patient Information  Admitted from:: Home  Mental Status: Alert  During Assessment patient was accompanied by: Daughter (CM contacted pt's daughter to conduct assessment)  Assessment information provided by:: Patient, Daughter  Primary Caregiver: Self  Support Systems: Spouse/significant other, Children, Family members  County of Residence: [place]  What city do you live in?: [place]  Home entry access options. Select all that apply.: Stairs  Number of steps to enter home.: 2  Do the steps have railings?: Yes  Type of Current Residence: 2 [address] home  Upon entering residence, is there a bedroom on the main floor (no further steps)?: Yes  Upon entering residence, is there a bathroom on the main floor (no further steps)?: Yes  Living Arrangements: Lives w/ Spouse/significant other  Is patient a ?: No    Activities of Daily Living Prior to Admission  Functional Status: Assistance  Completes ADLs independently?: No  Level of ADL dependence: Assistance  Ambulates independently?: No  Level of ambulatory dependence: Assistance  Does patient use assisted devices?: Yes  Assisted Devices (DME) used: Walker  Does patient currently own DME?: Yes  What DME does the patient currently own?: Walker  Does patient have a history of Outpatient Therapy (PT/OT)?: No  Does the patient have a history of Short-Term Rehab?: No  Does patient have a history of HHC?: Yes  Does patient currently have HHC?: No         Patient Information Continued  Income Source: Pension/FCI  Does patient have prescription coverage?: Yes  Does patient receive dialysis treatments?: No  Does patient have a history of substance abuse?: No  Does patient have a history of Mental Health Diagnosis?: No         Means of Transportation  Means of Transport to Appts:: Family transport      Social Determinants of Health (SDOH)      Flowsheet Row Most Recent Value   Housing Stability    In the last 12 months, was there a time when you were not able to  pay the mortgage or rent on time? N   In the past 12 months, how many times have you moved where you were living? 1   At any time in the past 12 months, were you homeless or living in a shelter (including now)? N   Transportation Needs    In the past 12 months, has lack of transportation kept you from medical appointments or from getting medications? no   In the past 12 months, has lack of transportation kept you from meetings, work, or from getting things needed for daily living? No   Food Insecurity    Within the past 12 months, you worried that your food would run out before you got the money to buy more. Never true   Within the past 12 months, the food you bought just didn't last and you didn't have money to get more. Never true   [facility] ([facility])    In the past 12 months has the electric, gas, oil, or water company threatened to shut off services in your home? No            DISCHARGE DETAILS:    Discharge planning discussed with:: Pt, daughter  Freedom of Choice: Yes     CM contacted family/caregiver?: Yes  Were Treatment Team discharge recommendations reviewed with patient/caregiver?: Yes  Did patient/caregiver verbalize understanding of patient care needs?: Yes  Were patient/caregiver advised of the risks associated with not following Treatment Team discharge recommendations?: Yes    Contacts  Patient Contacts: Kaila Marte  Relationship to Patient:: Family  Contact Method: Phone  Phone Number: [phone]  Reason/Outcome: Continuity of Care, Discharge Planning    Requested Home Health Care         Is the patient interested in HHC at discharge?: No    DME Referral Provided  Referral made for DME?: No    Other Referral/Resources/Interventions Provided:  Interventions: Short Term Rehab  Referral Comments: CM met with pt at bedside to conduct assessment. Pt requested to be with her . CM conducted pt's daughter to complete assessment. Daughter reported that pt and pt's  have been falling often at home,  and requested that both go to a STR. Pt and daughter agreeable to referrals being submitted for STR. CM submitted ref for ARU & SNF via [facility]. CM will email pt's mariah choice list and continue to follow. Daughter's email: [email or web address].    Would you like to participate in our \[facility] [place] [facility]\"" Pharmacy service program?  : No - Declined    Treatment Team Recommendation: Short Term Rehab  Discharge Destination Plan:: Short Term Rehab  Transport at Discharge : [place] sanchez

## 2024-06-10 NOTE — TELEPHONE ENCOUNTER
06/10/24 8:50 AM    Patient contacted post ED visit, VBI phone outreaches documented. Patient called practice and scheduled a follow-up ED visit.     Pt admitted 6-8-24     Thank you.  Rose Escamilla  [facility]

## 2024-06-10 NOTE — PLAN OF CARE
Problem: Potential for Falls  Goal: Patient will remain free of falls  Description: INTERVENTIONS:  - Educate patient/family on patient safety including physical limitations  - Instruct patient to call for assistance with activity   - Consult OT/PT to assist with strengthening/mobility   - Keep Call bell within reach  - Keep bed low and locked with side rails adjusted as appropriate  - Keep care items and personal belongings within reach  - Initiate and maintain comfort rounds  - Make Fall Risk Sign visible to staff  - Offer Toileting every 2 Hours, in advance of need  - Initiate/Maintain bed alarm  - Obtain necessary fall risk management equipment: non skid socks  - Apply yellow socks and bracelet for high fall risk patients  - Consider moving patient to room near nurses station  Outcome: Progressing     Problem: PAIN - ADULT  Goal: Verbalizes/displays adequate comfort level or baseline comfort level  Description: Interventions:  - Encourage patient to monitor pain and request assistance  - Assess pain using appropriate pain scale  - Administer analgesics based on type and severity of pain and evaluate response  - Implement non-pharmacological measures as appropriate and evaluate response  - Consider cultural and social influences on pain and pain management  - Notify physician/advanced practitioner if interventions unsuccessful or patient reports new pain  Outcome: Progressing     Problem: INFECTION - ADULT  Goal: Absence or prevention of progression during hospitalization  Description: INTERVENTIONS:  - Assess and monitor for signs and symptoms of infection  - Monitor lab/diagnostic results  - Monitor all insertion sites, i.e. indwelling lines, tubes, and drains  - Monitor endotracheal if appropriate and nasal secretions for changes in amount and color  - Mayer appropriate cooling/warming therapies per order  - Administer medications as ordered  - Instruct and encourage patient and family to use good hand  hygiene technique  - Identify and instruct in appropriate isolation precautions for identified infection/condition  Outcome: Progressing  Goal: Absence of fever/infection during neutropenic period  Description: INTERVENTIONS:  - Monitor WBC    Outcome: Progressing     Problem: SAFETY ADULT  Goal: Patient will remain free of falls  Description: INTERVENTIONS:  - Educate patient/family on patient safety including physical limitations  - Instruct patient to call for assistance with activity   - Consult OT/PT to assist with strengthening/mobility   - Keep Call bell within reach  - Keep bed low and locked with side rails adjusted as appropriate  - Keep care items and personal belongings within reach  - Initiate and maintain comfort rounds  - Make Fall Risk Sign visible to staff  - Offer Toileting every 2 Hours, in advance of need  - Initiate/Maintain bed alarm  - Obtain necessary fall risk management equipment: non skid socks  - Apply yellow socks and bracelet for high fall risk patients  - Consider moving patient to room near nurses station  Outcome: Progressing  Goal: Maintain or return to baseline ADL function  Description: INTERVENTIONS:  -  Assess patient's ability to carry out ADLs; assess patient's baseline for ADL function and identify physical deficits which impact ability to perform ADLs (bathing, care of mouth/teeth, toileting, grooming, dressing, etc.)  - Assess/evaluate cause of self-care deficits   - Assess range of motion  - Assess patient's mobility; develop plan if impaired  - Assess patient's need for assistive devices and provide as appropriate  - Encourage maximum independence but intervene and supervise when necessary  - Involve family in performance of ADLs  - Assess for home care needs following discharge   - Consider OT consult to assist with ADL evaluation and planning for discharge  - Provide patient education as appropriate  Outcome: Progressing  Goal: Maintains/Returns to pre admission  functional level  Description: INTERVENTIONS:  - Perform AM-PAC 6 Click Basic Mobility/ Daily Activity assessment daily.  - Set and communicate daily mobility goal to care team and patient/family/caregiver.   - Collaborate with rehabilitation services on mobility goals if consulted  - Perform Range of Motion 2 times a day.  - Reposition patient every 2 hours.  - Dangle patient 2 times a day  - Stand patient 2 times a day  - Ambulate patient 2 times a day  - Out of bed to chair 3 times a day   - Out of bed for meals 3 times a day  - Out of bed for toileting  - Record patient progress and toleration of activity level   Outcome: Progressing     Problem: DISCHARGE PLANNING  Goal: Discharge to home or other facility with appropriate resources  Description: INTERVENTIONS:  - Identify barriers to discharge w/patient and caregiver  - Arrange for needed discharge resources and transportation as appropriate  - Identify discharge learning needs (meds, wound care, etc.)  - Refer to Case Management Department for coordinating discharge planning if the patient needs post-hospital services based on physician/advanced practitioner order or complex needs related to functional status, cognitive ability, or social support system  Outcome: Progressing     Problem: MUSCULOSKELETAL - ADULT  Goal: Maintain or return mobility to safest level of function  Description: INTERVENTIONS:  - Assess patient's ability to carry out ADLs; assess patient's baseline for ADL function and identify physical deficits which impact ability to perform ADLs (bathing, care of mouth/teeth, toileting, grooming, dressing, etc.)  - Assess/evaluate cause of self-care deficits   - Assess range of motion  - Assess patient's mobility  - Assess patient's need for assistive devices and provide as appropriate  - Encourage maximum independence but intervene and supervise when necessary  - Involve family in performance of ADLs  - Assess for home care needs following  discharge   - Consider OT consult to assist with ADL evaluation and planning for discharge  - Provide patient education as appropriate  Outcome: Progressing     Problem: NEUROSENSORY - ADULT  Goal: Achieves stable or improved neurological status  Description: INTERVENTIONS  - Monitor and report changes in neurological status  - Monitor vital signs such as temperature, blood pressure, glucose, and any other labs ordered   - Initiate measures to prevent increased intracranial pressure  - Monitor for seizure activity and implement precautions if appropriate      Outcome: Progressing  Goal: Achieves maximal functionality and self care  Description: INTERVENTIONS  - Monitor swallowing and airway patency with patient fatigue and changes in neurological status  - Encourage and assist patient to increase activity and self care.   - Encourage visually impaired, hearing impaired and aphasic patients to use assistive/communication devices  Outcome: Progressing

## 2024-06-10 NOTE — ASSESSMENT & PLAN NOTE
Suspect asymptomatic bacteriuria as the patient had no symptoms of cystitis  Rocephin discontinued  Continue to observe clinically  ID evaluation appreciated

## 2024-06-10 NOTE — PLAN OF CARE
Problem: Potential for Falls  Goal: Patient will remain free of falls  Description: INTERVENTIONS:  - Educate patient/family on patient safety including physical limitations  - Instruct patient to call for assistance with activity   - Consult OT/PT to assist with strengthening/mobility   - Keep Call bell within reach  - Keep bed low and locked with side rails adjusted as appropriate  - Keep care items and personal belongings within reach  - Initiate and maintain comfort rounds  - Make Fall Risk Sign visible to staff  - Offer Toileting every 2 Hours, in advance of need  - Initiate/Maintain bed alarm  - Obtain necessary fall risk management equipment: non skid socks  - Apply yellow socks and bracelet for high fall risk patients  - Consider moving patient to room near nurses station  Outcome: Progressing     Problem: PAIN - ADULT  Goal: Verbalizes/displays adequate comfort level or baseline comfort level  Description: Interventions:  - Encourage patient to monitor pain and request assistance  - Assess pain using appropriate pain scale  - Administer analgesics based on type and severity of pain and evaluate response  - Implement non-pharmacological measures as appropriate and evaluate response  - Consider cultural and social influences on pain and pain management  - Notify physician/advanced practitioner if interventions unsuccessful or patient reports new pain  Outcome: Progressing     Problem: INFECTION - ADULT  Goal: Absence or prevention of progression during hospitalization  Description: INTERVENTIONS:  - Assess and monitor for signs and symptoms of infection  - Monitor lab/diagnostic results  - Monitor all insertion sites, i.e. indwelling lines, tubes, and drains  - Monitor endotracheal if appropriate and nasal secretions for changes in amount and color  - [place] appropriate cooling/warming therapies per order  - Administer medications as ordered  - Instruct and encourage patient and family to use good hand  hygiene technique  - Identify and instruct in appropriate isolation precautions for identified infection/condition  Outcome: Progressing  Goal: Absence of fever/infection during neutropenic period  Description: INTERVENTIONS:  - Monitor WBC    Outcome: Progressing     Problem: SAFETY ADULT  Goal: Patient will remain free of falls  Description: INTERVENTIONS:  - Educate patient/family on patient safety including physical limitations  - Instruct patient to call for assistance with activity   - Consult OT/PT to assist with strengthening/mobility   - Keep Call bell within reach  - Keep bed low and locked with side rails adjusted as appropriate  - Keep care items and personal belongings within reach  - Initiate and maintain comfort rounds  - Make Fall Risk Sign visible to staff  - Offer Toileting every 2 Hours, in advance of need  - Initiate/Maintain bed alarm  - Obtain necessary fall risk management equipment: non skid socks  - Apply yellow socks and bracelet for high fall risk patients  - Consider moving patient to room near nurses station  Outcome: Progressing  Goal: Maintain or return to baseline ADL function  Description: INTERVENTIONS:  -  Assess patient's ability to carry out ADLs; assess patient's baseline for ADL function and identify physical deficits which impact ability to perform ADLs (bathing, care of mouth/teeth, toileting, grooming, dressing, etc.)  - Assess/evaluate cause of self-care deficits   - Assess range of motion  - Assess patient's mobility; develop plan if impaired  - Assess patient's need for assistive devices and provide as appropriate  - Encourage maximum independence but intervene and supervise when necessary  - Involve family in performance of ADLs  - Assess for home care needs following discharge   - Consider OT consult to assist with ADL evaluation and planning for discharge  - Provide patient education as appropriate  Outcome: Progressing  Goal: Maintains/Returns to pre admission  functional level  Description: INTERVENTIONS:  - Perform AM-PAC 6 Click Basic Mobility/ Daily Activity assessment daily.  - Set and communicate daily mobility goal to care team and patient/family/caregiver.   - Collaborate with rehabilitation services on mobility goals if consulted  - Perform Range of Motion 2 times a day.  - Reposition patient every 2 hours.  - Dangle patient 2 times a day  - Stand patient 2 times a day  - Ambulate patient 2 times a day  - Out of bed to chair 3 times a day   - Out of bed for meals 3 times a day  - Out of bed for toileting  - Record patient progress and toleration of activity level   Outcome: Progressing

## 2024-06-10 NOTE — CONSULTS
Consultation - [facility] Infectious Disease   Daily Schaeffer 78 y.o. female MRN: [id number]  Unit/Bed#: -01 Encounter: [id number]      IMPRESSION & RECOMMENDATIONS:   1.  Asymptomatic bacteriuria and pyuria.  UA on admission with innumerable bacteria and 30-50 WBCs.  Patient without any urinary symptomatology.  No intra-abdominal imaging.  Urine culture is pending.  -Discontinue IV ceftriaxone  -Can follow-up urine culture     2. Acute metabolic toxic encephalopathy.  Patient presented with fall and altered mental status.  This is most likely due to polypharmacy and benzodiazepine use.  Low suspicion for infectious etiology as acute cystitis does not cause change in mentation.  CT head negative.  -Workup per primary team     3.  Pharyngitis.  Patient reports sore throat, no findings on exam to suggest bacterial etiology.  Possibly viral or allergy related.   -Consider antihistamines     4.  Anxiety/depression.  On Cymbalta at night and recently started on Ativan.    Antibiotics:  Status post 2 days of IV ceftriaxone    I have discussed the above management plan in detail with patient.     Above plan discussed in detail with Dr. Zambrano who is in agreement with plan to observe off abx.     ID consult service will continue to follow.    HISTORY OF PRESENT ILLNESS:  Reason for Consult: hypomagnesemia, strep pharyngitis     HPI: Daily Schaeffer is a 78 y.o. year old female  who presented on 6/8 with AMS. Patient was apparently found on floor by police during welfare check. Unknown how long patient was on floor however she was soiled in urine. Patient was confused and hallucinating while in ED. Per chart review, patient's  is currently admitted and she was having difficulty caring for herself so dtr came up to help. Apparently she was having panic attacks and seen in ED 6/6 and given Rx for ativan. CT head was negative. XR left shoulder was negative. CXR was also negative. She was found to have normal WBC  count with left shift, 30-50 WBC on UA with innumerable WBC, and normal Cr. Vitals on admission have been normal.  Today she reports sore throat and recent exacerbation of seasonal allergies.     REVIEW OF SYSTEMS:  A complete review of systems is negative other than that noted in the HPI.    PAST MEDICAL HISTORY:  Past Medical History:   Diagnosis Date    Aortic stenosis     Depression     Disease of thyroid gland     Diverticular disease 2017    Essential hypertension 2016    Hyperlipidemia     Major depression 2016    Peripheral neuropathy     Type 2 diabetes mellitus (HCC) 2016    Visual impairment      Past Surgical History:   Procedure Laterality Date    BOWEL RESECTION      COLON SURGERY      EYE SURGERY      SMALL INTESTINE SURGERY      TUBAL LIGATION         FAMILY HISTORY:  Non-contributory    SOCIAL HISTORY:  Social History   Social History     Substance and Sexual Activity   Alcohol Use Yes    Comment: occasional     Social History     Substance and Sexual Activity   Drug Use Never     Social History     Tobacco Use   Smoking Status Never    Passive exposure: Never   Smokeless Tobacco Never       ALLERGIES:  Allergies   Allergen Reactions    Wound Dressing Adhesive Rash       MEDICATIONS:  All current active medications have been reviewed.    PHYSICAL EXAM:  Temp:  [97.7 °F (36.5 °C)-98.4 °F (36.9 °C)] 97.7 °F (36.5 °C)  HR:  [72-83] 72  Resp:  [18] 18  BP: (108-122)/(54-58) 108/54  SpO2:  [97 %-99 %] 99 %  Temp (24hrs), Av.9 °F (36.6 °C), Min:97.7 °F (36.5 °C), Max:98.4 °F (36.9 °C)  Current: Temperature: 97.7 °F (36.5 °C)    Intake/Output Summary (Last 24 hours) at 6/10/2024 0951  Last data filed at 2024 1700  Gross per 24 hour   Intake 480 ml   Output --   Net 480 ml       General Appearance:  Appearing elderly, nontoxic, and in no distress   Head:  Normocephalic, without obvious abnormality, atraumatic   Eyes:  Conjunctiva pink and sclera anicteric, both eyes   Nose:  Nares normal, mucosa normal, no drainage   Throat: Oropharynx moist without lesions, no posterior pharyngeal exudates appreciated    Neck: Supple, symmetrical, no adenopathy, no tenderness/mass/nodules   Back:   Symmetric, no curvature, ROM normal.   Lungs:   Clear to auscultation bilaterally, respirations unlabored   Heart:  RRR; no murmur, rub or gallop   Abdomen:   Soft, non-tender, non-distended, positive bowel sounds    Extremities: No cyanosis, clubbing or edema   Skin: No rashes or lesions. No draining wounds noted.   Lymph nodes: No palpable cervical or submandibular LN    : No CVA or suprapubic tenderness.   Neurologic: Alert and oriented times 3, though forgetful and not oriented to situation       LABS, IMAGING, & OTHER STUDIES:  Extensive review of the medical records in epic including review of the notes, radiographs, and laboratory results were reviewed personally as below.     Lab Results:  I have personally reviewed pertinent labs.  Results from last 7 days   Lab Units 06/10/24  0456 06/09/24  0540 06/08/24  1227   WBC Thousand/uL 6.24 7.40 9.94   HEMOGLOBIN g/dL 10.5* 10.6* 12.7   PLATELETS Thousands/uL 157 150 193     Results from last 7 days   Lab Units 06/10/24  0456 06/09/24  0540 06/08/24  1227   SODIUM mmol/L 138 137 137   POTASSIUM mmol/L 3.2* 3.4* 3.7   CHLORIDE mmol/L 106 104 101   CO2 mmol/L 25 26 25   BUN mg/dL 12 12 13   CREATININE mg/dL 0.78 0.81 0.97   EGFR ml/min/1.73sq m 72 68 55   CALCIUM mg/dL 8.6 8.6 9.7                           Lab interpretation/comments: Normal WBC    Culture data: Urine culture pending    Imaging Studies:   Imaging interpretation/comments: CT head negative

## 2024-06-10 NOTE — PROGRESS NOTES
Critical access hospital  Progress Note  Name: Daily Schaeffer I  MRN: [id number]  Unit/Bed#: -01 I Date of Admission: 6/8/2024   Date of Service: 6/10/2024 I Hospital Day: 1    Assessment & Plan   * Acute cystitis without hematuria  Assessment & Plan  Suspect asymptomatic bacteriuria as the patient had no symptoms of cystitis  Rocephin discontinued  Continue to observe clinically  ID evaluation appreciated    Acute encephalopathy  Assessment & Plan  Patient with an initial toxic metabolic encephalopathy   Back to this is likely due to polypharmacy rather than infectious related  Patient is alert and oriented this morning and actually recalls speaking to me 2 weeks prior regarding her   CT brain (6/8): No intracranial hemorrhage or calvarial fracture.   Will change Ativan to at bedtime as needed only at this time  Seroquel added in the evening for some agitation    Low hemoglobin  Assessment & Plan  Hemoglobin drop noted from 12.7 on admission to-10.6  No evidence of bleeding  Hemoglobin remains stable at 10.5  Continue to monitor for active bleeding or further decrease    Debility  Assessment & Plan  Patient was found down at home  PT/OT recommending postacute rehab placement  Patient's daughter would like her mother and father to be placed in the same facility    Anxiety and depression  Assessment & Plan  Continue home dosing of Cymbalta  Continue Ativan as needed only at this time             VTE Pharmacologic Prophylaxis: VTE Score: 5 High Risk (Score >/= 5) - Pharmacological DVT Prophylaxis Ordered: enoxaparin (Lovenox). Sequential Compression Devices Ordered.    Mobility:   Basic Mobility Inpatient Raw Score: 17  JH-HLM Goal: 5: Stand one or more mins  JH-HLM Achieved: 7: Walk 25 feet or more  JH-HLM Goal achieved. Continue to encourage appropriate mobility.    Patient Centered Rounds: I performed bedside rounds with nursing staff today.   Discussions with Specialists or Other Care Team  Provider: Nursing and CM    Education and Discussions with Family / Patient: Updated  (daughter) via phone.      Current Length of Stay: 1 day(s)  Current Patient Status: Inpatient   Certification Statement: The patient will continue to require additional inpatient hospital stay due to acute encephalopathy  Discharge Plan: Anticipate discharge in 24-48 hrs to rehab facility.    Code Status: Level 1 - Full Code    Subjective:   Patient is resting comfortably in bed without any acute complaints.  She is currently alert and oriented and actually remembers speaking with me 2 weeks prior regarding her 's hospitalization.  No significant overnight events reported by nursing.    Objective:     Vitals:   Temp (24hrs), Av.9 °F (36.6 °C), Min:97.7 °F (36.5 °C), Max:98.4 °F (36.9 °C)    Temp:  [97.7 °F (36.5 °C)-98.4 °F (36.9 °C)] 97.7 °F (36.5 °C)  HR:  [72-83] 72  Resp:  [18] 18  BP: (108-122)/(54-58) 108/54  SpO2:  [97 %-99 %] 99 %  Body mass index is 20.85 kg/m².     Input and Output Summary (last 24 hours):     Intake/Output Summary (Last 24 hours) at 6/10/2024 1303  Last data filed at 2024 1700  Gross per 24 hour   Intake 360 ml   Output --   Net 360 ml       Physical Exam:   Physical Exam  Vitals and nursing note reviewed.   Constitutional:       General: She is not in acute distress.     Appearance: She is well-developed.   HENT:      Head: Normocephalic and atraumatic.   Eyes:      Conjunctiva/sclera: Conjunctivae normal.   Cardiovascular:      Rate and Rhythm: Normal rate and regular rhythm.      Pulses: Normal pulses.      Heart sounds: Normal heart sounds. No murmur heard.  Pulmonary:      Effort: Pulmonary effort is normal. No respiratory distress.      Breath sounds: Normal breath sounds.   Abdominal:      Palpations: Abdomen is soft.      Tenderness: There is no abdominal tenderness.   Musculoskeletal:         General: No swelling.      Cervical back: Neck supple.   Skin:      General: Skin is warm and dry.      Capillary Refill: Capillary refill takes less than 2 seconds.   Neurological:      General: No focal deficit present.      Mental Status: She is alert and oriented to person, place, and time. Mental status is at baseline.   Psychiatric:         Mood and Affect: Mood normal.         Behavior: Behavior normal.          Labs:  Results from last 7 days   Lab Units 06/10/24  0456   WBC Thousand/uL 6.24   HEMOGLOBIN g/dL 10.5*   HEMATOCRIT % 32.2*   PLATELETS Thousands/uL 157   SEGS PCT % 40*   LYMPHO PCT % 40   MONO PCT % 13*   EOS PCT % 6     Results from last 7 days   Lab Units 06/10/24  0456   SODIUM mmol/L 138   POTASSIUM mmol/L 3.2*   CHLORIDE mmol/L 106   CO2 mmol/L 25   BUN mg/dL 12   CREATININE mg/dL 0.78   ANION GAP mmol/L 7   CALCIUM mg/dL 8.6   GLUCOSE RANDOM mg/dL 99     Results from last 7 days   Lab Units 06/08/24  1227   INR  1.04                   Lines/Drains:  Invasive Devices       Peripheral Intravenous Line  Duration             Peripheral IV 06/08/24 Right Antecubital 2 days                          Imaging: No new imaging    Recent Cultures (last 7 days):         Last 24 Hours Medication List:   Current Facility-Administered Medications   Medication Dose Route Frequency Provider Last Rate    acetaminophen  650 mg Oral Q6H PRN Delmis Chan MD      atorvastatin  40 mg Oral Daily Delmis Chan MD      docusate sodium  100 mg Oral BID Delmis Chan MD      DULoxetine  60 mg Oral Daily Delmis Chan MD      enoxaparin  40 mg Subcutaneous Daily Delmis Chan MD      levothyroxine  75 mcg Oral Early Morning Delmis Chan MD      LORazepam  0.5 mg Oral HS PRN Lorrie Zambrano DO      QUEtiapine  25 mg Oral HS Delmis Chan MD          Today, Patient Was Seen By: Lorrie Zambrano DO    **Please Note: This note may have been constructed using a voice recognition system.**

## 2024-06-11 LAB
ANION GAP SERPL CALCULATED.3IONS-SCNC: 6 MMOL/L (ref 4–13)
BACTERIA UR CULT: ABNORMAL
BUN SERPL-MCNC: 11 MG/DL (ref 5–25)
CALCIUM SERPL-MCNC: 8.8 MG/DL (ref 8.4–10.2)
CHLORIDE SERPL-SCNC: 104 MMOL/L (ref 96–108)
CO2 SERPL-SCNC: 25 MMOL/L (ref 21–32)
CREAT SERPL-MCNC: 0.71 MG/DL (ref 0.6–1.3)
ERYTHROCYTE [DISTWIDTH] IN BLOOD BY AUTOMATED COUNT: 13.6 % (ref 11.6–15.1)
GFR SERPL CREATININE-BSD FRML MDRD: 80 ML/MIN/1.73SQ M
GLUCOSE SERPL-MCNC: 103 MG/DL (ref 65–140)
HCT VFR BLD AUTO: 32.5 % (ref 34.8–46.1)
HGB BLD-MCNC: 10.5 G/DL (ref 11.5–15.4)
MAGNESIUM SERPL-MCNC: 1.6 MG/DL (ref 1.9–2.7)
MCH RBC QN AUTO: 32.8 PG (ref 26.8–34.3)
MCHC RBC AUTO-ENTMCNC: 32.3 G/DL (ref 31.4–37.4)
MCV RBC AUTO: 102 FL (ref 82–98)
PLATELET # BLD AUTO: 168 THOUSANDS/UL (ref 149–390)
PMV BLD AUTO: 9 FL (ref 8.9–12.7)
POTASSIUM SERPL-SCNC: 4 MMOL/L (ref 3.5–5.3)
RBC # BLD AUTO: 3.2 MILLION/UL (ref 3.81–5.12)
SODIUM SERPL-SCNC: 135 MMOL/L (ref 135–147)
WBC # BLD AUTO: 6.85 THOUSAND/UL (ref 4.31–10.16)

## 2024-06-11 PROCEDURE — 85027 COMPLETE CBC AUTOMATED: CPT | Performed by: INTERNAL MEDICINE

## 2024-06-11 PROCEDURE — 83735 ASSAY OF MAGNESIUM: CPT | Performed by: INTERNAL MEDICINE

## 2024-06-11 PROCEDURE — 99232 SBSQ HOSP IP/OBS MODERATE 35: CPT | Performed by: INTERNAL MEDICINE

## 2024-06-11 PROCEDURE — 80048 BASIC METABOLIC PNL TOTAL CA: CPT | Performed by: INTERNAL MEDICINE

## 2024-06-11 RX ORDER — MAGNESIUM SULFATE HEPTAHYDRATE 40 MG/ML
2 INJECTION, SOLUTION INTRAVENOUS ONCE
Status: COMPLETED | OUTPATIENT
Start: 2024-06-11 | End: 2024-06-11

## 2024-06-11 RX ADMIN — ATORVASTATIN CALCIUM 40 MG: 40 TABLET, FILM COATED ORAL at 08:00

## 2024-06-11 RX ADMIN — QUETIAPINE FUMARATE 25 MG: 25 TABLET ORAL at 22:12

## 2024-06-11 RX ADMIN — DULOXETINE HYDROCHLORIDE 60 MG: 60 CAPSULE, DELAYED RELEASE ORAL at 08:00

## 2024-06-11 RX ADMIN — LORAZEPAM 0.5 MG: 0.5 TABLET ORAL at 22:12

## 2024-06-11 RX ADMIN — DOCUSATE SODIUM 100 MG: 100 CAPSULE, LIQUID FILLED ORAL at 08:00

## 2024-06-11 RX ADMIN — ENOXAPARIN SODIUM 40 MG: 40 INJECTION SUBCUTANEOUS at 08:00

## 2024-06-11 RX ADMIN — LEVOTHYROXINE SODIUM 75 MCG: 75 TABLET ORAL at 06:20

## 2024-06-11 RX ADMIN — MAGNESIUM SULFATE HEPTAHYDRATE 2 G: 40 INJECTION, SOLUTION INTRAVENOUS at 07:57

## 2024-06-11 NOTE — PLAN OF CARE
Problem: Potential for Falls  Goal: Patient will remain free of falls  Description: INTERVENTIONS:  - Educate patient/family on patient safety including physical limitations  - Instruct patient to call for assistance with activity   - Consult OT/PT to assist with strengthening/mobility   - Keep Call bell within reach  - Keep bed low and locked with side rails adjusted as appropriate  - Keep care items and personal belongings within reach  - Initiate and maintain comfort rounds  - Make Fall Risk Sign visible to staff  - Offer Toileting every 2 Hours, in advance of need  - Initiate/Maintain bed alarm  - Obtain necessary fall risk management equipment: non skid socks  - Apply yellow socks and bracelet for high fall risk patients  - Consider moving patient to room near nurses station  Outcome: Progressing     Problem: PAIN - ADULT  Goal: Verbalizes/displays adequate comfort level or baseline comfort level  Description: Interventions:  - Encourage patient to monitor pain and request assistance  - Assess pain using appropriate pain scale  - Administer analgesics based on type and severity of pain and evaluate response  - Implement non-pharmacological measures as appropriate and evaluate response  - Consider cultural and social influences on pain and pain management  - Notify physician/advanced practitioner if interventions unsuccessful or patient reports new pain  Outcome: Progressing     Problem: INFECTION - ADULT  Goal: Absence or prevention of progression during hospitalization  Description: INTERVENTIONS:  - Assess and monitor for signs and symptoms of infection  - Monitor lab/diagnostic results  - Monitor all insertion sites, i.e. indwelling lines, tubes, and drains  - Monitor endotracheal if appropriate and nasal secretions for changes in amount and color  - [place] appropriate cooling/warming therapies per order  - Administer medications as ordered  - Instruct and encourage patient and family to use good hand  hygiene technique  - Identify and instruct in appropriate isolation precautions for identified infection/condition  Outcome: Progressing  Goal: Absence of fever/infection during neutropenic period  Description: INTERVENTIONS:  - Monitor WBC    Outcome: Progressing     Problem: SAFETY ADULT  Goal: Patient will remain free of falls  Description: INTERVENTIONS:  - Educate patient/family on patient safety including physical limitations  - Instruct patient to call for assistance with activity   - Consult OT/PT to assist with strengthening/mobility   - Keep Call bell within reach  - Keep bed low and locked with side rails adjusted as appropriate  - Keep care items and personal belongings within reach  - Initiate and maintain comfort rounds  - Make Fall Risk Sign visible to staff  - Offer Toileting every 2 Hours, in advance of need  - Initiate/Maintain bed alarm  - Obtain necessary fall risk management equipment: non skid socks  - Apply yellow socks and bracelet for high fall risk patients  - Consider moving patient to room near nurses station  Outcome: Progressing  Goal: Maintain or return to baseline ADL function  Description: INTERVENTIONS:  -  Assess patient's ability to carry out ADLs; assess patient's baseline for ADL function and identify physical deficits which impact ability to perform ADLs (bathing, care of mouth/teeth, toileting, grooming, dressing, etc.)  - Assess/evaluate cause of self-care deficits   - Assess range of motion  - Assess patient's mobility; develop plan if impaired  - Assess patient's need for assistive devices and provide as appropriate  - Encourage maximum independence but intervene and supervise when necessary  - Involve family in performance of ADLs  - Assess for home care needs following discharge   - Consider OT consult to assist with ADL evaluation and planning for discharge  - Provide patient education as appropriate  Outcome: Progressing  Goal: Maintains/Returns to pre admission  functional level  Description: INTERVENTIONS:  - Perform AM-PAC 6 Click Basic Mobility/ Daily Activity assessment daily.  - Set and communicate daily mobility goal to care team and patient/family/caregiver.   - Collaborate with rehabilitation services on mobility goals if consulted  - Perform Range of Motion 2 times a day.  - Reposition patient every 2 hours.  - Dangle patient 2 times a day  - Stand patient 2 times a day  - Ambulate patient 2 times a day  - Out of bed to chair 3 times a day   - Out of bed for meals 3 times a day  - Out of bed for toileting  - Record patient progress and toleration of activity level   Outcome: Progressing     Problem: DISCHARGE PLANNING  Goal: Discharge to home or other facility with appropriate resources  Description: INTERVENTIONS:  - Identify barriers to discharge w/patient and caregiver  - Arrange for needed discharge resources and transportation as appropriate  - Identify discharge learning needs (meds, wound care, etc.)  - Refer to Case Management Department for coordinating discharge planning if the patient needs post-hospital services based on physician/advanced practitioner order or complex needs related to functional status, cognitive ability, or social support system  Outcome: Progressing     Problem: Knowledge Deficit  Goal: Patient/family/caregiver demonstrates understanding of disease process, treatment plan, medications, and discharge instructions  Description: Complete learning assessment and assess knowledge base.  Interventions:  - Provide teaching at level of understanding  - Provide teaching via preferred learning methods  Outcome: Progressing     Problem: MUSCULOSKELETAL - ADULT  Goal: Maintain or return mobility to safest level of function  Description: INTERVENTIONS:  - Assess patient's ability to carry out ADLs; assess patient's baseline for ADL function and identify physical deficits which impact ability to perform ADLs (bathing, care of mouth/teeth,  toileting, grooming, dressing, etc.)  - Assess/evaluate cause of self-care deficits   - Assess range of motion  - Assess patient's mobility  - Assess patient's need for assistive devices and provide as appropriate  - Encourage maximum independence but intervene and supervise when necessary  - Involve family in performance of ADLs  - Assess for home care needs following discharge   - Consider OT consult to assist with ADL evaluation and planning for discharge  - Provide patient education as appropriate  Outcome: Progressing     Problem: NEUROSENSORY - ADULT  Goal: Achieves stable or improved neurological status  Description: INTERVENTIONS  - Monitor and report changes in neurological status  - Monitor vital signs such as temperature, blood pressure, glucose, and any other labs ordered   - Initiate measures to prevent increased intracranial pressure  - Monitor for seizure activity and implement precautions if appropriate      Outcome: Progressing  Goal: Achieves maximal functionality and self care  Description: INTERVENTIONS  - Monitor swallowing and airway patency with patient fatigue and changes in neurological status  - Encourage and assist patient to increase activity and self care.   - Encourage visually impaired, hearing impaired and aphasic patients to use assistive/communication devices  Outcome: Progressing

## 2024-06-11 NOTE — CASE MANAGEMENT
Case Management Discharge Planning Note    Patient name Daily Schaeffer  Location /-01 MRN [id number]  : 1945 Date 2024       Current Admission Date: 2024  Current Admission Diagnosis:Acute cystitis without hematuria- Ruled Out   Patient Active Problem List    Diagnosis Date Noted Date Diagnosed    Low hemoglobin 2024     Acute encephalopathy 2024     Debility 2024     Stage 3 chronic kidney disease, unspecified whether stage 3a or 3b CKD (HCC) 2024     Exertional dyspnea 2024     Hypomagnesemia 2023     Acute cystitis without hematuria- Ruled Out 2023     Hypercalcemia 2023     Abnormal CT scan, pelvis 2023     Neuropathy 2023     Nonrheumatic aortic (valve) stenosis 2023     Hypothyroidism 2017    Dyslipidemia 2016    Crohn disease (HCC) 2016    Anxiety and depression 2016    Hyperglycemia 2016    Crohn's disease of jejunum (HCC) 2016     CKD (chronic kidney disease) stage 2, GFR 60-89 ml/min 2016       LOS (days): 2  Geometric Mean LOS (GMLOS) (days): 3.9  Days to GMLOS:1.5     OBJECTIVE:  Risk of Unplanned Readmission Score: 17.18         Current admission status: Inpatient   Preferred Pharmacy:   [facility] [place] [address]  [place] [address]  Phone: [phone] Fax: [phone]    Primary Care Provider: Alanna Moreau DO    Primary Insurance: MEDICARE  Secondary Insurance: CIGNA    DISCHARGE DETAILS:                                          Other Referral/Resources/Interventions Provided:  Interventions: Transportation, SNF  Referral Comments: CM contacted pt's daughter to review choice list. Daughter provided top 3 SNF choices: [facility], [facility], & [facility]. CM reserved [facility] via [facility] as they were 1st choice. CM arranged for [facility]  transport for tomorrow 1400. CM notified nurse, facility, hospitalist, and pt's daughter. CM will continue to follow.         Treatment Team Recommendation: SNF  Discharge Destination Plan:: SNF  Transport at Discharge : [place] [facility]  Dispatcher Contacted: Yes  Number/Name of Dispatcher: [facility] / [phone]     ETA of Transport (Date): 06/11/24  ETA of Transport (Time): 1400              IMM Given (Date):: 06/11/24  IMM Given to:: Family  Family notified:: Daughter  Additional Comments: CM contacted daughter to review IMM and obtained verbal consent.    Accepting Facility Name, City & State : [facility]  [address] [facility] [address]  [place] [address]  Receiving Facility/Agency Phone Number: [phone]  Facility/Agency Fax Number: [phone]

## 2024-06-11 NOTE — ASSESSMENT & PLAN NOTE
Suspect asymptomatic bacteriuria as the patient had no symptoms of cystitis  Patient remains stable off abx therapy  ID evaluation appreciated

## 2024-06-11 NOTE — ASSESSMENT & PLAN NOTE
Patient was found down at home  Awaiting PT/OT evaluation for SNF referral   Patient's daughter would like her mother and father to be placed in the same facility

## 2024-06-11 NOTE — PROGRESS NOTES
Patient:    MRN:  [id number]    Denis Request ID:  [id number]    Level of care reserved:  Skilled Nursing Facility    Partner Reserved:  [facility], [place] [address] [phone]    Clinical needs requested:    Geography searched:  40 miles around [address]    Start of Service:    Request sent:  10:31am EDT on 6/10/2024 by Leticia Keenan    Partner reserved:  3:32pm EDT on 6/11/2024 by Leticia Keenan    Choice list shared:  3:04pm EDT on 6/10/2024 by Leticia Keenan

## 2024-06-11 NOTE — PROGRESS NOTES
[facility]  Progress Note  Name: Daily Schaeffer I  MRN: [id number]  Unit/Bed#: -01 I Date of Admission: 6/8/2024   Date of Service: 6/11/2024 I Hospital Day: 2    Assessment & Plan   * Acute cystitis without hematuria- Ruled Out  Assessment & Plan  Suspect asymptomatic bacteriuria as the patient had no symptoms of cystitis  Patient remains stable off abx therapy  ID evaluation appreciated    Acute encephalopathy  Assessment & Plan  Patient with an initial toxic metabolic encephalopathy   This is likely due to polypharmacy rather than infectious related  Patient is alert and oriented this morning and actually recalls speaking to me 2 weeks prior regarding her   CT brain (6/8): No intracranial hemorrhage or calvarial fracture.   Ativan at bedtime as needed only at this time  Seroquel added in the evening for some agitation    Low hemoglobin  Assessment & Plan  Hemoglobin drop noted from 12.7 on admission to-10.6  No evidence of bleeding  Hemoglobin remains stable at 10.5  Continue to monitor for active bleeding or further decrease    Debility  Assessment & Plan  Patient was found down at home  Awaiting PT/OT evaluation for SNF referral   Patient's daughter would like her mother and father to be placed in the same facility    Anxiety and depression  Assessment & Plan  Continue home dosing of Cymbalta  Continue Ativan as needed only at this time           VTE Pharmacologic Prophylaxis: VTE Score: 5 High Risk (Score >/= 5) - Pharmacological DVT Prophylaxis Ordered: enoxaparin (Lovenox). Sequential Compression Devices Ordered.    Mobility:   Basic Mobility Inpatient Raw Score: 19  JH-HLM Goal: 6: Walk 10 steps or more  JH-HLM Achieved: 6: Walk 10 steps or more  JH-HLM Goal achieved. Continue to encourage appropriate mobility.    Patient Centered Rounds: I performed bedside rounds with nursing staff today.   Discussions with Specialists or Other Care Team Provider: Nursing and  CM    Education and Discussions with Family / Patient:  Patient updated on plan of care. CM to discuss placement with family.     Current Length of Stay: 2 day(s)  Current Patient Status: Inpatient   Certification Statement: The patient will continue to require additional inpatient hospital stay due to awaiting placement  Discharge Plan:  Patient medically cleared for discharge once placement is arranged.    Code Status: Level 1 - Full Code    Subjective:   Patient resting comfortably in bed without any acute complaints.  No significant overnight events reported by nursing.    Objective:     Vitals:   Temp (24hrs), Av.3 °F (36.8 °C), Min:98.1 °F (36.7 °C), Max:98.7 °F (37.1 °C)    Temp:  [98.1 °F (36.7 °C)-98.7 °F (37.1 °C)] 98.3 °F (36.8 °C)  HR:  [76-89] 85  Resp:  [15-16] 16  BP: (110-120)/(52-62) 120/62  SpO2:  [93 %-98 %] 97 %  Body mass index is 20.85 kg/m².     Input and Output Summary (last 24 hours):     Intake/Output Summary (Last 24 hours) at 2024 1510  Last data filed at 2024 1300  Gross per 24 hour   Intake 700 ml   Output --   Net 700 ml       Physical Exam:   Physical Exam  Vitals and nursing note reviewed.   Constitutional:       General: She is not in acute distress.     Appearance: She is well-developed.   HENT:      Head: Normocephalic and atraumatic.   Eyes:      Conjunctiva/sclera: Conjunctivae normal.   Cardiovascular:      Rate and Rhythm: Normal rate and regular rhythm.      Pulses: Normal pulses.      Heart sounds: Normal heart sounds. No murmur heard.  Pulmonary:      Effort: Pulmonary effort is normal. No respiratory distress.      Breath sounds: Normal breath sounds.   Abdominal:      Palpations: Abdomen is soft.      Tenderness: There is no abdominal tenderness.   Musculoskeletal:         General: No swelling.      Cervical back: Neck supple.   Skin:     General: Skin is warm and dry.      Capillary Refill: Capillary refill takes less than 2 seconds.   Neurological:       General: No focal deficit present.      Mental Status: She is alert. Mental status is at baseline.   Psychiatric:         Mood and Affect: Mood normal.         Behavior: Behavior normal.          Labs:  Results from last 7 days   Lab Units 06/11/24  0545 06/10/24  0456   WBC Thousand/uL 6.85 6.24   HEMOGLOBIN g/dL 10.5* 10.5*   HEMATOCRIT % 32.5* 32.2*   PLATELETS Thousands/uL 168 157   SEGS PCT %  --  40*   LYMPHO PCT %  --  40   MONO PCT %  --  13*   EOS PCT %  --  6     Results from last 7 days   Lab Units 06/11/24  0545   SODIUM mmol/L 135   POTASSIUM mmol/L 4.0   CHLORIDE mmol/L 104   CO2 mmol/L 25   BUN mg/dL 11   CREATININE mg/dL 0.71   ANION GAP mmol/L 6   CALCIUM mg/dL 8.8   GLUCOSE RANDOM mg/dL 103     Results from last 7 days   Lab Units 06/08/24  1227   INR  1.04                   Lines/Drains:  Invasive Devices       Peripheral Intravenous Line  Duration             Peripheral IV 06/08/24 Right Antecubital 3 days                      Imaging: No new imaging.    Recent Cultures (last 7 days):   Results from last 7 days   Lab Units 06/08/24  1227   URINE CULTURE  >100,000 cfu/ml Escherichia coli*       Last 24 Hours Medication List:   Current Facility-Administered Medications   Medication Dose Route Frequency Provider Last Rate    acetaminophen  650 mg Oral Q6H PRN Delmis Chan MD      atorvastatin  40 mg Oral Daily Delmis Chan MD      docusate sodium  100 mg Oral BID Delmis Chan MD      DULoxetine  60 mg Oral Daily Delmis Chan MD      enoxaparin  40 mg Subcutaneous Daily Delmis Chan MD      levothyroxine  75 mcg Oral Early Morning Delmis Chan MD      LORazepam  0.5 mg Oral HS PRN Lorrie Zambrano DO      QUEtiapine  25 mg Oral HS Delmis Chan MD          Today, Patient Was Seen By: Lorrie Zambrano DO    **Please Note: This note may have been constructed using a voice recognition system.**

## 2024-06-11 NOTE — PROGRESS NOTES
Patient:    MRN:  [id number]    Denis Request ID:  [id number]    Level of care reserved:  Skilled Nursing Facility    Partner Reserved:  [facility], [place] [address] [phone]    Clinical needs requested:    Geography searched:  40 miles around [address]    Start of Service:    Request sent:  10:31am EDT on 6/10/2024 by Leticia Keenan    Partner reserved:  3:32pm EDT on 6/11/2024 by Leticia Keenan    Choice list shared:  3:04pm EDT on 6/10/2024 by Leticia Keenan

## 2024-06-11 NOTE — ASSESSMENT & PLAN NOTE
Patient with an initial toxic metabolic encephalopathy   This is likely due to polypharmacy rather than infectious related  Patient is alert and oriented this morning and actually recalls speaking to me 2 weeks prior regarding her   CT brain (6/8): No intracranial hemorrhage or calvarial fracture.   Ativan at bedtime as needed only at this time  Seroquel added in the evening for some agitation

## 2024-06-12 PROBLEM — G93.40 ACUTE ENCEPHALOPATHY: Status: RESOLVED | Noted: 2024-06-08 | Resolved: 2024-06-12

## 2024-06-12 PROCEDURE — 97167 OT EVAL HIGH COMPLEX 60 MIN: CPT

## 2024-06-12 PROCEDURE — 97163 PT EVAL HIGH COMPLEX 45 MIN: CPT

## 2024-06-12 PROCEDURE — 99239 HOSP IP/OBS DSCHRG MGMT >30: CPT | Performed by: INTERNAL MEDICINE

## 2024-06-12 RX ADMIN — DULOXETINE HYDROCHLORIDE 60 MG: 60 CAPSULE, DELAYED RELEASE ORAL at 08:18

## 2024-06-12 RX ADMIN — LEVOTHYROXINE SODIUM 75 MCG: 75 TABLET ORAL at 05:11

## 2024-06-12 RX ADMIN — LORAZEPAM 0.5 MG: 0.5 TABLET ORAL at 22:27

## 2024-06-12 RX ADMIN — ENOXAPARIN SODIUM 40 MG: 40 INJECTION SUBCUTANEOUS at 08:18

## 2024-06-12 RX ADMIN — ATORVASTATIN CALCIUM 40 MG: 40 TABLET, FILM COATED ORAL at 08:18

## 2024-06-12 RX ADMIN — QUETIAPINE FUMARATE 25 MG: 25 TABLET ORAL at 22:27
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
## 2024-06-12 NOTE — PLAN OF CARE
Problem: Potential for Falls  Goal: Patient will remain free of falls  Description: INTERVENTIONS:  - Educate patient/family on patient safety including physical limitations  - Instruct patient to call for assistance with activity   - Consult OT/PT to assist with strengthening/mobility   - Keep Call bell within reach  - Keep bed low and locked with side rails adjusted as appropriate  - Keep care items and personal belongings within reach  - Initiate and maintain comfort rounds  - Make Fall Risk Sign visible to staff  - Offer Toileting every 2 Hours, in advance of need  - Initiate/Maintain bed alarm  - Obtain necessary fall risk management equipment: non skid socks  - Apply yellow socks and bracelet for high fall risk patients  - Consider moving patient to room near nurses station  Outcome: Progressing     Problem: PAIN - ADULT  Goal: Verbalizes/displays adequate comfort level or baseline comfort level  Description: Interventions:  - Encourage patient to monitor pain and request assistance  - Assess pain using appropriate pain scale  - Administer analgesics based on type and severity of pain and evaluate response  - Implement non-pharmacological measures as appropriate and evaluate response  - Consider cultural and social influences on pain and pain management  - Notify physician/advanced practitioner if interventions unsuccessful or patient reports new pain  Outcome: Progressing     Problem: INFECTION - ADULT  Goal: Absence or prevention of progression during hospitalization  Description: INTERVENTIONS:  - Assess and monitor for signs and symptoms of infection  - Monitor lab/diagnostic results  - Monitor all insertion sites, i.e. indwelling lines, tubes, and drains  - Monitor endotracheal if appropriate and nasal secretions for changes in amount and color  - [place] appropriate cooling/warming therapies per order  - Administer medications as ordered  - Instruct and encourage patient and family to use good hand  hygiene technique  - Identify and instruct in appropriate isolation precautions for identified infection/condition  Outcome: Progressing  Goal: Absence of fever/infection during neutropenic period  Description: INTERVENTIONS:  - Monitor WBC    Outcome: Progressing     Problem: SAFETY ADULT  Goal: Patient will remain free of falls  Description: INTERVENTIONS:  - Educate patient/family on patient safety including physical limitations  - Instruct patient to call for assistance with activity   - Consult OT/PT to assist with strengthening/mobility   - Keep Call bell within reach  - Keep bed low and locked with side rails adjusted as appropriate  - Keep care items and personal belongings within reach  - Initiate and maintain comfort rounds  - Make Fall Risk Sign visible to staff  - Offer Toileting every 2 Hours, in advance of need  - Initiate/Maintain bed alarm  - Obtain necessary fall risk management equipment: non skid socks  - Apply yellow socks and bracelet for high fall risk patients  - Consider moving patient to room near nurses station  Outcome: Progressing  Goal: Maintain or return to baseline ADL function  Description: INTERVENTIONS:  -  Assess patient's ability to carry out ADLs; assess patient's baseline for ADL function and identify physical deficits which impact ability to perform ADLs (bathing, care of mouth/teeth, toileting, grooming, dressing, etc.)  - Assess/evaluate cause of self-care deficits   - Assess range of motion  - Assess patient's mobility; develop plan if impaired  - Assess patient's need for assistive devices and provide as appropriate  - Encourage maximum independence but intervene and supervise when necessary  - Involve family in performance of ADLs  - Assess for home care needs following discharge   - Consider OT consult to assist with ADL evaluation and planning for discharge  - Provide patient education as appropriate  Outcome: Progressing  Goal: Maintains/Returns to pre admission  functional level  Description: INTERVENTIONS:  - Perform AM-PAC 6 Click Basic Mobility/ Daily Activity assessment daily.  - Set and communicate daily mobility goal to care team and patient/family/caregiver.   - Collaborate with rehabilitation services on mobility goals if consulted  - Perform Range of Motion 2 times a day.  - Reposition patient every 2 hours.  - Dangle patient 2 times a day  - Stand patient 2 times a day  - Ambulate patient 2 times a day  - Out of bed to chair 3 times a day   - Out of bed for meals 3 times a day  - Out of bed for toileting  - Record patient progress and toleration of activity level   Outcome: Progressing     Problem: DISCHARGE PLANNING  Goal: Discharge to home or other facility with appropriate resources  Description: INTERVENTIONS:  - Identify barriers to discharge w/patient and caregiver  - Arrange for needed discharge resources and transportation as appropriate  - Identify discharge learning needs (meds, wound care, etc.)  - Refer to Case Management Department for coordinating discharge planning if the patient needs post-hospital services based on physician/advanced practitioner order or complex needs related to functional status, cognitive ability, or social support system  Outcome: Progressing     Problem: Knowledge Deficit  Goal: Patient/family/caregiver demonstrates understanding of disease process, treatment plan, medications, and discharge instructions  Description: Complete learning assessment and assess knowledge base.  Interventions:  - Provide teaching at level of understanding  - Provide teaching via preferred learning methods  Outcome: Progressing     Problem: MUSCULOSKELETAL - ADULT  Goal: Maintain or return mobility to safest level of function  Description: INTERVENTIONS:  - Assess patient's ability to carry out ADLs; assess patient's baseline for ADL function and identify physical deficits which impact ability to perform ADLs (bathing, care of mouth/teeth,  toileting, grooming, dressing, etc.)  - Assess/evaluate cause of self-care deficits   - Assess range of motion  - Assess patient's mobility  - Assess patient's need for assistive devices and provide as appropriate  - Encourage maximum independence but intervene and supervise when necessary  - Involve family in performance of ADLs  - Assess for home care needs following discharge   - Consider OT consult to assist with ADL evaluation and planning for discharge  - Provide patient education as appropriate  Outcome: Progressing     Problem: NEUROSENSORY - ADULT  Goal: Achieves stable or improved neurological status  Description: INTERVENTIONS  - Monitor and report changes in neurological status  - Monitor vital signs such as temperature, blood pressure, glucose, and any other labs ordered   - Initiate measures to prevent increased intracranial pressure  - Monitor for seizure activity and implement precautions if appropriate      Outcome: Progressing  Goal: Achieves maximal functionality and self care  Description: INTERVENTIONS  - Monitor swallowing and airway patency with patient fatigue and changes in neurological status  - Encourage and assist patient to increase activity and self care.   - Encourage visually impaired, hearing impaired and aphasic patients to use assistive/communication devices  Outcome: Progressing

## 2024-06-12 NOTE — CONSULTS
Consultation - Neuropsychology/Psychology Department  Daily Schaeffer 78 y.o. female MRN: [id number]  Unit/Bed#: -01 Encounter: [id number]        Reason for Consultation:  Daily Schaeffer is a 78 y.o. year old female who was referred for a Neuropsychological Exam to assess cognitive functioning and comment on capacity to make informed medical decisions.      History of Present Illness  Altered mental status, found down    Physician Requesting Consult: Lorrie Zambrano DO    PROBLEM LIST:  Patient Active Problem List   Diagnosis    Nonrheumatic aortic (valve) stenosis    Dyslipidemia    Crohn disease (HCC)    Anxiety and depression    Hyperglycemia    Hypothyroidism    Neuropathy    Acute cystitis without hematuria- Ruled Out    Hypercalcemia    Abnormal CT scan, pelvis    Hypomagnesemia    Exertional dyspnea    Crohn's disease of jejunum (HCC)    CKD (chronic kidney disease) stage 2, GFR 60-89 ml/min    Stage 3 chronic kidney disease, unspecified whether stage 3a or 3b CKD (HCC)    Acute toxic encephalopathy    Debility    Low hemoglobin         Historical Information   Past Medical History:   Diagnosis Date    Aortic stenosis     Depression     Disease of thyroid gland     Diverticular disease 05/09/2017    Essential hypertension 05/20/2016    Hyperlipidemia     Major depression 05/20/2016    Peripheral neuropathy     Type 2 diabetes mellitus (HCC) 05/20/2016    Visual impairment      Past Surgical History:   Procedure Laterality Date    BOWEL RESECTION      COLON SURGERY      EYE SURGERY      SMALL INTESTINE SURGERY      TUBAL LIGATION       Social History   Social History     Substance and Sexual Activity   Alcohol Use Yes    Comment: occasional     Social History     Substance and Sexual Activity   Drug Use Never     Social History     Tobacco Use   Smoking Status Never    Passive exposure: Never   Smokeless Tobacco Never     Family History: History reviewed. No pertinent family  history.    Meds/Allergies   current meds:   Current Facility-Administered Medications   Medication Dose Route Frequency    acetaminophen (TYLENOL) tablet 650 mg  650 mg Oral Q6H PRN    atorvastatin (LIPITOR) tablet 40 mg  40 mg Oral Daily    docusate sodium (COLACE) capsule 100 mg  100 mg Oral BID    DULoxetine (CYMBALTA) delayed release capsule 60 mg  60 mg Oral Daily    enoxaparin (LOVENOX) subcutaneous injection 40 mg  40 mg Subcutaneous Daily    levothyroxine tablet 75 mcg  75 mcg Oral Early Morning    LORazepam (ATIVAN) tablet 0.5 mg  0.5 mg Oral HS PRN    QUEtiapine (SEROquel) tablet 25 mg  25 mg Oral HS       Allergies   Allergen Reactions    Wound Dressing Adhesive Rash         Family and Social Support:   Discharge planning discussed with:: Pt, spouse, daughters  Freedom of Choice: Yes  IMM Given (Date):: 06/12/24  IMM Given to:: Patient  Family notified:: Daughter  ETA of Transport (Date): 06/11/24  ETA of Transport (Time): 1400      Behavioral Observations: Patient was alert, oriented x 3 and cooperative; affect appeared pleasant and patient admitted to anxiety and depressed mood; no overt evidence of psychotic process;     Cognitive Examination    General Cognitive Functioning MMSE = Adequate orientation, working memory, and recall    Attention/Concentration Auditory Selective Attention = Within Normal Limits; Auditory Vigilance = Within Normal Limits; Information Processing Speed = Within Normal Limits    Frontal Systems/Executive Functioning Mental Flexibility/Cognitive Control = Within Normal Limits; Working Memory = Within Normal Limits Abstract Reasoning = Impaired; Generative Ability = Within Normal Limits,     Language Functioning Phonemic Fluency = Within Normal Limits; Semantic Retrieval = Impaired; Comprehension of Complex Ideational Material = Within Normal Limits;     Memory Functioning Narrative Recall - Short Delay = Average; Long Delay Narrative Recall = Impaired; Three word recall =  Average    Visuo-Spatial Abilities Not Assessed    Functional Knowledge  Health & Safety Knowledge = Within Normal Limits;     Summary/Impression:  Results of Neuropsychological Exam revealed cognitive deficits in abstract reasoning ability, declarative recall, and semantic retrieval with all other tested areas within normal limits. On a measure assessing awareness of personal health status and ability to evaluate health problems, handle medical emergencies and take safety precautions, patient performed within normal limits. During this encounter, patient appears to have capacity to make informed medical decisions.

## 2024-06-12 NOTE — PHYSICAL THERAPY NOTE
PHYSICAL THERAPY EVALUATION  NAME:  Daily Schaeffer  DATE: 06/12/24    AGE:   78 y.o.  Mrn:   [id number]  ADMIT DX:  Altered mental status [R41.82]  Bacteria in urine [R82.71]  Fall in elderly patient [R29.6]  Problem List:   Patient Active Problem List   Diagnosis    Nonrheumatic aortic (valve) stenosis    Dyslipidemia    Crohn disease (HCC)    Anxiety and depression    Hyperglycemia    Hypothyroidism    Neuropathy    Acute cystitis without hematuria- Ruled Out    Hypercalcemia    Abnormal CT scan, pelvis    Hypomagnesemia    Exertional dyspnea    Crohn's disease of jejunum (HCC)    CKD (chronic kidney disease) stage 2, GFR 60-89 ml/min    Stage 3 chronic kidney disease, unspecified whether stage 3a or 3b CKD (HCC)    Acute toxic encephalopathy    Debility    Low hemoglobin       Past Medical History  Past Medical History:   Diagnosis Date    Aortic stenosis     Depression     Disease of thyroid gland     Diverticular disease 05/09/2017    Essential hypertension 05/20/2016    Hyperlipidemia     Major depression 05/20/2016    Peripheral neuropathy     Type 2 diabetes mellitus (HCC) 05/20/2016    Visual impairment        Past Surgical History  Past Surgical History:   Procedure Laterality Date    BOWEL RESECTION      COLON SURGERY      EYE SURGERY      SMALL INTESTINE SURGERY      TUBAL LIGATION         Length Of Stay: 3  Performed at least 2 patient identifiers during session: Name and ID bracelet       06/12/24 1006   PT Last Visit   PT Visit Date 06/12/24   Note Type   Note type Evaluation   Pain Assessment   Pain Assessment Tool 0-10   Pain Score No Pain   Restrictions/Precautions   Weight Bearing Precautions Per Order No   Other Precautions Cognitive;Fall Risk   Home Living   Type of Home House   Home Layout Two level;Able to live on main level with bedroom/bathroom;Performs ADLs on one level;Stairs to enter with rails   Bathroom Shower/Tub Tub/shower unit   Bathroom Toilet Standard   Bathroom Equipment    (electric shower chair that raises/lowers)   Home Equipment Walker  (Baseline rollator in community, no AD @ home)   Prior Function   Level of [place] Independent with functional mobility;Independent with ADLs;Independent with IADLS   Lives With Spouse   Receives Help From Family   IADLs Family/Friend/Other provides transportation;Independent with medication management;Independent with meal prep   Falls in the last 6 months 1 to 4  (2x)   Vocational Retired   General   Family/Caregiver Present Yes   Cognition   Overall Cognitive Status Impaired   Arousal/Participation Responsive   Orientation Level Oriented to person;Oriented to place;Oriented to time;Disoriented to situation   Memory Decreased short term memory;Decreased recall of recent events   Following Commands Follows one step commands without difficulty   Comments pt very frustrated about current situation   RLE Assessment   RLE Assessment WFL   LLE Assessment   LLE Assessment WFL   Vision-Basic Assessment   Current Vision Wears glasses only for reading  (+distance)   Coordination   Sensation   (impaired sensation B hands)   Transfers   Sit to Stand 4  Minimal assistance   Additional items Assist x 1;Verbal cues;Increased time required  (pt reported L knee weakness with standing)   Stand to Sit 4  Minimal assistance   Additional items Assist x 1;Verbal cues;Increased time required   Additional Comments pt required cues for proper hand placement multiple imtes throughout session  (pt denied dizziness with transitional movement)   Ambulation/Elevation   Gait pattern Improper Weight shift;Decreased foot clearance;Foward flexed   Gait Assistance 4  Minimal assist   Additional items Assist x 1;Verbal cues   Assistive Device Rolling walker   Distance 40 ft  x 2   Ambulation/Elevation Additional Comments decreased safety awarenes noted with mobility and obstacle negiotation   Balance   Static Sitting Good   Dynamic Sitting Fair +   Static Standing Fair    Dynamic Standing Fair -   Ambulatory Fair -   Endurance Deficit   Endurance Deficit Yes   Endurance Deficit Description pt reported L knee weakness   Activity Tolerance   Activity Tolerance Patient limited by fatigue   Assessment   Prognosis Fair   Assessment Pt is 78 y.o. female seen for PT evaluation s/p admit to [facility] on 6/8/2024 w/ Acute cystitis without hematuria. PT consulted to assess pt's functional mobility and d/c needs. Order placed for PT eval and tx, w/ up and OOB as tolerated order. Pt agreeable to PT  session upon arrival, pt found seated OOB in recliner.  PTA, pt was independent w/ all functional mobility w/ occ use of rollator, lives w/ spouse in 2 level home, and retired.  Pt to benefit from continued PT tx to address deficits and maximize level of functional independent mobility and consistency. Upon conclusion pt  seated in recliner. Complexity: Comorbidities affecting pt's physical performance at time of assessment include: neuropathy, anxiety, depression, and encephalopathy and recent falls . Personal factors affecting pt at time of IE include: advanced age, history of falls, limited insight into impairments, steps to enter home, and decreased cognition. Please find objective findings from PT assessment regarding body systems outlined above with impairments and limitations including impaired balance, gait deviations, altered sensation, decreased safety awareness, impaired judgement, fall risk, and decreased cognition.  Pt's clinical presentation is currently unstable/unpredictable seen in pt's presentation of abnormal H&H and abnormal potassium levels. The patient's AM-PAC Basic Mobility Inpatient Short Form Raw Score is 19.  Based on patient presentations and impairments, pt would most appropriately benefit from Level 2 resource intensity upon discharge. Please also refer to the recommendation of the Physical Therapist for safe discharge planning. RN verbalized pt appropriate for  PT session. Pt seen as a co-eval with OT due to the patient's co-morbidities, clinically unstable presentation, and present impairments which are a regression from the patient's baseline.   Barriers to Discharge Decreased caregiver support;Inaccessible home environment   Goals   Patient Goals to figure get things organized   LTG Expiration Date 06/22/24   Long Term Goal #1 Pt will: Perform bed mobility tasks to modified I to improve ease of bed mobility. Perform transfers to modified I to improve ease of transfers. Perform ambulation with MI and RW for 250 feet to increase Indep in home environment. Increase dynamic standing balance to F+ to decrease fall risk. Increase OOB activity tolerance to 10 minutes without s/s of exertion to decrease fall risk. Navigate up and down 3 steps with MI so patient can enter and exit home.   Plan   Treatment/Interventions Functional transfer training;Therapeutic exercise;Endurance training;Gait training;Bed mobility;Equipment eval/education;Elevations   PT Frequency 3-5x/wk   Discharge Recommendation   Rehab Resource Intensity Level, PT II (Moderate Resource Intensity)   Equipment Recommended Walker   Walker Package Recommended Wheeled walker   AM-PAC Basic Mobility Inpatient   Turning in Flat Bed Without Bedrails 4   Lying on Back to Sitting on Edge of Flat Bed Without Bedrails 3   Moving Bed to Chair 3   Standing Up From Chair Using Arms 3   Walk in Room 3   Climb 3-5 Stairs With Railing 3   Basic Mobility Inpatient Raw Score 19   Basic Mobility Standardized Score 42.48   [facility] Highest Level Of Mobility   -HLM Goal 6: Walk 10 steps or more   -HLM Achieved 7: Walk 25 feet or more       Time In: 0951  Time Out: 1006  Total Evaluation Minutes: 15    Jaki Tierney, PT

## 2024-06-12 NOTE — PLAN OF CARE
Problem: OCCUPATIONAL THERAPY ADULT  Goal: Performs self-care activities at highest level of function for planned discharge setting.  See evaluation for individualized goals.  Description: Treatment Interventions: ADL retraining, Functional transfer training, UE strengthening/ROM, Endurance training, Cognitive reorientation, Patient/family training, Equipment evaluation/education, Compensatory technique education, Energy conservation, Activityengagement          See flowsheet documentation for full assessment, interventions and recommendations.   Note: Limitation: Decreased ADL status, Decreased UE strength, Decreased Safe judgement during ADL, Decreased cognition, Decreased endurance, Decreased self-care trans, Decreased high-level ADLs  Prognosis: Fair  Assessment: Patient is a 78 y.o. female seen for OT evaluation s/p admit to  [facility] on 6/8/2024 w/Acute cystitis without hematuria + commorbidities/PMHx (as listed in medical record) affecting patient's functional performance c ADL tasks at time of assessment. OT orders placed for evaluation and treatment to assess pt's ADLs, cognitive status + performance during functional tasks in order to formulate appropriate d/c recommendations.     Therapist performed at least two patient identifiers during session including name and wristband. Personal factors affecting patient at time of initial evaluation include: step(s) to enter environment, multi-level environment, limited home support, inability to perform IADLs, inability to perform ADLs, and inability to ambulate household distances.   Pt's clinical presentation is currently unstable/unpredictable given new onset deficits that effect pt's occupational performance and ability to safely return to PLOF including decrease activity tolerance, decrease standing balance, decrease performance during ADL tasks, decrease cognition, decrease safety awareness , decrease activity engagement, decrease performance  during functional transfers, and high fall risk combined with medical complications of abnormal CBC and need for input for mobility technique/safety. This evaluation required an extensive review of medical and/or therapy records and additional review of physical, cognitive and psychosocial history related to functional performance. Based upon functional performance deficits and assessments, this evaluation has been identified as a  high complexity evaluation.      Patient to benefit from continued Occupational Therapy treatment while in the hospital to address aforementioned deficits and maximize level of functional independence with ADLs and functional mobility. Pt currently requires A to facilitate appropriate d/c plan.     Rehab Resource Intensity Level, OT: I (Maximum Resource Intensity)

## 2024-06-12 NOTE — DISCHARGE SUMMARY
[facility]  Discharge- Daily Schaeffer 1945, 78 y.o. female MRN: [id number]  Unit/Bed#: [facility] [place] Encounter: [id number]  Primary Care Provider: Alanna Moreau DO   Date and time admitted to hospital: 6/8/2024 12:00 PM    * Acute cystitis without hematuria- Ruled Out  Assessment & Plan  Suspect asymptomatic bacteriuria as the patient had no symptoms of cystitis  Patient remains stable off abx therapy  ID evaluation appreciated    Acute toxic encephalopathy  Assessment & Plan  This is likely due to polypharmacy rather than infectious related  Patient has returned to baseline  CT brain (6/8): No intracranial hemorrhage or calvarial fracture.   Would avoid future benzodiazepine administration in this patient    Low hemoglobin  Assessment & Plan  Hemoglobin drop noted from 12.7 on admission to 10.6  But this was dilutional as hemoglobin stabilized at 10.5 with no active bleeding    Debility  Assessment & Plan  Patient was found down at home  Postacute rehab placement recommended by to PT/OT  Patient's daughter would like her mother and father to be placed in the same facility    Anxiety and depression  Assessment & Plan  Continue home Cymbalta 60mg daily      Medical Problems       Resolved Problems  Date Reviewed: 4/1/2024   None       Discharging Physician / Practitioner: Lorrie Zambrano DO  PCP: Alanna Moreau DO  Admission Date:   Admission Orders (From admission, onward)       Ordered        06/09/24 0738  INPATIENT ADMISSION  Once            06/08/24 1405  Place in Observation  Once                          Discharge Date: 06/13/24    Consultations During Hospital Stay:  ID, PT/OT    Procedures Performed:   None    Significant Findings / Test Results:   CT brain (6/8): No intracranial hemorrhage or calvarial fracture.     Incidental Findings:   None     Test Results Pending at Discharge (will require follow up):   None     Outpatient Tests  Requested:  TBD based on outpatient follow-up with PCP     Complications:  None    Reason for Admission: Acute encephalopathy    Hospital Course:   Daily Schaeffer is a 78 y.o. female patient who originally presented to the hospital on 6/8/2024 due to altered mental status. Please see H&P as documented by Dr. Chan for complete details regarding history of presenting illness. In brief, this patient has a past medical history of hypertension, hypothyroidism, and anxiety who presented after being found down by her daughter with increased confusion.  Prior to admission she was seen in the ED and diagnosed with panic attacks and given a prescription for Ativan and discharged home.  During hospitalization, there was some concern for possible urinary tract infection but this was deemed more likely asymptomatic bacteriuria by her infectious disease specialist and antibiotics were discontinued.  More than likely her encephalopathy was related to polypharmacy with recent Ativan use in an elderly woman.  CT brain was performed and negative for acute intracranial hemorrhage or other abnormality.  The patient did return to her baseline during hospitalization and was ultimately discharged home with home health care.  The patient was recommended for postacute rehab placement and actually had exceptions to a facility, however, the patient and her  adamantly refused rehab placement despite this being the safest discharge plan for them.  Neuropsych did evaluate the patient and her  and noted they did have capacity to make their own medical decisions.  The patient was therefore discharged home with home health care services.    Please see above list of diagnoses and related plan for additional information.     Condition at Discharge: stable    Discharge Day Visit / Exam:   Subjective: Patient resting comfortably in bed without any acute complaints.  No significant overnight events reported by nursing.    Vitals: Blood  "Pressure: 108/57 (06/12/24 0713)  Pulse: 80 (06/12/24 0713)  Temperature: 98.1 °F (36.7 °C) (06/12/24 0713)  Temp Source: Oral (06/12/24 0713)  Respirations: 17 (06/12/24 0713)  Height: 5' 6\" (167.6 cm) (06/08/24 1517)  Weight - Scale: 58.6 kg (129 lb 3 oz) (06/08/24 1517)  SpO2: 96 % (06/12/24 0713)    Exam:   Physical Exam  Vitals and nursing note reviewed.   Constitutional:       General: She is not in acute distress.  HENT:      Mouth/Throat:      Mouth: Mucous membranes are moist.      Pharynx: Oropharynx is clear.   Eyes:      Extraocular Movements: Extraocular movements intact.      Pupils: Pupils are equal, round, and reactive to light.   Cardiovascular:      Rate and Rhythm: Normal rate and regular rhythm.      Pulses: Normal pulses.      Heart sounds: Normal heart sounds.   Pulmonary:      Effort: Pulmonary effort is normal. No respiratory distress.      Breath sounds: Normal breath sounds.   Abdominal:      General: Bowel sounds are normal. There is no distension.      Palpations: Abdomen is soft.   Musculoskeletal:         General: Normal range of motion.   Neurological:      General: No focal deficit present.      Mental Status: She is alert. Mental status is at baseline.   Psychiatric:         Mood and Affect: Mood normal.         Behavior: Behavior normal.          Discussion with Family:  Patient updated on plan of care. CM discussed DC time/location with family.     Discharge instructions/Information to patient and family:   See after visit summary for information provided to patient and family.      Provisions for Follow-Up Care:  See after visit summary for information related to follow-up care and any pertinent home health orders.      Mobility at time of Discharge:   Basic Mobility Inpatient Raw Score: 19  JH-HLM Goal: 6: Walk 10 steps or more  JH-HLM Achieved: 6: Walk 10 steps or more  HLM Goal achieved. Continue to encourage appropriate mobility.     Disposition:   [facility]ute [facility]    Planned Readmission: None     Discharge Statement:  I spent > 35 minutes discharging the patient. This time was spent on the day of discharge. I had direct contact with the patient on the day of discharge. Greater than 50% of the total time was spent examining patient, answering all patient questions, arranging and discussing plan of care with patient as well as directly providing post-discharge instructions.  Additional time then spent on discharge activities.    Discharge Medications:  See after visit summary for reconciled discharge medications provided to patient and/or family.      **Please Note: This note may have been constructed using a voice recognition system**

## 2024-06-12 NOTE — QUICK NOTE
Patient (and ) cleared for discharge with planned discharge to [facility]. This morning patient and her  are refusing to go to the rehab facility and demanding to return home.  Unfortunately, this is a very unsafe decision as they live alone and the  has been his wife's primary caregiver but unfortunately has been unable to walk over the last 2 to 3 weeks due to severe back discomfort- actually had rapid response during his last hospitalization as he passed out from severe pain after bending to put on his shoes.  Their case have been extensively discussed with both of their daughters who would like to them to be placed in a rehab facility together.    Although both the patient and her  are alert and oriented, they are stating that no providers have seen them and noting that nobody has been getting them out of bed.  I personally have seen Daily daily in the morning and have discussed her ongoing medical care and discharge plan with her daily.  Though she states that she has only been talking to me on the phone (I did talk to her on the phone approximately 2 weeks ago when she was at home and her  was admitted).    Discussed this with case management who recommended neuropsych evaluation to determine if they have capacity.  Consultation has been placed.

## 2024-06-12 NOTE — ARC ADMISSION
ARC continues to await therapy evaluations. However, noted that patient/family has chosen [facility], and patient will discharge there today. Please notify with any changes.   Detail Level: Detailed

## 2024-06-12 NOTE — OCCUPATIONAL THERAPY NOTE
Occupational Therapy Evaluation     Patient Name: Daily Schaeffer  Today's Date: 6/12/2024  Problem List  Principal Problem:    Acute cystitis without hematuria- Ruled Out  Active Problems:    Anxiety and depression    Acute toxic encephalopathy    Debility    Low hemoglobin    Past Medical History  Past Medical History:   Diagnosis Date    Aortic stenosis     Depression     Disease of thyroid gland     Diverticular disease 05/09/2017    Essential hypertension 05/20/2016    Hyperlipidemia     Major depression 05/20/2016    Peripheral neuropathy     Type 2 diabetes mellitus (HCC) 05/20/2016    Visual impairment      Past Surgical History  Past Surgical History:   Procedure Laterality Date    BOWEL RESECTION      COLON SURGERY      EYE SURGERY      SMALL INTESTINE SURGERY      TUBAL LIGATION             06/12/24 0955   OT Last Visit   OT Visit Date 06/12/24   Note Type   Note type Evaluation   Additional Comments Nsg staff verbally cleared pt for OT evaluation.  Pt received  seated in bedside recliner of 's room on this date reporting no pain + is agreeable to OT session @ this time. @ exit, pt remains in  seated in bedside recliner of 's room c all lines intact, all needs met, call bell in hand + nsg aware of location/disposition.     During evaluation,  + KERR present in room.   Pain Assessment   Pain Assessment Tool 0-10   Pain Score No Pain   Restrictions/Precautions   Weight Bearing Precautions Per Order No   Other Precautions Fall Risk;Cognitive   Home Living   Type of Home House   Home Layout Two level;Able to live on main level with bedroom/bathroom;Performs ADLs on one level;Stairs to enter with rails   Bathroom Shower/Tub Tub/shower unit   Bathroom Toilet Standard   Bathroom Equipment   (electric shower chair that raises/lowers)   Bathroom Accessibility Accessible   Home Equipment Walker  (Baseline rollator in community, no AD @ home)   Prior Function   Level of [place]  "Independent with functional mobility;Independent with ADLs;Independent with IADLS   Lives With Spouse   Receives Help From Family   IADLs Family/Friend/Other provides transportation;Independent with medication management;Independent with meal prep   Falls in the last 6 months 1 to 4  (2x)   Vocational Retired   Lifestyle   Autonomy Pt lives in  2 story home c spouse + @ baseline, performs ADLs  I'ly, IADLs with A + fxl mobility I'ly without AD in home-Rollator in community. (-) . Pt is retired.   Reciprocal Relationships    Service to Others Daughters   Subjective   Subjective \"they didn't give us much time to plan this\"   ADL   Eating Assistance 5  Supervision/Setup   Grooming Assistance 5  Supervision/Setup   UB Bathing Assistance 5  Supervision/Setup   LB Bathing Assistance 5  Supervision/Setup   UB Dressing Assistance 5  Supervision/Setup   LB Dressing Assistance 4  Minimal Assistance   Toileting Assistance  4  Minimal Assistance   Additional Comments Given fxl performance skills + medical complexity, therapist suspecting via clinical judgement + skilled analysis; pt currently requires stated assist above to perform each area of ADL d/t limitations including: generalized muscle weakness and instability in stance   Transfers   Sit to Stand 4  Minimal assistance   Additional items Assist x 1;Verbal cues;Increased time required  (pt reported L knee weakness with standing)   Stand to Sit 4  Minimal assistance   Additional items Assist x 1;Verbal cues;Increased time required   Functional Mobility   Additional Comments Pt performed short distance ADL related fxl mobility in room/hallway c min A, with RW simulating toileting distances.   No overt LOB demonstrated + pt denies pain /  denies SOB/ denies dizziness during transitional movements. Pt minimally below baseline c abilities related to ADL-focused fxl mobility. F safety awareness noted while navigating t/o room. Transitions to bedside recliner for " remainder of session.   Balance   Static Sitting Good   Dynamic Sitting Fair +   Static Standing Fair   Dynamic Standing Fair -   Ambulatory Fair -   Activity Tolerance   Activity Tolerance Patient limited by fatigue   Medical Staff Made Aware PT/OT co-eval on this date d/t medical complexity, ambulatory dysfunction c high fall risk, various impeding lines + requirement for skilled interdisciplinary analysis of appropriate d/c recommendations. PT/OT POC/goals I'ly determined per respective discipline. (Jaki)  (Yunior)   Nurse Made Aware Medical staff made aware of current fxn, recommendations for d/c planning, fall risk + pt's location upon exit. (Martine)   RUE Assessment   RUE Assessment WFL   LUE Assessment   LUE Assessment WFL   Hand Function   Gross Motor Coordination Functional   Fine Motor Coordination Functional   Sensation   Additional Comments R/L UE digits-forearm mod/severe per pt report   Vision-Basic Assessment   Current Vision Wears glasses only for reading  (+distance)   Psychosocial   Psychosocial (WDL) WDL   Cognition   Overall Cognitive Status Impaired   Orientation Level Oriented to person;Oriented to time;Oriented to place;Disoriented to situation   Memory Decreased short term memory;Decreased recall of recent events   Following Commands Follows one step commands without difficulty   Comments POOR insight into current situation c /pt admission @ hospital + d/c plan. Verbalized frustration regarding d/c plan.   Assessment   Limitation Decreased ADL status;Decreased UE strength;Decreased Safe judgement during ADL;Decreased cognition;Decreased endurance;Decreased self-care trans;Decreased high-level ADLs   Prognosis Fair   Assessment Patient is a 78 y.o. female seen for OT evaluation s/p admit to  [facility] on 6/8/2024 w/Acute cystitis without hematuria + commorbidities/PMHx (as listed in medical record) affecting patient's functional performance c ADL tasks at time of assessment. OT  orders placed for evaluation and treatment to assess pt's ADLs, cognitive status + performance during functional tasks in order to formulate appropriate d/c recommendations.     Therapist performed at least two patient identifiers during session including name and wristband. Personal factors affecting patient at time of initial evaluation include: step(s) to enter environment, multi-level environment, limited home support, inability to perform IADLs, inability to perform ADLs, and inability to ambulate household distances.   Pt's clinical presentation is currently unstable/unpredictable given new onset deficits that effect pt's occupational performance and ability to safely return to OF including decrease activity tolerance, decrease standing balance, decrease performance during ADL tasks, decrease cognition, decrease safety awareness , decrease activity engagement, decrease performance during functional transfers, and high fall risk combined with medical complications of abnormal CBC and need for input for mobility technique/safety. This evaluation required an extensive review of medical and/or therapy records and additional review of physical, cognitive and psychosocial history related to functional performance. Based upon functional performance deficits and assessments, this evaluation has been identified as a  high complexity evaluation.      Patient to benefit from continued Occupational Therapy treatment while in the hospital to address aforementioned deficits and maximize level of functional independence with ADLs and functional mobility. Pt currently requires A to facilitate appropriate d/c plan.   Goals   Patient Goals To have a plan for d/c   Plan   Treatment Interventions ADL retraining;Functional transfer training;UE strengthening/ROM;Endurance training;Cognitive reorientation;Patient/family training;Equipment evaluation/education;Compensatory technique education;Energy conservation;Activityengagement    Goal Expiration Date 06/22/24   OT Treatment Day 0   OT Frequency 3-5x/wk   Discharge Recommendation   Rehab Resource Intensity Level, OT II (Moderate Resource Intensity)   AM-PAC Daily Activity Inpatient   Lower Body Dressing 3   Bathing 3   Toileting 3   Upper Body Dressing 3   Grooming 4   Eating 4   Daily Activity Raw Score 20   Daily Activity Standardized Score (Calc for Raw Score >=11) 42.03   AM-PAC Applied Cognition Inpatient   Following a Speech/Presentation 4   Understanding Ordinary Conversation 4   Taking Medications 3   Remembering Where Things Are Placed or Put Away 2   Remembering List of 4-5 Errands 2   Taking Care of Complicated Tasks 2   Applied Cognition Raw Score 17   Applied Cognition Standardized Score 36.52   Barthel Index   Feeding 10   Bathing 0   Grooming Score 5   Dressing Score 5   Bladder Score 5   Bowels Score 5   Toilet Use Score 5   Transfers (Bed/Chair) Score 10   Mobility (Level Surface) Score 10   Stairs Score 0   Barthel Index Score 55   End of Consult   Patient Position at End of Consult Bedside chair;All needs within reach   Nurse Communication Nurse aware of consult   The patient's raw score on the AM-PAC Daily Activity inpatient short form is 20, standardized score is 42.03, greater than 39.4. Please refer to the recommendation of the Occupational Therapist for safe DC planning.    Resource Intensity Recommendation: Level I (Maximum Resource Intensity)        GOALS:    *ADL transfers with (I) for inc'd independence with ADLs/purposeful tasks    *UB ADL with (I) for inc'd independence with self cares    *LB ADL with (I) using AE prn for inc'd independence with self cares    *Toileting with (I) for clothing management and hygiene for return to PLOF with personal care    *Increase stand tolerance x 10  m for inc'd tolerance with standing purposeful tasks    *Participate in 10m UE therex to increase overall stamina/activity tolerance for purposeful tasks    *Bed mobility- (I)  for inc'd independence to manage own comfort and initiate EOB & OOB purposeful tasks    *Patient will verbalize 3 safety awareness/ principles to prevent falls in the home setting.     *Patient will verbalize and demonstrate use of energy conservation/deep breathing techniques and work simplification skills during functional activities with no verbal cues.     *Patient will increase OOB/sitting tolerance to 2-4 hours per day to increase participation in self-care and leisure tasks with no s/s of exertion.     *Patient will engage in ongoing cognitive assessment to assist with safe discharge planning/recommendations.     *Pt will verbalize and demonstrate understanding of post-op movement precautions 100% (if applicable) in tx sessions for increased safety and functional mobility.      *Pt will demonstrate use of long handled AE (if appropriate) during 100% of tx sessions for increased ADL safety and independence following D/C     Pippa Cook OTR/L

## 2024-06-12 NOTE — CASE MANAGEMENT
Case Management Discharge Planning Note    Patient name Daily Schaeffer  Location /-01 MRN [id number]  : 1945 Date 2024       Current Admission Date: 2024  Current Admission Diagnosis:Acute cystitis without hematuria- Ruled Out   Patient Active Problem List    Diagnosis Date Noted Date Diagnosed    Low hemoglobin 2024     Acute toxic encephalopathy 2024     Debility 2024     Stage 3 chronic kidney disease, unspecified whether stage 3a or 3b CKD (HCC) 2024     Exertional dyspnea 2024     Hypomagnesemia 2023     Acute cystitis without hematuria- Ruled Out 2023     Hypercalcemia 2023     Abnormal CT scan, pelvis 2023     Neuropathy 2023     Nonrheumatic aortic (valve) stenosis 2023     Hypothyroidism 2017    Dyslipidemia 2016    Crohn disease (HCC) 2016    Anxiety and depression 2016    Hyperglycemia 2016    Crohn's disease of jejunum (HCC) 2016     CKD (chronic kidney disease) stage 2, GFR 60-89 ml/min 2016       LOS (days): 3  Geometric Mean LOS (GMLOS) (days): 3.9  Days to GMLOS:0.5     OBJECTIVE:  Risk of Unplanned Readmission Score: 17.25         Current admission status: Inpatient   Preferred Pharmacy:   [facility] [place] [facility] [address]  [place] [address]  Phone: [phone] Fax: [phone]    Primary Care Provider: Alanna Moreau DO    Primary Insurance: MEDICARE  Secondary Insurance: [employer]    DISCHARGE DETAILS:    Discharge planning discussed with:: Pt, spouse, daughters  Freedom of Choice: Yes                                  Other Referral/Resources/Interventions Provided:  Interventions: HHC, SNF  Referral Comments: CM is awaiting capacity eval results. Pt's  was determined to have capacity, and is requesting to go home with [facility] & hospital bed. CM  submitted request for C via [facility] per pt request. CM will provide pt and spouse with choice list to review. CM contacted both pt’s daughter to provide update. Daughters reported that they do not feel comfortable having parents go home. Daughter Kaila reported that she cannot go to pt’s home d/t work schedule & Kayleen cannot visit pt this week as her son is graduating. CM will f/u with choice list once capacity has been determined.            Discharge Destination Plan:: Home with Home Health Care

## 2024-06-12 NOTE — ASSESSMENT & PLAN NOTE
Patient was found down at home  Postacute rehab placement recommended by to PT/OT  Patient's daughter would like her mother and father to be placed in the same facility   Patient's cough has become more frequent throughout the night. It is productive with thick, green-yellow mucus that has required oral suctioning.     Electronically signed by Fredric Lefort, RN on 5/14/2022 at 6:06 AM

## 2024-06-12 NOTE — PROGRESS NOTES
Pastoral Care Progress Note    2024  Patient: Daily Schaeffer : 1945  Admission Date & Time: 2024 1200  MRN: [id number] [facility]: [id number]  CH initiated support visit to pt in setting of pt and spouse being hospitalized and disagreement about discharge planning. Daily received CH upright in bed, no family present.  Pt shared extensively about her family. Pt shared her connection and love for her children and grandchildren. Pt shared about the difficulties of raising one of her children who is now .  Pt shared that she and her  Gilmar have been  for 60 years next month. Pt shared adamantly about her desire to return home and her disbelief that discharge could not happen quickly.  CH provided empathetic listening and support.  CH remains available.               Chaplaincy Interventions Utilized:   Empowerment: Encouraged focus on present and Encouraged self-care    Exploration: Explored relational needs & resources    Relationship Building: Listened empathically    Chaplaincy Outcomes Achieved:  Identified meaningful connections    Spiritual Coping Strategies Utilized:   Connectedness       24 1300   Clinical Encounter Type   Visited With Patient   Routine Visit Introduction

## 2024-06-12 NOTE — PLAN OF CARE
Problem: SAFETY ADULT  Goal: Patient will remain free of falls  Description: INTERVENTIONS:  - Educate patient/family on patient safety including physical limitations  - Instruct patient to call for assistance with activity   - Consult OT/PT to assist with strengthening/mobility   - Keep Call bell within reach  - Keep bed low and locked with side rails adjusted as appropriate  - Keep care items and personal belongings within reach  - Initiate and maintain comfort rounds  - Make Fall Risk Sign visible to staff  - Offer Toileting every 2 Hours, in advance of need  - Initiate/Maintain bed alarm  - Obtain necessary fall risk management equipment: non skid socks  - Apply yellow socks and bracelet for high fall risk patients  - Consider moving patient to room near nurses station  Outcome: Progressing     Problem: DISCHARGE PLANNING  Goal: Discharge to home or other facility with appropriate resources  Description: INTERVENTIONS:  - Identify barriers to discharge w/patient and caregiver  - Arrange for needed discharge resources and transportation as appropriate  - Identify discharge learning needs (meds, wound care, etc.)  - Refer to Case Management Department for coordinating discharge planning if the patient needs post-hospital services based on physician/advanced practitioner order or complex needs related to functional status, cognitive ability, or social support system  Outcome: Progressing

## 2024-06-12 NOTE — PLAN OF CARE
Problem: PHYSICAL THERAPY ADULT  Goal: Performs mobility at highest level of function for planned discharge setting.  See evaluation for individualized goals.  Description: Treatment/Interventions: Functional transfer training, Therapeutic exercise, Endurance training, Gait training, Bed mobility, Equipment eval/education, Elevations  Equipment Recommended: Walker       See flowsheet documentation for full assessment, interventions and recommendations.  Outcome: Progressing  Note: Prognosis: Fair     Assessment: Pt is 78 y.o. female seen for PT evaluation s/p admit to [facility] on 6/8/2024 w/ Acute cystitis without hematuria. PT consulted to assess pt's functional mobility and d/c needs. Order placed for PT eval and tx, w/ up and OOB as tolerated order. Pt agreeable to PT  session upon arrival, pt found seated OOB in recliner.  PTA, pt was independent w/ all functional mobility w/ occ use of rollator, lives w/ spouse in 2 level home, and retired.  Pt to benefit from continued PT tx to address deficits and maximize level of functional independent mobility and consistency. Upon conclusion pt  seated in recliner. Complexity: Comorbidities affecting pt's physical performance at time of assessment include: neuropathy, anxiety, depression, and encephalopathy and recent falls . Personal factors affecting pt at time of IE include: advanced age, history of falls, limited insight into impairments, steps to enter home, and decreased cognition. Please find objective findings from PT assessment regarding body systems outlined above with impairments and limitations including impaired balance, gait deviations, altered sensation, decreased safety awareness, impaired judgement, fall risk, and decreased cognition.  Pt's clinical presentation is currently unstable/unpredictable seen in pt's presentation of abnormal H&H and abnormal potassium levels. The patient's AM-PAC Basic Mobility Inpatient Short Form Raw Score is 19.   Based on patient presentations and impairments, pt would most appropriately benefit from Level 2 resource intensity upon discharge. Please also refer to the recommendation of the Physical Therapist for safe discharge planning. RN verbalized pt appropriate for PT session. Pt seen as a co-eval with OT due to the patient's co-morbidities, clinically unstable presentation, and present impairments which are a regression from the patient's baseline.  Barriers to Discharge: Decreased caregiver support, Inaccessible home environment     Rehab Resource Intensity Level, PT: II (Moderate Resource Intensity)    See flowsheet documentation for full assessment.

## 2024-06-12 NOTE — ASSESSMENT & PLAN NOTE
Hemoglobin drop noted from 12.7 on admission to 10.6  But this was dilutional as hemoglobin stabilized at 10.5 with no active bleeding

## 2024-06-12 NOTE — ASSESSMENT & PLAN NOTE
This is likely due to polypharmacy rather than infectious related  Patient has returned to baseline  CT brain (6/8): No intracranial hemorrhage or calvarial fracture.   Would avoid future benzodiazepine administration in this patient

## 2024-06-13 ENCOUNTER — TRANSITIONAL CARE MANAGEMENT (OUTPATIENT)
Dept: FAMILY MEDICINE CLINIC | Facility: CLINIC | Age: 79
End: 2024-06-13

## 2024-06-13 VITALS
TEMPERATURE: 97.7 F | BODY MASS INDEX: 20.76 KG/M2 | RESPIRATION RATE: 18 BRPM | HEART RATE: 80 BPM | WEIGHT: 129.19 LBS | DIASTOLIC BLOOD PRESSURE: 51 MMHG | OXYGEN SATURATION: 97 % | SYSTOLIC BLOOD PRESSURE: 108 MMHG | HEIGHT: 66 IN

## 2024-06-13 DIAGNOSIS — Z71.89 COMPLEX CARE COORDINATION: Primary | ICD-10-CM

## 2024-06-13 PROCEDURE — 97116 GAIT TRAINING THERAPY: CPT

## 2024-06-13 RX ADMIN — DULOXETINE HYDROCHLORIDE 60 MG: 60 CAPSULE, DELAYED RELEASE ORAL at 09:05

## 2024-06-13 RX ADMIN — LEVOTHYROXINE SODIUM 75 MCG: 75 TABLET ORAL at 05:57

## 2024-06-13 RX ADMIN — ATORVASTATIN CALCIUM 40 MG: 40 TABLET, FILM COATED ORAL at 09:05

## 2024-06-13 NOTE — PHYSICAL THERAPY NOTE
PHYSICAL THERAPY TREATMENT NOTE  NAME:  Daily Schaeffer  DATE: 06/13/24    Length Of Stay: 4  Performed at least 2 patient identifiers during session: Name and ID bracelet    TREATMENT:      06/13/24 1335   PT Last Visit   PT Visit Date 06/13/24   Note Type   Note Type Treatment   Pain Assessment   Pain Assessment Tool 0-10   Pain Score No Pain   Restrictions/Precautions   Weight Bearing Precautions Per Order No   Other Precautions Cognitive;Chair Alarm;Bed Alarm;Fall Risk   General   Chart Reviewed Yes   Family/Caregiver Present Yes   Cognition   Overall Cognitive Status Impaired   Arousal/Participation Alert;Responsive;Cooperative   Attention Attends with cues to redirect   Orientation Level Oriented to person;Oriented to place;Oriented to time;Disoriented to situation   Memory Decreased short term memory;Decreased recall of recent events   Following Commands Follows one step commands with increased time or repetition   Comments pt agreeable to PT treatment   Bed Mobility   Additional Comments pt seated OOB in recliner upon arrival and at conclusion   Transfers   Sit to Stand   (CGA)   Additional items Assist x 1;Armrests;Increased time required;Verbal cues   Stand to Sit   (CGA)   Additional items Assist x 1;Increased time required;Verbal cues;Armrests   Additional Comments pt requires verbal cues for proper hand placement and safe technique   Ambulation/Elevation   Gait pattern Improper Weight shift;Decreased foot clearance;Short stride   Gait Assistance   (CGA)   Additional items Assist x 1;Verbal cues   Assistive Device Rolling walker   Distance 175 ft x1   Ambulation/Elevation Additional Comments decreased safety awareness noted with mobility and obstacle negiotation   Balance   Static Sitting Good   Dynamic Sitting Fair +   Static Standing Fair   Dynamic Standing Fair   Ambulatory Fair -   Endurance Deficit   Endurance Deficit No   Activity Tolerance   Activity Tolerance Patient tolerated treatment well    Nurse Made Aware RN made aware of outcomes   Assessment   Prognosis Fair   Assessment Pt seen for PT treatment session this date with interventions consisting of gait training w/ emphasis on improving pt's ability to ambulate level surfaces x 175 ft x1 with CGA provided by therapist with RW and therapeutic activity consisting of training: sit<>stand transfers. Pt agreeable to PT treatment session upon arrival, pt found seated OOB in recliner, in no apparent distress and responsive. In comparison to previous session, pt with improvements in distance ambulated . Post session: pt returned back to recliner, chair alarm engaged, all needs in reach, and RN notified of session findings/recommendations. Continue to recommend Level II (Moderate Resource Intensity at time of d/c in order to maximize pt's functional independence and safety w/ mobility. Pt continues to be functioning below baseline level. PT will continue to see pt during current hospitalization in order to address the deficits listed above and provide interventions consistent w/ POC in effort to achieve STGs.    Amy Monterroso, PTA    Pt seen for PT treatment session this date with interventions consisting of gait training w/ emphasis on improving pt's ability to ambulate level surfaces x 175 ft x1 with CGA provided by therapist with RW and therapeutic activity consisting of training: sit<>stand transfers. Pt agreeable to PT treatment session upon arrival, pt found seated OOB in recliner, in no apparent distress and responsive. In comparison to previous session, pt with improvements in distance ambulated . Post session: pt returned back to recliner, chair alarm engaged, all needs in reach, and RN notified of session findings/recommendations. Continue to recommend Level II (Moderate Resource Intensity at time of d/c in order to maximize pt's functional independence and safety w/ mobility. Pt continues to be functioning below baseline level. PT will  continue to see pt during current hospitalization in order to address the deficits listed above and provide interventions consistent w/ POC in effort to achieve STGs.    Amy Monterroso PTA   Barriers to Discharge Decreased caregiver support;Inaccessible home environment   Goals   Patient Goals to go home   LTG Expiration Date 06/22/24   Plan   Treatment/Interventions Functional transfer training;LE strengthening/ROM;Elevations;Therapeutic exercise;Endurance training;Bed mobility;Gait training;Compensatory technique education;Spoke to nursing   PT Frequency 3-5x/wk   Discharge Recommendation   Rehab Resource Intensity Level, PT II (Moderate Resource Intensity)   AM-PAC Basic Mobility Inpatient   Turning in Flat Bed Without Bedrails 4   Lying on Back to Sitting on Edge of Flat Bed Without Bedrails 3   Moving Bed to Chair 3   Standing Up From Chair Using Arms 3   Walk in Room 3   Climb 3-5 Stairs With Railing 3   Basic Mobility Inpatient Raw Score 19   Basic Mobility Standardized Score 42.48   [facility] Highest Level Of Mobility   JH-HLM Goal 6: Walk 10 steps or more   JH-HLM Achieved 7: Walk 25 feet or more   End of Consult   Patient Position at End of Consult Bedside chair;Bed/Chair alarm activated;All needs within reach         The patient's AM-PAC Basic Mobility Inpatient Short Form Raw Score is 19. A Raw score of greater than 16 suggests the patient may benefit from discharge to home. Please also refer to the recommendation of the Physical Therapist for safe discharge planning.      Amy Monterroso PTA,PTA

## 2024-06-13 NOTE — PLAN OF CARE
Problem: Potential for Falls  Goal: Patient will remain free of falls  Description: INTERVENTIONS:  - Educate patient/family on patient safety including physical limitations  - Instruct patient to call for assistance with activity   - Consult OT/PT to assist with strengthening/mobility   - Keep Call bell within reach  - Keep bed low and locked with side rails adjusted as appropriate  - Keep care items and personal belongings within reach  - Initiate and maintain comfort rounds  - Make Fall Risk Sign visible to staff  - Offer Toileting every 2 Hours, in advance of need  - Initiate/Maintain bed alarm  - Obtain necessary fall risk management equipment: non skid socks  - Apply yellow socks and bracelet for high fall risk patients  - Consider moving patient to room near nurses station  Outcome: Progressing     Problem: PAIN - ADULT  Goal: Verbalizes/displays adequate comfort level or baseline comfort level  Description: Interventions:  - Encourage patient to monitor pain and request assistance  - Assess pain using appropriate pain scale  - Administer analgesics based on type and severity of pain and evaluate response  - Implement non-pharmacological measures as appropriate and evaluate response  - Consider cultural and social influences on pain and pain management  - Notify physician/advanced practitioner if interventions unsuccessful or patient reports new pain  Outcome: Progressing     Problem: INFECTION - ADULT  Goal: Absence or prevention of progression during hospitalization  Description: INTERVENTIONS:  - Assess and monitor for signs and symptoms of infection  - Monitor lab/diagnostic results  - Monitor all insertion sites, i.e. indwelling lines, tubes, and drains  - Monitor endotracheal if appropriate and nasal secretions for changes in amount and color  - [place] appropriate cooling/warming therapies per order  - Administer medications as ordered  - Instruct and encourage patient and family to use good hand  hygiene technique  - Identify and instruct in appropriate isolation precautions for identified infection/condition  Outcome: Progressing  Goal: Absence of fever/infection during neutropenic period  Description: INTERVENTIONS:  - Monitor WBC    Outcome: Progressing     Problem: SAFETY ADULT  Goal: Patient will remain free of falls  Description: INTERVENTIONS:  - Educate patient/family on patient safety including physical limitations  - Instruct patient to call for assistance with activity   - Consult OT/PT to assist with strengthening/mobility   - Keep Call bell within reach  - Keep bed low and locked with side rails adjusted as appropriate  - Keep care items and personal belongings within reach  - Initiate and maintain comfort rounds  - Make Fall Risk Sign visible to staff  - Offer Toileting every 2 Hours, in advance of need  - Initiate/Maintain bed alarm  - Obtain necessary fall risk management equipment: non skid socks  - Apply yellow socks and bracelet for high fall risk patients  - Consider moving patient to room near nurses station  Outcome: Progressing  Goal: Maintain or return to baseline ADL function  Description: INTERVENTIONS:  -  Assess patient's ability to carry out ADLs; assess patient's baseline for ADL function and identify physical deficits which impact ability to perform ADLs (bathing, care of mouth/teeth, toileting, grooming, dressing, etc.)  - Assess/evaluate cause of self-care deficits   - Assess range of motion  - Assess patient's mobility; develop plan if impaired  - Assess patient's need for assistive devices and provide as appropriate  - Encourage maximum independence but intervene and supervise when necessary  - Involve family in performance of ADLs  - Assess for home care needs following discharge   - Consider OT consult to assist with ADL evaluation and planning for discharge  - Provide patient education as appropriate  Outcome: Progressing  Goal: Maintains/Returns to pre admission  functional level  Description: INTERVENTIONS:  - Perform AM-PAC 6 Click Basic Mobility/ Daily Activity assessment daily.  - Set and communicate daily mobility goal to care team and patient/family/caregiver.   - Collaborate with rehabilitation services on mobility goals if consulted  - Perform Range of Motion 2 times a day.  - Reposition patient every 2 hours.  - Dangle patient 2 times a day  - Stand patient 2 times a day  - Ambulate patient 2 times a day  - Out of bed to chair 3 times a day   - Out of bed for meals 3 times a day  - Out of bed for toileting  - Record patient progress and toleration of activity level   Outcome: Progressing     Problem: DISCHARGE PLANNING  Goal: Discharge to home or other facility with appropriate resources  Description: INTERVENTIONS:  - Identify barriers to discharge w/patient and caregiver  - Arrange for needed discharge resources and transportation as appropriate  - Identify discharge learning needs (meds, wound care, etc.)  - Refer to Case Management Department for coordinating discharge planning if the patient needs post-hospital services based on physician/advanced practitioner order or complex needs related to functional status, cognitive ability, or social support system  Outcome: Progressing     Problem: Knowledge Deficit  Goal: Patient/family/caregiver demonstrates understanding of disease process, treatment plan, medications, and discharge instructions  Description: Complete learning assessment and assess knowledge base.  Interventions:  - Provide teaching at level of understanding  - Provide teaching via preferred learning methods  Outcome: Progressing     Problem: MUSCULOSKELETAL - ADULT  Goal: Maintain or return mobility to safest level of function  Description: INTERVENTIONS:  - Assess patient's ability to carry out ADLs; assess patient's baseline for ADL function and identify physical deficits which impact ability to perform ADLs (bathing, care of mouth/teeth,  toileting, grooming, dressing, etc.)  - Assess/evaluate cause of self-care deficits   - Assess range of motion  - Assess patient's mobility  - Assess patient's need for assistive devices and provide as appropriate  - Encourage maximum independence but intervene and supervise when necessary  - Involve family in performance of ADLs  - Assess for home care needs following discharge   - Consider OT consult to assist with ADL evaluation and planning for discharge  - Provide patient education as appropriate  Outcome: Progressing     Problem: NEUROSENSORY - ADULT  Goal: Achieves stable or improved neurological status  Description: INTERVENTIONS  - Monitor and report changes in neurological status  - Monitor vital signs such as temperature, blood pressure, glucose, and any other labs ordered   - Initiate measures to prevent increased intracranial pressure  - Monitor for seizure activity and implement precautions if appropriate      Outcome: Progressing  Goal: Achieves maximal functionality and self care  Description: INTERVENTIONS  - Monitor swallowing and airway patency with patient fatigue and changes in neurological status  - Encourage and assist patient to increase activity and self care.   - Encourage visually impaired, hearing impaired and aphasic patients to use assistive/communication devices  Outcome: Progressing

## 2024-06-13 NOTE — PROGRESS NOTES
All discharge instructions reviewed with patient.  Glo removed.  Dwaine removed.  No scripts to give.  Pt discharged and awaiting  at 1430 today.

## 2024-06-13 NOTE — PROGRESS NOTES
Patient:    MRN:  [id number]    Denis Request ID:  [id number]    Level of care reserved:  Home Health Agency    Partner Reserved:  [facility], [place] [address] [phone]    Clinical needs requested:    Geography searched:  [phone]    Start of Service:    Request sent:  4:35pm EDT on 6/12/2024 by Leticia Keenan    Partner reserved:  9:06am EDT on 6/13/2024 by Leticia eKenan    Choice list shared:  8:46am EDT on 6/13/2024 by Leticia Keenan

## 2024-06-13 NOTE — QUICK NOTE
Patient was cleared for discharge yesterday and discharge summary was completed on 6/12/24.  Unfortunately, patient did not leave the hospital as she was planned for discharge to rehab which she ultimately refused.  She remained in the hospital overnight pending neuropsych evaluation and case management to aid in home health care establishment and DME.    Remains medically stable for discharge.  She will be discharged home with home health care services this afternoon.  Please see discharge summary that was completed yesterday for further details regarding her hospitalization.

## 2024-06-13 NOTE — PLAN OF CARE
Problem: Potential for Falls  Goal: Patient will remain free of falls  Description: INTERVENTIONS:  - Educate patient/family on patient safety including physical limitations  - Instruct patient to call for assistance with activity   - Consult OT/PT to assist with strengthening/mobility   - Keep Call bell within reach  - Keep bed low and locked with side rails adjusted as appropriate  - Keep care items and personal belongings within reach  - Initiate and maintain comfort rounds  - Make Fall Risk Sign visible to staff  - Offer Toileting every 2 Hours, in advance of need  - Initiate/Maintain bed alarm  - Obtain necessary fall risk management equipment: non skid socks  - Apply yellow socks and bracelet for high fall risk patients  - Consider moving patient to room near nurses station  Outcome: Progressing     Problem: PAIN - ADULT  Goal: Verbalizes/displays adequate comfort level or baseline comfort level  Description: Interventions:  - Encourage patient to monitor pain and request assistance  - Assess pain using appropriate pain scale  - Administer analgesics based on type and severity of pain and evaluate response  - Implement non-pharmacological measures as appropriate and evaluate response  - Consider cultural and social influences on pain and pain management  - Notify physician/advanced practitioner if interventions unsuccessful or patient reports new pain  Outcome: Progressing     Problem: INFECTION - ADULT  Goal: Absence or prevention of progression during hospitalization  Description: INTERVENTIONS:  - Assess and monitor for signs and symptoms of infection  - Monitor lab/diagnostic results  - Monitor all insertion sites, i.e. indwelling lines, tubes, and drains  - Monitor endotracheal if appropriate and nasal secretions for changes in amount and color  - [place] appropriate cooling/warming therapies per order  - Administer medications as ordered  - Instruct and encourage patient and family to use good hand  hygiene technique  - Identify and instruct in appropriate isolation precautions for identified infection/condition  Outcome: Progressing  Goal: Absence of fever/infection during neutropenic period  Description: INTERVENTIONS:  - Monitor WBC    Outcome: Progressing     Problem: SAFETY ADULT  Goal: Patient will remain free of falls  Description: INTERVENTIONS:  - Educate patient/family on patient safety including physical limitations  - Instruct patient to call for assistance with activity   - Consult OT/PT to assist with strengthening/mobility   - Keep Call bell within reach  - Keep bed low and locked with side rails adjusted as appropriate  - Keep care items and personal belongings within reach  - Initiate and maintain comfort rounds  - Make Fall Risk Sign visible to staff  - Offer Toileting every 2 Hours, in advance of need  - Initiate/Maintain bed alarm  - Obtain necessary fall risk management equipment: non skid socks  - Apply yellow socks and bracelet for high fall risk patients  - Consider moving patient to room near nurses station  Outcome: Progressing  Goal: Maintain or return to baseline ADL function  Description: INTERVENTIONS:  -  Assess patient's ability to carry out ADLs; assess patient's baseline for ADL function and identify physical deficits which impact ability to perform ADLs (bathing, care of mouth/teeth, toileting, grooming, dressing, etc.)  - Assess/evaluate cause of self-care deficits   - Assess range of motion  - Assess patient's mobility; develop plan if impaired  - Assess patient's need for assistive devices and provide as appropriate  - Encourage maximum independence but intervene and supervise when necessary  - Involve family in performance of ADLs  - Assess for home care needs following discharge   - Consider OT consult to assist with ADL evaluation and planning for discharge  - Provide patient education as appropriate  Outcome: Progressing  Goal: Maintains/Returns to pre admission  functional level  Description: INTERVENTIONS:  - Perform AM-PAC 6 Click Basic Mobility/ Daily Activity assessment daily.  - Set and communicate daily mobility goal to care team and patient/family/caregiver.   - Collaborate with rehabilitation services on mobility goals if consulted  - Perform Range of Motion 2 times a day.  - Reposition patient every 2 hours.  - Dangle patient 2 times a day  - Stand patient 2 times a day  - Ambulate patient 2 times a day  - Out of bed to chair 3 times a day   - Out of bed for meals 3 times a day  - Out of bed for toileting  - Record patient progress and toleration of activity level   Outcome: Progressing     Problem: DISCHARGE PLANNING  Goal: Discharge to home or other facility with appropriate resources  Description: INTERVENTIONS:  - Identify barriers to discharge w/patient and caregiver  - Arrange for needed discharge resources and transportation as appropriate  - Identify discharge learning needs (meds, wound care, etc.)  - Refer to Case Management Department for coordinating discharge planning if the patient needs post-hospital services based on physician/advanced practitioner order or complex needs related to functional status, cognitive ability, or social support system  Outcome: Progressing     Problem: Knowledge Deficit  Goal: Patient/family/caregiver demonstrates understanding of disease process, treatment plan, medications, and discharge instructions  Description: Complete learning assessment and assess knowledge base.  Interventions:  - Provide teaching at level of understanding  - Provide teaching via preferred learning methods  Outcome: Progressing     Problem: MUSCULOSKELETAL - ADULT  Goal: Maintain or return mobility to safest level of function  Description: INTERVENTIONS:  - Assess patient's ability to carry out ADLs; assess patient's baseline for ADL function and identify physical deficits which impact ability to perform ADLs (bathing, care of mouth/teeth,  toileting, grooming, dressing, etc.)  - Assess/evaluate cause of self-care deficits   - Assess range of motion  - Assess patient's mobility  - Assess patient's need for assistive devices and provide as appropriate  - Encourage maximum independence but intervene and supervise when necessary  - Involve family in performance of ADLs  - Assess for home care needs following discharge   - Consider OT consult to assist with ADL evaluation and planning for discharge  - Provide patient education as appropriate  Outcome: Progressing     Problem: NEUROSENSORY - ADULT  Goal: Achieves stable or improved neurological status  Description: INTERVENTIONS  - Monitor and report changes in neurological status  - Monitor vital signs such as temperature, blood pressure, glucose, and any other labs ordered   - Initiate measures to prevent increased intracranial pressure  - Monitor for seizure activity and implement precautions if appropriate      Outcome: Progressing  Goal: Achieves maximal functionality and self care  Description: INTERVENTIONS  - Monitor swallowing and airway patency with patient fatigue and changes in neurological status  - Encourage and assist patient to increase activity and self care.   - Encourage visually impaired, hearing impaired and aphasic patients to use assistive/communication devices  Outcome: Progressing

## 2024-06-13 NOTE — CASE MANAGEMENT
Case Management Discharge Planning Note    Patient name Daily Schaeffer  Location /-01 MRN [id number]  : 1945 Date 2024       Current Admission Date: 2024  Current Admission Diagnosis:Acute cystitis without hematuria- Ruled Out   Patient Active Problem List    Diagnosis Date Noted Date Diagnosed    Low hemoglobin 2024     Acute toxic encephalopathy 2024     Debility 2024     Stage 3 chronic kidney disease, unspecified whether stage 3a or 3b CKD (HCC) 2024     Exertional dyspnea 2024     Hypomagnesemia 2023     Acute cystitis without hematuria- Ruled Out 2023     Hypercalcemia 2023     Abnormal CT scan, pelvis 2023     Neuropathy 2023     Nonrheumatic aortic (valve) stenosis 2023     Hypothyroidism 2017    Dyslipidemia 2016    Crohn disease (HCC) 2016    Anxiety and depression 2016    Hyperglycemia 2016    Crohn's disease of jejunum (HCC) 2016     CKD (chronic kidney disease) stage 2, GFR 60-89 ml/min 2016       LOS (days): 4  Geometric Mean LOS (GMLOS) (days): 3.9  Days to GMLOS:-0.2     OBJECTIVE:  Risk of Unplanned Readmission Score: 17.42         Current admission status: Inpatient   Preferred Pharmacy:   [facility] [place] [facility] [address]  [place] [address]  Phone: [phone] Fax: [phone]    Primary Care Provider: Alanna Moreau DO    Primary Insurance: MEDICARE  Secondary Insurance: [employer]    DISCHARGE DETAILS:    Discharge planning discussed with:: Pt, spouse, daughters, grandson  Freedom of Choice: Yes                        Requested Home Health Care         Home Health Agency Name:: [facility]         Other Referral/Resources/Interventions Provided:  Interventions: HHC, Outpatient , Transportation (Area of Aging)  Referral Comments: PAIGE  spoke with grandson to review DCP. Grandson reported that he does not feel that his grandparents are safe in the home, but understands that they have been deemed to have capacity to make their own decisions. CM met with pt and spouse to review [facility] choice list. Pt and spouse chose [facility]. CM reserved via [employer]. CM spoke with pt and spouse about referral to [facility] for future assistance with HHA. Pt and spouse agreeable to referral being made to [facility]. CM contacted [facility] to refer both pt and spouse. CM sent referral to OP CM to follow pt and spouse. CM arranged for [place] transport for pt and spouse at 1430 today. CM notified pt, nurse, hospitalist, spouse, daughter, and grandson. Pt's  reported that the grandson has the house key and will leave it under the mat at front entrance. CM notified [facility] of key location via [employer]. [facility] reported that wife can transport in [place] with pt.                  Dispatcher Contacted: Yes  Number/Name of Dispatcher: [facility] / [phone]     ETA of Transport (Date): 06/13/24  ETA of Transport (Time): 1430

## 2024-06-14 ENCOUNTER — PATIENT OUTREACH (OUTPATIENT)
Dept: FAMILY MEDICINE CLINIC | Facility: CLINIC | Age: 79
End: 2024-06-14

## 2024-06-14 ENCOUNTER — HOME CARE VISIT (OUTPATIENT)
Dept: HOME HEALTH SERVICES | Facility: HOME HEALTHCARE | Age: 79
End: 2024-06-14

## 2024-06-14 NOTE — PROGRESS NOTES
Outpatient Care Management Note    IR referral received. Chart reviewed.  Patient recently hospitalized at [facility] 6/8/24-6/13/24 for acute cystitis without hematuria- ruled out.  Per review of discharge summary notes, originally presented due to altered mental status likely due to polypharmacy. Patient returned to baseline.    Patient referred to  [facility] at discharge.    OP RN PAIGE placed outreach call and briefly spoke with patient and call then handed off to patient's spouse Gilmar.    Spouse offers no patient concerns at this time. States that patient is currently in the kitchen cooking. State patient ambulates with walker and is able to manage ADL's and is also assisting spouse with ADL's.    He confirms that patient referred to  VNA and states that they are scheduled to come out to see both him and her today.  Spouse confirmed that patient received copy of AVS instructions and medication list and states that both were reviewed with them at time of discharge. He states that patient had no changes made to medications and declined need to review with RN PAIGE at this time.    Spouse offering no patient concerns at this time.    RN PAIGE explained role and care management. Spouse agreed to additional care management outreach. RN CM will schedule next outreach in 2 weeks.

## 2024-06-15 ENCOUNTER — HOME CARE VISIT (OUTPATIENT)
Dept: HOME HEALTH SERVICES | Facility: HOME HEALTHCARE | Age: 79
End: 2024-06-15

## 2024-06-15 VITALS
SYSTOLIC BLOOD PRESSURE: 142 MMHG | TEMPERATURE: 97.8 F | OXYGEN SATURATION: 96 % | DIASTOLIC BLOOD PRESSURE: 70 MMHG | HEART RATE: 82 BPM | RESPIRATION RATE: 18 BRPM

## 2024-06-15 NOTE — CASE COMMUNICATION
Notification of Assess not Admit    [facility] has assessed your patient for Home Health services and has determined the patient is not eligible for service due to the following    patients needs are beyond homecare. Daily declined getting out of bed for assessment today stating she is too tired to do so. patient was made aware that in order to have [facility] services we have to assess functional status, but she still declined getting ou t of bed.     this SN spoke with the patients daughter Kayleen and informed her of the situation. she was thankful for the phone call and is aware that we will be making a call to area on aging to report safety concerns in the home.    Suzanne Cruz RN

## 2024-06-16 ENCOUNTER — HOME CARE VISIT (OUTPATIENT)
Dept: HOME HEALTH SERVICES | Facility: HOME HEALTHCARE | Age: 79
End: 2024-06-16

## 2024-06-16 NOTE — CASE COMMUNICATION
Suzanne Cruz RN - Notification of Assess not Admit   [facility] has assessed your patient for Home Health services and has determined the patient is not eligible for service due to the following   patients needs are beyond homecare. Daily declined getting out of bed for assessment today stating she is too tired to do so. patient was made aware that in order to have VNA services we have to assess functional status, but she stil l declined getting out of bed.   this SN spoke with the patients daughter Kayleen and informed her of the situation. she was thankful for the phone call and is aware that we will be making a call to area on aging to report safety concerns in the home.   Suzanne Cruz RN

## 2024-06-18 ENCOUNTER — TELEPHONE (OUTPATIENT)
Age: 79
End: 2024-06-18

## 2024-06-18 ENCOUNTER — HOSPITAL ENCOUNTER (EMERGENCY)
Facility: HOSPITAL | Age: 79
Discharge: HOME | DRG: 683 | End: 2024-06-18
Attending: EMERGENCY MEDICINE
Payer: MEDICARE

## 2024-06-18 ENCOUNTER — APPOINTMENT (EMERGENCY)
Dept: RADIOLOGY | Facility: HOSPITAL | Age: 79
DRG: 683 | End: 2024-06-18
Payer: MEDICARE

## 2024-06-18 ENCOUNTER — APPOINTMENT (EMERGENCY)
Dept: CT IMAGING | Facility: HOSPITAL | Age: 79
DRG: 683 | End: 2024-06-18
Payer: MEDICARE

## 2024-06-18 VITALS
DIASTOLIC BLOOD PRESSURE: 74 MMHG | WEIGHT: 134.04 LBS | BODY MASS INDEX: 21.54 KG/M2 | HEART RATE: 79 BPM | TEMPERATURE: 98 F | OXYGEN SATURATION: 100 % | HEIGHT: 66 IN | SYSTOLIC BLOOD PRESSURE: 122 MMHG | RESPIRATION RATE: 18 BRPM

## 2024-06-18 DIAGNOSIS — S43.409A SHOULDER SPRAIN: ICD-10-CM
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
## 2024-06-18 DIAGNOSIS — S09.90XA CLOSED HEAD INJURY, INITIAL ENCOUNTER: ICD-10-CM

## 2024-06-18 PROCEDURE — 99285 EMERGENCY DEPT VISIT HI MDM: CPT

## 2024-06-18 PROCEDURE — 73030 X-RAY EXAM OF SHOULDER: CPT

## 2024-06-18 PROCEDURE — 70450 CT HEAD/BRAIN W/O DYE: CPT

## 2024-06-18 PROCEDURE — 99284 EMERGENCY DEPT VISIT MOD MDM: CPT | Performed by: EMERGENCY MEDICINE

## 2024-06-18 NOTE — TELEPHONE ENCOUNTER
Okay to put referral into [facility] for patient , Dx change in mental status.

## 2024-06-18 NOTE — TELEPHONE ENCOUNTER
Zully called in stating she received a report on patient and her  on Saturday 6/15 with concerns. Patient and  was in the hospital and discharged on Friday 6/14. They arraigned home health services through [facility]. Patient and  were scheduled for a visit. When Home health services patient asked them to come back later. [facility] is refusing to reopen case. Zully asking for home health services be order through another home health agency other then [facility].  Zully very concerned.

## 2024-06-19 ENCOUNTER — APPOINTMENT (EMERGENCY)
Dept: CT IMAGING | Facility: HOSPITAL | Age: 79
DRG: 683 | End: 2024-06-19
Payer: MEDICARE

## 2024-06-19 ENCOUNTER — HOSPITAL ENCOUNTER (INPATIENT)
Facility: HOSPITAL | Age: 79
LOS: 3 days | Discharge: HOME | DRG: 683 | End: 2024-06-22
Attending: EMERGENCY MEDICINE | Admitting: INTERNAL MEDICINE
Payer: MEDICARE

## 2024-06-19 ENCOUNTER — TELEPHONE (OUTPATIENT)
Age: 79
End: 2024-06-19

## 2024-06-19 ENCOUNTER — PATIENT OUTREACH (OUTPATIENT)
Dept: FAMILY MEDICINE CLINIC | Facility: CLINIC | Age: 79
End: 2024-06-19

## 2024-06-19 DIAGNOSIS — F41.9 ANXIETY: Primary | ICD-10-CM

## 2024-06-19 DIAGNOSIS — R41.82 AMS (ALTERED MENTAL STATUS): ICD-10-CM

## 2024-06-19 DIAGNOSIS — G62.9 NEUROPATHY: ICD-10-CM

## 2024-06-19 DIAGNOSIS — N17.9 AKI (ACUTE KIDNEY INJURY) (HCC): ICD-10-CM

## 2024-06-19 DIAGNOSIS — R93.0 ABNORMAL MRI OF HEAD: ICD-10-CM

## 2024-06-19 DIAGNOSIS — Z63.0 STRESS DUE TO MARITAL PROBLEMS: ICD-10-CM

## 2024-06-19 LAB
4HR DELTA HS TROPONIN: 0 NG/L
ALBUMIN SERPL BCG-MCNC: 4.2 G/DL (ref 3.5–5)
ALP SERPL-CCNC: 81 U/L (ref 34–104)
ALT SERPL W P-5'-P-CCNC: 22 U/L (ref 7–52)
AMPHETAMINES SERPL QL SCN: NEGATIVE
ANION GAP SERPL CALCULATED.3IONS-SCNC: 16 MMOL/L (ref 4–13)
AST SERPL W P-5'-P-CCNC: 29 U/L (ref 13–39)
ATRIAL RATE: 87 BPM
BACTERIA UR QL AUTO: NORMAL /HPF
BARBITURATES UR QL: NEGATIVE
BASOPHILS # BLD AUTO: 0.05 THOUSANDS/ÂΜL (ref 0–0.1)
BASOPHILS NFR BLD AUTO: 1 % (ref 0–1)
BENZODIAZ UR QL: NEGATIVE
BILIRUB SERPL-MCNC: 0.81 MG/DL (ref 0.2–1)
BILIRUB UR QL STRIP: NEGATIVE
BUN SERPL-MCNC: 13 MG/DL (ref 5–25)
CALCIUM SERPL-MCNC: 10.3 MG/DL (ref 8.4–10.2)
CARDIAC TROPONIN I PNL SERPL HS: 9 NG/L
CARDIAC TROPONIN I PNL SERPL HS: 9 NG/L
CHLORIDE SERPL-SCNC: 105 MMOL/L (ref 96–108)
CLARITY UR: CLEAR
CO2 SERPL-SCNC: 20 MMOL/L (ref 21–32)
COCAINE UR QL: NEGATIVE
COLOR UR: YELLOW
CREAT SERPL-MCNC: 1.29 MG/DL (ref 0.6–1.3)
EOSINOPHIL # BLD AUTO: 0.1 THOUSAND/ÂΜL (ref 0–0.61)
EOSINOPHIL NFR BLD AUTO: 1 % (ref 0–6)
ERYTHROCYTE [DISTWIDTH] IN BLOOD BY AUTOMATED COUNT: 13.3 % (ref 11.6–15.1)
ETHANOL SERPL-MCNC: <10 MG/DL
FENTANYL UR QL SCN: NEGATIVE
GFR SERPL CREATININE-BSD FRML MDRD: 39 ML/MIN/1.73SQ M
GLUCOSE SERPL-MCNC: 98 MG/DL (ref 65–140)
GLUCOSE UR STRIP-MCNC: NEGATIVE MG/DL
HCT VFR BLD AUTO: 38 % (ref 34.8–46.1)
HGB BLD-MCNC: 12.5 G/DL (ref 11.5–15.4)
HGB UR QL STRIP.AUTO: NEGATIVE
HYDROCODONE UR QL SCN: NEGATIVE
IMM GRANULOCYTES # BLD AUTO: 0.05 THOUSAND/UL (ref 0–0.2)
IMM GRANULOCYTES NFR BLD AUTO: 1 % (ref 0–2)
KETONES UR STRIP-MCNC: NEGATIVE MG/DL
LEUKOCYTE ESTERASE UR QL STRIP: ABNORMAL
LYMPHOCYTES # BLD AUTO: 3.14 THOUSANDS/ÂΜL (ref 0.6–4.47)
LYMPHOCYTES NFR BLD AUTO: 36 % (ref 14–44)
MCH RBC QN AUTO: 32.6 PG (ref 26.8–34.3)
MCHC RBC AUTO-ENTMCNC: 32.9 G/DL (ref 31.4–37.4)
MCV RBC AUTO: 99 FL (ref 82–98)
METHADONE UR QL: NEGATIVE
MONOCYTES # BLD AUTO: 1.04 THOUSAND/ÂΜL (ref 0.17–1.22)
MONOCYTES NFR BLD AUTO: 12 % (ref 4–12)
NEUTROPHILS # BLD AUTO: 4.31 THOUSANDS/ÂΜL (ref 1.85–7.62)
NEUTS SEG NFR BLD AUTO: 49 % (ref 43–75)
NITRITE UR QL STRIP: NEGATIVE
NON-SQ EPI CELLS URNS QL MICRO: NORMAL /HPF
NRBC BLD AUTO-RTO: 0 /100 WBCS
OPIATES UR QL SCN: NEGATIVE
OXYCODONE+OXYMORPHONE UR QL SCN: NEGATIVE
P AXIS: 81 DEGREES
PCP UR QL: NEGATIVE
PH UR STRIP.AUTO: 7 [PH]
PLATELET # BLD AUTO: 282 THOUSANDS/UL (ref 149–390)
PMV BLD AUTO: 8.5 FL (ref 8.9–12.7)
POTASSIUM SERPL-SCNC: 3.7 MMOL/L (ref 3.5–5.3)
PR INTERVAL: 202 MS
PROT SERPL-MCNC: 7.3 G/DL (ref 6.4–8.4)
PROT UR STRIP-MCNC: NEGATIVE MG/DL
QRS AXIS: 66 DEGREES
QRSD INTERVAL: 68 MS
QT INTERVAL: 412 MS
QTC INTERVAL: 495 MS
RBC # BLD AUTO: 3.83 MILLION/UL (ref 3.81–5.12)
RBC #/AREA URNS AUTO: NORMAL /HPF
SODIUM SERPL-SCNC: 141 MMOL/L (ref 135–147)
SP GR UR STRIP.AUTO: 1.01
T WAVE AXIS: 78 DEGREES
THC UR QL: NEGATIVE
TSH SERPL DL<=0.05 MIU/L-ACNC: 3.09 UIU/ML (ref 0.45–4.5)
UROBILINOGEN UR QL STRIP.AUTO: 0.2 E.U./DL
VENTRICULAR RATE: 87 BPM
WBC # BLD AUTO: 8.69 THOUSAND/UL (ref 4.31–10.16)
WBC #/AREA URNS AUTO: NORMAL /HPF

## 2024-06-19 PROCEDURE — 80053 COMPREHEN METABOLIC PANEL: CPT | Performed by: EMERGENCY MEDICINE

## 2024-06-19 PROCEDURE — 93010 ELECTROCARDIOGRAM REPORT: CPT | Performed by: INTERNAL MEDICINE

## 2024-06-19 PROCEDURE — 82077 ASSAY SPEC XCP UR&BREATH IA: CPT | Performed by: EMERGENCY MEDICINE

## 2024-06-19 PROCEDURE — 84443 ASSAY THYROID STIM HORMONE: CPT | Performed by: EMERGENCY MEDICINE

## 2024-06-19 PROCEDURE — G0426 INPT/ED TELECONSULT50: HCPCS

## 2024-06-19 PROCEDURE — 81001 URINALYSIS AUTO W/SCOPE: CPT | Performed by: EMERGENCY MEDICINE

## 2024-06-19 PROCEDURE — 84484 ASSAY OF TROPONIN QUANT: CPT | Performed by: EMERGENCY MEDICINE

## 2024-06-19 PROCEDURE — 80307 DRUG TEST PRSMV CHEM ANLYZR: CPT | Performed by: EMERGENCY MEDICINE

## 2024-06-19 PROCEDURE — 96361 HYDRATE IV INFUSION ADD-ON: CPT

## 2024-06-19 PROCEDURE — 93005 ELECTROCARDIOGRAM TRACING: CPT

## 2024-06-19 PROCEDURE — 96360 HYDRATION IV INFUSION INIT: CPT

## 2024-06-19 PROCEDURE — 99223 1ST HOSP IP/OBS HIGH 75: CPT | Performed by: INTERNAL MEDICINE

## 2024-06-19 PROCEDURE — 70450 CT HEAD/BRAIN W/O DYE: CPT

## 2024-06-19 PROCEDURE — 36415 COLL VENOUS BLD VENIPUNCTURE: CPT | Performed by: EMERGENCY MEDICINE

## 2024-06-19 PROCEDURE — 81003 URINALYSIS AUTO W/O SCOPE: CPT | Performed by: EMERGENCY MEDICINE

## 2024-06-19 PROCEDURE — 99285 EMERGENCY DEPT VISIT HI MDM: CPT

## 2024-06-19 PROCEDURE — 99285 EMERGENCY DEPT VISIT HI MDM: CPT | Performed by: EMERGENCY MEDICINE

## 2024-06-19 PROCEDURE — 85025 COMPLETE CBC W/AUTO DIFF WBC: CPT | Performed by: EMERGENCY MEDICINE

## 2024-06-19 RX ORDER — LORAZEPAM 0.5 MG/1
0.5 TABLET ORAL ONCE
Status: DISCONTINUED | OUTPATIENT
Start: 2024-06-19 | End: 2024-06-20

## 2024-06-19 RX ORDER — SODIUM CHLORIDE 9 MG/ML
100 INJECTION, SOLUTION INTRAVENOUS CONTINUOUS
Status: DISCONTINUED | OUTPATIENT
Start: 2024-06-19 | End: 2024-06-21

## 2024-06-19 RX ORDER — ACETAMINOPHEN 325 MG/1
650 TABLET ORAL EVERY 4 HOURS PRN
Status: DISCONTINUED | OUTPATIENT
Start: 2024-06-19 | End: 2024-06-22 | Stop reason: HOSPADM

## 2024-06-19 RX ORDER — LORAZEPAM 2 MG/ML
0.5 INJECTION INTRAMUSCULAR EVERY 6 HOURS PRN
Status: DISCONTINUED | OUTPATIENT
Start: 2024-06-19 | End: 2024-06-20

## 2024-06-19 RX ORDER — DULOXETIN HYDROCHLORIDE 30 MG/1
30 CAPSULE, DELAYED RELEASE ORAL DAILY
Status: DISCONTINUED | OUTPATIENT
Start: 2024-06-20 | End: 2024-06-22 | Stop reason: HOSPADM

## 2024-06-19 RX ORDER — DULOXETIN HYDROCHLORIDE 60 MG/1
60 CAPSULE, DELAYED RELEASE ORAL DAILY
Status: DISCONTINUED | OUTPATIENT
Start: 2024-06-19 | End: 2024-06-19

## 2024-06-19 RX ORDER — HEPARIN SODIUM 5000 [USP'U]/ML
5000 INJECTION, SOLUTION INTRAVENOUS; SUBCUTANEOUS EVERY 8 HOURS SCHEDULED
Status: DISCONTINUED | OUTPATIENT
Start: 2024-06-19 | End: 2024-06-22 | Stop reason: HOSPADM

## 2024-06-19 RX ORDER — QUETIAPINE FUMARATE 25 MG/1
25 TABLET, FILM COATED ORAL
Status: DISCONTINUED | OUTPATIENT
Start: 2024-06-19 | End: 2024-06-22 | Stop reason: HOSPADM

## 2024-06-19 RX ORDER — LEVOTHYROXINE SODIUM 0.07 MG/1
75 TABLET ORAL
Status: DISCONTINUED | OUTPATIENT
Start: 2024-06-20 | End: 2024-06-22 | Stop reason: HOSPADM

## 2024-06-19 RX ORDER — ATORVASTATIN CALCIUM 40 MG/1
40 TABLET, FILM COATED ORAL DAILY
Status: DISCONTINUED | OUTPATIENT
Start: 2024-06-19 | End: 2024-06-22 | Stop reason: HOSPADM

## 2024-06-19 RX ADMIN — SODIUM CHLORIDE 1000 ML: 0.9 INJECTION, SOLUTION INTRAVENOUS at 10:55

## 2024-06-19 RX ADMIN — SODIUM CHLORIDE 100 ML/HR: 0.9 INJECTION, SOLUTION INTRAVENOUS at 14:21

## 2024-06-19 RX ADMIN — LORAZEPAM 0.5 MG: 2 INJECTION INTRAMUSCULAR; INTRAVENOUS at 22:54

## 2024-06-19 RX ADMIN — DULOXETINE HYDROCHLORIDE 60 MG: 60 CAPSULE, DELAYED RELEASE ORAL at 14:16

## 2024-06-19 RX ADMIN — LORAZEPAM 0.5 MG: 2 INJECTION INTRAMUSCULAR; INTRAVENOUS at 14:22

## 2024-06-19 RX ADMIN — HEPARIN SODIUM 5000 UNITS: 5000 INJECTION, SOLUTION INTRAVENOUS; SUBCUTANEOUS at 14:16

## 2024-06-19 RX ADMIN — QUETIAPINE FUMARATE 25 MG: 25 TABLET ORAL at 21:12

## 2024-06-19 RX ADMIN — HEPARIN SODIUM 5000 UNITS: 5000 INJECTION, SOLUTION INTRAVENOUS; SUBCUTANEOUS at 21:11

## 2024-06-19 RX ADMIN — ATORVASTATIN CALCIUM 40 MG: 40 TABLET, FILM COATED ORAL at 14:16

## 2024-06-19 SDOH — SOCIAL STABILITY - SOCIAL INSECURITY: PROBLEMS IN RELATIONSHIP WITH SPOUSE OR PARTNER: Z63.0

## 2024-06-19 NOTE — PLAN OF CARE
Problem: PAIN - ADULT  Goal: Verbalizes/displays adequate comfort level or baseline comfort level  Description: Interventions:  - Encourage patient to monitor pain and request assistance  - Assess pain using appropriate pain scale  - Administer analgesics based on type and severity of pain and evaluate response  - Implement non-pharmacological measures as appropriate and evaluate response  - Consider cultural and social influences on pain and pain management  - Notify physician/advanced practitioner if interventions unsuccessful or patient reports new pain  Outcome: Progressing     Problem: INFECTION - ADULT  Goal: Absence or prevention of progression during hospitalization  Description: INTERVENTIONS:  - Assess and monitor for signs and symptoms of infection  - Monitor lab/diagnostic results  - Monitor all insertion sites, i.e. indwelling lines, tubes, and drains  - Monitor endotracheal if appropriate and nasal secretions for changes in amount and color  - [place] appropriate cooling/warming therapies per order  - Administer medications as ordered  - Instruct and encourage patient and family to use good hand hygiene technique  - Identify and instruct in appropriate isolation precautions for identified infection/condition  Outcome: Progressing  Goal: Absence of fever/infection during neutropenic period  Description: INTERVENTIONS:  - Monitor WBC    Outcome: Progressing     Problem: SAFETY ADULT  Goal: Patient will remain free of falls  Description: INTERVENTIONS:  - Educate patient/family on patient safety including physical limitations  - Instruct patient to call for assistance with activity   - Consult OT/PT to assist with strengthening/mobility   - Keep Call bell within reach  - Keep bed low and locked with side rails adjusted as appropriate  - Keep care items and personal belongings within reach  - Initiate and maintain comfort rounds  - Make Fall Risk Sign visible to staff  - Offer Toileting every 2 Hours,  in advance of need  - Initiate/Maintain bed alarm  - Obtain necessary fall risk management equipment: non skid socks  - Apply yellow socks and bracelet for high fall risk patients  - Consider moving patient to room near nurses station  Outcome: Progressing  Goal: Maintain or return to baseline ADL function  Description: INTERVENTIONS:  -  Assess patient's ability to carry out ADLs; assess patient's baseline for ADL function and identify physical deficits which impact ability to perform ADLs (bathing, care of mouth/teeth, toileting, grooming, dressing, etc.)  - Assess/evaluate cause of self-care deficits   - Assess range of motion  - Assess patient's mobility; develop plan if impaired  - Assess patient's need for assistive devices and provide as appropriate  - Encourage maximum independence but intervene and supervise when necessary  - Involve family in performance of ADLs  - Assess for home care needs following discharge   - Consider OT consult to assist with ADL evaluation and planning for discharge  - Provide patient education as appropriate  Outcome: Progressing  Goal: Maintains/Returns to pre admission functional level  Description: INTERVENTIONS:  - Perform AM-PAC 6 Click Basic Mobility/ Daily Activity assessment daily.  - Set and communicate daily mobility goal to care team and patient/family/caregiver.   - Collaborate with rehabilitation services on mobility goals if consulted  - Perform Range of Motion 2 times a day.  - Reposition patient every 2 hours.  - Dangle patient 2 times a day  - Stand patient 2 times a day  - Ambulate patient 2 times a day  - Out of bed to chair 3 times a day   - Out of bed for meals 3 times a day  - Out of bed for toileting  - Record patient progress and toleration of activity level   Outcome: Progressing     Problem: DISCHARGE PLANNING  Goal: Discharge to home or other facility with appropriate resources  Description: INTERVENTIONS:  - Identify barriers to discharge w/patient and  caregiver  - Arrange for needed discharge resources and transportation as appropriate  - Identify discharge learning needs (meds, wound care, etc.)  - Arrange for interpretive services to assist at discharge as needed  - Refer to Case Management Department for coordinating discharge planning if the patient needs post-hospital services based on physician/advanced practitioner order or complex needs related to functional status, cognitive ability, or social support system  Outcome: Progressing     Problem: Knowledge Deficit  Goal: Patient/family/caregiver demonstrates understanding of disease process, treatment plan, medications, and discharge instructions  Description: Complete learning assessment and assess knowledge base.  Interventions:  - Provide teaching at level of understanding  - Provide teaching via preferred learning methods  Outcome: Progressing     Problem: MUSCULOSKELETAL - ADULT  Goal: Maintain or return mobility to safest level of function  Description: INTERVENTIONS:  - Assess patient's ability to carry out ADLs; assess patient's baseline for ADL function and identify physical deficits which impact ability to perform ADLs (bathing, care of mouth/teeth, toileting, grooming, dressing, etc.)  - Assess/evaluate cause of self-care deficits   - Assess range of motion  - Assess patient's mobility  - Assess patient's need for assistive devices and provide as appropriate  - Encourage maximum independence but intervene and supervise when necessary  - Involve family in performance of ADLs  - Assess for home care needs following discharge   - Consider OT consult to assist with ADL evaluation and planning for discharge  - Provide patient education as appropriate  Outcome: Progressing  Goal: Maintain proper alignment of affected body part  Description: INTERVENTIONS:  - Support, maintain and protect limb and body alignment  - Provide patient/ family with appropriate education  Outcome: Progressing

## 2024-06-19 NOTE — PROGRESS NOTES
Pastoral Care Progress Note    2024  Patient: Daily Schaeffer : 1945  Admission Date & Time: 2024 0826  MRN: [id number] CSN: [id number]    CH in consult with RN initiated support visit to pt in setting of pt anxiety. Pt received CH on bed, no family present.  Pt expressed anxiety and fear about being in the hospital. Pt shared that she wanted water and to leave.  CH provided empathetic listening, support, water with RN permission.             Chaplaincy Interventions Utilized:   Empowerment: Encouraged focus on present and Encouraged self-care    Exploration: Explored relational needs & resources    Collaboration: Consulted with interdisciplinary team       24 0900   Clinical Encounter Type   Visited With Patient   Referral From Nurse

## 2024-06-19 NOTE — TELEPHONE ENCOUNTER
Patient's grandson called to request home health referral. He is also asking if there was a shuttle service set up to get the patient to TCM appointment scheduled for Next Wednesday. son, Milad, Is requesting a call back on his cell to clarify and ensure everything is set up properly.  
Spoke to Milad, he explained that pt is in hospital, from a fall, They need transportation to app, She is going to need help from the house to vehicle. She is unable to do this herself, I advised him to go thru patient services at the hospital for recommendations  
lumbar spine

## 2024-06-19 NOTE — PROGRESS NOTES
Outpatient Care Management Note    ADT alerts received- ED discharge followed by new ED visit. Chart reviewed.    Per Chart review:  Patient seen in ED at [facility] on 6/18/24 for fall.  Patient slipped and fell backward and injured right shoulder. No LOC. No blood thinners. Patient was deemed stable for outpatient management and discharged.    New ADT alert received 6/19/24. Chart reviewed.     Per chart review, patient is currently in ED at [facility]. Patient presented to ED on 6/19/24 for increased anxiety. Patient caring for  at home and states it's getting more difficult. Patient also reported mild chest pain.    RN CM will monitor chart for patient discharge and will outreach to patient once patient is discharged to home.

## 2024-06-19 NOTE — ASSESSMENT & PLAN NOTE
Presentation to the hospital for increased anxiety and weakness.  Was found in her house without air conditioning.  IV fluids.  Recheck labs in AM.    Results from last 7 days   Lab Units 06/19/24  0856   BUN mg/dL 13   CREATININE mg/dL 1.29   EGFR ml/min/1.73sq m 39
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
## 2024-06-19 NOTE — ED PROVIDER NOTES
History  Chief Complaint   Patient presents with    Fall     Fall from standing position hit head, back and right shoulder. No LOC negative blood thinners       78 YEAR-OLD FEMALE    PAST MEDICAL HISTORY  non reheumatic Aortic stenosis   HLD  Crohn disease   anxiety and depression   CKD        CC:   PATIENT IS HERE FOR HEAD INJURY  Slipped and Fell backwards at around 7pm   Injured right shoulder       THERE WAS NO LOC    THERE WAS NO DRUGS OR ETOH INVOLVED    SHE  HAS NO OTHER SOURCES OF PAIN: NO CP OR BACK PAIN OR ABDOMINAL PAIN    PT IS MOVING ALL EXTREMITIES    PT IS AMBULATORY    PT HAS EQUAL  STRENGTH, SHE IS NOT HAVING TROUBLE WALKING  PT HAS NO LOSS OF BOWEL OR BLADDER    INTERVENTIONS:   None            History provided by:  Patient      Prior to Admission Medications   Prescriptions Last Dose Informant Patient Reported? Taking?   DULoxetine (CYMBALTA) 60 mg delayed release capsule   No No   Sig: Take 1 capsule (60 mg total) by mouth daily   atorvastatin (LIPITOR) 40 mg tablet   No No   Sig: Take 1 tablet (40 mg total) by mouth daily   levothyroxine 75 mcg tablet   No No   Sig: Take 1 tablet (75 mcg total) by mouth daily in the early morning Take 1 tablet (75 mg) by oral route five days a week.      Facility-Administered Medications: None       Past Medical History:   Diagnosis Date    Aortic stenosis     Depression     Disease of thyroid gland     Diverticular disease 05/09/2017    Essential hypertension 05/20/2016    Hyperlipidemia     Major depression 05/20/2016    Peripheral neuropathy     Type 2 diabetes mellitus (HCC) 05/20/2016    Visual impairment        Past Surgical History:   Procedure Laterality Date    BOWEL RESECTION      COLON SURGERY      EYE SURGERY      SMALL INTESTINE SURGERY      TUBAL LIGATION         No family history on file.  I have reviewed and agree with the history as documented.    E-Cigarette/Vaping    E-Cigarette Use Never User      E-Cigarette/Vaping Substances    Nicotine  No     THC No     CBD No     Flavoring No      Social History     Tobacco Use    Smoking status: Never     Passive exposure: Never    Smokeless tobacco: Never   Vaping Use    Vaping status: Never Used   Substance Use Topics    Alcohol use: Yes     Comment: occasional    Drug use: Never       Review of Systems   Constitutional:  Negative for chills, diaphoresis, fatigue and fever.   HENT:  Negative for trouble swallowing.    Respiratory:  Negative for cough, chest tightness, shortness of breath, wheezing and stridor.    Cardiovascular:  Negative for palpitations and leg swelling.   Gastrointestinal:  Negative for abdominal pain and blood in stool.   Genitourinary:  Negative for difficulty urinating, dysuria, flank pain and frequency.   Musculoskeletal:  Negative for arthralgias, gait problem, joint swelling, myalgias, neck pain and neck stiffness.        Right shoulder pain    Skin:  Negative for rash and wound.   Neurological:  Negative for dizziness, light-headedness and headaches.   All other systems reviewed and are negative.      Physical Exam  Physical Exam  Constitutional:       General: She is not in acute distress.     Appearance: Normal appearance. She is well-developed. She is not ill-appearing, toxic-appearing or diaphoretic.   HENT:      Head: Normocephalic and atraumatic.      Right Ear: External ear normal.      Left Ear: External ear normal.      Nose: Nose normal. No congestion or rhinorrhea.      Mouth/Throat:      Pharynx: No oropharyngeal exudate or posterior oropharyngeal erythema.   Eyes:      General: No scleral icterus.        Right eye: No discharge.         Left eye: No discharge.      Extraocular Movements: Extraocular movements intact.      Conjunctiva/sclera: Conjunctivae normal.      Pupils: Pupils are equal, round, and reactive to light.   Neck:      Vascular: No JVD.      Trachea: No tracheal deviation.   Cardiovascular:      Rate and Rhythm: Normal rate and regular rhythm.       Pulses: Normal pulses.      Heart sounds: Normal heart sounds. No murmur heard.     No friction rub. No gallop.   Pulmonary:      Effort: Pulmonary effort is normal. No respiratory distress.      Breath sounds: Normal breath sounds. No stridor. No wheezing, rhonchi or rales.   Chest:      Chest wall: No tenderness.   Abdominal:      General: Bowel sounds are normal. There is no distension.      Palpations: Abdomen is soft. There is no mass.      Tenderness: There is no abdominal tenderness. There is no right CVA tenderness, left CVA tenderness, guarding or rebound.      Hernia: No hernia is present.   Musculoskeletal:         General: No swelling, tenderness, deformity or signs of injury. Normal range of motion.      Cervical back: Normal range of motion and neck supple. No rigidity or tenderness.      Right lower leg: No edema.      Left lower leg: No edema.   Lymphadenopathy:      Cervical: No cervical adenopathy.   Skin:     General: Skin is warm.      Capillary Refill: Capillary refill takes less than 2 seconds.      Coloration: Skin is not jaundiced or pale.      Findings: No bruising, erythema, lesion or rash.   Neurological:      General: No focal deficit present.      Mental Status: She is alert and oriented to person, place, and time. Mental status is at baseline.      Cranial Nerves: No cranial nerve deficit.      Sensory: No sensory deficit.      Motor: No weakness or abnormal muscle tone.      Coordination: Coordination normal.   Psychiatric:         Mood and Affect: Mood normal.         Behavior: Behavior normal.         Thought Content: Thought content normal.         Judgment: Judgment normal.         Vital Signs  ED Triage Vitals   Temp Pulse Resp BP SpO2   -- -- -- -- --      Temp src Heart Rate Source Patient Position - Orthostatic VS BP Location FiO2 (%)   -- -- -- -- --      Pain Score       --           There were no vitals filed for this visit.      Visual Acuity      ED  Medications  Medications - No data to display    Diagnostic Studies  Results Reviewed       None                   No orders to display              Procedures  Procedures         ED Course  ED Course as of 06/18/24 6246   Tue Jun 18, 2024   2253 CT BRAIN - WITHOUT CONTRAST     INDICATION:   fall, chi.     COMPARISON: 6/8/2024.     TECHNIQUE:  CT examination of the brain was performed.  Multiplanar 2D reformatted images were created from the source data.     Radiation dose length product (DLP) for this visit:  873.68 mGy-cm .  This examination, like all CT scans performed in the [facility], was performed utilizing techniques to minimize radiation dose exposure, including the use of iterative   reconstruction and automated exposure control.     IMAGE QUALITY:  Diagnostic.     FINDINGS:     PARENCHYMA: Decreased attenuation is noted in periventricular and subcortical white matter demonstrating an appearance that is statistically most likely to represent advanced microangiopathic change; this appearance is similar when compared to most   recent prior examination.     No CT signs of acute infarction.  No intracranial mass, mass effect or midline shift.  No acute parenchymal hemorrhage.     VENTRICLES AND EXTRA-AXIAL SPACES: Mild central greater than cortical volume loss/atrophy. Basilar cisterns are patent and unremarkable.     VISUALIZED ORBITS:  Normal visualized orbits.     PARANASAL SINUSES:  Normal visualized paranasal sinuses.     CALVARIUM AND EXTRACRANIAL SOFT TISSUES: Bony calvarium and temporomandibular joints are intact. There is mild posterior parietal scalp soft tissue swelling.     IMPRESSION:     Stable senescent changes without acute intracranial hemorrhage or depressed calvarial fracture. Mild posterior parietal scalp soft tissue swelling        [address]fer, pt's   Pt is cleared for discharge  He is fine w/ plan                                               Medical Decision  Making  Patient with history as above presented with Patient presents with:  Fall: Fall from standing position hit head, back and right shoulder. No LOC negative blood thinners    History obtained from patient, EMS    Patient was nontoxic, stable. Ambulatory. Exam as above.    Differential diagnosis considered. Overall presentation is consistent with CHI, right shoulder sprain  Low suspicion for sepsis, acs, ICH, intra-abdominal trauma, intrathoracic trauma, life or limb threatening process    Patient did not require treatment and remained stable    No Consideration was given for admission, as the patient was stable for outpatient management.    Disposition: Discussed need to follow up with PCP  Discharged with instructions to obtain outpatient follow up of patient's symptoms and findings, with strict return precautions if patient develops new or worsening symptoms.      This medical documentation was created using an electronic medical record system with M Modal voice recognition.  Although this document has been carefully reviewed, there may still be some phonetic and typographical errors.  These errors are purely typographical and due to imperfections of the software program, do not reflect any compromise in the patient's medical care.  ]      Amount and/or Complexity of Data Reviewed  Radiology: ordered and independent interpretation performed.    Risk  OTC drugs.  Prescription drug management.             Disposition  Final diagnoses:   None     ED Disposition       None          Follow-up Information    None         Patient's Medications   Discharge Prescriptions    No medications on file       No discharge procedures on file.    PDMP Review         Value Time User    PDMP Reviewed  Yes 5/11/2022 10:12 AM MAJOR Church            ED Provider  Electronically Signed by             Ernie Paige MD  06/19/24 0000

## 2024-06-19 NOTE — ED PROVIDER NOTES
History  Chief Complaint   Patient presents with    Anxiety     Patient reporting to the ed with increased anxiety. Patient states she has been taking care of her  at home and its getting more difficult for her.   Reporting mild chest pain.        77 yo female stating that was seen last night after a fall and evaluated with CT scan which was negative. States went home last night with ambulance, states that upon getting home last night with her  he was acting weird and wouldn't talk to her so she called her neighbor because she was afraid. Reports that her  never threatened her or hurt her. She states that she locked herself in the bedroom, then she called her daughter.  States that her  looked weird, and that his eyes looked weird.         Prior to Admission Medications   Prescriptions Last Dose Informant Patient Reported? Taking?   DULoxetine (CYMBALTA) 60 mg delayed release capsule Unknown  No No   Sig: Take 1 capsule (60 mg total) by mouth daily   atorvastatin (LIPITOR) 40 mg tablet Unknown  No No   Sig: Take 1 tablet (40 mg total) by mouth daily   levothyroxine 75 mcg tablet Unknown  No No   Sig: Take 1 tablet (75 mcg total) by mouth daily in the early morning Take 1 tablet (75 mg) by oral route five days a week.      Facility-Administered Medications: None       Past Medical History:   Diagnosis Date    Aortic stenosis     Depression     Disease of thyroid gland     Diverticular disease 05/09/2017    Essential hypertension 05/20/2016    Hyperlipidemia     Major depression 05/20/2016    Peripheral neuropathy     Type 2 diabetes mellitus (HCC) 05/20/2016    Visual impairment        Past Surgical History:   Procedure Laterality Date    BOWEL RESECTION      COLON SURGERY      EYE SURGERY      SMALL INTESTINE SURGERY      TUBAL LIGATION         History reviewed. No pertinent family history.  I have reviewed and agree with the history as documented.    E-Cigarette/Vaping    E-Cigarette Use  Never User      E-Cigarette/Vaping Substances    Nicotine No     THC No     CBD No     Flavoring No      Social History     Tobacco Use    Smoking status: Never     Passive exposure: Never    Smokeless tobacco: Never   Vaping Use    Vaping status: Never Used   Substance Use Topics    Alcohol use: Yes     Comment: occasional    Drug use: Never       Review of Systems   Cardiovascular:  Positive for chest pain.   Psychiatric/Behavioral:  The patient is nervous/anxious.    All other systems reviewed and are negative.      Physical Exam  Physical Exam  Constitutional:       General: She is not in acute distress.     Appearance: She is not ill-appearing, toxic-appearing or diaphoretic.      Comments: dissheveled   HENT:      Head: Normocephalic and atraumatic.      Comments: Non-tender to palpation of scalp, no noted external signs of trauma       Right Ear: External ear normal.      Left Ear: External ear normal.      Nose: Nose normal. No congestion or rhinorrhea.      Mouth/Throat:      Mouth: Mucous membranes are moist.      Pharynx: Oropharynx is clear.   Eyes:      General: No scleral icterus.        Right eye: No discharge.         Left eye: No discharge.      Conjunctiva/sclera: Conjunctivae normal.      Pupils: Pupils are equal, round, and reactive to light.   Cardiovascular:      Rate and Rhythm: Normal rate and regular rhythm.   Pulmonary:      Effort: Pulmonary effort is normal. No respiratory distress.   Abdominal:      General: Abdomen is flat. There is no distension.      Palpations: There is no mass.      Tenderness: There is no abdominal tenderness.   Musculoskeletal:         General: No swelling, deformity or signs of injury. Normal range of motion.      Cervical back: Normal range of motion and neck supple. No rigidity or tenderness.      Right lower leg: No edema.      Left lower leg: No edema.      Comments: Moving all extremities without difficulty in stretcher   Skin:     General: Skin is warm and  dry.      Capillary Refill: Capillary refill takes less than 2 seconds.      Coloration: Skin is not jaundiced or pale.      Findings: No bruising, erythema, lesion or rash.   Neurological:      Cranial Nerves: No cranial nerve deficit.      Sensory: No sensory deficit.      Motor: No weakness.      Coordination: Coordination normal.      Comments: Patient aware of person and time, knows she's in a medical facility but is not sure which one or where that facility is located   Psychiatric:         Behavior: Behavior is agitated and aggressive.         Thought Content: Thought content is paranoid and delusional. Thought content does not include homicidal or suicidal ideation. Thought content does not include homicidal or suicidal plan.         Vital Signs  ED Triage Vitals   Temperature Pulse Respirations Blood Pressure SpO2   06/19/24 0830 06/19/24 0830 06/19/24 0830 06/19/24 0830 06/19/24 0830   97.7 °F (36.5 °C) 84 16 134/70 98 %      Temp Source Heart Rate Source Patient Position - Orthostatic VS BP Location FiO2 (%)   06/19/24 0830 06/19/24 0830 06/19/24 0830 06/19/24 0830 --   Tympanic Monitor Sitting Left arm       Pain Score       06/19/24 0832       No Pain           Vitals:    06/19/24 0930 06/19/24 0945 06/19/24 1354 06/19/24 1406   BP:   143/65 111/70   Pulse: 93 103 92 99   Patient Position - Orthostatic VS:   Lying Lying         Visual Acuity      ED Medications  Medications   LORazepam (ATIVAN) tablet 0.5 mg (0.5 mg Oral Not Given 6/19/24 0914)   atorvastatin (LIPITOR) tablet 40 mg (40 mg Oral Given 6/19/24 1416)   levothyroxine tablet 75 mcg (has no administration in time range)   sodium chloride 0.9 % infusion (100 mL/hr Intravenous New Bag 6/19/24 1421)   heparin (porcine) subcutaneous injection 5,000 Units (5,000 Units Subcutaneous Given 6/19/24 1416)   acetaminophen (TYLENOL) tablet 650 mg (has no administration in time range)   QUEtiapine (SEROquel) tablet 25 mg (has no administration in time  range)   LORazepam (ATIVAN) injection 0.5 mg (0.5 mg Intravenous Given 6/19/24 1422)   DULoxetine (CYMBALTA) delayed release capsule 30 mg (has no administration in time range)   sodium chloride 0.9 % bolus 1,000 mL (0 mL Intravenous Stopped 6/19/24 1421)       Diagnostic Studies  Results Reviewed       Procedure Component Value Units Date/Time    HS Troponin I 4hr [[id number]]  (Normal) Collected: 06/19/24 1532    Lab Status: Final result Specimen: Blood from Arm, Left Updated: 06/19/24 1606     hs TnI 4hr 9 ng/L      Delta 4hr hsTnI 0 ng/L     Urine Microscopic [[id number]]  (Normal) Collected: 06/19/24 1118    Lab Status: Final result Specimen: Urine, Clean Catch Updated: 06/19/24 1138     RBC, UA None Seen /hpf      WBC, UA 2-4 /hpf      Epithelial Cells Occasional /hpf      Bacteria, UA Occasional /hpf     Rapid drug screen, urine [[id number]]  (Normal) Collected: 06/19/24 1118    Lab Status: Final result Specimen: Urine, Clean Catch Updated: 06/19/24 1136     Amph/Meth UR Negative     Barbiturate Ur Negative     Benzodiazepine Urine Negative     Cocaine Urine Negative     Methadone Urine Negative     Opiate Urine Negative     PCP Ur Negative     THC Urine Negative     Oxycodone Urine Negative     Fentanyl Urine Negative     HYDROCODONE URINE Negative    Narrative:      FOR MEDICAL PURPOSES ONLY.   IF CONFIRMATION NEEDED PLEASE CONTACT THE LAB WITHIN 5 DAYS.    Drug Screen Cutoff Levels:  AMPHETAMINE/METHAMPHETAMINES  1000 ng/mL  BARBITURATES     200 ng/mL  BENZODIAZEPINES     200 ng/mL  COCAINE      300 ng/mL  METHADONE      300 ng/mL  OPIATES      300 ng/mL  PHENCYCLIDINE     25 ng/mL  THC       50 ng/mL  OXYCODONE      100 ng/mL  FENTANYL      5 ng/mL  HYDROCODONE     300 ng/mL    UA w Reflex to Microscopic w Reflex to Culture [[id number]]  (Abnormal) Collected: 06/19/24 1118    Lab Status: Final result Specimen: Urine, Clean Catch Updated: 06/19/24 1129     Color, UA Yellow     Clarity, UA Clear     Specific  Gravity, UA 1.010     pH, UA 7.0     Leukocytes, UA 1+     Nitrite, UA Negative     Protein, UA Negative mg/dl      Glucose, UA Negative mg/dl      Ketones, UA Negative mg/dl      Urobilinogen, UA 0.2 E.U./dl      Bilirubin, UA Negative     Occult Blood, UA Negative    TSH [[id number]]  (Normal) Collected: 06/19/24 0856    Lab Status: Final result Specimen: Blood from Arm, Right Updated: 06/19/24 0934     TSH 3RD GENERATON 3.087 uIU/mL     HS Troponin I 2hr [[id number]]     Lab Status: No result Specimen: Blood     HS Troponin 0hr (reflex protocol) [[id number]]  (Normal) Collected: 06/19/24 0856    Lab Status: Final result Specimen: Blood from Arm, Right Updated: 06/19/24 0925     hs TnI 0hr 9 ng/L     Comprehensive metabolic panel [[id number]]  (Abnormal) Collected: 06/19/24 0856    Lab Status: Final result Specimen: Blood from Arm, Right Updated: 06/19/24 0924     Sodium 141 mmol/L      Potassium 3.7 mmol/L      Chloride 105 mmol/L      CO2 20 mmol/L      ANION GAP 16 mmol/L      BUN 13 mg/dL      Creatinine 1.29 mg/dL      Glucose 98 mg/dL      Calcium 10.3 mg/dL      AST 29 U/L      ALT 22 U/L      Alkaline Phosphatase 81 U/L      Total Protein 7.3 g/dL      Albumin 4.2 g/dL      Total Bilirubin 0.81 mg/dL      eGFR 39 ml/min/1.73sq m     Narrative:      [employer] guidelines for Chronic Kidney Disease (CKD):     Stage 1 with normal or high GFR (GFR > 90 mL/min/1.73 square meters)    Stage 2 Mild CKD (GFR = 60-89 mL/min/1.73 square meters)    Stage 3A Moderate CKD (GFR = 45-59 mL/min/1.73 square meters)    Stage 3B Moderate CKD (GFR = 30-44 mL/min/1.73 square meters)    Stage 4 Severe CKD (GFR = 15-29 mL/min/1.73 square meters)    Stage 5 End Stage CKD (GFR <15 mL/min/1.73 square meters)  Note: GFR calculation is accurate only with a steady state creatinine    Ethanol [[id number]]  (Normal) Collected: 06/19/24 0856    Lab Status: Final result Specimen: Blood from Arm, Right Updated: 06/19/24  0917     Ethanol Lvl <10 mg/dL     CBC and differential [[id number]]  (Abnormal) Collected: 06/19/24 0856    Lab Status: Final result Specimen: Blood from Arm, Right Updated: 06/19/24 0908     WBC 8.69 Thousand/uL      RBC 3.83 Million/uL      Hemoglobin 12.5 g/dL      Hematocrit 38.0 %      MCV 99 fL      MCH 32.6 pg      MCHC 32.9 g/dL      RDW 13.3 %      MPV 8.5 fL      Platelets 282 Thousands/uL      nRBC 0 /100 WBCs      Segmented % 49 %      Immature Grans % 1 %      Lymphocytes % 36 %      Monocytes % 12 %      Eosinophils Relative 1 %      Basophils Relative 1 %      Absolute Neutrophils 4.31 Thousands/µL      Absolute Immature Grans 0.05 Thousand/uL      Absolute Lymphocytes 3.14 Thousands/µL      Absolute Monocytes 1.04 Thousand/µL      Eosinophils Absolute 0.10 Thousand/µL      Basophils Absolute 0.05 Thousands/µL     POCT alcohol breath test [[id number]]     Lab Status: No result                    CT head without contrast   Final Result by Doyle Montague MD (06/19 1144)      No acute intracranial abnormality.                  Workstation performed: [id number]                    Procedures  ECG 12 Lead Documentation Only    Date/Time: 6/19/2024 5:04 PM    Performed by: Elton Carrillo DO  Authorized by: Elton Carrillo DO    Interpretation:     Interpretation: normal    Rate:     ECG rate:  87    ECG rate assessment: normal    Rhythm:     Rhythm: sinus rhythm    Ectopy:     Ectopy: none    QRS:     QRS axis:  Normal    QRS intervals:  Normal  Conduction:     Conduction: normal    ST segments:     ST segments:  Normal  T waves:     T waves: normal             ED Course  ED Course as of 06/19/24 1704   Wed Jun 19, 2024   0930 Patient becoming more agitated while here. Initially patient requesting medication for anxiety and confirmed with her oral ativan 0.5mg which she was in agreement with and then when nursing attempted to give it to her she yelled that we were trying to drug her and  threw nursing out of the room. Patient not given medication.  Pastoral care speaking with patient in attempt to calm patient and Crisis to evaluate patient and with hx of anxiety and depression and now appears to be having delusions/paranoia.   0953 Patient increasingly agitated.  Patient requesting to leave.  Asked patient if it was possible we could talk to her family which she allowed as explained to patient that I do not want to just send her outside the hospital it is very hot outside.  Patient's daughter Kayleen contacted and states she will be here in approximately 15 to 20 minutes.  Attempted to contact patient's daughter Kaila with no answer.    Explained to patient that her daughter Kayleen is coming to see her   1039 Patient's daughter Kayleen present at bedside and able to talk with Kaila patient's other daughter on speaker phone with care present and crisis worker Rina Cabral also present.  Concern is this is a significant mental status change per daughter's and they are concerned about her safety going home.  They are in agreement that their mother needs psychiatric and/or medical treatment at this time to determine why she had such an acute change in her mental status.  Will repeat CT head   1045 Although patient with CT head last night with acute altered mental status will repeat CT head for the possibility of delayed bleed.             HEART Risk Score      Flowsheet Row Most Recent Value   Heart Score Risk Calculator    History 0 Filed at: 06/19/2024 1704   ECG 0 Filed at: 06/19/2024 1704   Age 2 Filed at: 06/19/2024 1704   Risk Factors 2 Filed at: 06/19/2024 1704   Troponin 0 Filed at: 06/19/2024 1704   HEART Score 4 Filed at: 06/19/2024 1704                                        Medical Decision Making  78-year-old female with paranoia and delusions believing that we are trying to harm her.  She states at 1 point that we took her down to the cellar and tied her to a bed where shining lights in her eyes.   "Asked patient if she meant if this was when she went to CAT scan and patient irate stating that she knows what a CAT scan machine was and that we are lying to her.  Patient's daughter at bedside requesting that her mother stay and trying to convince her mother to stay for worsening kidney function and altered mental status.  Patient eventually becoming irate stating \"I do not care what you do my daughter has the mouth she can make the decisions\".  Patient admitted to medicine at this time.    Amount and/or Complexity of Data Reviewed  Labs: ordered.  Radiology: ordered.    Risk  Prescription drug management.  Decision regarding hospitalization.             Disposition  Final diagnoses:   Anxiety   SARA (acute kidney injury) (HCC)   AMS (altered mental status)     Time reflects when diagnosis was documented in both MDM as applicable and the Disposition within this note       Time User Action Codes Description Comment    6/19/2024  1:19 PM Elton Carrillo Add [F41.9] Anxiety     6/19/2024  1:19 PM Elton Carrillo Add [N17.9] SARA (acute kidney injury) (HCC)     6/19/2024  1:19 PM Elton Carrillo Add [R41.82] AMS (altered mental status)     6/19/2024  1:50 PM Prudencio Barahona Add [Z63.0] Stress due to marital problems           ED Disposition       ED Disposition   Admit    Condition   Stable    Date/Time   Wed Jun 19, 2024 1319    Comment   Case was discussed with MARIO and the patient's admission status was agreed to be Admission Status: observation status to the service of Dr. Barahona.               Follow-up Information    None         Current Discharge Medication List        CONTINUE these medications which have NOT CHANGED    Details   atorvastatin (LIPITOR) 40 mg tablet Take 1 tablet (40 mg total) by mouth daily  Qty: 90 tablet, Refills: 3    Associated Diagnoses: Dyslipidemia      DULoxetine (CYMBALTA) 60 mg delayed release capsule Take 1 capsule (60 mg total) by mouth daily  Qty: 30 capsule, Refills: 5    " Associated Diagnoses: Neuropathy; Anxiety with depression      levothyroxine 75 mcg tablet Take 1 tablet (75 mcg total) by mouth daily in the early morning Take 1 tablet (75 mg) by oral route five days a week.  Qty: 90 tablet, Refills: 3    Associated Diagnoses: Hypothyroidism, unspecified type             No discharge procedures on file.    PDMP Review         Value Time User    PDMP Reviewed  Yes 6/19/2024  2:14 PM MAJOR Lewis            ED Provider  Electronically Signed by             Elton Carrillo DO  06/19/24 0191

## 2024-06-19 NOTE — ASSESSMENT & PLAN NOTE
Increased anxiety and depression from marital issues.  Will consult psychiatry for further recommendations.  No thoughts of SI/HI at this time.  Will start quetiapine to help establish sleep/wake cycle.

## 2024-06-19 NOTE — ASSESSMENT & PLAN NOTE
History of hypothyroidism neuropathy and dyslipidemia presents to the hospital for increased agitation  Since recently discharged from hospital she has been having arguments with spouse about medical comorbidities and plan of care.  This is causing her much anxiety.  In the ED she had increased agitation and paranoia.  Consulting psychiatry.  Consult PT/OT.  Recently seen by neuropsychology: Has decision-making capacity.

## 2024-06-19 NOTE — ED NOTES
"Patient in full blown panic attack but refusing po ativan and wants to leave. \"Get me outta here. Do whatever you have to do to get me out of here.\" Dr. Carrillo notified of patient condition and wants crisis to evaluate patient. Patient had made statements to the effect that she believed someone was breaking into her house last night. Patient states she never wanted to come to the ED that her  called the ambulance.     Sudarshan So RN  06/19/24 6135    "

## 2024-06-19 NOTE — H&P
[facility]  H&P  Name: Daily Schaeffer 78 y.o. female I MRN: [id number]  Unit/Bed#: ED 28 I Date of Admission: 6/19/2024   Date of Service: 6/19/2024 I Hospital Day: 0      Assessment & Plan   * Stress due to marital problems  Assessment & Plan  History of hypothyroidism neuropathy and dyslipidemia presents to the hospital for increased agitation  Since recently discharged from hospital she has been having arguments with spouse about medical comorbidities and plan of care.  This is causing her much anxiety.  In the ED she had increased agitation and paranoia.  Consulting psychiatry.  Consult PT/OT.  Recently seen by neuropsychology: Has decision-making capacity.    SARA (acute kidney injury) (HCC)  Assessment & Plan  Presentation to the hospital for increased anxiety and weakness.  Was found in her house without air conditioning.  IV fluids.  Recheck labs in AM.    Results from last 7 days   Lab Units 06/19/24  0856   BUN mg/dL 13   CREATININE mg/dL 1.29   EGFR ml/min/1.73sq m 39       Neuropathy  Assessment & Plan  Continue duloxetine    Hypothyroidism  Assessment & Plan  Continue levothyroxine    Anxiety and depression  Assessment & Plan  Increased anxiety and depression from marital issues.  Will consult psychiatry for further recommendations.  No thoughts of SI/HI at this time.  Will start quetiapine to help establish sleep/wake cycle.    Dyslipidemia  Assessment & Plan  Continue atorvastatin    VTE Pharmacologic Prophylaxis: VTE Score: 3 Moderate Risk (Score 3-4) - Pharmacological DVT Prophylaxis Ordered: heparin.  Code Status: Level 1 - Full Code  Discussion with family: Updated  (daughter) at bedside.    Anticipated Length of Stay: Patient will be admitted on an inpatient basis with an anticipated length of stay of greater than 2 midnights secondary to anxiety/depression with need for psychiatric evaluation, kidney injury.    Total Time Spent on Date of Encounter in care  of patient: This time was spent on one or more of the following: performing physical exam; counseling and coordination of care; obtaining or reviewing history; documenting in the medical record; reviewing/ordering tests, medications or procedures; communicating with other healthcare professionals and discussing with patient's family/caregivers.    Chief Complaint:     Anxiety (Patient reporting to the ed with increased anxiety. Patient states she has been taking care of her  at home and its getting more difficult for her. /Reporting mild chest pain. /)    History of Present Illness:    Daily Schaeffer is a 78 y.o. female with a past medical history of hypothyroidism dyslipidemia and neuropathy who presents with increased anxiety.  The patient was recently hospitalized here for change in mental status.  She was found to have a UTI and was recommended rehab placement.  She was seen by neuropsychology and had capacity to make decisions.  The patient and spouse decided to go home instead.  Due to the spouse's comorbidities she feels overwhelmed and having difficulty to care for him.  They have constant arguments.  Last night she fell and was brought to the hospital where initial imaging was negative.  She went home where today she locked herself in her bedroom and after calling her daughter for help she was brought here to the hospital where she was agitated with pressured speech and paranoia.  She was noted to have kidney injury prompting admission.    Review of Systems:  Review of Systems   Constitutional:  Positive for fatigue. Negative for fever.   HENT:  Negative for facial swelling.    Eyes:  Negative for visual disturbance.   Respiratory:  Negative for shortness of breath.    Cardiovascular:  Negative for chest pain and palpitations.   Gastrointestinal:  Negative for abdominal distention, abdominal pain and vomiting.   Genitourinary:  Negative for hematuria and urgency.   Musculoskeletal:  Negative for  myalgias.   Skin:  Negative for rash.   Neurological:  Negative for seizures.   Psychiatric/Behavioral:  Positive for agitation, behavioral problems and sleep disturbance. The patient is nervous/anxious.    All other systems reviewed and are negative.        Past Medical and Surgical History:   Past Medical History:   Diagnosis Date    Aortic stenosis     Depression     Disease of thyroid gland     Diverticular disease 05/09/2017    Essential hypertension 05/20/2016    Hyperlipidemia     Major depression 05/20/2016    Peripheral neuropathy     Type 2 diabetes mellitus (HCC) 05/20/2016    Visual impairment      Past Surgical History:   Procedure Laterality Date    BOWEL RESECTION      COLON SURGERY      EYE SURGERY      SMALL INTESTINE SURGERY      TUBAL LIGATION       Meds/Allergies:  Allergies:   Allergies   Allergen Reactions    Wound Dressing Adhesive Rash     Prior to Admission Medications   Prescriptions Last Dose Informant Patient Reported? Taking?   DULoxetine (CYMBALTA) 60 mg delayed release capsule   No No   Sig: Take 1 capsule (60 mg total) by mouth daily   atorvastatin (LIPITOR) 40 mg tablet   No No   Sig: Take 1 tablet (40 mg total) by mouth daily   levothyroxine 75 mcg tablet   No No   Sig: Take 1 tablet (75 mcg total) by mouth daily in the early morning Take 1 tablet (75 mg) by oral route five days a week.      Facility-Administered Medications: None     Social History:     Social History     Socioeconomic History    Marital status: /Civil Union     Spouse name: Not on file    Number of children: Not on file    Years of education: Not on file    Highest education level: Not on file   Occupational History    Not on file   Tobacco Use    Smoking status: Never     Passive exposure: Never    Smokeless tobacco: Never   Vaping Use    Vaping status: Never Used   Substance and Sexual Activity    Alcohol use: Yes     Comment: occasional    Drug use: Never    Sexual activity: Not Currently     Partners:  Male     Birth control/protection: Female Sterilization   Other Topics Concern    Not on file   Social History Narrative    Not on file     Social Determinants of Health     Financial Resource Strain: Low Risk  (12/4/2023)    Overall Financial Resource Strain (CARDIA)     Difficulty of Paying Living Expenses: Not hard at all   Food Insecurity: No Food Insecurity (6/10/2024)    Hunger Vital Sign     Worried About Running Out of Food in the Last Year: Never true     Ran Out of Food in the Last Year: Never true   Transportation Needs: No Transportation Needs (6/10/2024)    PRAPARE - Transportation     Lack of Transportation (Medical): No     Lack of Transportation (Non-Medical): No   Physical Activity: Not on file   Stress: Not on file   Social Connections: Not on file   Intimate Partner Violence: Not on file   Housing Stability: Low Risk  (6/10/2024)    Housing Stability Vital Sign     Unable to Pay for Housing in the Last Year: No     Number of Times Moved in the Last Year: 1     Homeless in the Last Year: No     Patient Pre-hospital Living Situation: With spouse  Patient Pre-hospital Level of Mobility:   Patient Pre-hospital Diet Restrictions:     Family History:  History reviewed. No pertinent family history.  Physical Exam:   Vitals:   Blood Pressure: 169/81 (06/19/24 0845)  Pulse: 103 (06/19/24 0945)  Temperature: 97.7 °F (36.5 °C) (06/19/24 0830)  Temp Source: Tympanic (06/19/24 0830)  Respirations: 20 (06/19/24 0945)  SpO2: 99 % (06/19/24 0945)    Physical Exam  Vitals reviewed.   Constitutional:       General: She is not in acute distress.     Appearance: Normal appearance.   HENT:      Head: Atraumatic.   Eyes:      Extraocular Movements: Extraocular movements intact.   Cardiovascular:      Rate and Rhythm: Regular rhythm.      Heart sounds: Normal heart sounds.   Pulmonary:      Breath sounds: Normal breath sounds. No wheezing.   Abdominal:      General: Bowel sounds are normal.      Palpations: Abdomen is  soft.      Tenderness: There is no guarding.   Musculoskeletal:         General: No swelling.      Cervical back: Normal range of motion.   Skin:     General: Skin is warm.   Neurological:      General: No focal deficit present.      Mental Status: She is alert.   Psychiatric:         Mood and Affect: Mood is anxious. Affect is angry.         Speech: Speech is rapid and pressured.         Behavior: Behavior is agitated.       Lab Results: I have personally reviewed pertinent reports.    Results from last 7 days   Lab Units 06/19/24  0856   WBC Thousand/uL 8.69   HEMOGLOBIN g/dL 12.5   HEMATOCRIT % 38.0   PLATELETS Thousands/uL 282   SEGS PCT % 49   LYMPHO PCT % 36   MONO PCT % 12   EOS PCT % 1     Results from last 7 days   Lab Units 06/19/24  0856   SODIUM mmol/L 141   POTASSIUM mmol/L 3.7   CHLORIDE mmol/L 105   CO2 mmol/L 20*   ANION GAP mmol/L 16*   BUN mg/dL 13   CREATININE mg/dL 1.29   CALCIUM mg/dL 10.3*   ALBUMIN g/dL 4.2   TOTAL BILIRUBIN mg/dL 0.81   ALK PHOS U/L 81   ALT U/L 22   AST U/L 29   EGFR ml/min/1.73sq m 39   GLUCOSE RANDOM mg/dL 98             Results from last 7 days   Lab Units 06/19/24  0856   HS TNI 0HR ng/L 9                  Results from last 7 days   Lab Units 06/19/24  1118   COLOR UA  Yellow   CLARITY UA  Clear   SPEC GRAV UA  1.010   PH UA  7.0   LEUKOCYTES UA  1+*   NITRITE UA  Negative   GLUCOSE UA mg/dl Negative   KETONES UA mg/dl Negative   BILIRUBIN UA  Negative   BLOOD UA  Negative      Results from last 7 days   Lab Units 06/19/24  1118   RBC UA /hpf None Seen   WBC UA /hpf 2-4   EPITHELIAL CELLS WET PREP /hpf Occasional   BACTERIA UA /hpf Occasional            Lines/Drains  Invasive Devices       Peripheral Intravenous Line  Duration             Peripheral IV 06/19/24 Right Antecubital <1 day                    Imaging: I have personally reviewed pertinent films in PACS  CT head without contrast    Result Date: 6/19/2024  Impression: No acute intracranial abnormality.  Workstation performed: [id number]       EKG, Pathology, and Other Studies Reviewed on Admission:   EKG  Result Date: 06/19/24  Personally reviewed strips with impression of: Sinus rhythm 87 bpm    ** Please Note: This note has been constructed using a voice recognition system. **

## 2024-06-19 NOTE — DISCHARGE INSTRUCTIONS
RETURN IF WORSE IN ANY WAY:   INCREASED PAIN,   FEVER OR FLU LIKE SYMPTOMS,   NECK STIFFNESS,  VOMITING,  SEIZURE ACTIVITY OR CONFUSION,  OR NEW AND CONCERNING SYMPTOMS SIGNS OR SYMPTOMS      PLEASE CALL YOUR PRIMARY DOCTOR IN THE MORNING TO SET UP FOLLOW UP IN 1-2 DAYS  PLEASE REVIEW THE WORK UP RESULTS WITH YOUR DOCTOR

## 2024-06-19 NOTE — NURSING NOTE
AWAKE AND ALERT AND ORIENTED X4. POOR STM. RESP EASY NO DISTRESS. CALL BELL NEAR AND BED ALARM ON NO DISTRESS VIRTUAL 1/1 CONTINUES FOR SAFETY.

## 2024-06-20 ENCOUNTER — TELEPHONE (OUTPATIENT)
Dept: PSYCHIATRY | Facility: CLINIC | Age: 79
End: 2024-06-20

## 2024-06-20 PROBLEM — E87.6 HYPOKALEMIA: Status: ACTIVE | Noted: 2024-06-20

## 2024-06-20 LAB
ALBUMIN SERPL BCG-MCNC: 3.7 G/DL (ref 3.5–5)
ALP SERPL-CCNC: 81 U/L (ref 34–104)
ALT SERPL W P-5'-P-CCNC: 16 U/L (ref 7–52)
ANION GAP SERPL CALCULATED.3IONS-SCNC: 8 MMOL/L (ref 4–13)
AST SERPL W P-5'-P-CCNC: 27 U/L (ref 13–39)
BILIRUB SERPL-MCNC: 1.1 MG/DL (ref 0.2–1)
BUN SERPL-MCNC: 14 MG/DL (ref 5–25)
CALCIUM SERPL-MCNC: 8.8 MG/DL (ref 8.4–10.2)
CHLORIDE SERPL-SCNC: 109 MMOL/L (ref 96–108)
CO2 SERPL-SCNC: 22 MMOL/L (ref 21–32)
CREAT SERPL-MCNC: 0.89 MG/DL (ref 0.6–1.3)
ERYTHROCYTE [DISTWIDTH] IN BLOOD BY AUTOMATED COUNT: 13.5 % (ref 11.6–15.1)
GFR SERPL CREATININE-BSD FRML MDRD: 61 ML/MIN/1.73SQ M
GLUCOSE SERPL-MCNC: 89 MG/DL (ref 65–140)
HCT VFR BLD AUTO: 34.5 % (ref 34.8–46.1)
HGB BLD-MCNC: 11.2 G/DL (ref 11.5–15.4)
MCH RBC QN AUTO: 33.4 PG (ref 26.8–34.3)
MCHC RBC AUTO-ENTMCNC: 32.5 G/DL (ref 31.4–37.4)
MCV RBC AUTO: 103 FL (ref 82–98)
PLATELET # BLD AUTO: 221 THOUSANDS/UL (ref 149–390)
PMV BLD AUTO: 8.7 FL (ref 8.9–12.7)
POTASSIUM SERPL-SCNC: 3.3 MMOL/L (ref 3.5–5.3)
PROT SERPL-MCNC: 6.2 G/DL (ref 6.4–8.4)
RBC # BLD AUTO: 3.35 MILLION/UL (ref 3.81–5.12)
SODIUM SERPL-SCNC: 139 MMOL/L (ref 135–147)
WBC # BLD AUTO: 6.71 THOUSAND/UL (ref 4.31–10.16)

## 2024-06-20 PROCEDURE — 99232 SBSQ HOSP IP/OBS MODERATE 35: CPT | Performed by: INTERNAL MEDICINE

## 2024-06-20 PROCEDURE — 85027 COMPLETE CBC AUTOMATED: CPT | Performed by: INTERNAL MEDICINE

## 2024-06-20 PROCEDURE — 80053 COMPREHEN METABOLIC PANEL: CPT | Performed by: INTERNAL MEDICINE

## 2024-06-20 PROCEDURE — G0426 INPT/ED TELECONSULT50: HCPCS | Performed by: PSYCHIATRY & NEUROLOGY

## 2024-06-20 PROCEDURE — RECHECK: Performed by: INTERNAL MEDICINE

## 2024-06-20 RX ORDER — POTASSIUM CHLORIDE 20 MEQ/1
40 TABLET, EXTENDED RELEASE ORAL ONCE
Status: COMPLETED | OUTPATIENT
Start: 2024-06-20 | End: 2024-06-20

## 2024-06-20 RX ADMIN — HEPARIN SODIUM 5000 UNITS: 5000 INJECTION, SOLUTION INTRAVENOUS; SUBCUTANEOUS at 21:43

## 2024-06-20 RX ADMIN — HEPARIN SODIUM 5000 UNITS: 5000 INJECTION, SOLUTION INTRAVENOUS; SUBCUTANEOUS at 05:12

## 2024-06-20 RX ADMIN — HEPARIN SODIUM 5000 UNITS: 5000 INJECTION, SOLUTION INTRAVENOUS; SUBCUTANEOUS at 15:14

## 2024-06-20 RX ADMIN — DULOXETINE HYDROCHLORIDE 30 MG: 30 CAPSULE, DELAYED RELEASE ORAL at 09:19

## 2024-06-20 RX ADMIN — ATORVASTATIN CALCIUM 40 MG: 40 TABLET, FILM COATED ORAL at 09:19

## 2024-06-20 RX ADMIN — LEVOTHYROXINE SODIUM 75 MCG: 75 TABLET ORAL at 05:12

## 2024-06-20 RX ADMIN — POTASSIUM CHLORIDE 40 MEQ: 1500 TABLET, EXTENDED RELEASE ORAL at 11:36

## 2024-06-20 RX ADMIN — QUETIAPINE FUMARATE 25 MG: 25 TABLET ORAL at 21:43

## 2024-06-20 RX ADMIN — SODIUM CHLORIDE 100 ML/HR: 0.9 INJECTION, SOLUTION INTRAVENOUS at 21:47

## 2024-06-20 NOTE — ASSESSMENT & PLAN NOTE
Replace and recheck tomorrow    Results from last 7 days   Lab Units 06/20/24  0452 06/19/24  0856   POTASSIUM mmol/L 3.3* 3.7

## 2024-06-20 NOTE — CONSULTS
TeleConsultation - Behavioral Health   Daily Schaeffer 78 y.o. female MRN: [id number]  Unit/Bed#: -01 Encounter: [id number]      VIRTUAL CARE DOCUMENTATION:     1. This service was provided via Telemedicine using [employer]     2. Parties in the room with patient during teleconsult RN    3. Confidentiality My office door was closed     4. Participants The patient was notified the following individuals were present in the room RN    5. Patient want not able to acknowledged consent nor understanding of privacy and security of the  Telemedicine consult. Patient refused to speak with writer. I spoke with her emergency contact daughter Kaila Marte. My note is about this conversation.  6. Time spent 30       Assessment & Plan     Present on Admission:   Hypothyroidism   Dyslipidemia   Anxiety and depression   Neuropathy   Severe aortic stenosis    Assessment:    Delirium. 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  This is an acute change of mental status that happened 2 weeks ago following a fall.  There is no history of psychiatric illnesses nor treatment reported.  There is no history of dementia reported.      At the present time I recommend continued medical workup and possibly involving neurology to determine the underlying etiology for the current presentation of delirium.    Based on the current diagnosis of delirium and psychosis secondary to general medical condition, at the present time inpatient psychiatric admission is not recommended.    Contact psychiatry on as needed basis.  Per chart Seroquel was already recommended yesterday.    Recommend Delirium Precautions  Maintain sleep-wake cycle, avoid nighttime interruptions  Provide adequate pain control  Avoid urinary retention and constipation  Provide frequent and early mobilization  Provide frequent redirection and reorientation as needed  Avoid  medications that may worsen or precipitate delirium such as tramadol, benzodiazepines, anticholinergics, and Benadryl  Redirect unwanted behaviors as first-line therapy, avoid physical restraints.      Planned Medication Changes:    none    Current Medications:     Current Facility-Administered Medications   Medication Dose Route Frequency Provider Last Rate    acetaminophen  650 mg Oral Q4H PRN Prudencio Jun, DO      atorvastatin  40 mg Oral Daily Prudencio Jun, DO      DULoxetine  30 mg Oral Daily MAJOR Lewis      heparin (porcine)  5,000 Units Subcutaneous Q8H NIECY Prudencio Jun, DO      levothyroxine  75 mcg Oral Early Morning Prudencio Jun, DO      potassium chloride  40 mEq Oral Once Prudencio Jun, DO      QUEtiapine  25 mg Oral HS Prudencio Barahona, DO      sodium chloride  100 mL/hr Intravenous Continuous Prudencio Barahona,  mL/hr (06/19/24 1421)       Risks / Benefits of Treatment:  Patient in bed, refused to be interviewed.Other treatment modalities recommended as indicated:    I recommend continued medical workup to possibly determine the etiology of this acute change in altered mental status current diagnosis is delirium of unknown etiology.  I recommend consulting neurology.      Inpatient consult to Psychiatry  Consult performed by: Rosario King MD  Consult ordered by: Prudencio Barahona DO        Physician Requesting Consult: Prudencio Barahona DO  Principal Problem:Stress due to marital problems    Reason for Consult:  psychosis.      History of Present Illness      Patient is a 78 y.o.  female with no previous psychiatric history.  When I attempted to interview her by telepsychiatry she was sleeping in bed and appeared very pale and lethargic.  Her nurse attempted to wake her up but she refused to interviewed and said that she was not able to speak at the moment.  I spoke with her emergency contact daughter Diana who told me that her mother has no history of previous psychiatric  conditions.  Mother has been functional at home and able to handle her responsibilities till 2 weeks ago when she and her  sustained a fall.  At that time they were both hospitalized and admission to rehab was recommended but they both declined and returned home.  Her daughter believe her mother's head CT came back negative.  Since then the patient has been psychotic yelling screaming asking for help.  She has been claiming that her  tries to hurt her she tries to call the police claiming that he tries to kill her.  She talks about witches she talks about wanting the police break down the door she talks about a group of [place] attacking her .her record as per the note of crisis worker Rina Cabral patient was observed paranoid as a result she refused to drink the water provided to her, also refused to take medication.  She believes that she was being poisoned and told the crisis worker that she specifically took her to the basement tied her up and shined bright light on her.  There was no report of suicidal or homicidal ideation.  There is significant report of confusion but without a history of significant of dementia.  As per her daughter this is an acute change of mental mental status associated with a recent fall during which she has sustained head trauma.    Psychiatric Review Of Systems:  Historical Information   Patient unable to be interviewed  Past Psychiatric History: As per daughter there is no history of previous psychiatric illnesses or treatment   substance Abuse History:  As per daughter there is no history of previous substance use   family Psychiatric History:   Not known   social History: Not known as patient refused to be interviewed.  Past Medical History:   Diagnosis Date    Aortic stenosis     Depression     Disease of thyroid gland     Diverticular disease 05/09/2017    Essential hypertension 05/20/2016    Hyperlipidemia     Major depression 05/20/2016    Peripheral neuropathy      Type 2 diabetes mellitus (HCC) 05/20/2016    Visual impairment        Medical Review Of Systems:    Review of Systems    Meds/Allergies     all current active meds have been reviewed  Allergies   Allergen Reactions    Wound Dressing Adhesive Rash       Objective     Vital signs in last 24 hours:  Temp:  [97.6 °F (36.4 °C)-98.2 °F (36.8 °C)] 97.6 °F (36.4 °C)  HR:  [90-99] 90  Resp:  [17-20] 17  BP: (108-143)/(44-70) 108/44      Intake/Output Summary (Last 24 hours) at 6/20/2024 1116  Last data filed at 6/20/2024 0601  Gross per 24 hour   Intake 1065 ml   Output 0 ml   Net 1065 ml       Mental Status Evaluation: At the present time and I am unable to complete the full mental status examination patient was in bed covered by her bedsheet.  She appeared pale she was sleeping and when we woke her up she refused to be interviewed.  Lab Results: I have personally reviewed all pertinent laboratory/tests results.         Imaging Studies: CT head without contrast    Result Date: 6/19/2024  Narrative: CT BRAIN - WITHOUT CONTRAST INDICATION:   ams. COMPARISON: CT head from yesterday. TECHNIQUE:  CT examination of the brain was performed.  Multiplanar 2D reformatted images were created from the source data. Radiation dose length product (DLP) for this visit:  850.13 mGy-cm .  This examination, like all CT scans performed in the [facility], was performed utilizing techniques to minimize radiation dose exposure, including the use of iterative  reconstruction and automated exposure control. IMAGE QUALITY:  Diagnostic. FINDINGS: PARENCHYMA: Decreased attenuation is noted in periventricular and subcortical white matter demonstrating an appearance that is statistically most likely to represent advanced microangiopathic change; this appearance is similar when compared to most recent prior examination. No CT signs of acute infarction.  No intracranial mass, mass effect or midline shift.  No acute parenchymal  hemorrhage. VENTRICLES AND EXTRA-AXIAL SPACES:  Normal for the patient's age. VISUALIZED ORBITS: Normal visualized orbits. PARANASAL SINUSES: Normal visualized paranasal sinuses. CALVARIUM AND EXTRACRANIAL SOFT TISSUES:  Normal.     Impression: No acute intracranial abnormality. Workstation performed: [id number]     XR shoulder 2+ views RIGHT    Result Date: 6/19/2024  Narrative: XR SHOULDER 2+ VW RIGHT INDICATION: fall. COMPARISON: None FINDINGS: No acute fracture or dislocation. Moderate glenohumeral and acromioclavicular osteoarthritis. No lytic or blastic osseous lesion. Unremarkable soft tissues.     Impression: No acute osseous abnormality. Workstation performed: [id number]     CT head without contrast    Result Date: 6/18/2024  Narrative: CT BRAIN - WITHOUT CONTRAST INDICATION:   fall, chi. COMPARISON: 6/8/2024. TECHNIQUE:  CT examination of the brain was performed.  Multiplanar 2D reformatted images were created from the source data. Radiation dose length product (DLP) for this visit:  873.68 mGy-cm .  This examination, like all CT scans performed in the [facility], was performed utilizing techniques to minimize radiation dose exposure, including the use of iterative  reconstruction and automated exposure control. IMAGE QUALITY:  Diagnostic. FINDINGS: PARENCHYMA: Decreased attenuation is noted in periventricular and subcortical white matter demonstrating an appearance that is statistically most likely to represent advanced microangiopathic change; this appearance is similar when compared to most recent prior examination. No CT signs of acute infarction.  No intracranial mass, mass effect or midline shift.  No acute parenchymal hemorrhage. VENTRICLES AND EXTRA-AXIAL SPACES: Mild central greater than cortical volume loss/atrophy. Basilar cisterns are patent and unremarkable. VISUALIZED ORBITS:  Normal visualized orbits. PARANASAL SINUSES:  Normal visualized paranasal sinuses. CALVARIUM AND  EXTRACRANIAL SOFT TISSUES: Bony calvarium and temporomandibular joints are intact. There is mild posterior parietal scalp soft tissue swelling.     Impression: Stable senescent changes without acute intracranial hemorrhage or depressed calvarial fracture. Mild posterior parietal scalp soft tissue swelling Workstation performed: [id number]     XR shoulder 2+ views LEFT    Result Date: 6/9/2024  Narrative: XR SHOULDER 2+ VW LEFT INDICATION: left shoulder bruise. COMPARISON: None FINDINGS: No acute fracture or dislocation. Severe glenohumeral and acromioclavicular osteoarthritis. Narrowing across the subacromial arch consistent with chronic supraspinatus tendon tear. Unremarkable soft tissues.     Impression: Advanced degenerative change without acute osseous abnormality. Workstation performed: [id number]     XR chest 1 view portable    Result Date: 6/8/2024  Narrative: XR CHEST PORTABLE INDICATION: tenderness on chest wall, possible fall. COMPARISON: Left shoulder radiographs 6/8/2024, abdomen CT 12/24/2023. FINDINGS: Clear lungs. No pneumothorax or pleural effusion. Normal cardiomediastinal silhouette. Bones are unremarkable for age. Moderate left glenohumeral degenerative disease. No acute displaced fracture. Normal upper abdomen.     Impression: No acute cardiopulmonary disease. No acute displaced fracture. Workstation performed: [id number]     CT head without contrast    Result Date: 6/8/2024  Narrative: CT BRAIN - WITHOUT CONTRAST INDICATION:   Altered mental status. Found down. no history of trauma. rule out bleed; clinically unlikely. COMPARISON:  None. TECHNIQUE:  CT examination of the brain was performed.  Multiplanar 2D reformatted images were created from the source data. Radiation dose length product (DLP) for this visit:  859 mGy-cm .  This examination, like all CT scans performed in the [facility], was performed utilizing techniques to minimize radiation dose exposure, including the use of  iterative reconstruction and automated exposure control. IMAGE QUALITY:  Diagnostic. FINDINGS: PARENCHYMA: Decreased attenuation is noted in periventricular and subcortical white matter demonstrating an appearance that is statistically most likely to represent advanced microangiopathic change. Atherosclerotic calcifications noted. No CT signs of acute infarction.  No intracranial mass, mass effect or midline shift.  No acute parenchymal hemorrhage. VENTRICLES AND EXTRA-AXIAL SPACES:  Normal for the patient's age. VISUALIZED ORBITS: Bilateral lens implants noted. PARANASAL SINUSES: Normal visualized paranasal sinuses. CALVARIUM AND EXTRACRANIAL SOFT TISSUES:  Normal.     Impression: 1.  No intracranial hemorrhage or calvarial fracture. 2.  Advanced, chronic microangiopathy Workstation performed: [id number]     EKG/Pathology/Other Studies:   Lab Results   Component Value Date    VENTRATE 87 06/19/2024    ATRIALRATE 87 06/19/2024    PRINT 202 06/19/2024    QRSDINT 68 06/19/2024    QTINT 412 06/19/2024    QTCINT 495 06/19/2024    PAXIS 81 06/19/2024    QRSAXIS 66 06/19/2024    TWAVEAXIS 78 06/19/2024        Code Status: Level 1 - Full Code  Advance Directive and Living Will:      Power of :    POLST:      Screenings:    1. Nutrition Screening  Nutrition Screen: Nutrition Screen  Have you lost weight recently without trying?: No  How much weight (pounds) have you lost?: 0 lbs  Have you been eating poorly because of a decreased appetite?: No  MST SCORE: 0  Stage III-IV pressure ulcer or non-healing wound?: No  Home tube feeding or total parenteral nutrition (TPN)?: No  Appearance of muscle wasting or fat loss?: No  Patient currently on hemodialysis or peritoneal dialysis? : No    2. Pain Screening  Pain Screening: Pain Assessment  Pain Assessment Tool: 0-10  Pain Score: 0  Pain Location/Orientation: Orientation: Right, Location: Arm  Pain Radiating Towards: none  Pain Onset/Description: Onset: Ongoing  Effect of  Pain on Daily Activities: NONE  Patient's Stated Pain Goal: 2  Hospital Pain Intervention(s): Rest  Multiple Pain Sites: No    3. Suicide Screening  ED Crisis Suicide Risk Assessment:      Per my clinical assessment patient suicide risk is low she has no previous history of psychiatric condition and no history of previous suicide attempts.    Counseling / Coordination of Care:    Total floor / unit time spent today 30 minutes. Greater than 50% of total time was spent with the patient and / or family counseling and / or coordination of care. A description of the counseling / coordination of care: I spoke with the patient nurse before and after the evaluation

## 2024-06-20 NOTE — PROGRESS NOTES
Treatment plan    Per discussion with psychiatry yesterday, it was recommended the patient have daily follow-up with psychiatry with consult to monitor progress.    Psychiatry consult today recommends neurology evaluation.  Will order.    Prudencio Barahona, DO

## 2024-06-20 NOTE — ASSESSMENT & PLAN NOTE
History of hypothyroidism neuropathy and dyslipidemia presents to the hospital for increased agitation  Since recently discharged from hospital she has been having arguments with spouse about medical comorbidities and plan of care.  This is causing her much anxiety.  In the ED she had increased agitation and paranoia.  No evidence of encephalopathy.  Cruciate psychiatry evaluation yesterday.  Duloxetine decreased.  Was recommended daily psychiatric follow-up.  Recently seen by neuropsychology: Has decision-making capacity.  PT/OT evaluations for discharge needs

## 2024-06-20 NOTE — PHYSICAL THERAPY NOTE
Physical Therapy Cancellation Note        PT order received. Chart review performed. At this time, PT evaluation cancelled due to patient refusal requesting to continue to rest and sleep. PT will continue to follow and evaluate as patient is medically appropriate for skilled PT interventions.       06/20/24 1003   PT Last Visit   PT Visit Date 06/20/24   Note Type   Note type Cancelled Session;Evaluation   Cancel Reasons Refusal         Mary Metzger, PT

## 2024-06-20 NOTE — ASSESSMENT & PLAN NOTE
Presentation to the hospital for increased anxiety and weakness.  Was found in her house without air conditioning.  Continues to improve with IV fluid.  Continue for additional day    Results from last 7 days   Lab Units 06/20/24  0452 06/19/24  0856   BUN mg/dL 14 13   CREATININE mg/dL 0.89 1.29   EGFR ml/min/1.73sq m 61 39

## 2024-06-20 NOTE — OCCUPATIONAL THERAPY NOTE
Occupational Therapy     Patient Name: Daily Schaeffer  Today's Date: 6/20/2024 06/20/24 1309   OT Last Visit   OT Visit Date 06/20/24   Note Type   Note type Cancelled Session   Cancel Reasons Refusal   Additional Comments OT order received and chart review performed. @ this time, OT evaluation cancelled d/t refusal. OT will follow and evaluate in efforts to provide skilled interventions as appropriate. Nsg aware.    Pippa Cook OTR/L           Attempted 2x; continued refusals.

## 2024-06-20 NOTE — ASSESSMENT & PLAN NOTE
Increased anxiety and depression from marital issues.  No thoughts of SI/HI at this time.  Seen by psychiatry yesterday.  Duloxetine dosing decreased.  Started quetiapine to help establish sleep/wake cycle.  Psychiatry recommended daily follow-up/consult and will place [facility] consult order

## 2024-06-20 NOTE — TELEPHONE ENCOUNTER
Consult placed on [facility] queue at 1102 to be seen by an [facility] psychiatrist.

## 2024-06-20 NOTE — PROGRESS NOTES
[facility]  Progress Note  Name: Daily Schaeffer I  MRN: [id number]  Unit/Bed#: -01 I Date of Admission: 6/19/2024   Date of Service: 6/20/2024 I Hospital Day: 1    Assessment & Plan   * Stress due to marital problems  Assessment & Plan  History of hypothyroidism neuropathy and dyslipidemia presents to the hospital for increased agitation  Since recently discharged from hospital she has been having arguments with spouse about medical comorbidities and plan of care.  This is causing her much anxiety.  In the ED she had increased agitation and paranoia.  No evidence of encephalopathy.  Cruciate psychiatry evaluation yesterday.  Duloxetine decreased.  Was recommended daily psychiatric follow-up.  Recently seen by neuropsychology: Has decision-making capacity.  PT/OT evaluations for discharge needs    SARA (acute kidney injury) (HCC)  Assessment & Plan  Presentation to the hospital for increased anxiety and weakness.  Was found in her house without air conditioning.  Continues to improve with IV fluid.  Continue for additional day    Results from last 7 days   Lab Units 06/20/24  0452 06/19/24  0856   BUN mg/dL 14 13   CREATININE mg/dL 0.89 1.29   EGFR ml/min/1.73sq m 61 39       Anxiety and depression  Assessment & Plan  Increased anxiety and depression from marital issues.  No thoughts of SI/HI at this time.  Seen by psychiatry yesterday.  Duloxetine dosing decreased.  Started quetiapine to help establish sleep/wake cycle.  Psychiatry recommended daily follow-up/consult and will place [facility] consult order    Hypokalemia  Assessment & Plan  Replace and recheck tomorrow    Results from last 7 days   Lab Units 06/20/24  0452 06/19/24  0856   POTASSIUM mmol/L 3.3* 3.7       Neuropathy  Assessment & Plan  Dosing of duloxetine decreased by psychiatry yesterday    Hypothyroidism  Assessment & Plan  Continue levothyroxine    Dyslipidemia  Assessment & Plan  Continue atorvastatin    Severe aortic  "stenosis  Assessment & Plan  Follows with Dr. Henao and referred to CT surgery as an outpatient    VTE Pharmacologic Prophylaxis: VTE Score: 3 Moderate Risk (Score 3-4) - Pharmacological DVT Prophylaxis Ordered: heparin.    Mobility:  Basic Mobility Inpatient Raw Score: 15  JH-HLM Goal: 4: Move to chair/commode  JH-HLM Achieved: 3: Sit at edge of bed  JH-HLM Goal NOT achieved. Continue with multidisciplinary rounding and encourage appropriate mobility to improve upon JH-HLM goals.    Patient Centered Rounds: I have performed bedside rounds with nursing staff today.  Discussions with Specialists or Other Care Team Provider: Case management    Education and Discussions with Family / Patient: Updated  (daughter) via phone.    Time Spent for Care:   This time was spent on one or more of the following: performing physical exam; counseling and coordination of care; obtaining or reviewing history; documenting in the medical record; reviewing/ordering tests, medications or procedures; communicating with other healthcare professionals and discussing with patient's family/caregivers.    Current Length of Stay: 1 day(s)  Current Patient Status: Inpatient   Certification Statement: The patient will continue to require additional inpatient hospital stay due to IV fluids  Discharge Plan: Anticipate discharge in 24-48 hrs to discharge location to be determined pending rehab evaluations.    Code Status: Level 1 - Full Code      Subjective:   Patient seen and examined.  Agitated overnight but eventually got some sleep this morning.    Objective:   Vitals: Blood pressure (!) 108/44, pulse 90, temperature 97.6 °F (36.4 °C), resp. rate 17, height 5' 6\" (1.676 m), weight 56.2 kg (124 lb), SpO2 98%.    Intake/Output Summary (Last 24 hours) at 6/20/2024 1035  Last data filed at 6/20/2024 0601  Gross per 24 hour   Intake 1065 ml   Output 0 ml   Net 1065 ml       Physical Exam  Vitals reviewed.   Constitutional:       " General: She is not in acute distress.  HENT:      Head: Atraumatic.   Eyes:      Extraocular Movements: Extraocular movements intact.   Cardiovascular:      Rate and Rhythm: Regular rhythm.      Heart sounds: Murmur heard.   Pulmonary:      Breath sounds: Normal breath sounds. No wheezing.   Abdominal:      General: Bowel sounds are normal.      Palpations: Abdomen is soft.      Tenderness: There is no guarding.   Musculoskeletal:         General: No swelling.   Skin:     General: Skin is warm.   Neurological:      General: No focal deficit present.      Mental Status: She is alert.   Psychiatric:         Mood and Affect: Mood normal.      Comments: Much calmer today       Additional Data:   Labs:  Results from last 7 days   Lab Units 06/20/24  0452 06/19/24  0856   WBC Thousand/uL 6.71 8.69   HEMOGLOBIN g/dL 11.2* 12.5   PLATELETS Thousands/uL 221 282   MCV fL 103* 99*     Results from last 7 days   Lab Units 06/20/24  0452 06/19/24  0856   SODIUM mmol/L 139 141   POTASSIUM mmol/L 3.3* 3.7   CHLORIDE mmol/L 109* 105   CO2 mmol/L 22 20*   ANION GAP mmol/L 8 16*   BUN mg/dL 14 13   CREATININE mg/dL 0.89 1.29   CALCIUM mg/dL 8.8 10.3*   ALBUMIN g/dL 3.7 4.2   TOTAL BILIRUBIN mg/dL 1.10* 0.81   ALK PHOS U/L 81 81   ALT U/L 16 22   AST U/L 27 29   EGFR ml/min/1.73sq m 61 39   GLUCOSE RANDOM mg/dL 89 98             Results from last 7 days   Lab Units 06/19/24  1532 06/19/24  0856   HS TNI 0HR ng/L  --  9   HS TNI 4HR ng/L 9  --                       Results from last 7 days   Lab Units 06/19/24  0856   TSH 3RD GENERATON uIU/mL 3.087     * I Have Reviewed All Lab Data Listed Above.    Recent cultures:                   Lines/Drains:  Invasive Devices       Peripheral Intravenous Line  Duration             Peripheral IV 06/19/24 Right Antecubital 1 day                          Telemetry:      Imaging:  Imaging Reports Reviewed Today Include:   CT head without contrast    Result Date: 6/19/2024  Impression: No acute  intracranial abnormality. Workstation performed: [id number]       Scheduled Meds:  Current Facility-Administered Medications   Medication Dose Route Frequency Provider Last Rate    acetaminophen  650 mg Oral Q4H PRN Prudencio Barahona DO      atorvastatin  40 mg Oral Daily Prudencio Barahona DO      DULoxetine  30 mg Oral Daily MAJOR Lewis      heparin (porcine)  5,000 Units Subcutaneous Q8H NIECY Prudencio Barahona DO      levothyroxine  75 mcg Oral Early Morning Prudencio Barahona DO      QUEtiapine  25 mg Oral HS Prudencio Barahona DO      sodium chloride  100 mL/hr Intravenous Continuous Prudencio Barahona  mL/hr (06/19/24 1421)       Today, Patient Was Seen By: Prudencio Barahona DO    ** Please Note: Dictation voice to text software may have been used in the creation of this document. **

## 2024-06-20 NOTE — PLAN OF CARE
Problem: PAIN - ADULT  Goal: Verbalizes/displays adequate comfort level or baseline comfort level  Description: Interventions:  - Encourage patient to monitor pain and request assistance  - Assess pain using appropriate pain scale  - Administer analgesics based on type and severity of pain and evaluate response  - Implement non-pharmacological measures as appropriate and evaluate response  - Consider cultural and social influences on pain and pain management  - Notify physician/advanced practitioner if interventions unsuccessful or patient reports new pain  Outcome: Progressing     Problem: INFECTION - ADULT  Goal: Absence or prevention of progression during hospitalization  Description: INTERVENTIONS:  - Assess and monitor for signs and symptoms of infection  - Monitor lab/diagnostic results  - Monitor all insertion sites, i.e. indwelling lines, tubes, and drains  - Monitor endotracheal if appropriate and nasal secretions for changes in amount and color  - [place] appropriate cooling/warming therapies per order  - Administer medications as ordered  - Instruct and encourage patient and family to use good hand hygiene technique  - Identify and instruct in appropriate isolation precautions for identified infection/condition  Outcome: Progressing  Goal: Absence of fever/infection during neutropenic period  Description: INTERVENTIONS:  - Monitor WBC    Outcome: Progressing     Problem: SAFETY ADULT  Goal: Patient will remain free of falls  Description: INTERVENTIONS:  - Educate patient/family on patient safety including physical limitations  - Instruct patient to call for assistance with activity   - Consult OT/PT to assist with strengthening/mobility   - Keep Call bell within reach  - Keep bed low and locked with side rails adjusted as appropriate  - Keep care items and personal belongings within reach  - Initiate and maintain comfort rounds  - Make Fall Risk Sign visible to staff  - Offer Toileting every 2 Hours,  in advance of need  - Initiate/Maintain bed alarm  - Obtain necessary fall risk management equipment: non skid socks  - Apply yellow socks and bracelet for high fall risk patients  - Consider moving patient to room near nurses station  Outcome: Progressing  Goal: Maintain or return to baseline ADL function  Description: INTERVENTIONS:  -  Assess patient's ability to carry out ADLs; assess patient's baseline for ADL function and identify physical deficits which impact ability to perform ADLs (bathing, care of mouth/teeth, toileting, grooming, dressing, etc.)  - Assess/evaluate cause of self-care deficits   - Assess range of motion  - Assess patient's mobility; develop plan if impaired  - Assess patient's need for assistive devices and provide as appropriate  - Encourage maximum independence but intervene and supervise when necessary  - Involve family in performance of ADLs  - Assess for home care needs following discharge   - Consider OT consult to assist with ADL evaluation and planning for discharge  - Provide patient education as appropriate  Outcome: Progressing  Goal: Maintains/Returns to pre admission functional level  Description: INTERVENTIONS:  - Perform AM-PAC 6 Click Basic Mobility/ Daily Activity assessment daily.  - Set and communicate daily mobility goal to care team and patient/family/caregiver.   - Collaborate with rehabilitation services on mobility goals if consulted  - Perform Range of Motion 2 times a day.  - Reposition patient every 2 hours.  - Dangle patient 2 times a day  - Stand patient 2 times a day  - Ambulate patient 2 times a day  - Out of bed to chair 3 times a day   - Out of bed for meals 3 times a day  - Out of bed for toileting  - Record patient progress and toleration of activity level   Outcome: Progressing     Problem: DISCHARGE PLANNING  Goal: Discharge to home or other facility with appropriate resources  Description: INTERVENTIONS:  - Identify barriers to discharge w/patient and  caregiver  - Arrange for needed discharge resources and transportation as appropriate  - Identify discharge learning needs (meds, wound care, etc.)  - Arrange for interpretive services to assist at discharge as needed  - Refer to Case Management Department for coordinating discharge planning if the patient needs post-hospital services based on physician/advanced practitioner order or complex needs related to functional status, cognitive ability, or social support system  Outcome: Progressing     Problem: Knowledge Deficit  Goal: Patient/family/caregiver demonstrates understanding of disease process, treatment plan, medications, and discharge instructions  Description: Complete learning assessment and assess knowledge base.  Interventions:  - Provide teaching at level of understanding  - Provide teaching via preferred learning methods  Outcome: Progressing     Problem: MUSCULOSKELETAL - ADULT  Goal: Maintain or return mobility to safest level of function  Description: INTERVENTIONS:  - Assess patient's ability to carry out ADLs; assess patient's baseline for ADL function and identify physical deficits which impact ability to perform ADLs (bathing, care of mouth/teeth, toileting, grooming, dressing, etc.)  - Assess/evaluate cause of self-care deficits   - Assess range of motion  - Assess patient's mobility  - Assess patient's need for assistive devices and provide as appropriate  - Encourage maximum independence but intervene and supervise when necessary  - Involve family in performance of ADLs  - Assess for home care needs following discharge   - Consider OT consult to assist with ADL evaluation and planning for discharge  - Provide patient education as appropriate  Outcome: Progressing  Goal: Maintain proper alignment of affected body part  Description: INTERVENTIONS:  - Support, maintain and protect limb and body alignment  - Provide patient/ family with appropriate education  Outcome: Progressing

## 2024-06-20 NOTE — CASE MANAGEMENT
Case Management Assessment & Discharge Planning Note    Patient name Daily Schaeffer  Location /-01 MRN [id number]  : 1945 Date 2024       Current Admission Date: 2024  Current Admission Diagnosis:Stress due to marital problems   Patient Active Problem List    Diagnosis Date Noted Date Diagnosed    Hypokalemia 2024     SARA (acute kidney injury) ([facility]) 2024     Stress due to marital problems 2024     Low hemoglobin 2024     Acute toxic encephalopathy 2024     Debility 2024     Stage 3 chronic kidney disease, unspecified whether stage 3a or 3b CKD ([facility]) 2024     Exertional dyspnea 2024     Hypomagnesemia 2023     Acute cystitis without hematuria- Ruled Out 2023     Hypercalcemia 2023     Abnormal CT scan, pelvis 2023     Neuropathy 2023     Severe aortic stenosis 2023     Hypothyroidism 2017    Dyslipidemia 2016    Crohn disease ([facility]) 2016    Anxiety and depression 2016    Hyperglycemia 2016    Crohn's disease of jejunum ([facility]) 2016     CKD (chronic kidney disease) stage 2, GFR 60-89 ml/min 2016       LOS (days): 1  Geometric Mean LOS (GMLOS) (days): 2.2  Days to GMLOS:1.1     OBJECTIVE:  PATIENT READMITTED TO HOSPITAL  Risk of Unplanned Readmission Score: 23.45         Current admission status: Inpatient  Referral Reason: Other (D/C planning)    Preferred Pharmacy:   [facility] [place] - [address] [id number] [place]  [place] [address]  Phone: [phone] Fax: [phone]    Primary Care Provider: Alanna Moreau DO    Primary Insurance: MEDICARE  Secondary Insurance: CIG[employer]    ASSESSMENT:  Active Health Care Proxies       Gilmar Schaeffer [facility] Representative - Spouse   Primary Phone: [phone] (Home)                 Advance Directives  Does patient have a  Health Care POA?: Yes  Does patient have Advance Directives?: Yes  Primary Contact: Kayleen Andrew         Readmission Root Cause  30 Day Readmission: Yes  Who directed you to return to the hospital?: Family  Did you understand whom to contact if you had questions or problems?: Yes  Did you get your prescriptions before you left the hospital?: Refused to provide information  Were you able to get your prescriptions filled when you left the hospital?: Refused to provide information  Did you take your medications as prescribed?: Refused to provide information  Were you able to get to your follow-up appointments?: Refused to provide information  During previous admission, was a post-acute recommendation made?: Yes  What post-acute resources were offered?: HHC, STR (STR was arranged, pt refused and went home with HHC)  Patient was readmitted due to: Stress due to marital problems / Psychosis  Action Plan: Psych consult / PT/OT eval / daily psychiatric f/u / quetiapine / Neurology consult    Patient Information  Admitted from:: Home  Mental Status: Confused  During Assessment patient was accompanied by: Not accompanied during assessment  Assessment information provided by:: Patient, Spouse, Daughter  Primary Caregiver: Self  Support Systems: Spouse/significant other, Children, Family members, Friends/neighbors  County of Residence: [place]  What city [place] you [place] in[place]  Home entry access options. Select all that apply.: Stairs  Number of steps to enter home.: 2  Do the steps have railings?: Yes  Type of Current Residence: [address] home  Upon entering residence, is there a bedroom on the main floor (no further steps)?: Yes  Upon entering residence, is there a bathroom on the main floor (no further steps)?: Yes  Living Arrangements: Lives w/ Spouse/significant other  Is patient a ?: No    Activities of Daily Living Prior to Admission  Functional Status: Assistance  Completes ADLs independently?: No  Level of ADL  dependence: Assistance  Ambulates independently?: No  Level of ambulatory dependence: Assistance  Does patient use assisted devices?: Yes  Assisted Devices (DME) used: Walker  Does patient currently own DME?: Yes  What DME does the patient currently own?: Walker  Does patient have a history of Outpatient Therapy (PT/OT)?: No  Does the patient have a history of Short-Term Rehab?: No  Does patient have a history of HHC?: Yes  Does patient currently have HHC?: No         Patient Information Continued  Income Source: Pension/intermediate  Does patient have prescription coverage?: Yes  Does patient receive dialysis treatments?: No  Does patient have a history of substance abuse?: No  Does patient have a history of Mental Health Diagnosis?: Yes  Is patient receiving treatment for mental health?: Yes  Has patient received inpatient treatment related to mental health in the last 2 years?: No         Means of Transportation  Means of Transport to Appts:: Family transport      Social Determinants of Health (SDOH)      Flowsheet Row Most Recent Value   Housing Stability    In the last 12 months, was there a time when you were not able to pay the mortgage or rent on time? N   In the past 12 months, how many times have you moved where you were living? 1   At any time in the past 12 months, were you homeless or living in a shelter (including now)? N   Transportation Needs    In the past 12 months, has lack of transportation kept you from medical appointments or from getting medications? no   In the past 12 months, has lack of transportation kept you from meetings, work, or from getting things needed for daily living? No   Food Insecurity    Within the past 12 months, you worried that your food would run out before you got the money to buy more. Never true   Within the past 12 months, the food you bought just didn't last and you didn't have money to get more. Never true   Utilities    In the past 12 months has the electric, gas,  "oil, or water company threatened to shut off services in your home? No            DISCHARGE DETAILS:    Discharge planning discussed with:: Pt, daughter (Kayleen),   Freedom of Choice: Yes     CM contacted family/caregiver?: Yes  Were Treatment Team discharge recommendations reviewed with patient/caregiver?: Yes  Did patient/caregiver verbalize understanding of patient care needs?: Yes  Were patient/caregiver advised of the risks associated with not following Treatment Team discharge recommendations?: Yes    Contacts  Patient Contacts: Kayleen Andrew  Relationship to Patient:: Family  Contact Method: Phone  Phone Number: [phone]  Reason/Outcome: Discharge Planning, Continuity of Care    Requested Home Health Care         Is the patient interested in HHC at discharge?: No         Other Referral/Resources/Interventions Provided:  Interventions: Other (Specify) (D/C planning)  Referral Comments: CM met with pt at bedside. Pt reported that she would like to rest, when asked if this writer can contact pt's daughters, pt stated \"yes, they're here.\" CM attempted to contact daughter, Kaila, and left voicemail. CM contacted daughterKayleen, to complete assessment. Daughter reported that she does not think her mother should return home, and is agreeable to pt going to STR at D/C. Daughter reported that she and pt's grandson have been going to pt's home to check on pt and spouse. Daughter reported that pt and spouse refused HHC services. Daughter reported that while pt's grandson was cleaning the house recently, he found an empty wine bottle in pt's bedroom garbage. Daughter reported that both she and pt's grandson do not know how long the wine bottle has been in garbage. Daughter requested this writer contact pt's  to discuss STR. CM contacted pt's  to review. Pt's  is agreeable to pt going to STR once medically stable.  reported that pt's brother  on 1967, and every , pt seems to " struggle. This writer submitted referral to OP CM and Area of Aging during pt's last hospital admission. Per  [facility] notes, another referral was placed to [facility]ging d/t pt's living conditions. Psychiatry is following, & Neurology consult has been ordered. CM will continue to follow.    Would you like to participate in our [facility] service program?  : No - Declined    Treatment Team Recommendation: Other (TBD)  Discharge Destination Plan:: Other (TBD)  Transport at Discharge : Other (Comment) (TBD)

## 2024-06-20 NOTE — PLAN OF CARE
Problem: PAIN - ADULT  Goal: Verbalizes/displays adequate comfort level or baseline comfort level  Description: Interventions:  - Encourage patient to monitor pain and request assistance  - Assess pain using appropriate pain scale  - Administer analgesics based on type and severity of pain and evaluate response  - Implement non-pharmacological measures as appropriate and evaluate response  - Consider cultural and social influences on pain and pain management  - Notify physician/advanced practitioner if interventions unsuccessful or patient reports new pain  Outcome: Progressing     Problem: INFECTION - ADULT  Goal: Absence or prevention of progression during hospitalization  Description: INTERVENTIONS:  - Assess and monitor for signs and symptoms of infection  - Monitor lab/diagnostic results  - Monitor all insertion sites, i.e. indwelling lines, tubes, and drains  - Monitor endotracheal if appropriate and nasal secretions for changes in amount and color  - [place] appropriate cooling/warming therapies per order  - Administer medications as ordered  - Instruct and encourage patient and family to use good hand hygiene technique  - Identify and instruct in appropriate isolation precautions for identified infection/condition  Outcome: Progressing     Problem: Knowledge Deficit  Goal: Patient/family/caregiver demonstrates understanding of disease process, treatment plan, medications, and discharge instructions  Description: Complete learning assessment and assess knowledge base.  Interventions:  - Provide teaching at level of understanding  - Provide teaching via preferred learning methods  Outcome: Progressing

## 2024-06-20 NOTE — TELEMEDICINE
TeleConsultation - Neurology   Daily Schaeffer 78 y.o. female MRN: [id number]  Unit/Bed#: -01 Encounter: [id number]      VIRTUAL CARE DOCUMENTATION:     1. This service was provided via Telemedicine using [employer]     2. Parties in the room with patient during teleconsult Patient only    3. Confidentiality My office door was closed     4. Participants No one else was in the room    5. Patient acknowledged consent and understanding of privacy and security of the  Telemedicine consult. I informed the patient that I have reviewed their record in Epic and presented the opportunity for them to ask any questions regarding the visit today.  The patient agreed to participate.    6. Time spent 35 min       Assessment & Plan   Delirium in the setting of poor sleep, worsening stressors. If appears per yesterday's [facility] psychiatry consult note that patient is being treated for depression and anxiety however per today's [facility] tele psychiatric consult note, psychiatrist has spoken with patient's daughter who does not feel there is underlying psychiatric history nor dementia.  Cannot exclude a post concussive component however not having obvious symptoms such as HA, nausea, blurry vision, imbalance. No evidence of an infectious process. Low clinical suspicion for seizures and no seizure disorder history. UDS negative. TSH wnl.    b12  Mri brain w/wo contrast  Outpt neurocognitive assessment  No clinical indication for LP and EEG at this time.  Follow up with outpt general neurology attending in 4-6 weeks.      History of Present Illness     Reason for Consult / Principal Problem: ams    HPI: Daily Schaeffer is a 78 y.o.  female who presents with increased anxiety, paranoia on 6/19 and had fallen on 6/18 no LOC. Per ER note from 6/18 she had fallen from a standing position and hit her head, back, right shoulder. NO neurologic deficits. She had grace on this current admission found in her house without air conditioning.  "No si/hi.   She's also been dealing with increased stress and anxiety in the setting of marital problems.  She has not been sleeping well for the past month.  There have been to psychiatry consults during this admission, one today suggesting no underlying psychiatric history and the one yesterday suggesting there is underlying psychiatric history. CT head repeat yesterday wnl. She has had an apparent change in mental status over the past two weeks following the fall.   Inpatient consult to Neurology  Consult performed by: Jair Young MD  Consult ordered by: Prudencio Barahona DO           Review of Systems  Per 12 point review, see hpi, rest negative    Historical Information   Past Medical History:   Diagnosis Date    Aortic stenosis     Depression     Disease of thyroid gland     Diverticular disease 05/09/2017    Essential hypertension 05/20/2016    Hyperlipidemia     Major depression 05/20/2016    Peripheral neuropathy     Type 2 diabetes mellitus (HCC) 05/20/2016    Visual impairment      Past Surgical History:   Procedure Laterality Date    BOWEL RESECTION      COLON SURGERY      EYE SURGERY      SMALL INTESTINE SURGERY      TUBAL LIGATION       Social History   Social History     Substance and Sexual Activity   Alcohol Use Yes    Comment: occasional     Social History     Substance and Sexual Activity   Drug Use Never     E-Cigarette/Vaping    E-Cigarette Use Never User      E-Cigarette/Vaping Substances    Nicotine No     THC No     CBD No     Flavoring No      Social History     Tobacco Use   Smoking Status Never    Passive exposure: Never   Smokeless Tobacco Never     Family History: non-contributory      Meds/Allergies   all current active meds have been reviewed    Allergies   Allergen Reactions    Wound Dressing Adhesive Rash       Objective   Vitals:Blood pressure 139/64, pulse 90, temperature 98.7 °F (37.1 °C), temperature source Oral, resp. rate 20, height 5' 6\" (1.676 m), weight 56.2 kg (124 lb), " "SpO2 97%.,Body mass index is 20.01 kg/m².    Intake/Output Summary (Last 24 hours) at 2024 1600  Last data filed at 2024 1304  Gross per 24 hour   Intake 1065 ml   Output 0 ml   Net 1065 ml       Physical Exam  General: no acute distress  HEENT: atraumatic, normocephalic    Neurologic Exam  MS: Alert and oriented to self. Location, month, year. 79 years old she stated when actually 78 still. Correct . [facility]. She couldn't guess the campus but then when given options picked the correct campus. She was able to answer questions but then got frustrated saying she's answered the same questions already and didn't want to answer any more and then stopped interacting, just layed on her side and put the bedsheet over her. When I asked her why she's here she says she hit her head and simply wanted an eval and somehow has ended up in the hospital for mutliple days and has been lied to about when she can go home and has been ready to go home since yesterday afternoon she states.   \"You can check with the hospital\" was her answer when I asked her why she's here.  \"My daughter does not run my life\".  No aphasia.  CN:2-12 intact. No nystagmus. No dysarthria.  Motor: moving all extremities symmetric in bed. No involuntary movements noted at rest or activation.            Lab Results: I have personally reviewed pertinent reports.    Imaging Studies: I have personally reviewed pertinent films in PACS  EKG, Pathology, and Other Studies: I have personally reviewed pertinent films in PACS      "

## 2024-06-20 NOTE — NURSING NOTE
AWAKE AND PLEASANT AT MOMENT . ORIENTED X4. R/A STATUS RESP EASY NO DISTRESS [facility] 1/1 CONTINUES

## 2024-06-20 NOTE — NURSING NOTE
CONSTANT ALARMS FROM [facility]. PATIENT HALLUCINATING PRIOR TO IV ATIVAN. NOT EFFECTUAL 1/1 BEDSIDE IS ORDERED AS PATIENT IS NOT SAFE AND NOT FOLLOWING COMMANDS FROM [facility] SITTER. . SHE IS BECOMING AGRESSIVE

## 2024-06-21 ENCOUNTER — PATIENT OUTREACH (OUTPATIENT)
Dept: FAMILY MEDICINE CLINIC | Facility: CLINIC | Age: 79
End: 2024-06-21

## 2024-06-21 ENCOUNTER — APPOINTMENT (INPATIENT)
Dept: MRI IMAGING | Facility: HOSPITAL | Age: 79
DRG: 683 | End: 2024-06-21
Payer: MEDICARE

## 2024-06-21 LAB
ANION GAP SERPL CALCULATED.3IONS-SCNC: 6 MMOL/L (ref 4–13)
BUN SERPL-MCNC: 10 MG/DL (ref 5–25)
CALCIUM SERPL-MCNC: 8.5 MG/DL (ref 8.4–10.2)
CHLORIDE SERPL-SCNC: 112 MMOL/L (ref 96–108)
CO2 SERPL-SCNC: 22 MMOL/L (ref 21–32)
CREAT SERPL-MCNC: 0.78 MG/DL (ref 0.6–1.3)
ERYTHROCYTE [DISTWIDTH] IN BLOOD BY AUTOMATED COUNT: 13.6 % (ref 11.6–15.1)
GFR SERPL CREATININE-BSD FRML MDRD: 72 ML/MIN/1.73SQ M
GLUCOSE SERPL-MCNC: 109 MG/DL (ref 65–140)
GLUCOSE SERPL-MCNC: 92 MG/DL (ref 65–140)
HCT VFR BLD AUTO: 34.1 % (ref 34.8–46.1)
HGB BLD-MCNC: 10.5 G/DL (ref 11.5–15.4)
MAGNESIUM SERPL-MCNC: 1.8 MG/DL (ref 1.9–2.7)
MCH RBC QN AUTO: 32.9 PG (ref 26.8–34.3)
MCHC RBC AUTO-ENTMCNC: 30.8 G/DL (ref 31.4–37.4)
MCV RBC AUTO: 107 FL (ref 82–98)
PLATELET # BLD AUTO: 206 THOUSANDS/UL (ref 149–390)
PMV BLD AUTO: 8.9 FL (ref 8.9–12.7)
POTASSIUM SERPL-SCNC: 3.8 MMOL/L (ref 3.5–5.3)
RBC # BLD AUTO: 3.19 MILLION/UL (ref 3.81–5.12)
SODIUM SERPL-SCNC: 140 MMOL/L (ref 135–147)
VIT B12 SERPL-MCNC: 270 PG/ML (ref 180–914)
WBC # BLD AUTO: 6.28 THOUSAND/UL (ref 4.31–10.16)

## 2024-06-21 PROCEDURE — 97167 OT EVAL HIGH COMPLEX 60 MIN: CPT

## 2024-06-21 PROCEDURE — 82607 VITAMIN B-12: CPT | Performed by: PSYCHIATRY & NEUROLOGY

## 2024-06-21 PROCEDURE — 70553 MRI BRAIN STEM W/O & W/DYE: CPT

## 2024-06-21 PROCEDURE — 83735 ASSAY OF MAGNESIUM: CPT | Performed by: INTERNAL MEDICINE

## 2024-06-21 PROCEDURE — 82948 REAGENT STRIP/BLOOD GLUCOSE: CPT

## 2024-06-21 PROCEDURE — 85027 COMPLETE CBC AUTOMATED: CPT | Performed by: INTERNAL MEDICINE

## 2024-06-21 PROCEDURE — 99232 SBSQ HOSP IP/OBS MODERATE 35: CPT | Performed by: INTERNAL MEDICINE

## 2024-06-21 PROCEDURE — A9585 GADOBUTROL INJECTION: HCPCS | Performed by: INTERNAL MEDICINE

## 2024-06-21 PROCEDURE — 80048 BASIC METABOLIC PNL TOTAL CA: CPT | Performed by: INTERNAL MEDICINE

## 2024-06-21 PROCEDURE — 97163 PT EVAL HIGH COMPLEX 45 MIN: CPT

## 2024-06-21 RX ORDER — MAGNESIUM SULFATE HEPTAHYDRATE 40 MG/ML
2 INJECTION, SOLUTION INTRAVENOUS ONCE
Status: COMPLETED | OUTPATIENT
Start: 2024-06-21 | End: 2024-06-21

## 2024-06-21 RX ORDER — CYANOCOBALAMIN 1000 UG/ML
1000 INJECTION, SOLUTION INTRAMUSCULAR; SUBCUTANEOUS
Status: DISCONTINUED | OUTPATIENT
Start: 2024-06-21 | End: 2024-06-22 | Stop reason: HOSPADM

## 2024-06-21 RX ORDER — GADOBUTROL 604.72 MG/ML
5 INJECTION INTRAVENOUS
Status: COMPLETED | OUTPATIENT
Start: 2024-06-21 | End: 2024-06-21

## 2024-06-21 RX ADMIN — HEPARIN SODIUM 5000 UNITS: 5000 INJECTION, SOLUTION INTRAVENOUS; SUBCUTANEOUS at 21:49

## 2024-06-21 RX ADMIN — CYANOCOBALAMIN 1000 MCG: 1000 INJECTION, SOLUTION INTRAMUSCULAR; SUBCUTANEOUS at 18:14

## 2024-06-21 RX ADMIN — HEPARIN SODIUM 5000 UNITS: 5000 INJECTION, SOLUTION INTRAVENOUS; SUBCUTANEOUS at 14:55

## 2024-06-21 RX ADMIN — LEVOTHYROXINE SODIUM 75 MCG: 75 TABLET ORAL at 06:08

## 2024-06-21 RX ADMIN — GADOBUTROL 5 ML: 604.72 INJECTION INTRAVENOUS at 10:52

## 2024-06-21 RX ADMIN — DULOXETINE HYDROCHLORIDE 30 MG: 30 CAPSULE, DELAYED RELEASE ORAL at 09:13

## 2024-06-21 RX ADMIN — MAGNESIUM SULFATE HEPTAHYDRATE 2 G: 40 INJECTION, SOLUTION INTRAVENOUS at 16:14

## 2024-06-21 RX ADMIN — QUETIAPINE FUMARATE 25 MG: 25 TABLET ORAL at 21:49

## 2024-06-21 RX ADMIN — ATORVASTATIN CALCIUM 40 MG: 40 TABLET, FILM COATED ORAL at 09:13

## 2024-06-21 RX ADMIN — CYANOCOBALAMIN TAB 500 MCG 500 MCG: 500 TAB at 15:37

## 2024-06-21 RX ADMIN — ASPIRIN 81 MG: 81 TABLET, COATED ORAL at 16:14

## 2024-06-21 RX ADMIN — HEPARIN SODIUM 5000 UNITS: 5000 INJECTION, SOLUTION INTRAVENOUS; SUBCUTANEOUS at 06:08

## 2024-06-21 NOTE — PLAN OF CARE
Problem: OCCUPATIONAL THERAPY ADULT  Goal: Performs self-care activities at highest level of function for planned discharge setting.  See evaluation for individualized goals.  Description: Treatment Interventions: ADL retraining, Functional transfer training, UE strengthening/ROM, Endurance training, Cognitive reorientation, Patient/family training, Compensatory technique education, Energy conservation, Activityengagement      See flowsheet documentation for full assessment, interventions and recommendations.   Note: Limitation: Decreased ADL status, Decreased UE strength, Decreased Safe judgement during ADL, Decreased endurance, Decreased cognition, Decreased high-level ADLs  Prognosis: Good  Assessment: Pt is a 78 y.o. female seen for OT evaluation s/p admit to [facility] on 6/19/2024 w/ Stress due to marital problems.  Comorbidities affecting pt's functional performance at time of assessment include: severe aortic stenosis, dyslipidemia, anxiety, neuropathy, SARA, chron disease, hyperglycemia, CKD. Personal factors affecting pt at time of IE include:steps to enter environment, limited home support, difficulty performing IADLS , decreased initiation and engagement , and health management . Prior to admission, pt was I with ADLs and IADLs. Upon evaluation: the following deficits impact occupational performance: weakness, decreased strength, decreased balance, decreased tolerance, impaired memory, impaired problem solving, and decreased safety awareness. Pt to benefit from continued skilled OT tx while in the hospital to address deficits as defined above and maximize level of functional independence w ADL's and functional mobility. Occupational Performance areas to address include: grooming, bathing/shower, toilet hygiene, dressing, functional mobility, community mobility, and clothing management. From OT standpoint, recommendation at time of d/c would with minimal intensity OT resources.     Rehab Resource  Intensity Level, OT: III (Minimum Resource Intensity)     Liza Sanchez MS, OTR/L

## 2024-06-21 NOTE — ASSESSMENT & PLAN NOTE
History of hypothyroidism neuropathy and dyslipidemia presents to the hospital for increased agitation  Patient seems to be improving, continue supportive care, discontinue IV fluid and check a.m. labs.  This is causing her much anxiety.  In the ED she had increased agitation and paranoia.  No evidence of encephalopathy.  Per psychiatry evaluation yesterday.  Duloxetine decreased. Was recommended daily psychiatric follow-up.  Recently seen by neuropsychology: Has decision-making capacity.  Okay for discharge home per PT OT

## 2024-06-21 NOTE — PROGRESS NOTES
CMOC working with patient's spouse, Gilmar, on obtaining transportation services. An application for senior shared ride services was emailed to [employer] for processing.   A certification of disability form was faxed to the PCP office for review and completion, if applicable. Will forward this to [place] if returned.   Processing time for application is normally 2-3 weeks. Will contact patient and spouse with determination.     One time outreach, no further outreaches will be made.

## 2024-06-21 NOTE — CASE MANAGEMENT
Case Management Discharge Planning Note    Patient name Daily Schaeffer  Location /-01 MRN [id number]  : 1945 Date 2024       Current Admission Date: 2024  Current Admission Diagnosis:Stress due to marital problems   Patient Active Problem List    Diagnosis Date Noted Date Diagnosed    Hypokalemia 2024     SARA (acute kidney injury) ([facility]) 2024     Stress due to marital problems 2024     Low hemoglobin 2024     Acute toxic encephalopathy 2024     Debility 2024     Stage 3 chronic kidney disease, unspecified whether stage 3a or 3b CKD ([facility]) 2024     Exertional dyspnea 2024     Hypomagnesemia 2023     Acute cystitis without hematuria- Ruled Out 2023     Hypercalcemia 2023     Abnormal CT scan, pelvis 2023     Neuropathy 2023     Severe aortic stenosis 2023     Hypothyroidism 2017    Dyslipidemia 2016    Crohn disease ([facility]) 2016    Anxiety and depression 2016    Hyperglycemia 2016    Crohn's disease of jejunum ([facility]) 2016     CKD (chronic kidney disease) stage 2, GFR 60-89 ml/min 2016       LOS (days): 2  Geometric Mean LOS (GMLOS) (days): 2.2  Days to GMLOS:0.1     OBJECTIVE:  Risk of Unplanned Readmission Score: 24.04         Current admission status: Inpatient   Preferred Pharmacy:   [facility] [place] - [address]  [place] [address]  Phone: [phone] Fax: [phone]    Primary Care Provider: Alanna Moreau DO    Primary Insurance: MEDICARE  Secondary Insurance: [employer]    DISCHARGE DETAILS:                                          Other Referral/Resources/Interventions Provided:  Interventions: Transportation, [facility]  Referral Comments: CM contacted pt's daughter and concha to notify that pt was cleared by psych to D/C. Grandson requested referrals  be submitted for [facility].  Pt's daughter does not want pt going home. CM suggested that daughter and all additonal family members discuss SNF with pt and spouse if they are not in agreement with pt being in the home. Per last admission, pt was deemed to have capacity to make decisions. CM met with pt at bedside to review. Pt requested to go home, and reported that she has been in the hospital for 5 days against her will. CM inquired if pt felt safe going back home. Pt reported that she would never say that her  wanted to hurt her, and that he has never hurt her. Pt later told physician that she would like to stay in the hospital until tomorrow. CM met with pt at bedside to confirm D/C plan for tomorrow. Pt would like to be transported by w/c van. Pt declined HHC, and reported that she does not need help. CM arranged for w/c transport on 6/22 at 1100. CM notified pt's daughter.         Treatment Team Recommendation: Home with Home Health Care  Discharge Destination Plan:: Home  Transport at Discharge : Wheelchair daniel  Dispatcher Contacted: Yes  Number/Name of Dispatcher: [facility] / [phone]     ETA of Transport (Date): 06/22/24  ETA of Transport (Time): 1100

## 2024-06-21 NOTE — ASSESSMENT & PLAN NOTE
"Increased anxiety and depression from marital issues.  No thoughts of SI/HI at this time.  Seen by psychiatry yesterday.  Duloxetine dosing decreased.  Started quetiapine to help establish sleep/wake cycle.    MRI reviewed, given severe chronic microangiopathy, start aspirin 81 mg daily per neuro.      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      "

## 2024-06-21 NOTE — PLAN OF CARE
Problem: PHYSICAL THERAPY ADULT  Goal: Performs mobility at highest level of function for planned discharge setting.  See evaluation for individualized goals.  Description: Treatment/Interventions: Functional transfer training, Therapeutic exercise, Endurance training, Elevations, Patient/family training, Bed mobility, Gait training, Spoke to nursing, OT  Equipment Recommended: Walker       See flowsheet documentation for full assessment, interventions and recommendations.  Note: Prognosis: Good  Problem List: Impaired balance, Decreased endurance, Decreased mobility, Decreased cognition, Decreased safety awareness  Assessment: Pt is 78 y.o. female seen for high-complexity PT evaluation on 6/21/2024 s/p admit to [facility] on 6/19/2024 w/ Stress due to marital problems. PT was consulted to assess pt's functional mobility and d/c needs. Order placed for PT eval and tx, w/ up and OOB as tolerated order. PTA, pt was living with her spouse in a two story home with first floor set up and 2 MARIE. Pt reports independence at baseline with ADLs, IADLs, and functional mobility with RW. At time of eval, pt completed sup > sit supervision, STS Chip, and ambulated 150' CGA with RW. Upon evaluation, pt presenting with impaired functional mobility d/t decreased endurance, impaired balance, decreased mobility, impaired judgement, decreased safety awareness, and activity intolerance. Pertinent PMHx and current co-morbidities affecting pt's physical performance at time of assessment include: SARA, anxiety and depression, hypokalemia, neuropathy, severe aortic stenosis. Personal factors affecting pt at time of eval include: ambulating w/ assistive device, stairs to enter home, decreased cognition, and positive fall history. The following objective measures performed on IE also reveal limitations: AM-PAC 6-Clicks: 18/24. Pt's clinical presentation is currently unstable/unpredictable seen in pt's presentation of advanced  age, abnormal lab value(s), need for input for task focus and mobility technique, need for CG to min assist w/ all phases of mobility when usually mobilizing independently, and ongoing medical assessment. Overall, pt's rehab potential and prognosis to return to PLOF is good as impacted by objective findings, warranting pt to receive further skilled PT interventions to address impairments, activity limitations, and participation restrictions. Pt to benefit from continued PT services to address deficits as defined above and maximize level of functional independent mobility and consistency. From PT/mobility standpoint, recommendation at time of d/c would be level 2, moderate resource intensity in order to facilitate return to PLOF.        Rehab Resource Intensity Level, PT: II (Moderate Resource Intensity)    See flowsheet documentation for full assessment.      Mary Metzger; PT, DPT

## 2024-06-21 NOTE — ASSESSMENT & PLAN NOTE
Presentation to the hospital for increased anxiety and weakness.  Was found in her house without air conditioning.  DC IV fluid, check a.m. labs.  Acute kidney injury resolved    Results from last 7 days   Lab Units 06/21/24  0548 06/20/24  0452 06/19/24  0856   BUN mg/dL 10 14 13   CREATININE mg/dL 0.78 0.89 1.29   EGFR ml/min/1.73sq m 72 61 39

## 2024-06-21 NOTE — PLAN OF CARE
Problem: PAIN - ADULT  Goal: Verbalizes/displays adequate comfort level or baseline comfort level  Description: Interventions:  - Encourage patient to monitor pain and request assistance  - Assess pain using appropriate pain scale  - Administer analgesics based on type and severity of pain and evaluate response  - Implement non-pharmacological measures as appropriate and evaluate response  - Consider cultural and social influences on pain and pain management  - Notify physician/advanced practitioner if interventions unsuccessful or patient reports new pain  Outcome: Progressing     Problem: INFECTION - ADULT  Goal: Absence or prevention of progression during hospitalization  Description: INTERVENTIONS:  - Assess and monitor for signs and symptoms of infection  - Monitor lab/diagnostic results  - Monitor all insertion sites, i.e. indwelling lines, tubes, and drains  - Monitor endotracheal if appropriate and nasal secretions for changes in amount and color  - [place] appropriate cooling/warming therapies per order  - Administer medications as ordered  - Instruct and encourage patient and family to use good hand hygiene technique  - Identify and instruct in appropriate isolation precautions for identified infection/condition  Outcome: Progressing     Problem: SAFETY ADULT  Goal: Maintain or return to baseline ADL function  Description: INTERVENTIONS:  -  Assess patient's ability to carry out ADLs; assess patient's baseline for ADL function and identify physical deficits which impact ability to perform ADLs (bathing, care of mouth/teeth, toileting, grooming, dressing, etc.)  - Assess/evaluate cause of self-care deficits   - Assess range of motion  - Assess patient's mobility; develop plan if impaired  - Assess patient's need for assistive devices and provide as appropriate  - Encourage maximum independence but intervene and supervise when necessary  - Involve family in performance of ADLs  - Assess for home care  needs following discharge   - Consider OT consult to assist with ADL evaluation and planning for discharge  - Provide patient education as appropriate  Outcome: Progressing     Problem: Knowledge Deficit  Goal: Patient/family/caregiver demonstrates understanding of disease process, treatment plan, medications, and discharge instructions  Description: Complete learning assessment and assess knowledge base.  Interventions:  - Provide teaching at level of understanding  - Provide teaching via preferred learning methods  Outcome: Progressing

## 2024-06-21 NOTE — ASSESSMENT & PLAN NOTE
Replace and recheck tomorrow    Results from last 7 days   Lab Units 06/21/24  0548 06/20/24  0452 06/19/24  0856   POTASSIUM mmol/L 3.8 3.3* 3.7     Resolved

## 2024-06-21 NOTE — PLAN OF CARE
Problem: PAIN - ADULT  Goal: Verbalizes/displays adequate comfort level or baseline comfort level  Description: Interventions:  - Encourage patient to monitor pain and request assistance  - Assess pain using appropriate pain scale  - Administer analgesics based on type and severity of pain and evaluate response  - Implement non-pharmacological measures as appropriate and evaluate response  - Consider cultural and social influences on pain and pain management  - Notify physician/advanced practitioner if interventions unsuccessful or patient reports new pain  Outcome: Progressing     Problem: INFECTION - ADULT  Goal: Absence or prevention of progression during hospitalization  Description: INTERVENTIONS:  - Assess and monitor for signs and symptoms of infection  - Monitor lab/diagnostic results  - Monitor all insertion sites, i.e. indwelling lines, tubes, and drains  - Monitor endotracheal if appropriate and nasal secretions for changes in amount and color  - [place] appropriate cooling/warming therapies per order  - Administer medications as ordered  - Instruct and encourage patient and family to use good hand hygiene technique  - Identify and instruct in appropriate isolation precautions for identified infection/condition  Outcome: Progressing  Goal: Absence of fever/infection during neutropenic period  Description: INTERVENTIONS:  - Monitor WBC    Outcome: Progressing     Problem: SAFETY ADULT  Goal: Patient will remain free of falls  Description: INTERVENTIONS:  - Educate patient/family on patient safety including physical limitations  - Instruct patient to call for assistance with activity   - Consult OT/PT to assist with strengthening/mobility   - Keep Call bell within reach  - Keep bed low and locked with side rails adjusted as appropriate  - Keep care items and personal belongings within reach  - Initiate and maintain comfort rounds  - Make Fall Risk Sign visible to staff  - Offer Toileting every 2 Hours,  in advance of need  - Initiate/Maintain bed alarm  - Obtain necessary fall risk management equipment: non skid socks  - Apply yellow socks and bracelet for high fall risk patients  - Consider moving patient to room near nurses station  Outcome: Progressing  Goal: Maintain or return to baseline ADL function  Description: INTERVENTIONS:  -  Assess patient's ability to carry out ADLs; assess patient's baseline for ADL function and identify physical deficits which impact ability to perform ADLs (bathing, care of mouth/teeth, toileting, grooming, dressing, etc.)  - Assess/evaluate cause of self-care deficits   - Assess range of motion  - Assess patient's mobility; develop plan if impaired  - Assess patient's need for assistive devices and provide as appropriate  - Encourage maximum independence but intervene and supervise when necessary  - Involve family in performance of ADLs  - Assess for home care needs following discharge   - Consider OT consult to assist with ADL evaluation and planning for discharge  - Provide patient education as appropriate  Outcome: Progressing  Goal: Maintains/Returns to pre admission functional level  Description: INTERVENTIONS:  - Perform AM-PAC 6 Click Basic Mobility/ Daily Activity assessment daily.  - Set and communicate daily mobility goal to care team and patient/family/caregiver.   - Collaborate with rehabilitation services on mobility goals if consulted  - Perform Range of Motion 2 times a day.  - Reposition patient every 2 hours.  - Dangle patient 2 times a day  - Stand patient 2 times a day  - Ambulate patient 2 times a day  - Out of bed to chair 3 times a day   - Out of bed for meals 3 times a day  - Out of bed for toileting  - Record patient progress and toleration of activity level   Outcome: Progressing     Problem: DISCHARGE PLANNING  Goal: Discharge to home or other facility with appropriate resources  Description: INTERVENTIONS:  - Identify barriers to discharge w/patient and  caregiver  - Arrange for needed discharge resources and transportation as appropriate  - Identify discharge learning needs (meds, wound care, etc.)  - Arrange for interpretive services to assist at discharge as needed  - Refer to Case Management Department for coordinating discharge planning if the patient needs post-hospital services based on physician/advanced practitioner order or complex needs related to functional status, cognitive ability, or social support system  Outcome: Progressing     Problem: Knowledge Deficit  Goal: Patient/family/caregiver demonstrates understanding of disease process, treatment plan, medications, and discharge instructions  Description: Complete learning assessment and assess knowledge base.  Interventions:  - Provide teaching at level of understanding  - Provide teaching via preferred learning methods  Outcome: Progressing     Problem: MUSCULOSKELETAL - ADULT  Goal: Maintain or return mobility to safest level of function  Description: INTERVENTIONS:  - Assess patient's ability to carry out ADLs; assess patient's baseline for ADL function and identify physical deficits which impact ability to perform ADLs (bathing, care of mouth/teeth, toileting, grooming, dressing, etc.)  - Assess/evaluate cause of self-care deficits   - Assess range of motion  - Assess patient's mobility  - Assess patient's need for assistive devices and provide as appropriate  - Encourage maximum independence but intervene and supervise when necessary  - Involve family in performance of ADLs  - Assess for home care needs following discharge   - Consider OT consult to assist with ADL evaluation and planning for discharge  - Provide patient education as appropriate  Outcome: Progressing  Goal: Maintain proper alignment of affected body part  Description: INTERVENTIONS:  - Support, maintain and protect limb and body alignment  - Provide patient/ family with appropriate education  Outcome: Progressing

## 2024-06-21 NOTE — OCCUPATIONAL THERAPY NOTE
Occupational Therapy Evaluation     Patient Name: Daily Schaeffer  Today's Date: 6/21/2024  Problem List  Principal Problem:    Stress due to marital problems  Active Problems:    Severe aortic stenosis    Dyslipidemia    Anxiety and depression    Hypothyroidism    Neuropathy    SARA (acute kidney injury) (HCC)    Hypokalemia    Past Medical History  Past Medical History:   Diagnosis Date    Aortic stenosis     Depression     Disease of thyroid gland     Diverticular disease 05/09/2017    Essential hypertension 05/20/2016    Hyperlipidemia     Major depression 05/20/2016    Peripheral neuropathy     Type 2 diabetes mellitus (HCC) 05/20/2016    Visual impairment      Past Surgical History  Past Surgical History:   Procedure Laterality Date    BOWEL RESECTION      COLON SURGERY      EYE SURGERY      SMALL INTESTINE SURGERY      TUBAL LIGATION             06/21/24 0803   OT Last Visit   OT Visit Date 06/21/24   Note Type   Note type Evaluation   Additional Comments Pt seen as a co-eval with PT due to the patient's co-morbidities, clinically unstable presentation, and present impairments which are a regression from the patient's baseline.   Pain Assessment   Pain Assessment Tool 0-10   Pain Score No Pain   Restrictions/Precautions   Weight Bearing Precautions Per Order No   Braces or Orthoses   (none reported)   Other Precautions Chair Alarm;Bed Alarm;Cognitive;1:1;Fall Risk  (virtual 1:1)   Home Living   Type of Home House   Home Layout Two level;Performs ADLs on one level;Able to live on main level with bedroom/bathroom;Stairs to enter with rails  (2 MARIE w/ bilateral HR)   Bathroom Shower/Tub Tub/shower unit   Bathroom Toilet Raised   Bathroom Equipment Grab bars in shower;Shower chair;Grab bars around toilet   Bathroom Accessibility Accessible   Home Equipment Walker;Cane  (Pt reports RW use at baseline)   Prior Function   Level of [place] Independent with ADLs;Independent with functional mobility;Independent  "with IADLS   Lives With Spouse   Receives Help From Family   IADLs Independent with meal prep;Independent with medication management;Family/Friend/Other provides transportation   Falls in the last 6 months 1 to 4  (x1 per patient report)   Vocational Retired   Comments questionable historian d/t cognitive deficits   Subjective   Subjective \"I think I have to use the bathroom\"   ADL   Where Assessed Edge of bed   Grooming Assistance 6  Modified Independent   UB Bathing Assistance 5  Supervision/Setup   LB Bathing Assistance 5  Supervision/Setup   UB Dressing Assistance 5  Supervision/Setup   LB Dressing Assistance 5  Supervision/Setup   LB Dressing Deficit Don/doff R sock;Don/doff L sock  (cross over leg technique utilized)   Toileting Assistance  5  Supervision/Setup   Toileting Deficit Increased time to complete;Clothing management up;Clothing management down;Perineal hygiene  (perineal and perianal hygiene completed while seated on commode; CM completed in stance at S level)   Bed Mobility   Supine to Sit 5  Supervision   Additional items Assist x 1;HOB elevated;Bedrails;Increased time required   Sit to Supine   (DNT; pt seated in recliner upon conclusion of session)   Additional Comments VC for bedrail utilization and proper body mechanics   Transfers   Sit to Stand 4  Minimal assistance   Additional items Assist x 1;Bedrails;Increased time required;Verbal cues   Stand to Sit 5  Supervision   Additional items Assist x 1;Increased time required;Verbal cues   Toilet transfer   (CGA)   Additional items Assist x 1;Increased time required;Raised toilet seat   Additional Comments RW used; VC for safe hand placement and proper body mechanics to achieve transfers   Functional Mobility   Functional Mobility   (CGA)   Additional Comments Pt completed ~200 ft w/ RW at CGA level to simulate home environment. No significant LOB observed, mild instability   Additional items Rolling walker   Balance   Static Sitting Good "   Dynamic Sitting Fair +   Static Standing Fair   Dynamic Standing Fair -   Ambulatory Fair -   Activity Tolerance   Activity Tolerance Patient limited by fatigue   Medical Staff Made Aware Yes, CM made aware of d/c recs   Nurse Made Aware Yes, nursing staff made aware of session outcomes   RUE Assessment   RUE Assessment WFL   RUE Strength   RUE Overall Strength   (3+/5)   LUE Assessment   LUE Assessment WFL   LUE Strength   LUE Overall Strength   (3+/5)   Hand Function   Gross Motor Coordination Functional   Fine Motor Coordination Functional   Sensation   Additional Comments B hand numbness d/t neuropathy   Vision-Basic Assessment   Current Vision Wears glasses all the time   Cognition   Overall Cognitive Status Impaired  (Questionable)   Arousal/Participation Responsive;Cooperative;Alert   Attention Attends with cues to redirect   Orientation Level Oriented to person;Oriented to place   Memory Decreased recall of recent events;Decreased recall of precautions   Following Commands Follows multistep commands without difficulty   Comments Pt agreeable to OT evaluation, tangential at times and questionable short term memory   Assessment   Limitation Decreased ADL status;Decreased UE strength;Decreased Safe judgement during ADL;Decreased endurance;Decreased cognition;Decreased high-level ADLs   Prognosis Good   Assessment Pt is a 78 y.o. female seen for OT evaluation s/p admit to [facility] on 6/19/2024 w/ Stress due to marital problems.  Comorbidities affecting pt's functional performance at time of assessment include: severe aortic stenosis, dyslipidemia, anxiety, neuropathy, SARA, chron disease, hyperglycemia, CKD. Personal factors affecting pt at time of IE include:steps to enter environment, limited home support, difficulty performing IADLS , decreased initiation and engagement , and health management . Prior to admission, pt was I with ADLs and IADLs. Upon evaluation: the following deficits impact occupational  performance: weakness, decreased strength, decreased balance, decreased tolerance, impaired memory, impaired problem solving, and decreased safety awareness. Pt to benefit from continued skilled OT tx while in the hospital to address deficits as defined above and maximize level of functional independence w ADL's and functional mobility. Occupational Performance areas to address include: grooming, bathing/shower, toilet hygiene, dressing, functional mobility, community mobility, and clothing management. From OT standpoint, recommendation at time of d/c would with minimal intensity OT resources.   Goals   Patient Goals to go home   Plan   Treatment Interventions ADL retraining;Functional transfer training;UE strengthening/ROM;Endurance training;Cognitive reorientation;Patient/family training;Compensatory technique education;Energy conservation;Activityengagement   Goal Expiration Date 07/01/24   OT Treatment Day 0   OT Frequency 2-3x/wk   Discharge Recommendation   Rehab Resource Intensity Level, OT III (Minimum Resource Intensity)   Additional Comments  The patient's raw score on the AM-PAC Daily Activity inpatient short form is 21, standardized score is 44.27, greater than 39.4. Patients at this level are likely to benefit from discharge with minimal intensity OT resources. Please refer to the recommendation of the Occupational Therapist for safe discharge planning.   AM-PAC Daily Activity Inpatient   Lower Body Dressing 3   Bathing 3   Toileting 3   Upper Body Dressing 4   Grooming 4   Eating 4   Daily Activity Raw Score 21   Daily Activity Standardized Score (Calc for Raw Score >=11) 44.27   AM-PAC Applied Cognition Inpatient   Following a Speech/Presentation 3   Understanding Ordinary Conversation 4   Taking Medications 3   Remembering Where Things Are Placed or Put Away 3   Remembering List of 4-5 Errands 2   Taking Care of Complicated Tasks 2   Applied Cognition Raw Score 17   Applied Cognition Standardized  Score 36.52       GOALS:    Pt will achieve the following within specified time frame: LTG  Pt will achieve the following goals within 10 days    *ADL transfers with (I) for inc'd independence with ADLs/purposeful tasks    *UB ADL with (I) for inc'd independence with self cares    *LB ADL with (I) using AE prn for inc'd independence with self cares    *Toileting with (I) for clothing management and hygiene for return to [facility] with personal care    *Increase static stand balance and dyn stand balance to G for inc'd safety with standing purposeful tasks    *Increase stand tolerance x7 m for inc'd tolerance with standing purposeful tasks    *Bed mobility- (I) for inc'd independence to manage own comfort and initiate EOB & OOB purposeful tasks    *Participate in 10-15m UE therex to increase overall stamina/activity tolerance for purposeful tasks      Liza Sanchez MS, OTR/L

## 2024-06-21 NOTE — PROGRESS NOTES
"[facility]  Progress Note  Name: Daily Schaeffer I  MRN: [id number]  Unit/Bed#: -01 I Date of Admission: 6/19/2024   Date of Service: 6/21/2024 I Hospital Day: 2    Assessment & Plan   * Stress due to marital problems  Assessment & Plan  History of hypothyroidism neuropathy and dyslipidemia presents to the hospital for increased agitation  Patient seems to be improving, continue supportive care, discontinue IV fluid and check a.m. labs.  This is causing her much anxiety.  In the ED she had increased agitation and paranoia.  No evidence of encephalopathy.  Per psychiatry evaluation yesterday.  Duloxetine decreased. Was recommended daily psychiatric follow-up.  Recently seen by neuropsychology: Has decision-making capacity.  Okay for discharge home per PT OT    Hypokalemia  Assessment & Plan  Replace and recheck tomorrow    Results from last 7 days   Lab Units 06/21/24  0548 06/20/24  0452 06/19/24  0856   POTASSIUM mmol/L 3.8 3.3* 3.7     Resolved    SARA (acute kidney injury) ([facility])  Assessment & Plan  Presentation to the hospital for increased anxiety and weakness.  Was found in her house without air conditioning.  DC IV fluid, check a.m. labs.  Acute kidney injury resolved    Results from last 7 days   Lab Units 06/21/24  0548 06/20/24  0452 06/19/24  0856   BUN mg/dL 10 14 13   CREATININE mg/dL 0.78 0.89 1.29   EGFR ml/min/1.73sq m 72 61 39         Neuropathy  Assessment & Plan  Dosing of duloxetine decreased by psychiatry yesterday    Hypothyroidism  Assessment & Plan  Continue levothyroxine    Anxiety and depression  Assessment & Plan  Increased anxiety and depression from marital issues.  No thoughts of SI/HI at this time.  Seen by psychiatry yesterday.  Duloxetine dosing decreased.  Started quetiapine to help establish sleep/wake cycle.    MRI reviewed, given severe chronic microangiopathy, start aspirin 81 mg daily per neuro.      \"Severe chronic microangiopathy. 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 Question inflammatory arthropathy or crowned dens syndrome.\"  Neurosurgical consult requested for comment on MRI findings.        Dyslipidemia  Assessment & Plan  Continue atorvastatin    Severe aortic stenosis  Assessment & Plan  Follows with Dr. Henao and referred to CT surgery as an outpatient           Case discussed with nursing.  Case discussed with neurology.    Patient's  updated via phone.    Likely discharge tomorrow.    Code Status: Level 1 - Full Code    Subjective:   Patient reports anxiety, chronic neuropathic pain in hands and feet.  No new weakness.    Objective:     Vitals:   Temp (24hrs), Av.9 °F (36.6 °C), Min:97.9 °F (36.6 °C), Max:98 °F (36.7 °C)    Temp:  [97.9 °F (36.6 °C)-98 °F (36.7 °C)] 97.9 °F (36.6 °C)  HR:  [80] 80  Resp:  [18] 18  BP: ()/(42-61) 139/61  SpO2:  [97 %-98 %] 97 %  Body mass index is 20.01 kg/m².     Input and Output Summary (last 24 hours):     Intake/Output Summary (Last 24 hours) at 2024 1601  Last data filed at 2024 1238  Gross per 24 hour   Intake 2611.67 ml   Output 0 ml   Net 2611.67 ml       Physical Exam:   Physical Exam  Vitals and nursing note reviewed.   HENT:      Head: Normocephalic and atraumatic.      Right Ear: External ear normal.   Cardiovascular:      Rate and Rhythm: Normal rate.      Pulses: Normal pulses.   Pulmonary:      Effort: Pulmonary effort is normal.   Musculoskeletal:         General: Normal range of motion.      Cervical back: Normal range of motion. No rigidity or tenderness.   Lymphadenopathy:      Cervical: No cervical adenopathy.   Skin:     General: Skin is warm and dry.   Neurological:      Mental Status: She is alert. Mental status is at baseline.   Psychiatric:         Mood and Affect: Mood normal.         Thought Content: Thought content normal.         Judgment: Judgment normal.      Comments: Appears anxious, tearful at times " per nursing.          Additional Data:     Labs:  Results from last 7 days   Lab Units 06/21/24  0548 06/20/24  0452 06/19/24  0856   WBC Thousand/uL 6.28   < > 8.69   HEMOGLOBIN g/dL 10.5*   < > 12.5   HEMATOCRIT % 34.1*   < > 38.0   PLATELETS Thousands/uL 206   < > 282   SEGS PCT %  --   --  49   LYMPHO PCT %  --   --  36   MONO PCT %  --   --  12   EOS PCT %  --   --  1    < > = values in this interval not displayed.     Results from last 7 days   Lab Units 06/21/24  0548 06/20/24  0452   SODIUM mmol/L 140 139   POTASSIUM mmol/L 3.8 3.3*   CHLORIDE mmol/L 112* 109*   CO2 mmol/L 22 22   BUN mg/dL 10 14   CREATININE mg/dL 0.78 0.89   ANION GAP mmol/L 6 8   CALCIUM mg/dL 8.5 8.8   ALBUMIN g/dL  --  3.7   TOTAL BILIRUBIN mg/dL  --  1.10*   ALK PHOS U/L  --  81   ALT U/L  --  16   AST U/L  --  27   GLUCOSE RANDOM mg/dL 92 89                       Lines/Drains:  Invasive Devices       Peripheral Intravenous Line  Duration             Peripheral IV 06/19/24 Right Antecubital 2 days                          Imaging: Reviewed radiology reports from this admission including: MRI brain    Recent Cultures (last 7 days):         Last 24 Hours Medication List:   Current Facility-Administered Medications   Medication Dose Route Frequency Provider Last Rate    acetaminophen  650 mg Oral Q4H PRN Prudencio Barahona DO      aspirin  81 mg Oral Daily Timothy Holt DO      atorvastatin  40 mg Oral Daily Prudencio Barahona DO      cyanocobalamin  1,000 mcg Intramuscular Q30 Days Timothy Holt DO      DULoxetine  30 mg Oral Daily MAJOR Lewis      heparin (porcine)  5,000 Units Subcutaneous Q8H [facility] Prudencio Barahona DO      levothyroxine  75 mcg Oral Early Morning Prudencio Barahona DO      magnesium sulfate  2 g Intravenous Once Timothy Holt DO      QUEtiapine  25 mg Oral HS Prudencio Barahona DO          Today, Patient Was Seen By: Timothy Holt DO    **Please Note: This note may have been constructed using a  voice recognition system.**

## 2024-06-21 NOTE — PHYSICAL THERAPY NOTE
PHYSICAL THERAPY EVALUATION  NAME:  Daily Schaeffer  DATE: 06/21/24    AGE:   78 y.o.  Mrn:   [id number]  ADMIT DX:  Anxiety [F41.9]  SARA (acute kidney injury) (HCC) [N17.9]  Stress due to marital problems [Z63.0]  AMS (altered mental status) [R41.82]  Problem List:   Patient Active Problem List   Diagnosis    Severe aortic stenosis    Dyslipidemia    Crohn disease (HCC)    Anxiety and depression    Hyperglycemia    Hypothyroidism    Neuropathy    Acute cystitis without hematuria- Ruled Out    Hypercalcemia    Abnormal CT scan, pelvis    Hypomagnesemia    Exertional dyspnea    Crohn's disease of jejunum (HCC)    CKD (chronic kidney disease) stage 2, GFR 60-89 ml/min    Stage 3 chronic kidney disease, unspecified whether stage 3a or 3b CKD (HCC)    Acute toxic encephalopathy    Debility    Low hemoglobin    SARA (acute kidney injury) (HCC)    Stress due to marital problems    Hypokalemia       Past Medical History  Past Medical History:   Diagnosis Date    Aortic stenosis     Depression     Disease of thyroid gland     Diverticular disease 05/09/2017    Essential hypertension 05/20/2016    Hyperlipidemia     Major depression 05/20/2016    Peripheral neuropathy     Type 2 diabetes mellitus (HCC) 05/20/2016    Visual impairment        Past Surgical History  Past Surgical History:   Procedure Laterality Date    BOWEL RESECTION      COLON SURGERY      EYE SURGERY      SMALL INTESTINE SURGERY      TUBAL LIGATION         Length Of Stay: 2  Performed at least 2 patient identifiers during session: Name and Birthday       06/21/24 0758   PT Last Visit   PT Visit Date 06/21/24   Note Type   Note type Evaluation   Pain Assessment   Pain Assessment Tool 0-10   Pain Score No Pain   Restrictions/Precautions   Weight Bearing Precautions Per Order No   Braces or Orthoses   (none reported)   Other Precautions Chair Alarm;Bed Alarm;Cognitive;1:1;Fall Risk  (virtual 1:1)   Home Living   Type of Home House   Home Layout Two level;Performs  ADLs on one level;Able to live on main level with bedroom/bathroom;Stairs to enter with rails  (2 MARIE with bilateral HR)   Bathroom Shower/Tub Tub/shower unit   Bathroom Toilet Raised   Bathroom Equipment Grab bars in shower;Shower chair;Grab bars around toilet   Bathroom Accessibility Accessible   Home Equipment Walker;Cane   Additional Comments RW used at baseline   Prior Function   Level of [place] Independent with ADLs;Independent with functional mobility;Independent with IADLS   Lives With Spouse   Receives Help From Family   IADLs Independent with meal prep;Independent with medication management;Family/Friend/Other provides transportation   Falls in the last 6 months 1 to 4  (1 fall per pt)   Vocational Retired   General   Family/Caregiver Present No   Cognition   Overall Cognitive Status   (questionable)   Arousal/Participation Cooperative   Attention Attends with cues to redirect   Orientation Level Oriented to person;Oriented to place   Memory Decreased recall of recent events;Decreased recall of precautions   Following Commands Follows multistep commands without difficulty   Comments PT agreeable to PT evaluation   RLE Assessment   RLE Assessment X   Strength RLE   R Knee Extension 4/5   R Ankle Dorsiflexion 4/5   LLE Assessment   LLE Assessment X   Strength LLE   L Knee Extension 4/5   L Ankle Dorsiflexion 4/5   Coordination   Sensation X  (pt reports neuropathy to B hands)   Light Touch   RLE Light Touch Grossly intact   LLE Light Touch Grossly intact   Bed Mobility   Supine to Sit 5  Supervision   Additional items Assist x 1;HOB elevated;Increased time required   Sit to Supine   (not tested as pt seated in bedside chair at end of session)   Transfers   Sit to Stand 4  Minimal assistance   Additional items Assist x 1;Increased time required;Verbal cues;Armrests   Stand to Sit   (CGA)   Additional items Assist x 1;Increased time required;Verbal cues;Armrests   Additional Comments RW used during  transfers. Verbal cues for UE placement and positioning   Ambulation/Elevation   Gait pattern Improper Weight shift;Narrow ANAID;Decreased foot clearance;Shuffling   Gait Assistance   (CGA)   Additional items Assist x 1;Verbal cues   Assistive Device Rolling walker   Distance 150'   Stair Management Assistance Not tested   Ambulation/Elevation Additional Comments 2 instances during gait with unsteadiness causing RW to 'tilt' laterally, verbal cues to increase safety   Balance   Static Sitting Good   Dynamic Sitting Fair +   Static Standing Fair   Dynamic Standing Fair -   Ambulatory Fair -   Activity Tolerance   Activity Tolerance Patient limited by fatigue   Medical Staff Made Aware Pt seen as a co-eval with MAAME De Jesus due to the patient's co-morbidities, clinical presentation, and present impairments which are a regression from the patient's baseline.   Nurse Made Aware DENISSE Dia confirmed pt appropriate for PT session   Assessment   Prognosis Good   Problem List Impaired balance;Decreased endurance;Decreased mobility;Decreased cognition;Decreased safety awareness   Assessment Pt is 78 y.o. female seen for high-complexity PT evaluation on 6/21/2024 s/p admit to [facility] on 6/19/2024 w/ Stress due to marital problems. PT was consulted to assess pt's functional mobility and d/c needs. Order placed for PT eval and tx, w/ up and OOB as tolerated order. PTA, pt was living with her spouse in a two story home with first floor set up and 2 MARIE. Pt reports independence at baseline with ADLs, IADLs, and functional mobility with RW. At time of eval, pt completed sup > sit supervision, STS Chip, and ambulated 150' CGA with RW. Upon evaluation, pt presenting with impaired functional mobility d/t decreased endurance, impaired balance, decreased mobility, impaired judgement, decreased safety awareness, and activity intolerance. Pertinent PMHx and current co-morbidities affecting pt's physical performance at time of  assessment include: SARA, anxiety and depression, hypokalemia, neuropathy, severe aortic stenosis. Personal factors affecting pt at time of eval include: ambulating w/ assistive device, stairs to enter home, decreased cognition, and positive fall history. The following objective measures performed on IE also reveal limitations: AM-PAC 6-Clicks: 18/24. Pt's clinical presentation is currently unstable/unpredictable seen in pt's presentation of advanced age, abnormal lab value(s), need for input for task focus and mobility technique, need for CG to min assist w/ all phases of mobility when usually mobilizing independently, and ongoing medical assessment. Overall, pt's rehab potential and prognosis to return to PLOF is good as impacted by objective findings, warranting pt to receive further skilled PT interventions to address impairments, activity limitations, and participation restrictions. Pt to benefit from continued PT services to address deficits as defined above and maximize level of functional independent mobility and consistency. From PT/mobility standpoint, recommendation at time of d/c would be level 2, moderate resource intensity in order to facilitate return to PLOF.   Goals   [facility] Expiration Date 07/01/24   Short Term Goal #1 In 10 days: Increase bilateral LE strength 1/2 grade to facilitate independent mobility, Perform all bed mobility tasks modified independent to decrease caregiver burden, Perform all transfers modified independent to improve independence, Ambulate > 250 ft. with RW modified independent w/o LOB and w/ normalized gait pattern 100% of the time, Navigate 2 stairs modified independent with unilateral handrail to facilitate return to previous living environment, and Increase all balance 1/2 grade to decrease risk for falls   PT Treatment Day 0   Plan   Treatment/Interventions Functional transfer training;Therapeutic exercise;Endurance training;Elevations;Patient/family training;Bed  mobility;Gait training;Spoke to nursing;OT   PT Frequency 3-5x/wk   Discharge Recommendation   Rehab Resource Intensity Level, PT II (Moderate Resource Intensity)   Equipment Recommended Walker   AM-PAC Basic Mobility Inpatient   Turning in Flat Bed Without Bedrails 3   Lying on Back to Sitting on Edge of Flat Bed Without Bedrails 3   Moving Bed to Chair 3   Standing Up From Chair Using Arms 3   Walk in Room 3   Climb 3-5 Stairs With Railing 3   Basic Mobility Inpatient Raw Score 18   Basic Mobility Standardized Score 41.05   [facility] Highest Level Of Mobility   -HLM Goal 6: Walk 10 steps or more   -HLM Achieved 7: Walk 25 feet or more   End of Consult   Patient Position at End of Consult Bed/Chair alarm activated;All needs within reach;Bedside chair  (virtual 1:1 present)       Time In: 0743  Time Out: 0802  Total Evaluation Minutes: 19    Mary Metzger, PT

## 2024-06-21 NOTE — CONSULTS
"e-Consult (IP[facility])     Inpatient consult to Neurosurgery  Consult performed by: Rosi Pelayo PA-C  Consult ordered by: Timothy Holt DO           Contacted by Timothy Holt @ [facility].    Daily Schaeffer 78 y.o. female MRN: [id number]  Unit/Bed#: -01 Encounter: [id number]    Reason for Consult    Per provider report, patient presents with anxiety and AMS, found to have UTI and ultimately discharged to rehab then home. She was readmitted for increased agitation after locking herself in her bedroom. MRI brain was ultimately obtained looking for additional causes of AMS. She denies gait abnormality or frequent falls but does admit to dropping glasses infrequently; perhaps once a month. Available past medical history,social history, surgical history, medication list, drug allergies and review of systems were reviewed.    /61   Pulse 80   Temp 97.9 °F (36.6 °C)   Resp 18   Ht 5' 6\" (1.676 m)   Wt 56.2 kg (124 lb)   SpO2 97%   BMI 20.01 kg/m²      Clinical exam per provider report, no neck pain with full ROM, no weakness or new sensory deficits. Chronic bilateral hand and feet neuropathy.    Imaging personally reviewed.   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 Question inflammatory arthropathy or crowned dens syndrome     Assessment and Recommendations    1. Imaging personally reviewed; in the absence of myelopathic symptoms this would be considered an incidental finding, and not likely related to her current complaints  2. If patient continued with frequent falls, clumsiness of her hands, or other signs of myelopathy could follow up in our office to discuss results and next steps  3. Rest of management per primary team    All questions answered. Provider is in agreement with the course of action. 11-20 minutes, >50% of the total time devoted to medical consultative verbal/EMR discussion between providers. Written report will be generated in " the EMR.

## 2024-06-21 NOTE — PROGRESS NOTES
Pastoral Care Progress Note    2024  Patient: Daily Schaeffer : 1945  Admission Date & Time: 2024 0826  MRN: [id number] [facility]: [id number]        CH initiated follow-up visit, encountered pt sitting in bedside chair, no one else present.  Pt expresses she is 'angry and disgusted' at being in the hospital; states she felt she was to go home after a day or so and 'here I sit.' Pt expresses desire to go back home to care for her house and family; states that her  listens to her but her daughter is 'bossy.'  PCA arrived to transport pt for MRI; pt thanked  for visit.   provided compassionate listening and invited pt to contact pastoral services if and when desired.              24 1000   Clinical Encounter Type   Visited With Patient   Routine Visit Follow-up

## 2024-06-22 VITALS
BODY MASS INDEX: 19.93 KG/M2 | TEMPERATURE: 97.7 F | HEIGHT: 66 IN | RESPIRATION RATE: 18 BRPM | SYSTOLIC BLOOD PRESSURE: 111 MMHG | OXYGEN SATURATION: 97 % | DIASTOLIC BLOOD PRESSURE: 48 MMHG | WEIGHT: 124 LBS | HEART RATE: 82 BPM

## 2024-06-22 PROBLEM — E87.6 HYPOKALEMIA: Status: RESOLVED | Noted: 2024-06-20 | Resolved: 2024-06-22

## 2024-06-22 PROBLEM — R93.0 ABNORMAL MRI OF THE HEAD: Status: ACTIVE | Noted: 2024-06-22

## 2024-06-22 LAB
ALBUMIN SERPL BCG-MCNC: 3.3 G/DL (ref 3.5–5)
ALP SERPL-CCNC: 76 U/L (ref 34–104)
ALT SERPL W P-5'-P-CCNC: 15 U/L (ref 7–52)
ANION GAP SERPL CALCULATED.3IONS-SCNC: 6 MMOL/L (ref 4–13)
AST SERPL W P-5'-P-CCNC: 24 U/L (ref 13–39)
BASOPHILS # BLD AUTO: 0.04 THOUSANDS/ÂΜL (ref 0–0.1)
BASOPHILS NFR BLD AUTO: 1 % (ref 0–1)
BILIRUB SERPL-MCNC: 0.54 MG/DL (ref 0.2–1)
BUN SERPL-MCNC: 10 MG/DL (ref 5–25)
CALCIUM ALBUM COR SERPL-MCNC: 9.5 MG/DL (ref 8.3–10.1)
CALCIUM SERPL-MCNC: 8.9 MG/DL (ref 8.4–10.2)
CHLORIDE SERPL-SCNC: 109 MMOL/L (ref 96–108)
CO2 SERPL-SCNC: 24 MMOL/L (ref 21–32)
CREAT SERPL-MCNC: 0.77 MG/DL (ref 0.6–1.3)
EOSINOPHIL # BLD AUTO: 0.33 THOUSAND/ÂΜL (ref 0–0.61)
EOSINOPHIL NFR BLD AUTO: 5 % (ref 0–6)
ERYTHROCYTE [DISTWIDTH] IN BLOOD BY AUTOMATED COUNT: 13.2 % (ref 11.6–15.1)
GFR SERPL CREATININE-BSD FRML MDRD: 73 ML/MIN/1.73SQ M
GLUCOSE SERPL-MCNC: 99 MG/DL (ref 65–140)
HCT VFR BLD AUTO: 34.2 % (ref 34.8–46.1)
HGB BLD-MCNC: 10.8 G/DL (ref 11.5–15.4)
IMM GRANULOCYTES # BLD AUTO: 0.03 THOUSAND/UL (ref 0–0.2)
IMM GRANULOCYTES NFR BLD AUTO: 0 % (ref 0–2)
INR PPP: 1.07 (ref 0.84–1.19)
LYMPHOCYTES # BLD AUTO: 3.16 THOUSANDS/ÂΜL (ref 0.6–4.47)
LYMPHOCYTES NFR BLD AUTO: 45 % (ref 14–44)
MAGNESIUM SERPL-MCNC: 1.9 MG/DL (ref 1.9–2.7)
MCH RBC QN AUTO: 32.5 PG (ref 26.8–34.3)
MCHC RBC AUTO-ENTMCNC: 31.6 G/DL (ref 31.4–37.4)
MCV RBC AUTO: 103 FL (ref 82–98)
MONOCYTES # BLD AUTO: 0.7 THOUSAND/ÂΜL (ref 0.17–1.22)
MONOCYTES NFR BLD AUTO: 10 % (ref 4–12)
NEUTROPHILS # BLD AUTO: 2.7 THOUSANDS/ÂΜL (ref 1.85–7.62)
NEUTS SEG NFR BLD AUTO: 39 % (ref 43–75)
NRBC BLD AUTO-RTO: 0 /100 WBCS
PHOSPHATE SERPL-MCNC: 4.2 MG/DL (ref 2.3–4.1)
PLATELET # BLD AUTO: 219 THOUSANDS/UL (ref 149–390)
PMV BLD AUTO: 8.6 FL (ref 8.9–12.7)
POTASSIUM SERPL-SCNC: 3.6 MMOL/L (ref 3.5–5.3)
PROCALCITONIN SERPL-MCNC: 0.11 NG/ML
PROT SERPL-MCNC: 5.2 G/DL (ref 6.4–8.4)
PROTHROMBIN TIME: 14 SECONDS (ref 11.6–14.5)
RBC # BLD AUTO: 3.32 MILLION/UL (ref 3.81–5.12)
SODIUM SERPL-SCNC: 139 MMOL/L (ref 135–147)
WBC # BLD AUTO: 6.96 THOUSAND/UL (ref 4.31–10.16)

## 2024-06-22 PROCEDURE — 84100 ASSAY OF PHOSPHORUS: CPT | Performed by: INTERNAL MEDICINE

## 2024-06-22 PROCEDURE — 84145 PROCALCITONIN (PCT): CPT | Performed by: INTERNAL MEDICINE

## 2024-06-22 PROCEDURE — 83735 ASSAY OF MAGNESIUM: CPT | Performed by: INTERNAL MEDICINE

## 2024-06-22 PROCEDURE — 80053 COMPREHEN METABOLIC PANEL: CPT | Performed by: INTERNAL MEDICINE

## 2024-06-22 PROCEDURE — 85025 COMPLETE CBC W/AUTO DIFF WBC: CPT | Performed by: INTERNAL MEDICINE

## 2024-06-22 PROCEDURE — 99239 HOSP IP/OBS DSCHRG MGMT >30: CPT | Performed by: INTERNAL MEDICINE

## 2024-06-22 PROCEDURE — 85610 PROTHROMBIN TIME: CPT | Performed by: INTERNAL MEDICINE

## 2024-06-22 RX ORDER — QUETIAPINE FUMARATE 25 MG/1
25 TABLET, FILM COATED ORAL
Qty: 30 TABLET | Refills: 0 | Status: SHIPPED | OUTPATIENT
Start: 2024-06-22 | End: 2024-06-27

## 2024-06-22 RX ORDER — ACETAMINOPHEN 325 MG/1
650 TABLET ORAL EVERY 4 HOURS PRN
Start: 2024-06-22

## 2024-06-22 RX ORDER — DULOXETIN HYDROCHLORIDE 30 MG/1
30 CAPSULE, DELAYED RELEASE ORAL DAILY
Qty: 30 CAPSULE | Refills: 0 | Status: SHIPPED | OUTPATIENT
Start: 2024-06-23

## 2024-06-22 RX ADMIN — HEPARIN SODIUM 5000 UNITS: 5000 INJECTION, SOLUTION INTRAVENOUS; SUBCUTANEOUS at 05:00

## 2024-06-22 RX ADMIN — LEVOTHYROXINE SODIUM 75 MCG: 75 TABLET ORAL at 05:00

## 2024-06-22 RX ADMIN — ATORVASTATIN CALCIUM 40 MG: 40 TABLET, FILM COATED ORAL at 08:33

## 2024-06-22 RX ADMIN — DULOXETINE HYDROCHLORIDE 30 MG: 30 CAPSULE, DELAYED RELEASE ORAL at 08:33

## 2024-06-22 RX ADMIN — ASPIRIN 81 MG: 81 TABLET, COATED ORAL at 08:33

## 2024-06-22 NOTE — ASSESSMENT & PLAN NOTE
Increased anxiety and depression from marital issues.  No thoughts of SI/HI at this time.  Seen by psychiatry yesterday.  Duloxetine dosing decreased.  Started quetiapine to help establish sleep/wake cycle.

## 2024-06-22 NOTE — ASSESSMENT & PLAN NOTE
Follows with Dr. Henao and referred to CT surgery as an outpatient, patient encouraged to seek outpatient follow-up with CT surgery in near future, not urgent.

## 2024-06-22 NOTE — ASSESSMENT & PLAN NOTE
"Patient with evidence of severe chronic microangiopathy, case discussed with neurology, aspirin 81 mg daily recommended, continue Lipitor 40 mg daily.      MRI read noted below.:    \"Severe chronic microangiopathy.     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 Question inflammatory arthropathy or crowned dens syndrome.\"    With regards to the incidental finding of retro-Don toyed pseudotumor formation with surrounding enhancement, questionable inflammatory arthropathy or crowned dens syndrome neurology recommended neurosurgical comment, interprofessional consult completed.    Patient does have some clumsiness of the bilateral hands, reports that the symptoms are chronic and not acutely changing, neurosurgery suggested no further intervention, symptoms are worsening can obtain outpatient neurosurgical evaluation in the future.  Again this is nonurgent.    These findings both were discussed with the patient at length, she understands her current medical illness, understands need for follow-up as needed.  "

## 2024-06-22 NOTE — ASSESSMENT & PLAN NOTE
History of hypothyroidism neuropathy and dyslipidemia presents to the hospital for increased agitation    Suspect component of severe anxiety, also with evidence of volume depletion, acute kidney injury present on admission, also with electrolyte abnormalities.    Hypercalcemia possibly contributed.

## 2024-06-22 NOTE — PLAN OF CARE
Problem: PAIN - ADULT  Goal: Verbalizes/displays adequate comfort level or baseline comfort level  Description: Interventions:  - Encourage patient to monitor pain and request assistance  - Assess pain using appropriate pain scale  - Administer analgesics based on type and severity of pain and evaluate response  - Implement non-pharmacological measures as appropriate and evaluate response  - Consider cultural and social influences on pain and pain management  - Notify physician/advanced practitioner if interventions unsuccessful or patient reports new pain  Outcome: Progressing     Problem: SAFETY ADULT  Goal: Patient will remain free of falls  Description: INTERVENTIONS:  - Educate patient/family on patient safety including physical limitations  - Instruct patient to call for assistance with activity   - Consult OT/PT to assist with strengthening/mobility   - Keep Call bell within reach  - Keep bed low and locked with side rails adjusted as appropriate  - Keep care items and personal belongings within reach  - Initiate and maintain comfort rounds  - Make Fall Risk Sign visible to staff  - Offer Toileting every 2 Hours, in advance of need  - Initiate/Maintain bed alarm  - Obtain necessary fall risk management equipment: non skid socks  - Apply yellow socks and bracelet for high fall risk patients  - Consider moving patient to room near nurses station  Outcome: Progressing  Goal: Maintain or return to baseline ADL function  Description: INTERVENTIONS:  -  Assess patient's ability to carry out ADLs; assess patient's baseline for ADL function and identify physical deficits which impact ability to perform ADLs (bathing, care of mouth/teeth, toileting, grooming, dressing, etc.)  - Assess/evaluate cause of self-care deficits   - Assess range of motion  - Assess patient's mobility; develop plan if impaired  - Assess patient's need for assistive devices and provide as appropriate  - Encourage maximum independence but  intervene and supervise when necessary  - Involve family in performance of ADLs  - Assess for home care needs following discharge   - Consider OT consult to assist with ADL evaluation and planning for discharge  - Provide patient education as appropriate  Outcome: Progressing  Goal: Maintains/Returns to pre admission functional level  Description: INTERVENTIONS:  - Perform AM-PAC 6 Click Basic Mobility/ Daily Activity assessment daily.  - Set and communicate daily mobility goal to care team and patient/family/caregiver.   - Collaborate with rehabilitation services on mobility goals if consulted  - Perform Range of Motion 2 times a day.  - Reposition patient every 2 hours.  - Dangle patient 2 times a day  - Stand patient 2 times a day  - Ambulate patient 2 times a day  - Out of bed to chair 3 times a day   - Out of bed for meals 3 times a day  - Out of bed for toileting  - Record patient progress and toleration of activity level   Outcome: Progressing

## 2024-06-22 NOTE — DISCHARGE SUMMARY
"[facility]  Discharge- Daily Schaeffer 1945, 78 y.o. female MRN: [id number]  Unit/Bed#: -Be Encounter: [id number]  Primary Care Provider: Alanna Moreau DO   Date and time admitted to hospital: 6/19/2024  8:26 AM    * Stress due to marital problems  Assessment & Plan  History of hypothyroidism neuropathy and dyslipidemia presents to the hospital for increased agitation    Suspect component of severe anxiety, also with evidence of volume depletion, acute kidney injury present on admission, also with electrolyte abnormalities.    Hypercalcemia possibly contributed.    Abnormal MRI of the head  Assessment & Plan  Patient with evidence of severe chronic microangiopathy, case discussed with neurology, aspirin 81 mg daily recommended, continue Lipitor 40 mg daily.      MRI read noted below.:    \"Severe chronic microangiopathy.     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 Question inflammatory arthropathy or crowned dens syndrome.\"    With regards to the incidental finding of retro-Don toyed pseudotumor formation with surrounding enhancement, questionable inflammatory arthropathy or crowned dens syndrome neurology recommended neurosurgical comment, interprofessional consult completed.    Patient does have some clumsiness of the bilateral hands, reports that the symptoms are chronic and not acutely changing, neurosurgery suggested no further intervention, symptoms are worsening can obtain outpatient neurosurgical evaluation in the future.  Again this is nonurgent.    These findings both were discussed with the patient at length, she understands her current medical illness, understands need for follow-up as needed.    Anxiety and depression  Assessment & Plan  Increased anxiety and depression from marital issues.  No thoughts of SI/HI at this time.  Seen by psychiatry yesterday.  Duloxetine dosing decreased.  Started quetiapine to help establish " sleep/wake cycle.          Neuropathy  Assessment & Plan  Outpatient follow-up with neurology, if bilateral hand symptoms are worsening or patient develops any lower extremity symptoms neurosurgical consult can be obtained.    SARA (acute kidney injury) (HCC)  Assessment & Plan  Presentation to the hospital for increased anxiety and weakness.  Was found in her house without air conditioning.  Acute kidney injury was present admission, now resolved    Results from last 7 days   Lab Units 06/22/24  0453 06/21/24  0548 06/20/24  0452 06/19/24  0856   BUN mg/dL 10 10 14 13   CREATININE mg/dL 0.77 0.78 0.89 1.29   EGFR ml/min/1.73sq m 73 72 61 39         Hypokalemia-resolved as of 6/22/2024  Assessment & Plan  Replace and recheck tomorrow    Results from last 7 days   Lab Units 06/22/24  0453 06/21/24  0548 06/20/24  0452 06/19/24  0856   POTASSIUM mmol/L 3.6 3.8 3.3* 3.7     Resolved    Severe aortic stenosis  Assessment & Plan  Follows with Dr. Henao and referred to CT surgery as an outpatient, patient encouraged to seek outpatient follow-up with CT surgery in near future, not urgent.    Hypothyroidism  Assessment & Plan  Continue levothyroxine    Dyslipidemia  Assessment & Plan  Continue atorvastatin        Medical Problems       Resolved Problems  Date Reviewed: 6/22/2024            Resolved    Hypokalemia 6/22/2024     Resolved by  Timothy Holt DO        Discharging Physician / Practitioner: Timothy Holt DO  PCP: Alanna Moreau DO  Admission Date:   Admission Orders (From admission, onward)       Ordered        06/19/24 1324  INPATIENT ADMISSION  Once                          Discharge Date: 06/22/24    Consultations During Hospital Stay:  Neurology  Psychiatry  Neurosurgical team    Incidental Findings:   Severe chronic microangiopathy.     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 Question inflammatory arthropathy or crowned dens syndrome.       Test  "Results Pending at Discharge (will require follow up):   None     Outpatient Tests Requested:  None        Reason for Admission: Patient admitted with increased agitation, had evidence of acute kidney injury, electrolyte abnormalities on admission    Hospital Course:   Daily Schaeffer is a 78 y.o. female patient who originally presented to the hospital on 6/19/2024 due to acute agitation, paranoia, anxiety, patient had medical diagnosis of acute kidney injury with associated metabolic abnormalities, patient had evidence of metabolic acidosis with anion gap, also with mild hypercalcemia present on admission, now resolved.    Patient had hypokalemia and hypomagnesemia treated.  Patient received IV fluid, had gradual improvement in mental status throughout the hospital stay, psychiatry team recommended decreasing Cymbalta dosing to 30 mg daily.    Given abnormal MRI showing severe chronic microangiopathy as noted above patient was started on aspirin 81 mg daily.  Patient to follow-up with neurology and PCP.    PCP follow-up recommended within 1 week.      Please see above list of diagnoses and related plan for additional information.     Condition at Discharge: good    Discharge Day Visit / Exam:   Subjective: Patient is cooperative, she is an excellent medical historian, patient's mental status has returned to baseline.  Vitals: Blood Pressure: (!) 111/48 (06/22/24 0736)  Pulse: 82 (06/21/24 2152)  Temperature: 97.7 °F (36.5 °C) (06/22/24 0736)  Temp Source: Oral (06/21/24 2152)  Respirations: 18 (06/22/24 0736)  Height: 5' 6\" (167.6 cm) (06/19/24 1406)  Weight - Scale: 56.2 kg (124 lb) (06/19/24 1406)  SpO2: 97 % (06/21/24 2152)  Exam:   Physical Exam  Vitals and nursing note reviewed.   HENT:      Head: Normocephalic and atraumatic.      Right Ear: External ear normal.      Left Ear: External ear normal.   Cardiovascular:      Rate and Rhythm: Normal rate.      Pulses: Normal pulses.   Pulmonary:      Effort: " Pulmonary effort is normal.   Abdominal:      General: Abdomen is flat. Bowel sounds are normal. There is no distension.      Palpations: Abdomen is soft.      Tenderness: There is no abdominal tenderness.   Musculoskeletal:         General: Normal range of motion.      Cervical back: Normal range of motion.   Neurological:      General: No focal deficit present.      Mental Status: She is alert and oriented to person, place, and time. Mental status is at baseline.      Cranial Nerves: No cranial nerve deficit.      Sensory: No sensory deficit.      Motor: No weakness.      Coordination: Coordination normal.      Gait: Gait normal.   Psychiatric:         Mood and Affect: Mood normal.         Behavior: Behavior normal.         Thought Content: Thought content normal.         Judgment: Judgment normal.          Discussion with Family: Updated  () via phone.   was made aware of discharge plan on 6/21/2024    Discharge instructions/Information to patient and family:   See after visit summary for information provided to patient and family.      Provisions for Follow-Up Care:  See after visit summary for information related to follow-up care and any pertinent home health orders.      Mobility at time of Discharge:   Basic Mobility Inpatient Raw Score: 18  JH-HLM Goal: 6: Walk 10 steps or more  JH-HLM Achieved: 6: Walk 10 steps or more  HLM Goal achieved. Continue to encourage appropriate mobility.     Disposition:   Home and offered home health care services, she declined.    Planned Readmission: no     Discharge Statement:  I spent 35 minutes discharging the patient. This time was spent on the day of discharge. I had direct contact with the patient on the day of discharge. Greater than 50% of the total time was spent examining patient, answering all patient questions, arranging and discussing plan of care with patient as well as directly providing post-discharge instructions.  Additional time  then spent on discharge activities.    Discharge Medications:  See after visit summary for reconciled discharge medications provided to patient and/or family.      **Please Note: This note may have been constructed using a voice recognition system**

## 2024-06-22 NOTE — ASSESSMENT & PLAN NOTE
Presentation to the hospital for increased anxiety and weakness.  Was found in her house without air conditioning.  Acute kidney injury was present admission, now resolved    Results from last 7 days   Lab Units 06/22/24  0453 06/21/24  0548 06/20/24  0452 06/19/24  0856   BUN mg/dL 10 10 14 13   CREATININE mg/dL 0.77 0.78 0.89 1.29   EGFR ml/min/1.73sq m 73 72 61 39

## 2024-06-22 NOTE — ASSESSMENT & PLAN NOTE
Outpatient follow-up with neurology, if bilateral hand symptoms are worsening or patient develops any lower extremity symptoms neurosurgical consult can be obtained.

## 2024-06-22 NOTE — ASSESSMENT & PLAN NOTE
Replace and recheck tomorrow    Results from last 7 days   Lab Units 06/22/24  0453 06/21/24  0548 06/20/24  0452 06/19/24  0856   POTASSIUM mmol/L 3.6 3.8 3.3* 3.7     Resolved

## 2024-06-24 ENCOUNTER — APPOINTMENT (EMERGENCY)
Dept: RADIOLOGY | Facility: HOSPITAL | Age: 79
DRG: 556 | End: 2024-06-24
Payer: MEDICARE

## 2024-06-24 ENCOUNTER — TRANSITIONAL CARE MANAGEMENT (OUTPATIENT)
Dept: FAMILY MEDICINE CLINIC | Facility: CLINIC | Age: 79
End: 2024-06-24

## 2024-06-24 ENCOUNTER — PATIENT OUTREACH (OUTPATIENT)
Dept: FAMILY MEDICINE CLINIC | Facility: CLINIC | Age: 79
End: 2024-06-24

## 2024-06-24 ENCOUNTER — HOSPITAL ENCOUNTER (INPATIENT)
Facility: HOSPITAL | Age: 79
LOS: 3 days | Discharge: NURSING, REHAB OR LONG-TERM CARE | DRG: 556 | End: 2024-06-27
Attending: EMERGENCY MEDICINE | Admitting: INTERNAL MEDICINE
Payer: MEDICARE

## 2024-06-24 DIAGNOSIS — I35.0 AORTIC STENOSIS: ICD-10-CM

## 2024-06-24 DIAGNOSIS — E83.42 HYPOMAGNESEMIA: ICD-10-CM

## 2024-06-24 DIAGNOSIS — R41.82 CHANGE IN MENTAL STATUS: Primary | ICD-10-CM

## 2024-06-24 DIAGNOSIS — F22 PARANOID DELUSION (HCC): ICD-10-CM

## 2024-06-24 DIAGNOSIS — N39.0 UTI (URINARY TRACT INFECTION): ICD-10-CM

## 2024-06-24 PROBLEM — Z86.73 HISTORY OF STROKE: Status: ACTIVE | Noted: 2024-06-24

## 2024-06-24 PROBLEM — R90.89 ABNORMAL BRAIN MRI: Status: ACTIVE | Noted: 2024-06-24

## 2024-06-24 PROBLEM — M25.50 ARTHRALGIA: Status: ACTIVE | Noted: 2024-06-24

## 2024-06-24 LAB
ALBUMIN SERPL BCG-MCNC: 4 G/DL (ref 3.5–5)
ALP SERPL-CCNC: 134 U/L (ref 34–104)
ALT SERPL W P-5'-P-CCNC: 20 U/L (ref 7–52)
AMPHETAMINES SERPL QL SCN: NEGATIVE
ANION GAP SERPL CALCULATED.3IONS-SCNC: 16 MMOL/L (ref 4–13)
AST SERPL W P-5'-P-CCNC: 32 U/L (ref 13–39)
ATRIAL RATE: 87 BPM
BACTERIA UR QL AUTO: ABNORMAL /HPF
BARBITURATES UR QL: NEGATIVE
BASOPHILS # BLD AUTO: 0.04 THOUSANDS/ÂΜL (ref 0–0.1)
BASOPHILS NFR BLD AUTO: 1 % (ref 0–1)
BENZODIAZ UR QL: NEGATIVE
BILIRUB SERPL-MCNC: 1.12 MG/DL (ref 0.2–1)
BILIRUB UR QL STRIP: NEGATIVE
BUN SERPL-MCNC: 11 MG/DL (ref 5–25)
CALCIUM SERPL-MCNC: 9.7 MG/DL (ref 8.4–10.2)
CHLORIDE SERPL-SCNC: 104 MMOL/L (ref 96–108)
CLARITY UR: ABNORMAL
CO2 SERPL-SCNC: 20 MMOL/L (ref 21–32)
COCAINE UR QL: NEGATIVE
COLOR UR: YELLOW
CREAT SERPL-MCNC: 0.82 MG/DL (ref 0.6–1.3)
EOSINOPHIL # BLD AUTO: 0.06 THOUSAND/ÂΜL (ref 0–0.61)
EOSINOPHIL NFR BLD AUTO: 1 % (ref 0–6)
ERYTHROCYTE [DISTWIDTH] IN BLOOD BY AUTOMATED COUNT: 13.3 % (ref 11.6–15.1)
ETHANOL SERPL-MCNC: <10 MG/DL
FENTANYL UR QL SCN: NEGATIVE
GFR SERPL CREATININE-BSD FRML MDRD: 67 ML/MIN/1.73SQ M
GLUCOSE SERPL-MCNC: 106 MG/DL (ref 65–140)
GLUCOSE UR STRIP-MCNC: NEGATIVE MG/DL
HCT VFR BLD AUTO: 37.1 % (ref 34.8–46.1)
HGB BLD-MCNC: 12.4 G/DL (ref 11.5–15.4)
HGB UR QL STRIP.AUTO: ABNORMAL
HYDROCODONE UR QL SCN: NEGATIVE
IMM GRANULOCYTES # BLD AUTO: 0.02 THOUSAND/UL (ref 0–0.2)
IMM GRANULOCYTES NFR BLD AUTO: 0 % (ref 0–2)
KETONES UR STRIP-MCNC: ABNORMAL MG/DL
LEUKOCYTE ESTERASE UR QL STRIP: ABNORMAL
LYMPHOCYTES # BLD AUTO: 1.87 THOUSANDS/ÂΜL (ref 0.6–4.47)
LYMPHOCYTES NFR BLD AUTO: 29 % (ref 14–44)
MCH RBC QN AUTO: 33.5 PG (ref 26.8–34.3)
MCHC RBC AUTO-ENTMCNC: 33.4 G/DL (ref 31.4–37.4)
MCV RBC AUTO: 100 FL (ref 82–98)
METHADONE UR QL: NEGATIVE
MONOCYTES # BLD AUTO: 0.8 THOUSAND/ÂΜL (ref 0.17–1.22)
MONOCYTES NFR BLD AUTO: 13 % (ref 4–12)
NEUTROPHILS # BLD AUTO: 3.61 THOUSANDS/ÂΜL (ref 1.85–7.62)
NEUTS SEG NFR BLD AUTO: 56 % (ref 43–75)
NITRITE UR QL STRIP: NEGATIVE
NON-SQ EPI CELLS URNS QL MICRO: ABNORMAL /HPF
NRBC BLD AUTO-RTO: 0 /100 WBCS
OPIATES UR QL SCN: NEGATIVE
OXYCODONE+OXYMORPHONE UR QL SCN: NEGATIVE
P AXIS: 77 DEGREES
PCP UR QL: NEGATIVE
PH UR STRIP.AUTO: 7 [PH]
PLATELET # BLD AUTO: 269 THOUSANDS/UL (ref 149–390)
PMV BLD AUTO: 8.8 FL (ref 8.9–12.7)
POTASSIUM SERPL-SCNC: 3.6 MMOL/L (ref 3.5–5.3)
PR INTERVAL: 208 MS
PROT SERPL-MCNC: 7 G/DL (ref 6.4–8.4)
PROT UR STRIP-MCNC: NEGATIVE MG/DL
QRS AXIS: 47 DEGREES
QRSD INTERVAL: 66 MS
QT INTERVAL: 402 MS
QTC INTERVAL: 483 MS
RBC # BLD AUTO: 3.7 MILLION/UL (ref 3.81–5.12)
RBC #/AREA URNS AUTO: ABNORMAL /HPF
SODIUM SERPL-SCNC: 140 MMOL/L (ref 135–147)
SP GR UR STRIP.AUTO: 1.01
T WAVE AXIS: 70 DEGREES
THC UR QL: NEGATIVE
TSH SERPL DL<=0.05 MIU/L-ACNC: 2.1 UIU/ML (ref 0.45–4.5)
UROBILINOGEN UR QL STRIP.AUTO: 0.2 E.U./DL
VENTRICULAR RATE: 87 BPM
WBC # BLD AUTO: 6.4 THOUSAND/UL (ref 4.31–10.16)
WBC #/AREA URNS AUTO: ABNORMAL /HPF

## 2024-06-24 PROCEDURE — 82077 ASSAY SPEC XCP UR&BREATH IA: CPT | Performed by: EMERGENCY MEDICINE

## 2024-06-24 PROCEDURE — 93005 ELECTROCARDIOGRAM TRACING: CPT

## 2024-06-24 PROCEDURE — 36415 COLL VENOUS BLD VENIPUNCTURE: CPT | Performed by: EMERGENCY MEDICINE

## 2024-06-24 PROCEDURE — 73060 X-RAY EXAM OF HUMERUS: CPT

## 2024-06-24 PROCEDURE — 99285 EMERGENCY DEPT VISIT HI MDM: CPT

## 2024-06-24 PROCEDURE — 99285 EMERGENCY DEPT VISIT HI MDM: CPT | Performed by: EMERGENCY MEDICINE

## 2024-06-24 PROCEDURE — 73080 X-RAY EXAM OF ELBOW: CPT

## 2024-06-24 PROCEDURE — 99223 1ST HOSP IP/OBS HIGH 75: CPT | Performed by: PHYSICIAN ASSISTANT

## 2024-06-24 PROCEDURE — 73090 X-RAY EXAM OF FOREARM: CPT

## 2024-06-24 PROCEDURE — 84443 ASSAY THYROID STIM HORMONE: CPT | Performed by: EMERGENCY MEDICINE

## 2024-06-24 PROCEDURE — 80053 COMPREHEN METABOLIC PANEL: CPT | Performed by: EMERGENCY MEDICINE

## 2024-06-24 PROCEDURE — 81001 URINALYSIS AUTO W/SCOPE: CPT | Performed by: EMERGENCY MEDICINE

## 2024-06-24 PROCEDURE — 80307 DRUG TEST PRSMV CHEM ANLYZR: CPT | Performed by: EMERGENCY MEDICINE

## 2024-06-24 PROCEDURE — 93010 ELECTROCARDIOGRAM REPORT: CPT | Performed by: INTERNAL MEDICINE

## 2024-06-24 PROCEDURE — 73564 X-RAY EXAM KNEE 4 OR MORE: CPT

## 2024-06-24 PROCEDURE — 85025 COMPLETE CBC W/AUTO DIFF WBC: CPT | Performed by: EMERGENCY MEDICINE

## 2024-06-24 RX ORDER — DULOXETIN HYDROCHLORIDE 30 MG/1
30 CAPSULE, DELAYED RELEASE ORAL DAILY
Status: DISCONTINUED | OUTPATIENT
Start: 2024-06-25 | End: 2024-06-27 | Stop reason: HOSPADM

## 2024-06-24 RX ORDER — LORAZEPAM 0.5 MG/1
0.5 TABLET ORAL ONCE
Status: COMPLETED | OUTPATIENT
Start: 2024-06-24 | End: 2024-06-24

## 2024-06-24 RX ORDER — ATORVASTATIN CALCIUM 40 MG/1
40 TABLET, FILM COATED ORAL DAILY
Status: DISCONTINUED | OUTPATIENT
Start: 2024-06-25 | End: 2024-06-27 | Stop reason: HOSPADM

## 2024-06-24 RX ORDER — CEPHALEXIN 500 MG/1
500 CAPSULE ORAL ONCE
Status: COMPLETED | OUTPATIENT
Start: 2024-06-24 | End: 2024-06-24

## 2024-06-24 RX ORDER — ENOXAPARIN SODIUM 100 MG/ML
40 INJECTION SUBCUTANEOUS DAILY
Status: DISCONTINUED | OUTPATIENT
Start: 2024-06-25 | End: 2024-06-27 | Stop reason: HOSPADM

## 2024-06-24 RX ORDER — ACETAMINOPHEN 325 MG/1
650 TABLET ORAL EVERY 4 HOURS PRN
Status: DISCONTINUED | OUTPATIENT
Start: 2024-06-24 | End: 2024-06-27 | Stop reason: HOSPADM

## 2024-06-24 RX ORDER — LEVOTHYROXINE SODIUM 0.07 MG/1
75 TABLET ORAL
Status: DISCONTINUED | OUTPATIENT
Start: 2024-06-25 | End: 2024-06-27 | Stop reason: HOSPADM

## 2024-06-24 RX ORDER — ONDANSETRON 2 MG/ML
4 INJECTION INTRAMUSCULAR; INTRAVENOUS EVERY 6 HOURS PRN
Status: DISCONTINUED | OUTPATIENT
Start: 2024-06-24 | End: 2024-06-27 | Stop reason: HOSPADM

## 2024-06-24 RX ORDER — QUETIAPINE FUMARATE 25 MG/1
25 TABLET, FILM COATED ORAL
Status: DISCONTINUED | OUTPATIENT
Start: 2024-06-24 | End: 2024-06-25

## 2024-06-24 RX ORDER — LORAZEPAM 2 MG/ML
0.5 INJECTION INTRAMUSCULAR ONCE AS NEEDED
Status: DISCONTINUED | OUTPATIENT
Start: 2024-06-24 | End: 2024-06-24

## 2024-06-24 RX ORDER — LORAZEPAM 2 MG/ML
1 INJECTION INTRAMUSCULAR ONCE AS NEEDED
Status: DISCONTINUED | OUTPATIENT
Start: 2024-06-24 | End: 2024-06-24

## 2024-06-24 RX ADMIN — CEPHALEXIN 500 MG: 500 CAPSULE ORAL at 16:10

## 2024-06-24 RX ADMIN — LORAZEPAM 0.5 MG: 0.5 TABLET ORAL at 16:09

## 2024-06-24 RX ADMIN — QUETIAPINE FUMARATE 25 MG: 25 TABLET ORAL at 21:53
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
## 2024-06-24 NOTE — PROGRESS NOTES
Outpatient Care Management Note    ADT alert received-IP discharge. Chart reviewed. Patient was admitted at  [facility] 6/19/24-6/22/24.    OP RN CM placed outreach call to patient. No patient answer. Left VM message and included RN CM contact information and request for return call.    RN CM will schedule second outreach attempt later in week if no patient response received prior.
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
## 2024-06-24 NOTE — ASSESSMENT & PLAN NOTE
Reports left knee and right wrist, possibly due to physical strain from caring for   History of recent hospitalization for stroke, social issues  Xray right humerus: No acute osseous abnormality  Left knee x ray: Osteoarthritis and chondrocalcinosis. No fracture   X ray right elbow: No acute osseous abnormality. Enthesopathy  X ray right forearm: No acute osseous abnormality. Old healed fractures of the radius and ulna.   Prn pain control

## 2024-06-24 NOTE — ED NOTES
Patient danny demanding to go home, wanting to make sure her  was well. When questioned why she refused to go home earlier when first arrived patient became more agitated and yelling at staff requesting police. Security was called and talked to patient at bedside, which did calm her briefly.      Rosangela Wei, DENISSE  06/24/24 0711

## 2024-06-24 NOTE — PLAN OF CARE

## 2024-06-24 NOTE — ASSESSMENT & PLAN NOTE
Echocardiogram 2/21/2024: Aortic Valve: The leaflets are thickened. The leaflets are calcified. There is reduced mobility. There is severe stenosis. The aortic valve peak velocity is 4.0 m/s. The aortic valve mean gradient is 38 mmHg. The dimensionless velocity index is 0.25. The aortic valve area is 0.84 cm2. The stroke volume index is 47.30 ml/m2.   Monitor volume status closely  Continue outpatient follow up with Cardiology/cardiothoracic
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
## 2024-06-25 ENCOUNTER — PATIENT OUTREACH (OUTPATIENT)
Dept: CASE MANAGEMENT | Facility: HOSPITAL | Age: 79
End: 2024-06-25

## 2024-06-25 PROBLEM — F22 PARANOID DELUSION (HCC): Status: ACTIVE | Noted: 2024-06-25

## 2024-06-25 LAB
2HR DELTA HS TROPONIN: -4 NG/L
ALBUMIN SERPL BCG-MCNC: 3.3 G/DL (ref 3.5–5)
ALP SERPL-CCNC: 108 U/L (ref 34–104)
ALT SERPL W P-5'-P-CCNC: 15 U/L (ref 7–52)
ANION GAP SERPL CALCULATED.3IONS-SCNC: 6 MMOL/L (ref 4–13)
AST SERPL W P-5'-P-CCNC: 26 U/L (ref 13–39)
BILIRUB SERPL-MCNC: 0.67 MG/DL (ref 0.2–1)
BUN SERPL-MCNC: 9 MG/DL (ref 5–25)
CALCIUM ALBUM COR SERPL-MCNC: 9.5 MG/DL (ref 8.3–10.1)
CALCIUM SERPL-MCNC: 8.9 MG/DL (ref 8.4–10.2)
CARDIAC TROPONIN I PNL SERPL HS: 12 NG/L
CARDIAC TROPONIN I PNL SERPL HS: 8 NG/L
CHLORIDE SERPL-SCNC: 108 MMOL/L (ref 96–108)
CO2 SERPL-SCNC: 25 MMOL/L (ref 21–32)
CREAT SERPL-MCNC: 0.7 MG/DL (ref 0.6–1.3)
ERYTHROCYTE [DISTWIDTH] IN BLOOD BY AUTOMATED COUNT: 13.7 % (ref 11.6–15.1)
GFR SERPL CREATININE-BSD FRML MDRD: 82 ML/MIN/1.73SQ M
GLUCOSE SERPL-MCNC: 96 MG/DL (ref 65–140)
HCT VFR BLD AUTO: 33.5 % (ref 34.8–46.1)
HGB BLD-MCNC: 10.9 G/DL (ref 11.5–15.4)
MAGNESIUM SERPL-MCNC: 1.7 MG/DL (ref 1.9–2.7)
MCH RBC QN AUTO: 33.2 PG (ref 26.8–34.3)
MCHC RBC AUTO-ENTMCNC: 32.5 G/DL (ref 31.4–37.4)
MCV RBC AUTO: 102 FL (ref 82–98)
PHOSPHATE SERPL-MCNC: 3.9 MG/DL (ref 2.3–4.1)
PLATELET # BLD AUTO: 231 THOUSANDS/UL (ref 149–390)
PMV BLD AUTO: 8.8 FL (ref 8.9–12.7)
POTASSIUM SERPL-SCNC: 3.5 MMOL/L (ref 3.5–5.3)
PROT SERPL-MCNC: 5.6 G/DL (ref 6.4–8.4)
RBC # BLD AUTO: 3.28 MILLION/UL (ref 3.81–5.12)
SODIUM SERPL-SCNC: 139 MMOL/L (ref 135–147)
WBC # BLD AUTO: 6.75 THOUSAND/UL (ref 4.31–10.16)

## 2024-06-25 PROCEDURE — 84100 ASSAY OF PHOSPHORUS: CPT | Performed by: PHYSICIAN ASSISTANT

## 2024-06-25 PROCEDURE — 84484 ASSAY OF TROPONIN QUANT: CPT | Performed by: PHYSICIAN ASSISTANT

## 2024-06-25 PROCEDURE — 83735 ASSAY OF MAGNESIUM: CPT | Performed by: PHYSICIAN ASSISTANT

## 2024-06-25 PROCEDURE — 85027 COMPLETE CBC AUTOMATED: CPT | Performed by: PHYSICIAN ASSISTANT

## 2024-06-25 PROCEDURE — G0427 INPT/ED TELECONSULT70: HCPCS | Performed by: PSYCHIATRY & NEUROLOGY

## 2024-06-25 PROCEDURE — 99232 SBSQ HOSP IP/OBS MODERATE 35: CPT | Performed by: PHYSICIAN ASSISTANT

## 2024-06-25 PROCEDURE — 80053 COMPREHEN METABOLIC PANEL: CPT | Performed by: PHYSICIAN ASSISTANT

## 2024-06-25 PROCEDURE — 93005 ELECTROCARDIOGRAM TRACING: CPT

## 2024-06-25 RX ORDER — LORAZEPAM 2 MG/ML
2 INJECTION INTRAMUSCULAR ONCE
Status: COMPLETED | OUTPATIENT
Start: 2024-06-25 | End: 2024-06-25

## 2024-06-25 RX ORDER — RISPERIDONE 0.5 MG/1
0.5 TABLET, ORALLY DISINTEGRATING ORAL
Status: DISCONTINUED | OUTPATIENT
Start: 2024-06-25 | End: 2024-06-26

## 2024-06-25 RX ORDER — MAGNESIUM SULFATE HEPTAHYDRATE 40 MG/ML
2 INJECTION, SOLUTION INTRAVENOUS ONCE
Status: DISCONTINUED | OUTPATIENT
Start: 2024-06-25 | End: 2024-06-25

## 2024-06-25 RX ORDER — RISPERIDONE 0.25 MG/1
0.25 TABLET, ORALLY DISINTEGRATING ORAL EVERY 4 HOURS PRN
Status: DISCONTINUED | OUTPATIENT
Start: 2024-06-25 | End: 2024-06-27 | Stop reason: HOSPADM

## 2024-06-25 RX ORDER — UREA 10 %
500 LOTION (ML) TOPICAL DAILY
Status: DISCONTINUED | OUTPATIENT
Start: 2024-06-25 | End: 2024-06-27 | Stop reason: HOSPADM

## 2024-06-25 RX ADMIN — RISPERIDONE 0.5 MG: 0.5 TABLET, ORALLY DISINTEGRATING ORAL at 22:19

## 2024-06-25 RX ADMIN — DULOXETINE HYDROCHLORIDE 30 MG: 30 CAPSULE, DELAYED RELEASE ORAL at 08:42

## 2024-06-25 RX ADMIN — ATORVASTATIN CALCIUM 40 MG: 40 TABLET, FILM COATED ORAL at 08:42

## 2024-06-25 RX ADMIN — LORAZEPAM 2 MG: 2 INJECTION INTRAMUSCULAR; INTRAVENOUS at 18:50

## 2024-06-25 RX ADMIN — LEVOTHYROXINE SODIUM 75 MCG: 75 TABLET ORAL at 05:37

## 2024-06-25 RX ADMIN — ASPIRIN 81 MG: 81 TABLET, COATED ORAL at 08:42

## 2024-06-25 RX ADMIN — MAGNESIUM SULFATE HEPTAHYDRATE 2 G: 40 INJECTION, SOLUTION INTRAVENOUS at 08:42

## 2024-06-25 RX ADMIN — ENOXAPARIN SODIUM 40 MG: 40 INJECTION SUBCUTANEOUS at 08:42
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
## 2024-06-25 NOTE — CONSULTS
Consultation - Neuropsychology/Psychology Department  Daily Schaeffer 78 y.o. female MRN: [id number]  Unit/Bed#: -01 Encounter: [id number]        Reason for Consultation:  Daily Schaeffer is a 78 y.o. year old female who was referred for a Neuropsychological Exam to assess cognitive functioning and comment on capacity to make informed medical decisions.      History of Present Illness  Left knee pain  Physician Requesting Consult: Lorrie Zambrano DO    PROBLEM LIST:  Patient Active Problem List   Diagnosis    Severe aortic stenosis    Dyslipidemia    Crohn disease (HCC)    Anxiety and depression    Hyperglycemia    Hypothyroidism    Neuropathy    Acute cystitis without hematuria- Ruled Out    Hypercalcemia    Abnormal CT scan, pelvis    Hypomagnesemia    Exertional dyspnea    Crohn's disease of jejunum (HCC)    CKD (chronic kidney disease) stage 2, GFR 60-89 ml/min    Stage 3 chronic kidney disease, unspecified whether stage 3a or 3b CKD (HCC)    Acute toxic encephalopathy    Debility    Low hemoglobin    SARA (acute kidney injury) (HCC)    Stress due to marital problems    Abnormal MRI of the head    Arthralgia    Abnormal brain MRI         Historical Information   Past Medical History:   Diagnosis Date    Aortic stenosis     Depression     Disease of thyroid gland     Diverticular disease 05/09/2017    Essential hypertension 05/20/2016    Hyperlipidemia     Major depression 05/20/2016    Peripheral neuropathy     Type 2 diabetes mellitus (HCC) 05/20/2016    Visual impairment      Past Surgical History:   Procedure Laterality Date    BOWEL RESECTION      COLON SURGERY      EYE SURGERY      SMALL INTESTINE SURGERY      TUBAL LIGATION       Social History   Social History     Substance and Sexual Activity   Alcohol Use Yes    Comment: occasional     Social History     Substance and Sexual Activity   Drug Use Never     Social History     Tobacco Use   Smoking Status Never    Passive exposure: Never    Smokeless Tobacco Never     Family History: History reviewed. No pertinent family history.    Meds/Allergies   current meds:   Current Facility-Administered Medications   Medication Dose Route Frequency    acetaminophen (TYLENOL) tablet 650 mg  650 mg Oral Q4H PRN    aspirin (ECOTRIN LOW STRENGTH) EC tablet 81 mg  81 mg Oral Daily    atorvastatin (LIPITOR) tablet 40 mg  40 mg Oral Daily    DULoxetine (CYMBALTA) delayed release capsule 30 mg  30 mg Oral Daily    enoxaparin (LOVENOX) subcutaneous injection 40 mg  40 mg Subcutaneous Daily    levothyroxine tablet 75 mcg  75 mcg Oral Early Morning    magnesium gluconate (MAGONATE) tablet 500 mg  500 mg Oral Daily    ondansetron (ZOFRAN) injection 4 mg  4 mg Intravenous Q6H PRN    risperiDONE (RisperDAL M-TAB) disintegrating tablet 0.25 mg  0.25 mg Oral Q4H PRN    risperiDONE (RisperDAL M-TAB) disintegrating tablet 0.5 mg  0.5 mg Oral HS       Allergies   Allergen Reactions    Wound Dressing Adhesive Rash         Family and Social Support:   Living Arrangements: Lives w/ Spouse/significant other  Support Systems: Self; Spouse/significant other; Children  Assistance Needed: none  Type of Current Residence: Private residence  Current Home Care Services: No  Discharge planning discussed with:: Pt, daughter  Freedom of Choice: Yes      Behavioral Observations: Patient was alert, UNABLE to accurately state the season, month, day/week, day/month, city and name of hospital; patient denied depressed mood and anxiety; thoughts appeared hyper-focused on discussing the nature of her 's injuries; thoughts appeared tangential. Patient was unable to describe reason for hospitalization, medical history and does not know what she is being treated for in hospital.    Cognitive Examination    General Cognitive Functioning MMSE = Deficits in orientation, recall and working memory    Attention/Concentration Auditory Selective Attention = Within Normal Limits; Auditory Vigilance =  Within Normal Limits; Information Processing Speed = Within Normal Limits    Frontal Systems/Executive Functioning Mental Flexibility/Cognitive Control = Borderline; Working Memory = Impaired Abstract Reasoning = Impaired; Generative Ability = Within Normal Limits,     Language Functioning Phonemic Fluency = Within Normal Limits; Semantic Retrieval = Impaired; Comprehension of Complex Ideational Material = Impaired;     Memory Functioning Narrative Recall - Short Delay = Borderline; Long Delay Narrative Recall = Impaired;     Visuo-Spatial Abilities Not Assessed    Functional Knowledge  Health & Safety Knowledge = Impaired;    Summary/Impression:  Results of Neuropsychological Exam revealed diffuse cognitive dysfunction and on a measure assessing awareness of personal health status and ability to evaluate health problems, handle medical emergencies and take safety precautions patient performed in the IMPAIRED range of functioning. During this encounter, patient does not appear to have capacity to make fully informed medical decisions.
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
## 2024-06-25 NOTE — PROGRESS NOTES
ADT alert received and chart review completed. Pt currently admitted to [facility] for arthralgia. Pt was attempting to help  from a fall and subsequently fell striking her knee.     Per notes pt has had 4 ED visits in less than a month. In ED pt refusing to speak with CM. Daughter shares concerns for parents noncompliance with care and has refused medications and treatment in the past. Various notes of refusal of care during this hospital visit per chart review.    IPCM note states awaiting neuro-psych eval at this time.     OP RN CM remains on care team to follow up at discharge.  Note routed to covering RN PAIGE, Montserrat Amin.

## 2024-06-25 NOTE — ASSESSMENT & PLAN NOTE
Patient with paranoid delusions and possible hallucinations.   Family concerned about the patient's ability to care for herself.   Psychiatry consulted-  Unable to determine whether she meets inpatient psych at present. Will need re-evaluation after neuropsych. Patient should NOT leave AMA and if attempts to elope 302 should be pursued.   Seroquel discontinued and patient started on Risperdal for mood and agitation   Neuropsych- patient performed in the IMPAIRED range of function and does not appear to have the capacity to make fully informed medical decisions.   CM placed referrals to SNF- will need finally clearance from psych prior to discharge.

## 2024-06-25 NOTE — NUTRITION
06/25/24 1026   Current PO Intake   Current Diet Order Regular, finger food diet, thin liquids   Current Meal Intake 25-50%   Estimated calorie intake compared to estimated need Anticipate nutrient needs are not met at this time.   PES Statement   Energy Balance (1) Predicted suboptimal energy intake NI-1.4   Related to Decreased appetite   As evidenced by: Intake < estimated needs   Recommendations/Interventions   Malnutrition/BMI Present No  (1 criteria met, weight loss, will continue to monitor)   Summary Weight loss, poor p.o.-MST 2.  Presents with knee pain.  Past medical history significant for hypothyroidism, Crohn's, stage III CKD.  Weight history reviewed.  If most recent 6/24 weight accurate (122#via bed scale), noted significant 10# weight loss in 1 month (7.5% body weight), 12# weight loss in 1 week (8.9% body weight).  No edema.  Prescribed a regular, finger food diet, thin liquids.  Meal completion 25%.  Nutrient needs are not met.  RD protocol not initiated.  Attempted to meet with patient at bedside.  Patient declines visit from RD at this time.  Nutrition focused physical exam deferred to follow-up.  Recommend adding Ensure twice daily to promote intakes at meals.  RD to follow.   Interventions/Recommendations Continue current diet order;Supplement initiate;Monitor I & O's; Request RD protocol    Education Assessment   Education Patient/caregiver not appropriate for education at this time   Patient Nutrition Goals   Goal Increase kcal/PRO intake;Meet PO needs;Avoid weight loss

## 2024-06-25 NOTE — PLAN OF CARE

## 2024-06-25 NOTE — CASE MANAGEMENT
Case Management Discharge Planning Note    Patient name Daily Schaeffer  Location /-01 MRN [id number]  : 1945 Date 2024       Current Admission Date: 2024  Current Admission Diagnosis:Arthralgia   Patient Active Problem List    Diagnosis Date Noted Date Diagnosed    Arthralgia 2024     Abnormal brain MRI 2024     Abnormal MRI of the head 2024     SARA (acute kidney injury) ([facility]) 2024     Stress due to marital problems 2024     Low hemoglobin 2024     Acute toxic encephalopathy 2024     Debility 2024     Stage 3 chronic kidney disease, unspecified whether stage 3a or 3b CKD ([facility]) 2024     Exertional dyspnea 2024     Hypomagnesemia 2023     Acute cystitis without hematuria- Ruled Out 2023     Hypercalcemia 2023     Abnormal CT scan, pelvis 2023     Neuropathy 2023     Severe aortic stenosis 2023     Hypothyroidism 2017    Dyslipidemia 2016    Crohn disease ([facility]) 2016    Anxiety and depression 2016    Hyperglycemia 2016    Crohn's disease of jejunum ([facility]) 2016     CKD (chronic kidney disease) stage 2, GFR 60-89 ml/min 2016       LOS (days): 1  Geometric Mean LOS (GMLOS) (days): 2.6  Days to GMLOS:1.7     OBJECTIVE:  Risk of Unplanned Readmission Score: 27.86         Current admission status: Inpatient   Preferred Pharmacy:   [facility] [place] [facility] - [address]  [address] [place] [address]  [place] [id number]  Phone: [phone] Fax: [phone]    Primary Care Provider: Alanna Moreau DO    Primary Insurance: MEDICARE  Secondary Insurance: [employer]    DISCHARGE DETAILS:                                          Other Referral/Resources/Interventions Provided:  Interventions: SNF  Referral Comments: Following neuro-psych eval, pt was determined to lack capacity to make medical  decisions. Pt's , and both daughters want pt to be placed in a SNF. CM contacted pt's daughter, Kaila, to review capacity determination. Daughter is agreeable to referrals being submitted for SNF. Daughter requested that her top 3 SNF choices: [facility], [facility], & [facility] be included in referral. CM submitted ref for SNF via [facility], and will email daughter choice list to review with family. Kaila's email: [email or web address]. CM will update [facility] once SNF choice has been made by family. CM will continue to follow.         Treatment Team Recommendation: SNF  Discharge Destination Plan:: SNF  Transport at Discharge : [facility] Ambulance

## 2024-06-25 NOTE — NURSING NOTE
"Patient complaining of chest pain. Hospitalist JUAREZ aware. Troponin and EKG ordered and obtained.   Medisitter then alerted RN that patient complaining she \"cannot breathe\". Patient's SPO2 99% on room air. Dr. Degroot made aware, in to see patient at bedside.   "

## 2024-06-25 NOTE — PROGRESS NOTES
Former[facility], [facility]  Progress Note  Name: Daily Schaeffer I  MRN: [id number]  Unit/Bed#: -01 I Date of Admission: 6/24/2024   Date of Service: 6/25/2024 I Hospital Day: 1    Assessment & Plan   * Paranoid delusion (HCC)  Assessment & Plan  Patient with paranoid delusions and possible hallucinations.   Family concerned about the patient's ability to care for herself.   Psychiatry consulted-  Unable to determine whether she meets inpatient psych at present. Will need re-evaluation after neuropsych. Patient should NOT leave AMA and if attempts to elope 302 should be pursued.   Seroquel discontinued and patient started on Risperdal for mood and agitation   Neuropsych- patient performed in the IMPAIRED range of function and does not appear to have the capacity to make fully informed medical decisions.   CM placed referrals to SNF- will need finally clearance from psych prior to discharge.     Stress due to marital problems  Assessment & Plan  Patient reports having difficulty with  at home. She states he won't allow her to help him and he ended up on the floor at home today. She states she called EMS and he refused to go to the ED. However she agreed to go to the ED due to continued right knee pain from a fall at home a couple of days ago.   There are concerns from the family that the patient is verbally abusive towards her .   There are also concerns of her ability to care for herself at home as she's been in the ED 4 times this month.   Please see plan for paranoid delusion    Arthralgia  Assessment & Plan  Reports left knee and right wrist, possibly due to physical strain from caring for   History of recent hospitalization for stroke, social issues  Xray right humerus: No acute osseous abnormality  Left knee x ray: Osteoarthritis and chondrocalcinosis. No fracture   X ray right elbow: No acute osseous abnormality. Enthesopathy  X ray right forearm: No acute osseous  abnormality. Old healed fractures of the radius and ulna.   Prn pain control        Abnormal MRI of the head  Assessment & Plan  Noted on previous admission  Continue aspirin and statin    Hypothyroidism  Assessment & Plan  Continue levothyroxine     Anxiety and depression  Assessment & Plan  Continue Cymbalta (also utilized for pain)  Seroquel discontinued and patient started on Risperdal by psychiatry for mood and agitation   Supportive care     Severe aortic stenosis  Assessment & Plan  Echocardiogram 2/21/2024: Aortic Valve: The leaflets are thickened. The leaflets are calcified. There is reduced mobility. There is severe stenosis. The aortic valve peak velocity is 4.0 m/s. The aortic valve mean gradient is 38 mmHg. The dimensionless velocity index is 0.25. The aortic valve area is 0.84 cm2. The stroke volume index is 47.30 ml/m2.   Monitor volume status closely  Continue outpatient follow up with Cardiology/cardiothoracic         VTE Pharmacologic Prophylaxis:   Moderate Risk (Score 3-4) - Pharmacological DVT Prophylaxis Ordered: enoxaparin (Lovenox).    Mobility:   Basic Mobility Inpatient Raw Score: 19  JH-HLM Goal: 6: Walk 10 steps or more  JH-HLM Achieved: 4: Move to chair/commode  JH-HLM Goal NOT achieved. Continue with multidisciplinary rounding and encourage appropriate mobility to improve upon JH-HLM goals.    Patient Centered Rounds: I performed bedside rounds with nursing staff today.   Discussions with Specialists or Other Care Team Provider: nursing, CM    Education and Discussions with Family / Patient: Updated  (daughter) via phone.    Total Time Spent on Date of Encounter in care of patient: 25 mins. This time was spent on one or more of the following: performing physical exam; counseling and coordination of care; obtaining or reviewing history; documenting in the medical record; reviewing/ordering tests, medications or procedures; communicating with other healthcare professionals  and discussing with patient's family/caregivers.    Current Length of Stay: 1 day(s)  Current Patient Status: Inpatient   Certification Statement: The patient will continue to require additional inpatient hospital stay due to need for re-eval by psych for possible inpatient psych vs rehab vs LTC  Discharge Plan: Anticipate discharge in 24-48 hrs to possible rehab vs inpatient psych    Code Status: Level 1 - Full Code    Subjective:   The patient was seen and examined. The patient is lying in bed in no acute distress. She is asking for her bag and states she wants to go home. She is asking me to call the police for her. She was very upset when telling her she is unable to go home at this time.    Objective:     Vitals:   Temp (24hrs), Av.3 °F (36.8 °C), Min:97.4 °F (36.3 °C), Max:99 °F (37.2 °C)    Temp:  [97.4 °F (36.3 °C)-99 °F (37.2 °C)] 98.9 °F (37.2 °C)  HR:  [64-85] 85  Resp:  [17-18] 18  BP: (116-155)/(49-63) 120/57  SpO2:  [97 %-99 %] 98 %  Body mass index is 18.6 kg/m².     Input and Output Summary (last 24 hours):     Intake/Output Summary (Last 24 hours) at 2024 1729  Last data filed at 2024 0716  Gross per 24 hour   Intake 60 ml   Output --   Net 60 ml       Physical Exam:   Physical Exam  Vitals and nursing note reviewed.   Constitutional:       General: She is awake.      Appearance: Normal appearance.   HENT:      Head: Normocephalic and atraumatic.   Cardiovascular:      Rate and Rhythm: Normal rate and regular rhythm.   Pulmonary:      Effort: Pulmonary effort is normal.      Breath sounds: Normal breath sounds.   Abdominal:      Palpations: Abdomen is soft.      Tenderness: There is no abdominal tenderness.   Skin:     General: Skin is warm and dry.   Neurological:      General: No focal deficit present.      Mental Status: She is alert. She is confused.   Psychiatric:         Attention and Perception: Attention normal.         Mood and Affect: Mood is anxious. Affect is tearful.          Speech: Speech normal.         Behavior: Behavior is agitated. Behavior is cooperative.          Additional Data:     Labs:  Results from last 7 days   Lab Units 06/25/24  0613 06/24/24  1354   WBC Thousand/uL 6.75 6.40   HEMOGLOBIN g/dL 10.9* 12.4   HEMATOCRIT % 33.5* 37.1   PLATELETS Thousands/uL 231 269   SEGS PCT %  --  56   LYMPHO PCT %  --  29   MONO PCT %  --  13*   EOS PCT %  --  1     Results from last 7 days   Lab Units 06/25/24  0613   SODIUM mmol/L 139   POTASSIUM mmol/L 3.5   CHLORIDE mmol/L 108   CO2 mmol/L 25   BUN mg/dL 9   CREATININE mg/dL 0.70   ANION GAP mmol/L 6   CALCIUM mg/dL 8.9   ALBUMIN g/dL 3.3*   TOTAL BILIRUBIN mg/dL 0.67   ALK PHOS U/L 108*   ALT U/L 15   AST U/L 26   GLUCOSE RANDOM mg/dL 96     Results from last 7 days   Lab Units 06/22/24  0453   INR  1.07     Results from last 7 days   Lab Units 06/21/24  1621   POC GLUCOSE mg/dl 109         Results from last 7 days   Lab Units 06/22/24  0453   PROCALCITONIN ng/ml 0.11       Lines/Drains:  Invasive Devices       Peripheral Intravenous Line  Duration             Peripheral IV 06/24/24 Left;Ventral (anterior) Forearm <1 day                          Imaging: No pertinent imaging reviewed.    Recent Cultures (last 7 days):         Last 24 Hours Medication List:   Current Facility-Administered Medications   Medication Dose Route Frequency Provider Last Rate    acetaminophen  650 mg Oral Q4H PRN Cole Page, PA-C      aspirin  81 mg Oral Daily Cole Page, PA-C      atorvastatin  40 mg Oral Daily Cole Page, PA-C      DULoxetine  30 mg Oral Daily Cole Page, PA-C      enoxaparin  40 mg Subcutaneous Daily Cole Page, PA-C      levothyroxine  75 mcg Oral Early Morning Cole Page, PA-C      magnesium gluconate  500 mg Oral Daily Cole Page, PA-C      ondansetron  4 mg Intravenous Q6H PRN Cole Page, PA-C      risperiDONE  0.25 mg Oral Q4H PRN Jossy Williamson, PA-C      risperiDONE  0.5 mg Oral HALLIE Fenton  Yolanda Williamson PA-C          Today, Patient Was Seen By: Cole Page PA-C    **Please Note: This note may have been constructed using a voice recognition system.**

## 2024-06-25 NOTE — ASSESSMENT & PLAN NOTE
Continue Cymbalta (also utilized for pain)  Seroquel discontinued and patient started on Risperdal by psychiatry for mood and agitation   Supportive care

## 2024-06-26 PROBLEM — R00.0 SINUS TACHYCARDIA: Status: ACTIVE | Noted: 2024-06-26

## 2024-06-26 LAB
ANION GAP SERPL CALCULATED.3IONS-SCNC: 6 MMOL/L (ref 4–13)
ATRIAL RATE: 100 BPM
ATRIAL RATE: 103 BPM
ATRIAL RATE: 134 BPM
BUN SERPL-MCNC: 12 MG/DL (ref 5–25)
CALCIUM SERPL-MCNC: 8.8 MG/DL (ref 8.4–10.2)
CHLORIDE SERPL-SCNC: 108 MMOL/L (ref 96–108)
CO2 SERPL-SCNC: 24 MMOL/L (ref 21–32)
CREAT SERPL-MCNC: 0.7 MG/DL (ref 0.6–1.3)
ERYTHROCYTE [DISTWIDTH] IN BLOOD BY AUTOMATED COUNT: 14 % (ref 11.6–15.1)
GFR SERPL CREATININE-BSD FRML MDRD: 82 ML/MIN/1.73SQ M
GLUCOSE SERPL-MCNC: 108 MG/DL (ref 65–140)
HCT VFR BLD AUTO: 37.1 % (ref 34.8–46.1)
HGB BLD-MCNC: 11.6 G/DL (ref 11.5–15.4)
MAGNESIUM SERPL-MCNC: 1.9 MG/DL (ref 1.9–2.7)
MCH RBC QN AUTO: 33.5 PG (ref 26.8–34.3)
MCHC RBC AUTO-ENTMCNC: 31.3 G/DL (ref 31.4–37.4)
MCV RBC AUTO: 107 FL (ref 82–98)
P AXIS: 42 DEGREES
P AXIS: 70 DEGREES
P AXIS: 72 DEGREES
PLATELET # BLD AUTO: 162 THOUSANDS/UL (ref 149–390)
PMV BLD AUTO: 9.2 FL (ref 8.9–12.7)
POTASSIUM SERPL-SCNC: 4.5 MMOL/L (ref 3.5–5.3)
PR INTERVAL: 112 MS
PR INTERVAL: 184 MS
PR INTERVAL: 206 MS
QRS AXIS: -3 DEGREES
QRS AXIS: 12 DEGREES
QRS AXIS: 31 DEGREES
QRSD INTERVAL: 100 MS
QRSD INTERVAL: 64 MS
QRSD INTERVAL: 74 MS
QT INTERVAL: 316 MS
QT INTERVAL: 350 MS
QT INTERVAL: 374 MS
QTC INTERVAL: 458 MS
QTC INTERVAL: 471 MS
QTC INTERVAL: 482 MS
RBC # BLD AUTO: 3.46 MILLION/UL (ref 3.81–5.12)
SODIUM SERPL-SCNC: 138 MMOL/L (ref 135–147)
T WAVE AXIS: 75 DEGREES
T WAVE AXIS: 76 DEGREES
T WAVE AXIS: 83 DEGREES
VENTRICULAR RATE: 100 BPM
VENTRICULAR RATE: 103 BPM
VENTRICULAR RATE: 134 BPM
WBC # BLD AUTO: 7.54 THOUSAND/UL (ref 4.31–10.16)

## 2024-06-26 PROCEDURE — G0407 INPT/TELE FOLLOW UP 25: HCPCS | Performed by: STUDENT IN AN ORGANIZED HEALTH CARE EDUCATION/TRAINING PROGRAM

## 2024-06-26 PROCEDURE — 99232 SBSQ HOSP IP/OBS MODERATE 35: CPT | Performed by: NURSE PRACTITIONER

## 2024-06-26 PROCEDURE — 93010 ELECTROCARDIOGRAM REPORT: CPT | Performed by: INTERNAL MEDICINE

## 2024-06-26 PROCEDURE — 83735 ASSAY OF MAGNESIUM: CPT | Performed by: NURSE PRACTITIONER

## 2024-06-26 PROCEDURE — 85027 COMPLETE CBC AUTOMATED: CPT | Performed by: PHYSICIAN ASSISTANT

## 2024-06-26 PROCEDURE — 93005 ELECTROCARDIOGRAM TRACING: CPT

## 2024-06-26 PROCEDURE — 80048 BASIC METABOLIC PNL TOTAL CA: CPT | Performed by: PHYSICIAN ASSISTANT

## 2024-06-26 RX ORDER — MAGNESIUM SULFATE HEPTAHYDRATE 40 MG/ML
2 INJECTION, SOLUTION INTRAVENOUS ONCE
Status: COMPLETED | OUTPATIENT
Start: 2024-06-26 | End: 2024-06-26

## 2024-06-26 RX ORDER — RISPERIDONE 0.5 MG/1
1 TABLET, ORALLY DISINTEGRATING ORAL
Status: DISCONTINUED | OUTPATIENT
Start: 2024-06-26 | End: 2024-06-27 | Stop reason: HOSPADM

## 2024-06-26 RX ADMIN — LEVOTHYROXINE SODIUM 75 MCG: 75 TABLET ORAL at 06:07

## 2024-06-26 RX ADMIN — ENOXAPARIN SODIUM 40 MG: 40 INJECTION SUBCUTANEOUS at 10:40

## 2024-06-26 RX ADMIN — Medication 500 MG: at 10:40

## 2024-06-26 RX ADMIN — DULOXETINE HYDROCHLORIDE 30 MG: 30 CAPSULE, DELAYED RELEASE ORAL at 10:40

## 2024-06-26 RX ADMIN — MAGNESIUM SULFATE HEPTAHYDRATE 2 G: 40 INJECTION, SOLUTION INTRAVENOUS at 14:10

## 2024-06-26 RX ADMIN — RISPERIDONE 1 MG: 0.5 TABLET, ORALLY DISINTEGRATING ORAL at 22:03

## 2024-06-26 RX ADMIN — SODIUM CHLORIDE 500 ML: 0.9 INJECTION, SOLUTION INTRAVENOUS at 10:43

## 2024-06-26 RX ADMIN — ASPIRIN 81 MG: 81 TABLET, COATED ORAL at 10:40

## 2024-06-26 RX ADMIN — ATORVASTATIN CALCIUM 40 MG: 40 TABLET, FILM COATED ORAL at 10:40

## 2024-06-26 RX ADMIN — ACETAMINOPHEN 650 MG: 325 TABLET ORAL at 10:40

## 2024-06-26 NOTE — ASSESSMENT & PLAN NOTE
Patient with paranoid delusions and possible hallucinations.   Family concerned about the patient's ability to care for herself.    Psychiatry input appreciated-  Unable to determine whether she meets inpatient psych at present. Will need re-evaluation after neuropsych. Patient should NOT leave AMA and if attempts to elope 302 should be pursued.   Seroquel discontinued and patient started on Risperdal for mood and agitation   Neuropsychiatry input appreciated- patient performed in the IMPAIRED range of function and does not appear to have the capacity to make fully informed medical decisions.   CM placed referrals to SNF- will need finally clearance from psych prior to discharge.

## 2024-06-26 NOTE — CONSULTS
"  VIRTUAL CARE DOCUMENTATION:     1. This service was provided via Telemedicine using [employer]     2. Parties in the room with patient during teleconsult Patient only    3. Confidentiality My office door was closed     4. Participants No one else was in the room    5. Patient acknowledged consent and understanding of privacy and security of the  Telemedicine consult. I informed the patient that I have reviewed their record in Epic and presented the opportunity for them to ask any questions regarding the visit today.  The patient agreed to participate.    6. Time spent 15 minutes           Progress Note - Behavioral Health     Daily Schaeffer 78 y.o. female MRN: [id number]   Unit/Bed#: -01 Encounter: [id number]    Behavior over the last 24 hours: some improvement.     Daily is seen in follow-up via [employer] TV kit. She is seen resting in bed comfortably watching TV. She remains sarcastic, dismissive and slightly irritable. No overt agitation or loud/aggressive tone compared to yesterday. Is still endorsing some paranoia as well as dissatisfaction with her family members including her daughter who she calls, \"super nurse.\" She feels that she has been manipulating her entire family after she got into an argument with her  years ago. Reports some sadness over the death of a daughter who she feels would of been able to \"save her.\" She reports ongoing conflict with her  and feels he is, \"annoying.\" Is still insistent about returning home but more redirectable.    We discussed results of neuropsychiatric evaluation in that she lacked capacity to make informed medical decisions. She did not seem to fully understand that she is unable to make the final decision regarding her discharge disposition and that with will be up to the medical team and her daughters. She tells writer that she will continue to refuse placement and that she is, \"not a baby\" and can take care of herself at home. Continues to lack " "insight into her needs.     Sleep: normal  Appetite: normal  Medication side effects: No   ROS: all other systems are negative, some chest pain yesterday evening since then resolved    Mental Status Evaluation:    Appearance:  age appropriate, wearing hospital clothes, looks stated age   Behavior:  Irritable edge, but much less agitated compared to yesterdays encounter   Speech:  normal rate, normal volume, normal pitch   Mood:  irritable   Affect:  labile   Thought Process:  disorganized   Associations: Linear with direct questioning, can become tangential easily   Thought Content:  some paranoia, negative thinking   Perceptual Disturbances: no auditory hallucinations, no visual hallucinations, does not appear responding to internal stimuli   Risk Potential: Suicidal ideation - None at present  Homicidal ideation - None at present  Potential for aggression - No   Sensorium:  oriented to person and place   Memory:  recent and remote memory: unable to assess due to lack of cooperation   Consciousness:  alert and awake   Attention/Concentration: attention span and concentration are age appropriate   Insight:  limited   Judgment: poor   Gait/Station: in bed   Motor Activity: no abnormal movements     Vital signs in last 24 hours:    Temp:  [98.1 °F (36.7 °C)-98.9 °F (37.2 °C)] 98.2 °F (36.8 °C)  HR:  [] 137  Resp:  [17-18] 17  BP: ()/(53-74) 94/62    Laboratory results: I have personally reviewed all pertinent laboratory/tests results    Results from the past 24 hours:   Recent Results (from the past 24 hour(s))   HS Troponin 0hr (reflex protocol)    Collection Time: 06/25/24  6:33 PM   Result Value Ref Range    hs TnI 0hr 12 \"Refer to ACS Flowchart\"- see link ng/L   ECG 12 lead    Collection Time: 06/25/24  6:38 PM   Result Value Ref Range    Ventricular Rate 100 BPM    Atrial Rate 100 BPM    [place] Interval 184 ms    QRSD Interval 64 ms    QT Interval 374 ms    QTC Interval 482 ms    P [facility] 72 degrees    QRS " "Axis 31 degrees    T Wave [place] 75 degrees   HS Troponin I 2hr    Collection Time: 06/25/24  9:17 PM   Result Value Ref Range    hs TnI 2hr 8 \"Refer to ACS Flowchart\"- see link ng/L    Delta 2hr hsTnI -4 <20 ng/L   CBC    Collection Time: 06/26/24  6:07 AM   Result Value Ref Range    WBC 7.54 4.31 - 10.16 Thousand/uL    RBC 3.46 (L) 3.81 - 5.12 Million/uL    Hemoglobin 11.6 11.5 - 15.4 g/dL    Hematocrit 37.1 34.8 - 46.1 %     (H) 82 - 98 fL    MCH 33.5 26.8 - 34.3 pg    MCHC 31.3 (L) 31.4 - 37.4 g/dL    RDW 14.0 11.6 - 15.1 %    Platelets 162 149 - 390 Thousands/uL    MPV 9.2 8.9 - 12.7 fL   Basic metabolic panel    Collection Time: 06/26/24  6:07 AM   Result Value Ref Range    Sodium 138 135 - 147 mmol/L    Potassium 4.5 3.5 - 5.3 mmol/L    Chloride 108 96 - 108 mmol/L    CO2 24 21 - 32 mmol/L    ANION GAP 6 4 - 13 mmol/L    BUN 12 5 - 25 mg/dL    Creatinine 0.70 0.60 - 1.30 mg/dL    Glucose 108 65 - 140 mg/dL    Calcium 8.8 8.4 - 10.2 mg/dL    eGFR 82 ml/min/1.73sq m   Magnesium    Collection Time: 06/26/24  6:07 AM   Result Value Ref Range    Magnesium 1.9 1.9 - 2.7 mg/dL   ECG 12 lead    Collection Time: 06/26/24  9:41 AM   Result Value Ref Range    Ventricular Rate 103 BPM    Atrial Rate 103 BPM    [place] Interval 206 ms    QRSD Interval 74 ms    QT Interval 350 ms    QTC Interval 458 ms    P Axis 70 degrees    QRS Axis 12 degrees    T Wave Axis 76 degrees   ECG 12 lead    Collection Time: 06/26/24 10:03 AM   Result Value Ref Range    Ventricular Rate 134 BPM    Atrial Rate 134 BPM    NY Interval 112 ms    QRSD Interval 100 ms    QT Interval 316 ms    QTC Interval 471 ms    P Axis 42 degrees    QRS Axis -3 degrees    T Wave Axis 83 degrees       Progress Toward Goals: progressing    Assessment & Plan   Principal Problem:    Paranoid delusion (HCC)  Active Problems:    Severe aortic stenosis    Anxiety and depression    Acquired hypothyroidism    Stress due to marital problems    Abnormal MRI of the head    " Arthralgia    Sinus tachycardia      Recommended Treatment:     Planned medication and treatment changes:    All current active medications have been reviewed    Patient seems to have more favorable response to Risperdal. Will continue with medication adjustments to control mood/behavior. Is overall less agitated and more able to participate in conversation in a calmer manner. She still exhibits poor insight in terms of her treatment needs such as SNF placement. Can be sarcastic, demanding with a low frustration tolerance but overall no worsening behavioral disturbances or aggression over last 24 hours. Still some baseline paranoia however has been more participative of staff intervention compared to yesterday.     Recommend discharge to SNF at present. No need for inpatient psychiatry placement at this time.   Increase Risperdal to 1 mg QHS for mood stabilization  Continue Risperdal PRN for agitation if required  Case discussed with primary care team  Thank you for allowing psychiatry to consult. Will need to re-consult for follow-up if any new or worsening behavioral issues arise for re-evaluation.     Current Facility-Administered Medications   Medication Dose Route Frequency Provider Last Rate    acetaminophen  650 mg Oral Q4H PRN Cole Page, PA-C      aspirin  81 mg Oral Daily Cole Page, PA-C      atorvastatin  40 mg Oral Daily Cole Camilo, PA-C      DULoxetine  30 mg Oral Daily Cole Camilo, PA-C      enoxaparin  40 mg Subcutaneous Daily Cole Page, PA-C      levothyroxine  75 mcg Oral Early Morning Cole Page, PA-C      magnesium gluconate  500 mg Oral Daily Cole Page, PA-C      ondansetron  4 mg Intravenous Q6H PRN Cole Page, PA-C      risperiDONE  0.25 mg Oral Q4H PRN Jossy Williamson PA-C      risperiDONE  0.5 mg Oral HS Jossy Williamson PA-C      sodium chloride  500 mL Intravenous Once [facility]       Risks / Benefits of Treatment:    Risks, benefits,  and possible side effects of medications explained to patient and patient verbalizes understanding and agreement for treatment.    Counseling / Coordination of Care:    Total floor / unit time spent today 20 minutes. Greater than 50% of total time was spent with the patient and / or family counseling and / or coordination of care. A description of counseling / coordination of care:  Medications, treatment progress and treatment plan reviewed with patient.    Jossy Williamson PA-C 06/26/24

## 2024-06-26 NOTE — PLAN OF CARE
Problem: SAFETY ADULT  Goal: Patient will remain free of falls  Description: INTERVENTIONS:  - Educate patient/family on patient safety including physical limitations  - Instruct patient to call for assistance with activity   - Consult OT/PT to assist with strengthening/mobility   - Keep Call bell within reach  - Keep bed low and locked with side rails adjusted as appropriate  - Keep care items and personal belongings within reach  - Initiate and maintain comfort rounds  - Make Fall Risk Sign visible to staff  - Offer Toileting every 2 Hours, in advance of need  - Initiate/Maintain bed alarm  - Obtain necessary fall risk management equipment: bed alarm, 1:1  - Apply yellow socks and bracelet for high fall risk patients  - Consider moving patient to room near nurses station  Outcome: Progressing     Problem: SAFETY ADULT  Goal: Maintain or return to baseline ADL function  Description: INTERVENTIONS:  -  Assess patient's ability to carry out ADLs; assess patient's baseline for ADL function and identify physical deficits which impact ability to perform ADLs (bathing, care of mouth/teeth, toileting, grooming, dressing, etc.)  - Assess/evaluate cause of self-care deficits   - Assess range of motion  - Assess patient's mobility; develop plan if impaired  - Assess patient's need for assistive devices and provide as appropriate  - Encourage maximum independence but intervene and supervise when necessary  - Involve family in performance of ADLs  - Assess for home care needs following discharge   - Consider OT consult to assist with ADL evaluation and planning for discharge  - Provide patient education as appropriate  Outcome: Progressing     Problem: SAFETY ADULT  Goal: Maintains/Returns to pre admission functional level  Description: INTERVENTIONS:  - Perform AM-PAC 6 Click Basic Mobility/ Daily Activity assessment daily.  - Set and communicate daily mobility goal to care team and patient/family/caregiver.   - Collaborate  with rehabilitation services on mobility goals if consulted  - Out of bed for toileting  - Record patient progress and toleration of activity level   Outcome: Progressing

## 2024-06-26 NOTE — ASSESSMENT & PLAN NOTE
Reports left knee and right wrist, possibly due to physical strain from caring for   History of recent hospitalization for stroke, social issues  Xray right humerus: No acute osseous abnormality  Left knee x ray: Osteoarthritis and chondrocalcinosis. No fracture   X ray right elbow: No acute osseous abnormality. Enthesopathy  X ray right forearm: No acute osseous abnormality. Old healed fractures of the radius and ulna.   Prn pain control   Supportive care

## 2024-06-26 NOTE — ASSESSMENT & PLAN NOTE
Noted HR 130s. EKG reveals sinus tachycardia with PACs. Not on any AV block or previously.   Will obtain Magnesium and troponin levels   Monitor on telemetry   Will give gentle IV fluids

## 2024-06-26 NOTE — CASE MANAGEMENT
Case Management Discharge Planning Note    Patient name Daily Schaeffer  Location /-01 MRN [id number]  : 1945 Date 2024       Current Admission Date: 2024  Current Admission Diagnosis:Paranoid delusion (HCC)   Patient Active Problem List    Diagnosis Date Noted Date Diagnosed    Sinus tachycardia 2024     Paranoid delusion (HCC) 2024     Arthralgia 2024     Abnormal brain MRI 2024     Abnormal MRI of the head 2024     SARA (acute kidney injury) ([facility]) 2024     Stress due to marital problems 2024     Low hemoglobin 2024     Acute toxic encephalopathy 2024     Debility 2024     Stage 3 chronic kidney disease, unspecified whether stage 3a or 3b CKD (HCC) 2024     Exertional dyspnea 2024     Hypomagnesemia 2023     Acute cystitis without hematuria- Ruled Out 2023     Hypercalcemia 2023     Abnormal CT scan, pelvis 2023     Neuropathy 2023     Severe aortic stenosis 2023     Acquired hypothyroidism 2017    Dyslipidemia 2016    Crohn disease (HCC) 2016    Anxiety and depression 2016    Hyperglycemia 2016    Crohn's disease of jejunum ([facility]) 2016     CKD (chronic kidney disease) stage 2, GFR 60-89 ml/min 2016       LOS (days): 2  Geometric Mean LOS (GMLOS) (days): 2.6  Days to GMLOS:0.7     OBJECTIVE:  Risk of Unplanned Readmission Score: 26.36         Current admission status: Inpatient   Preferred Pharmacy:   [facility] [place] - [address]  [place] [address]  Phone: [phone] Fax: [phone]    Primary Care Provider: Alanna Moreau DO    Primary Insurance: MEDICARE  Secondary Insurance: [employer]    DISCHARGE DETAILS:                                          Other Referral/Resources/Interventions Provided:  Interventions:  SNF  Referral Comments: CM emailed pt's daughter SNF choice list to review with family. CM will f/u with SNF choice.

## 2024-06-26 NOTE — PROGRESS NOTES
Former[facility], [facility]  Progress Note  Name: Daily Schaeffer I  MRN: [id number]  Unit/Bed#: -01 I Date of Admission: 6/24/2024   Date of Service: 6/26/2024 I Hospital Day: 2    Assessment & Plan   * Paranoid delusion (HCC)  Assessment & Plan  Patient with paranoid delusions and possible hallucinations.   Family concerned about the patient's ability to care for herself.    Psychiatry input appreciated-  Unable to determine whether she meets inpatient psych at present. Will need re-evaluation after neuropsych. Patient should NOT leave AMA and if attempts to elope 302 should be pursued.   Seroquel discontinued and patient started on Risperdal for mood and agitation   Neuropsychiatry input appreciated- patient performed in the IMPAIRED range of function and does not appear to have the capacity to make fully informed medical decisions.   CM placed referrals to SNF- will need finally clearance from psych prior to discharge.      Sinus tachycardia  Assessment & Plan  Noted HR 130s. EKG reveals sinus tachycardia with PACs. Not on any AV block or previously.   Will obtain Magnesium and troponin levels   Monitor on telemetry   Will give gentle IV fluids    Arthralgia  Assessment & Plan  Reports left knee and right wrist, possibly due to physical strain from caring for   History of recent hospitalization for stroke, social issues  Xray right humerus: No acute osseous abnormality  Left knee x ray: Osteoarthritis and chondrocalcinosis. No fracture   X ray right elbow: No acute osseous abnormality. Enthesopathy  X ray right forearm: No acute osseous abnormality. Old healed fractures of the radius and ulna.   Prn pain control   Supportive care         Abnormal MRI of the head  Assessment & Plan  Noted on previous admission  Continue aspirin and statin     Stress due to marital problems  Assessment & Plan  Patient reports having difficulty with  at home. She states he won't allow her to help him and  he ended up on the floor at home today. She states she called EMS and he refused to go to the ED. However she agreed to go to the ED due to continued right knee pain from a fall at home a couple of days ago.   There are concerns from the family that the patient is verbally abusive towards her .   There are also concerns of her ability to care for herself at home as she's been in the ED 4 times this month.   Please see plan for paranoid delusion      Acquired hypothyroidism  Assessment & Plan  Continue levothyroxine      Anxiety and depression  Assessment & Plan  Continue Cymbalta (also utilized for pain)  Seroquel discontinued and patient started on Risperdal by psychiatry for mood and agitation   Supportive care      Severe aortic stenosis  Assessment & Plan  Echocardiogram 2/21/2024: Aortic Valve: The leaflets are thickened. The leaflets are calcified. There is reduced mobility. There is severe stenosis. The aortic valve peak velocity is 4.0 m/s. The aortic valve mean gradient is 38 mmHg. The dimensionless velocity index is 0.25. The aortic valve area is 0.84 cm2. The stroke volume index is 47.30 ml/m2.   Monitor volume status closely  Continue outpatient follow up with Cardiology/cardiothoracic                VTE Pharmacologic Prophylaxis: VTE Score: 3 Moderate Risk (Score 3-4) - Pharmacological DVT Prophylaxis Ordered: enoxaparin (Lovenox).    Mobility:   Basic Mobility Inpatient Raw Score: 19  JH-HLM Goal: 6: Walk 10 steps or more  JH-HLM Achieved: 4: Move to chair/commode  JH-HLM Goal achieved. Continue to encourage appropriate mobility.    Patient Centered Rounds: I performed bedside rounds with nursing staff today.   Discussions with Specialists or Other Care Team Provider: psychiatry, CM, PT/OT     Total Time Spent on Date of Encounter in care of patient: 36 mins. This time was spent on one or more of the following: performing physical exam; counseling and coordination of care; obtaining or reviewing  "history; documenting in the medical record; reviewing/ordering tests, medications or procedures; communicating with other healthcare professionals and discussing with patient's family/caregivers.    Current Length of Stay: 2 day(s)  Current Patient Status: Inpatient   Certification Statement: The patient will continue to require additional inpatient hospital stay due to paranoid delusion  Discharge Plan: Anticipate discharge in 24-48 hrs to rehab facility.    Code Status: Level 1 - Full Code    Subjective:   The patient was seen and examined.  She states that she feels \"terrible\".  She denies any pain to me or any dyspnea however.  She states that she just \"want to get out of here\".    Objective:     Vitals:   Temp (24hrs), Av.4 °F (36.9 °C), Min:98.1 °F (36.7 °C), Max:98.9 °F (37.2 °C)    Temp:  [98.1 °F (36.7 °C)-98.9 °F (37.2 °C)] 98.2 °F (36.8 °C)  HR:  [] 137  Resp:  [17-18] 17  BP: ()/(53-74) 94/62  SpO2:  [96 %-99 %] 99 %  Body mass index is 18.6 kg/m².     Input and Output Summary (last 24 hours):     Intake/Output Summary (Last 24 hours) at 2024 1008  Last data filed at 2024 0840  Gross per 24 hour   Intake 210 ml   Output 350 ml   Net -140 ml       Physical Exam:   Physical Exam  Vitals and nursing note reviewed.   Constitutional:       General: She is awake. She is not in acute distress.     Appearance: Normal appearance.      Comments: Frail and elderly      HENT:      Head: Normocephalic and atraumatic.      Right Ear: External ear normal.      Left Ear: External ear normal.      Nose: Nose normal. No rhinorrhea.      Mouth/Throat:      Mouth: Mucous membranes are moist.      Pharynx: Oropharynx is clear.   Eyes:      General:         Right eye: No discharge.         Left eye: No discharge.      Pupils: Pupils are equal, round, and reactive to light.   Cardiovascular:      Rate and Rhythm: Regular rhythm. Tachycardia present.      Pulses: Normal pulses.      Heart sounds: " Murmur heard.   Pulmonary:      Effort: Pulmonary effort is normal. No respiratory distress.      Breath sounds: Normal breath sounds.   Abdominal:      General: Bowel sounds are normal. There is no distension.      Palpations: Abdomen is soft. There is no mass.      Tenderness: There is no abdominal tenderness.   Musculoskeletal:         General: No swelling or tenderness. Normal range of motion.      Cervical back: Normal range of motion and neck supple. No muscular tenderness.   Skin:     General: Skin is warm and dry.      Capillary Refill: Capillary refill takes less than 2 seconds.      Findings: No erythema or rash.   Neurological:      General: No focal deficit present.      Mental Status: She is alert. Mental status is at baseline. She is confused.   Psychiatric:         Attention and Perception: Attention normal.         Mood and Affect: Mood is anxious. Affect is flat.         Speech: Speech normal.         Behavior: Behavior is not agitated. Behavior is cooperative.        Additional Data:     Labs:  Results from last 7 days   Lab Units 06/26/24  0607 06/25/24  0613 06/24/24  1354   WBC Thousand/uL 7.54   < > 6.40   HEMOGLOBIN g/dL 11.6   < > 12.4   HEMATOCRIT % 37.1   < > 37.1   PLATELETS Thousands/uL 162   < > 269   SEGS PCT %  --   --  56   LYMPHO PCT %  --   --  29   MONO PCT %  --   --  13*   EOS PCT %  --   --  1    < > = values in this interval not displayed.     Results from last 7 days   Lab Units 06/26/24  0607 06/25/24  0613   SODIUM mmol/L 138 139   POTASSIUM mmol/L 4.5 3.5   CHLORIDE mmol/L 108 108   CO2 mmol/L 24 25   BUN mg/dL 12 9   CREATININE mg/dL 0.70 0.70   ANION GAP mmol/L 6 6   CALCIUM mg/dL 8.8 8.9   ALBUMIN g/dL  --  3.3*   TOTAL BILIRUBIN mg/dL  --  0.67   ALK PHOS U/L  --  108*   ALT U/L  --  15   AST U/L  --  26   GLUCOSE RANDOM mg/dL 108 96     Results from last 7 days   Lab Units 06/22/24  0453   INR  1.07     Results from last 7 days   Lab Units 06/21/24  1621   POC GLUCOSE  mg/dl 109         Results from last 7 days   Lab Units 06/22/24  0453   PROCALCITONIN ng/ml 0.11       Lines/Drains:  Invasive Devices       Peripheral Intravenous Line  Duration             Peripheral IV 06/24/24 Left;Ventral (anterior) Forearm 1 day                      Telemetry:  Telemetry Orders (From admission, onward)               24 Hour Telemetry Monitoring  Continuous x 24 Hours (Telem)        Question:  Reason for 24 Hour Telemetry  Answer:  Arrhythmias requiring acute medical intervention / PPM or ICD malfunction                     Telemetry Reviewed: Sinus Tachycardia  Indication for Continued Telemetry Use: Arrthymias requiring medical therapy             Imaging: Reviewed radiology reports from this admission including: chest xray, MRI brain, and xray(s)    Recent Cultures (last 7 days):         Last 24 Hours Medication List:   Current Facility-Administered Medications   Medication Dose Route Frequency Provider Last Rate    acetaminophen  650 mg Oral Q4H PRN Cole Mendolia, PA-C      aspirin  81 mg Oral Daily Cole Mendolia, PA-C      atorvastatin  40 mg Oral Daily Cole Mendolia, PA-C      DULoxetine  30 mg Oral Daily Cole Mendolia, PA-C      enoxaparin  40 mg Subcutaneous Daily Cole Mendolia, PA-C      levothyroxine  75 mcg Oral Early Morning Cole Mendolia, PA-C      magnesium gluconate  500 mg Oral Daily Cole Mendolia, PA-C      ondansetron  4 mg Intravenous Q6H PRN Cole Mendolia, PA-C      risperiDONE  0.25 mg Oral Q4H PRN Jossy Williamson, PA-C      risperiDONE  0.5 mg Oral HS Jossy Williamson, PA-C      sodium chloride  500 mL Intravenous Once MAJOR Garertt          Today, Patient Was Seen By: MAJOR Garrett    **Please Note: This note may have been constructed using a voice recognition system.**

## 2024-06-27 VITALS
TEMPERATURE: 97.6 F | WEIGHT: 122.36 LBS | HEIGHT: 68 IN | DIASTOLIC BLOOD PRESSURE: 53 MMHG | OXYGEN SATURATION: 98 % | HEART RATE: 85 BPM | SYSTOLIC BLOOD PRESSURE: 111 MMHG | RESPIRATION RATE: 18 BRPM | BODY MASS INDEX: 18.54 KG/M2

## 2024-06-27 LAB
ANION GAP SERPL CALCULATED.3IONS-SCNC: 6 MMOL/L (ref 4–13)
BUN SERPL-MCNC: 8 MG/DL (ref 5–25)
CALCIUM SERPL-MCNC: 8.6 MG/DL (ref 8.4–10.2)
CHLORIDE SERPL-SCNC: 108 MMOL/L (ref 96–108)
CO2 SERPL-SCNC: 25 MMOL/L (ref 21–32)
CREAT SERPL-MCNC: 0.68 MG/DL (ref 0.6–1.3)
ERYTHROCYTE [DISTWIDTH] IN BLOOD BY AUTOMATED COUNT: 14 % (ref 11.6–15.1)
GFR SERPL CREATININE-BSD FRML MDRD: 82 ML/MIN/1.73SQ M
GLUCOSE SERPL-MCNC: 117 MG/DL (ref 65–140)
HCT VFR BLD AUTO: 34.4 % (ref 34.8–46.1)
HGB BLD-MCNC: 11.4 G/DL (ref 11.5–15.4)
MAGNESIUM SERPL-MCNC: 1.9 MG/DL (ref 1.9–2.7)
MCH RBC QN AUTO: 34.1 PG (ref 26.8–34.3)
MCHC RBC AUTO-ENTMCNC: 33.1 G/DL (ref 31.4–37.4)
MCV RBC AUTO: 103 FL (ref 82–98)
PLATELET # BLD AUTO: 226 THOUSANDS/UL (ref 149–390)
PMV BLD AUTO: 8.7 FL (ref 8.9–12.7)
POTASSIUM SERPL-SCNC: 3.6 MMOL/L (ref 3.5–5.3)
RBC # BLD AUTO: 3.34 MILLION/UL (ref 3.81–5.12)
SODIUM SERPL-SCNC: 139 MMOL/L (ref 135–147)
WBC # BLD AUTO: 7.87 THOUSAND/UL (ref 4.31–10.16)

## 2024-06-27 PROCEDURE — 99239 HOSP IP/OBS DSCHRG MGMT >30: CPT | Performed by: NURSE PRACTITIONER

## 2024-06-27 PROCEDURE — 80048 BASIC METABOLIC PNL TOTAL CA: CPT | Performed by: NURSE PRACTITIONER

## 2024-06-27 PROCEDURE — 85027 COMPLETE CBC AUTOMATED: CPT | Performed by: NURSE PRACTITIONER

## 2024-06-27 PROCEDURE — 83735 ASSAY OF MAGNESIUM: CPT | Performed by: NURSE PRACTITIONER

## 2024-06-27 RX ORDER — POTASSIUM CHLORIDE 20MEQ/15ML
40 LIQUID (ML) ORAL ONCE
Status: COMPLETED | OUTPATIENT
Start: 2024-06-27 | End: 2024-06-27

## 2024-06-27 RX ORDER — MAGNESIUM SULFATE HEPTAHYDRATE 40 MG/ML
2 INJECTION, SOLUTION INTRAVENOUS ONCE
Status: COMPLETED | OUTPATIENT
Start: 2024-06-27 | End: 2024-06-27

## 2024-06-27 RX ORDER — RISPERIDONE 0.25 MG/1
0.25 TABLET, ORALLY DISINTEGRATING ORAL EVERY 4 HOURS PRN
Start: 2024-06-27

## 2024-06-27 RX ORDER — RISPERIDONE 1 MG/1
1 TABLET, ORALLY DISINTEGRATING ORAL
Start: 2024-06-27

## 2024-06-27 RX ORDER — UREA 10 %
500 LOTION (ML) TOPICAL DAILY
Start: 2024-06-28

## 2024-06-27 RX ORDER — POTASSIUM CHLORIDE 20 MEQ/1
40 TABLET, EXTENDED RELEASE ORAL ONCE
Status: COMPLETED | OUTPATIENT
Start: 2024-06-27 | End: 2024-06-27

## 2024-06-27 RX ADMIN — POTASSIUM CHLORIDE 40 MEQ: 20 SOLUTION ORAL at 08:16

## 2024-06-27 RX ADMIN — MAGNESIUM SULFATE HEPTAHYDRATE 2 G: 40 INJECTION, SOLUTION INTRAVENOUS at 07:49

## 2024-06-27 RX ADMIN — ASPIRIN 81 MG: 81 TABLET, COATED ORAL at 08:47

## 2024-06-27 RX ADMIN — LEVOTHYROXINE SODIUM 75 MCG: 75 TABLET ORAL at 05:29

## 2024-06-27 RX ADMIN — ATORVASTATIN CALCIUM 40 MG: 40 TABLET, FILM COATED ORAL at 08:46

## 2024-06-27 RX ADMIN — Medication 500 MG: at 08:47

## 2024-06-27 RX ADMIN — POTASSIUM CHLORIDE 40 MEQ: 1500 TABLET, EXTENDED RELEASE ORAL at 07:49

## 2024-06-27 RX ADMIN — DULOXETINE HYDROCHLORIDE 30 MG: 30 CAPSULE, DELAYED RELEASE ORAL at 08:47

## 2024-06-27 RX ADMIN — ENOXAPARIN SODIUM 40 MG: 40 INJECTION SUBCUTANEOUS at 08:47

## 2024-06-27 NOTE — ASSESSMENT & PLAN NOTE
Echocardiogram 2/21/2024: Aortic Valve: The leaflets are thickened. The leaflets are calcified. There is reduced mobility. There is severe stenosis. The aortic valve peak velocity is 4.0 m/s. The aortic valve mean gradient is 38 mmHg. The dimensionless velocity index is 0.25.  Continue outpatient follow up with Cardiology/cardiothoracic surgery

## 2024-06-27 NOTE — ASSESSMENT & PLAN NOTE
Patient with paranoid delusions and possible hallucinations.   Family concerned about the patient's ability to care for herself.    Psychiatry input appreciated-  Recommends discharge to short-term rehab at present and no need for inpatient psychiatric admission.  Seroquel discontinued and patient started on Risperdal. Continue with Risperdal which has increased to 1 mg at bedtime for mood stabilizer.   Neuropsychiatry input appreciated- patient performed in the IMPAIRED range of function and does not appear to have the capacity to make fully informed medical decisions.   CM assisted with disposition, input appreciated. She is being discharged to [facility] in [place]

## 2024-06-27 NOTE — DISCHARGE SUMMARY
Critical access hospital  Discharge- Daily Schaeffer 1945, 78 y.o. female MRN: [id number]  Unit/Bed#: [facility] Encounter: [id number]  Primary Care Provider: Alanna Moreau DO   Date and time admitted to hospital: 6/24/2024 12:40 PM    * Paranoid delusion (HCC)  Assessment & Plan  Patient with paranoid delusions and possible hallucinations.   Family concerned about the patient's ability to care for herself.    Psychiatry input appreciated-  Recommends discharge to short-term rehab at present and no need for inpatient psychiatric admission.  Seroquel discontinued and patient started on Risperdal. Continue with Risperdal which has increased to 1 mg at bedtime for mood stabilizer.   Neuropsychiatry input appreciated- patient performed in the IMPAIRED range of function and does not appear to have the capacity to make fully informed medical decisions.   CM assisted with disposition, input appreciated. She is being discharged to [facility] in [place]     Sinus tachycardia  Assessment & Plan  Noted HR 130s. EKG reveals sinus tachycardia with PACs. Not on any AV block or previously.   Likely due to mild volume depletion, anxiety   Received IV fluids   Now resolved   Continue to monitor HR and electrolytes closely outpatient.   Follow up with cardiology       Arthralgia  Assessment & Plan  Reports left knee and right wrist, possibly due to physical strain from caring for   History of recent hospitalization for stroke, social issues  Xray right humerus: No acute osseous abnormality  Left knee x ray: Osteoarthritis and chondrocalcinosis. No fracture   X ray right elbow: No acute osseous abnormality. Enthesopathy  X ray right forearm: No acute osseous abnormality. Old healed fractures of the radius and ulna.   Prn pain control   Supportive care          Abnormal MRI of the head  Assessment & Plan  Noted on previous admission  Continue aspirin and statin      Stress due to marital  problems  Assessment & Plan  Patient reports having difficulty with  at home. She states he won't allow her to help him and he ended up on the floor at home today. She states she called EMS and he refused to go to the ED. However she agreed to go to the ED due to continued right knee pain from a fall at home a couple of days ago.   There are concerns from the family that the patient is verbally abusive towards her .   There are also concerns of her ability to care for herself at home as she's been in the ED 4 times this month.   Please see plan for paranoid delusion        Acquired hypothyroidism  Assessment & Plan  Continue levothyroxine       Anxiety and depression  Assessment & Plan  Continue Cymbalta (also utilized for pain)  Seroquel discontinued and patient started on Risperdal by psychiatry for mood and agitation   Supportive care       Severe aortic stenosis  Assessment & Plan  Echocardiogram 2/21/2024: Aortic Valve: The leaflets are thickened. The leaflets are calcified. There is reduced mobility. There is severe stenosis. The aortic valve peak velocity is 4.0 m/s. The aortic valve mean gradient is 38 mmHg. The dimensionless velocity index is 0.25.  Continue outpatient follow up with Cardiology/cardiothoracic surgery         Discharging Physician / Practitioner: MAJOR Garrett  PCP: Alanna Moreau DO  Admission Date:   Admission Orders (From admission, onward)       Ordered        06/24/24 5836  INPATIENT ADMISSION  Once                          Discharge Date: 06/27/24    Reason for Admission: Left knee pain    Discharge Diagnoses:     Principal Problem:    Paranoid delusion (HCC)  Active Problems:    Severe aortic stenosis    Anxiety and depression    Acquired hypothyroidism    Stress due to marital problems    Abnormal MRI of the head    Arthralgia    Sinus tachycardia  Resolved Problems:    * No resolved hospital problems. *      Consultations During Hospital Stay:  IP  CONSULT TO CASE MANAGEMENT  IP CONSULT TO PSYCHIATRY  IP CONSULT TO NEUROPSYCHOLOGY  IP CONSULT TO PSYCHIATRY    Procedures Performed:     None    Significant Findings / Test Results:     XR elbow 3+ views RIGHT  Result Date: 6/24/2024  No acute osseous abnormality. Enthesopathy.     XR forearm 2 views RIGHT  Result Date: 6/24/2024  No acute osseous abnormality. Old healed fractures of the radius and ulna.     XR humerus RIGHT  Result Date: 6/24/2024  No acute osseous abnormality.      XR knee 4+ views left injury  Result Date: 6/24/2024  Osteoarthritis and chondrocalcinosis. No fracture.     Incidental Findings:   None      Test Results Pending at Discharge (will require follow up):   None      Outpatient Tests Requested:  Follow up with PCP and psychiatry     Complications:  none     Hospital Course:     Daily Schaeffer is a 78 y.o. female patient who originally presented to the hospital on 6/24/2024 due to left knee pain.  Patient who was recently admitted in the hospital has been back-and-forth to the ED several times over the past month; came in after she was unable to help her  who fell.  She had a fall herself a few days prior and was complaining of left knee pain.  Family is very concerned for the patient's ability to care for herself at home.  Case management has been assisted with placement.  The patient was evaluated by neuropsychology and at this time is deemed to have an impaired capacity to make any medical decisions.  Psychiatry does not believe that the patient require any inpatient psychiatric admission and is okay for the patient to be discharged to short-term rehab. Overall, she is medically stable for discharge.     Condition at Discharge: fair     Discharge Day Visit / Exam:     Subjective:  Patient was seen and examined. She states that she would like to go home. She does not feel that she should remain in the hospital. She denies any pain.   Vitals: Blood Pressure: 102/52 (06/27/24  "0659)  Pulse: 86 (06/27/24 0659)  Temperature: 98.4 °F (36.9 °C) (06/27/24 0659)  Temp Source: Oral (06/27/24 0700)  Respirations: 16 (06/27/24 0659)  Height: 5' 8\" (172.7 cm) (06/24/24 1826)  Weight - Scale: 55.5 kg (122 lb 5.7 oz) (06/24/24 1826)  SpO2: 97 % (06/27/24 0900)  Exam:   Physical Exam  Vitals and nursing note reviewed.   Constitutional:       General: She is awake. She is not in acute distress.     Appearance: Normal appearance.      Comments: Frail and elderly      HENT:      Head: Normocephalic and atraumatic.      Right Ear: External ear normal.      Left Ear: External ear normal.      Nose: Nose normal. No rhinorrhea.      Mouth/Throat:      Mouth: Mucous membranes are moist.      Pharynx: Oropharynx is clear.   Eyes:      General:         Right eye: No discharge.         Left eye: No discharge.      Pupils: Pupils are equal, round, and reactive to light.   Cardiovascular:      Rate and Rhythm: Normal rate and regular rhythm.      Pulses: Normal pulses.      Heart sounds: Murmur heard.   Pulmonary:      Effort: Pulmonary effort is normal. No respiratory distress.      Breath sounds: Normal breath sounds.   Abdominal:      General: Bowel sounds are normal. There is no distension.      Palpations: Abdomen is soft. There is no mass.      Tenderness: There is no abdominal tenderness.   Musculoskeletal:         General: No swelling or tenderness. Normal range of motion.      Cervical back: Normal range of motion and neck supple. No muscular tenderness.   Skin:     General: Skin is warm and dry.      Capillary Refill: Capillary refill takes less than 2 seconds.      Findings: No erythema or rash.   Neurological:      General: No focal deficit present.      Mental Status: She is alert. Mental status is at baseline. She is confused.   Psychiatric:         Attention and Perception: Attention normal.         Mood and Affect: Mood is anxious. Affect is flat.         Speech: Speech normal.         Behavior: " Behavior is not agitated. Behavior is cooperative.       Discharge instructions/Information to patient and family:   See after visit summary for information provided to patient and family.      Provisions for Follow-Up Care:  See after visit summary for information related to follow-up care and any pertinent home health orders.      Disposition:     Other Skilled Nursing Facility at [facility]    Planned Readmission: no      Discharge Statement:  I spent 40 minutes discharging the patient. This time was spent on the day of discharge. I had direct contact with the patient on the day of discharge. Greater than 50% of the total time was spent examining patient, answering all patient questions, arranging and discussing plan of care with patient as well as directly providing post-discharge instructions.  Additional time then spent on discharge activities.    Discharge Medications:  See after visit summary for reconciled discharge medications provided to patient and family.      ** Please Note: This note has been constructed using a voice recognition system **

## 2024-06-27 NOTE — PLAN OF CARE
Problem: Potential for Falls  Goal: Patient will remain free of falls  Description: INTERVENTIONS:  - Educate patient/family on patient safety including physical limitations  - Instruct patient to call for assistance with activity   - Consult OT/PT to assist with strengthening/mobility   - Keep Call bell within reach  - Keep bed low and locked with side rails adjusted as appropriate  - Keep care items and personal belongings within reach  - Initiate and maintain comfort rounds  - Make Fall Risk Sign visible to staff  - Offer Toileting every 2 Hours, in advance of need  - Initiate/Maintain bed alarm  - Obtain necessary fall risk management equipment: bed alarm, 1:1  - Apply yellow socks and bracelet for high fall risk patients  - Consider moving patient to room near nurses station  Outcome: Progressing     Problem: PAIN - ADULT  Goal: Verbalizes/displays adequate comfort level or baseline comfort level  Description: Interventions:  - Encourage patient to monitor pain and request assistance  - Assess pain using appropriate pain scale  - Administer analgesics based on type and severity of pain and evaluate response  - Implement non-pharmacological measures as appropriate and evaluate response  - Consider cultural and social influences on pain and pain management  - Notify physician/advanced practitioner if interventions unsuccessful or patient reports new pain  Outcome: Progressing     Problem: INFECTION - ADULT  Goal: Absence or prevention of progression during hospitalization  Description: INTERVENTIONS:  - Assess and monitor for signs and symptoms of infection  - Monitor lab/diagnostic results  - Monitor all insertion sites, i.e. indwelling lines, tubes, and drains  - Monitor endotracheal if appropriate and nasal secretions for changes in amount and color  - [place] appropriate cooling/warming therapies per order  - Administer medications as ordered  - Instruct and encourage patient and family to use good hand  hygiene technique  - Identify and instruct in appropriate isolation precautions for identified infection/condition  Outcome: Progressing     Problem: SAFETY ADULT  Goal: Patient will remain free of falls  Description: INTERVENTIONS:  - Educate patient/family on patient safety including physical limitations  - Instruct patient to call for assistance with activity   - Consult OT/PT to assist with strengthening/mobility   - Keep Call bell within reach  - Keep bed low and locked with side rails adjusted as appropriate  - Keep care items and personal belongings within reach  - Initiate and maintain comfort rounds  - Make Fall Risk Sign visible to staff  - Offer Toileting every 2 Hours, in advance of need  - Initiate/Maintain bed alarm  - Obtain necessary fall risk management equipment: bed alarm, 1:1  - Apply yellow socks and bracelet for high fall risk patients  - Consider moving patient to room near nurses station  Outcome: Progressing  Goal: Maintain or return to baseline ADL function  Description: INTERVENTIONS:  -  Assess patient's ability to carry out ADLs; assess patient's baseline for ADL function and identify physical deficits which impact ability to perform ADLs (bathing, care of mouth/teeth, toileting, grooming, dressing, etc.)  - Assess/evaluate cause of self-care deficits   - Assess range of motion  - Assess patient's mobility; develop plan if impaired  - Assess patient's need for assistive devices and provide as appropriate  - Encourage maximum independence but intervene and supervise when necessary  - Involve family in performance of ADLs  - Assess for home care needs following discharge   - Consider OT consult to assist with ADL evaluation and planning for discharge  - Provide patient education as appropriate  Outcome: Progressing  Goal: Maintains/Returns to pre admission functional level  Description: INTERVENTIONS:  - Perform AM-PAC 6 Click Basic Mobility/ Daily Activity assessment daily.  - Set and  communicate daily mobility goal to care team and patient/family/caregiver.   - Collaborate with rehabilitation services on mobility goals if consulted  - Out of bed for toileting  - Record patient progress and toleration of activity level   Outcome: Progressing     Problem: DISCHARGE PLANNING  Goal: Discharge to home or other facility with appropriate resources  Description: INTERVENTIONS:  - Identify barriers to discharge w/patient and caregiver  - Arrange for needed discharge resources and transportation as appropriate  - Identify discharge learning needs (meds, wound care, etc.)  - Refer to Case Management Department for coordinating discharge planning if the patient needs post-hospital services based on physician/advanced practitioner order or complex needs related to functional status, cognitive ability, or social support system  Outcome: Progressing     Problem: Knowledge Deficit  Goal: Patient/family/caregiver demonstrates understanding of disease process, treatment plan, medications, and discharge instructions  Description: Complete learning assessment and assess knowledge base.  Interventions:  - Provide teaching at level of understanding  - Provide teaching via preferred learning methods  Outcome: Progressing     Problem: Nutrition/Hydration-ADULT  Goal: Nutrient/Hydration intake appropriate for improving, restoring or maintaining nutritional needs  Description: Monitor and assess patient's nutrition/hydration status for malnutrition. Collaborate with interdisciplinary team and initiate plan and interventions as ordered.  Monitor patient's weight and dietary intake as ordered or per policy. Utilize nutrition screening tool and intervene as necessary. Determine patient's food preferences and provide high-protein, high-caloric foods as appropriate.     INTERVENTIONS:  - Monitor oral intake, urinary output, labs, and treatment plans  - Assess nutrition and hydration status and recommend course of action  -  Evaluate amount of meals eaten  - Assist patient with eating if necessary   - Allow adequate time for meals  - Recommend/ encourage appropriate diets, oral nutritional supplements, and vitamin/mineral supplements  - Order, calculate, and assess calorie counts as needed  - Recommend, monitor, and adjust tube feedings and TPN/PPN based on assessed needs  - Assess need for intravenous fluids  - Provide specific nutrition/hydration education as appropriate  - Include patient/family/caregiver in decisions related to nutrition  Outcome: Progressing     Problem: Prexisting or High Potential for Compromised Skin Integrity  Goal: Skin integrity is maintained or improved  Description: INTERVENTIONS:  - Identify patients at risk for skin breakdown  - Assess and monitor skin integrity  - Assess and monitor nutrition and hydration status  - Monitor labs   - Assess for incontinence   - Turn and reposition patient  - Assist with mobility/ambulation  - Relieve pressure over bony prominences  - Avoid friction and shearing  - Provide appropriate hygiene as needed including keeping skin clean and dry  - Evaluate need for skin moisturizer/barrier cream  - Collaborate with interdisciplinary team   - Patient/family teaching  - Consider wound care consult   Outcome: Progressing

## 2024-06-27 NOTE — PROGRESS NOTES
Patient:    MRN:  [id number]    Denis Request ID:  [id number]    Level of care reserved:  Skilled Nursing Facility    Partner Reserved:  [facility], [place] [address] [phone]    Clinical needs requested:    Geography searched:  40 miles around [address]    Start of Service:    Request sent:  2:05pm EDT on 6/25/2024 by Leticia Keenan    Partner reserved:  1:40pm EDT on 6/27/2024 by Leticia Keenan    Choice list shared:  1:40pm EDT on 6/27/2024 by Leticia Keenan

## 2024-06-27 NOTE — ASSESSMENT & PLAN NOTE
Noted HR 130s. EKG reveals sinus tachycardia with PACs. Not on any AV block or previously.   Likely due to mild volume depletion, anxiety   Received IV fluids   Now resolved   Continue to monitor HR and electrolytes closely outpatient.   Follow up with cardiology

## 2024-06-27 NOTE — PLAN OF CARE
Problem: Potential for Falls  Goal: Patient will remain free of falls  Description: INTERVENTIONS:  - Educate patient/family on patient safety including physical limitations  - Instruct patient to call for assistance with activity   - Consult OT/PT to assist with strengthening/mobility   - Keep Call bell within reach  - Keep bed low and locked with side rails adjusted as appropriate  - Keep care items and personal belongings within reach  - Initiate and maintain comfort rounds  - Make Fall Risk Sign visible to staff  - Offer Toileting every 2 Hours, in advance of need  - Initiate/Maintain bed alarm  - Obtain necessary fall risk management equipment: bed alarm, 1:1  - Apply yellow socks and bracelet for high fall risk patients  - Consider moving patient to room near nurses station  6/27/2024 1606 by Lucy Mckeon RN  Outcome: Adequate for Discharge  6/27/2024 1029 by Lucy Mckeon RN  Outcome: Progressing     Problem: PAIN - ADULT  Goal: Verbalizes/displays adequate comfort level or baseline comfort level  Description: Interventions:  - Encourage patient to monitor pain and request assistance  - Assess pain using appropriate pain scale  - Administer analgesics based on type and severity of pain and evaluate response  - Implement non-pharmacological measures as appropriate and evaluate response  - Consider cultural and social influences on pain and pain management  - Notify physician/advanced practitioner if interventions unsuccessful or patient reports new pain  6/27/2024 1606 by Lucy Mckeon RN  Outcome: Adequate for Discharge  6/27/2024 1029 by Lucy Mckeon RN  Outcome: Progressing     Problem: INFECTION - ADULT  Goal: Absence or prevention of progression during hospitalization  Description: INTERVENTIONS:  - Assess and monitor for signs and symptoms of infection  - Monitor lab/diagnostic results  - Monitor all insertion sites, i.e. indwelling lines, tubes, and drains  - Monitor endotracheal if appropriate and  nasal secretions for changes in amount and color  - [place] appropriate cooling/warming therapies per order  - Administer medications as ordered  - Instruct and encourage patient and family to use good hand hygiene technique  - Identify and instruct in appropriate isolation precautions for identified infection/condition  6/27/2024 1606 by Lucy Mckeon RN  Outcome: Adequate for Discharge  6/27/2024 1029 by Lucy Mckeon RN  Outcome: Progressing     Problem: SAFETY ADULT  Goal: Patient will remain free of falls  Description: INTERVENTIONS:  - Educate patient/family on patient safety including physical limitations  - Instruct patient to call for assistance with activity   - Consult OT/PT to assist with strengthening/mobility   - Keep Call bell within reach  - Keep bed low and locked with side rails adjusted as appropriate  - Keep care items and personal belongings within reach  - Initiate and maintain comfort rounds  - Make Fall Risk Sign visible to staff  - Offer Toileting every 2 Hours, in advance of need  - Initiate/Maintain bed alarm  - Obtain necessary fall risk management equipment: bed alarm, 1:1  - Apply yellow socks and bracelet for high fall risk patients  - Consider moving patient to room near nurses station  6/27/2024 1606 by Lucy Mckeon RN  Outcome: Adequate for Discharge  6/27/2024 1029 by Lucy Mckeon RN  Outcome: Progressing  Goal: Maintain or return to baseline ADL function  Description: INTERVENTIONS:  -  Assess patient's ability to carry out ADLs; assess patient's baseline for ADL function and identify physical deficits which impact ability to perform ADLs (bathing, care of mouth/teeth, toileting, grooming, dressing, etc.)  - Assess/evaluate cause of self-care deficits   - Assess range of motion  - Assess patient's mobility; develop plan if impaired  - Assess patient's need for assistive devices and provide as appropriate  - Encourage maximum independence but intervene and supervise when  necessary  - Involve family in performance of ADLs  - Assess for home care needs following discharge   - Consider OT consult to assist with ADL evaluation and planning for discharge  - Provide patient education as appropriate  6/27/2024 1606 by Lucy Mckeon RN  Outcome: Adequate for Discharge  6/27/2024 1029 by Lucy Mckeon RN  Outcome: Progressing  Goal: Maintains/Returns to pre admission functional level  Description: INTERVENTIONS:  - Perform AM-PAC 6 Click Basic Mobility/ Daily Activity assessment daily.  - Set and communicate daily mobility goal to care team and patient/family/caregiver.   - Collaborate with rehabilitation services on mobility goals if consulted  - Out of bed for toileting  - Record patient progress and toleration of activity level   6/27/2024 1606 by Lucy Mckeon RN  Outcome: Adequate for Discharge  6/27/2024 1029 by Lucy Mckeon RN  Outcome: Progressing     Problem: DISCHARGE PLANNING  Goal: Discharge to home or other facility with appropriate resources  Description: INTERVENTIONS:  - Identify barriers to discharge w/patient and caregiver  - Arrange for needed discharge resources and transportation as appropriate  - Identify discharge learning needs (meds, wound care, etc.)  - Refer to Case Management Department for coordinating discharge planning if the patient needs post-hospital services based on physician/advanced practitioner order or complex needs related to functional status, cognitive ability, or social support system  6/27/2024 1606 by Luyc Mckeon RN  Outcome: Adequate for Discharge  6/27/2024 1029 by Lucy Mckeon RN  Outcome: Progressing     Problem: Knowledge Deficit  Goal: Patient/family/caregiver demonstrates understanding of disease process, treatment plan, medications, and discharge instructions  Description: Complete learning assessment and assess knowledge base.  Interventions:  - Provide teaching at level of understanding  - Provide teaching via preferred learning  methods  6/27/2024 1606 by Lucy Mckeon RN  Outcome: Adequate for Discharge  6/27/2024 1029 by Lucy Mckeon RN  Outcome: Progressing     Problem: Nutrition/Hydration-ADULT  Goal: Nutrient/Hydration intake appropriate for improving, restoring or maintaining nutritional needs  Description: Monitor and assess patient's nutrition/hydration status for malnutrition. Collaborate with interdisciplinary team and initiate plan and interventions as ordered.  Monitor patient's weight and dietary intake as ordered or per policy. Utilize nutrition screening tool and intervene as necessary. Determine patient's food preferences and provide high-protein, high-caloric foods as appropriate.     INTERVENTIONS:  - Monitor oral intake, urinary output, labs, and treatment plans  - Assess nutrition and hydration status and recommend course of action  - Evaluate amount of meals eaten  - Assist patient with eating if necessary   - Allow adequate time for meals  - Recommend/ encourage appropriate diets, oral nutritional supplements, and vitamin/mineral supplements  - Order, calculate, and assess calorie counts as needed  - Recommend, monitor, and adjust tube feedings and TPN/PPN based on assessed needs  - Assess need for intravenous fluids  - Provide specific nutrition/hydration education as appropriate  - Include patient/family/caregiver in decisions related to nutrition  6/27/2024 1606 by Lucy Mckeon RN  Outcome: Adequate for Discharge  6/27/2024 1029 by Lucy Mckeon RN  Outcome: Progressing     Problem: Prexisting or High Potential for Compromised Skin Integrity  Goal: Skin integrity is maintained or improved  Description: INTERVENTIONS:  - Identify patients at risk for skin breakdown  - Assess and monitor skin integrity  - Assess and monitor nutrition and hydration status  - Monitor labs   - Assess for incontinence   - Turn and reposition patient  - Assist with mobility/ambulation  - Relieve pressure over bony prominences  - Avoid  friction and shearing  - Provide appropriate hygiene as needed including keeping skin clean and dry  - Evaluate need for skin moisturizer/barrier cream  - Collaborate with interdisciplinary team   - Patient/family teaching  - Consider wound care consult   6/27/2024 1606 by Lucy Mckeon RN  Outcome: Adequate for Discharge  6/27/2024 1029 by Lucy Mckeon RN  Outcome: Progressing

## 2024-06-27 NOTE — CASE MANAGEMENT
Case Management Discharge Planning Note    Patient name Daily Schaeffer  Location /-01 MRN [id number]  : 1945 Date 2024       Current Admission Date: 2024  Current Admission Diagnosis:Paranoid delusion (HCC)   Patient Active Problem List    Diagnosis Date Noted Date Diagnosed    Sinus tachycardia 2024     Paranoid delusion (HCC) 2024     Arthralgia 2024     Abnormal brain MRI 2024     Abnormal MRI of the head 2024     SARA (acute kidney injury) ([facility]) 2024     Stress due to marital problems 2024     Low hemoglobin 2024     Acute toxic encephalopathy 2024     Debility 2024     Stage 3 chronic kidney disease, unspecified whether stage 3a or 3b CKD (HCC) 2024     Exertional dyspnea 2024     Hypomagnesemia 2023     Acute cystitis without hematuria- Ruled Out 2023     Hypercalcemia 2023     Abnormal CT scan, pelvis 2023     Neuropathy 2023     Severe aortic stenosis 2023     Acquired hypothyroidism 2017    Dyslipidemia 2016    Crohn disease (HCC) 2016    Anxiety and depression 2016    Hyperglycemia 2016    Crohn's disease of jejunum ([facility]) 2016     CKD (chronic kidney disease) stage 2, GFR 60-89 ml/min 2016       LOS (days): 3  Geometric Mean LOS (GMLOS) (days): 2.6  Days to GMLOS:-0.2     OBJECTIVE:  Risk of Unplanned Readmission Score: 27.3         Current admission status: Inpatient   Preferred Pharmacy:   [facility] [place] - [facility]  [place] [address]  Phone: [phone] Fax: [phone]    Primary Care Provider: Alanna Moreau DO    Primary Insurance: MEDICARE  Secondary Insurance: CIGNA    DISCHARGE DETAILS:                                          Other Referral/Resources/Interventions Provided:  Interventions:  Transportation, SNF  Referral Comments: CM contacted pt's daughter to f/u with SNF choice. Daughter chose [facility] as it is close to both of pt's daughters. CM reserved [facility] via [employer]. CM scheduled transport today at 1930. CM notified hospitalist, nurse, facility, and pt's daughter. CM will continue to follow.    Would you like to participate in our [facility] service program?  : No - Declined             Dispatcher Contacted: Yes  Number/Name of Dispatcher: [facility]  / [phone]     ETA of Transport (Date): 06/27/24  ETA of Transport (Time): 1930              IMM Given (Date):: 06/27/24  IMM Given to:: Family  Family notified:: Daughter: Aristeo Marte  Additional Comments: CM contacted pt's daughter to review IMM and obtained verbal consent.    Accepting Facility Name, City & State : [facility]   [address] [place] [address]  Receiving Facility/Agency Phone Number: [phone]  Facility/Agency Fax Number: [phone]

## 2024-06-28 ENCOUNTER — PATIENT OUTREACH (OUTPATIENT)
Dept: CASE MANAGEMENT | Facility: OTHER | Age: 79
End: 2024-06-28

## 2024-06-28 ENCOUNTER — TRANSITIONAL CARE MANAGEMENT (OUTPATIENT)
Dept: FAMILY MEDICINE CLINIC | Facility: CLINIC | Age: 79
End: 2024-06-28

## 2024-06-28 NOTE — PROGRESS NOTES
Admitted 6/24/24 to [facility]. Discharged 6/27/24 to [facility]. Email sent to facility to inform them I will be following the patient during their skilled stay.  This Admin Coordinator will continue to monitor via chart review.

## 2024-06-28 NOTE — NURSING NOTE
Patient discharged at this time in stable condition.  Patient knew she was leaving the hospital bu was convinced she was going home.  Receiving facility, [facility] in [place] was advised.  Patient able to ambulate to the stretcher with assist of 1.  She was alert and oriented x 3 at discharge.  Her VSS.  Report called to Janay at [facility].

## 2024-06-28 NOTE — PROGRESS NOTES
Outpatient Care Management Note:  In basket discharge alert received.  Chart review completed.  Neuro-psych declared her incompetent to make decisions.  Discharged to [facility] for STR.      In basket sent to SHANTI Simpson to track until discharge home or LTC.

## 2024-06-28 NOTE — CASE MANAGEMENT
Case Management Progress Note    Patient name Daily Schaeffer  Location /-01 MRN [id number]  : 1945 Date 2024       LOS (days): 3  Geometric Mean LOS (GMLOS) (days): 2.6  Days to GMLOS:-0.5        OBJECTIVE:        Current admission status: Inpatient  Preferred Pharmacy:   [facility] [place] - [address]  [place] [address]  Phone: [phone] Fax: [phone]    Primary Care Provider: Alanna Moreau DO    Primary Insurance: MEDICARE  Secondary Insurance: CIGNA    PROGRESS NOTE:    CM contacted [facility] to notify that pt has been transported to [facility].

## 2024-07-01 ENCOUNTER — HOSPITAL ENCOUNTER (EMERGENCY)
Facility: HOSPITAL | Age: 79
Discharge: HOME | End: 2024-07-02
Attending: EMERGENCY MEDICINE
Payer: MEDICARE

## 2024-07-01 ENCOUNTER — APPOINTMENT (EMERGENCY)
Dept: CT IMAGING | Facility: HOSPITAL | Age: 79
End: 2024-07-01
Payer: MEDICARE

## 2024-07-01 ENCOUNTER — APPOINTMENT (EMERGENCY)
Dept: RADIOLOGY | Facility: HOSPITAL | Age: 79
End: 2024-07-01
Payer: MEDICARE

## 2024-07-01 ENCOUNTER — PATIENT OUTREACH (OUTPATIENT)
Dept: CASE MANAGEMENT | Facility: OTHER | Age: 79
End: 2024-07-01
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
## 2024-07-01 DIAGNOSIS — R91.8 MULTIPLE LUNG NODULES ON CT: ICD-10-CM

## 2024-07-01 DIAGNOSIS — M54.2 ACUTE NECK PAIN: ICD-10-CM

## 2024-07-01 DIAGNOSIS — S00.83XA FOREHEAD CONTUSION, INITIAL ENCOUNTER: ICD-10-CM

## 2024-07-01 DIAGNOSIS — M25.562 ACUTE PAIN OF LEFT KNEE: ICD-10-CM

## 2024-07-01 DIAGNOSIS — R07.9 CHEST PAIN, UNSPECIFIED: ICD-10-CM

## 2024-07-01 LAB
ERYTHROCYTE [DISTWIDTH] IN BLOOD BY AUTOMATED COUNT: 13.2 % (ref 11.6–15.1)
HCT VFR BLD AUTO: 40.9 % (ref 34.8–46.1)
HGB BLD-MCNC: 13.5 G/DL (ref 11.5–15.4)
MCH RBC QN AUTO: 32.4 PG (ref 26.8–34.3)
MCHC RBC AUTO-ENTMCNC: 33 G/DL (ref 31.4–37.4)
MCV RBC AUTO: 98 FL (ref 82–98)
PLATELET # BLD AUTO: 286 THOUSANDS/UL (ref 149–390)
PMV BLD AUTO: 8.9 FL (ref 8.9–12.7)
RBC # BLD AUTO: 4.17 MILLION/UL (ref 3.81–5.12)
WBC # BLD AUTO: 6.85 THOUSAND/UL (ref 4.31–10.16)

## 2024-07-01 PROCEDURE — 84484 ASSAY OF TROPONIN QUANT: CPT | Performed by: EMERGENCY MEDICINE

## 2024-07-01 PROCEDURE — 99285 EMERGENCY DEPT VISIT HI MDM: CPT | Performed by: EMERGENCY MEDICINE

## 2024-07-01 PROCEDURE — 36415 COLL VENOUS BLD VENIPUNCTURE: CPT | Performed by: EMERGENCY MEDICINE

## 2024-07-01 PROCEDURE — 71045 X-RAY EXAM CHEST 1 VIEW: CPT

## 2024-07-01 PROCEDURE — 80053 COMPREHEN METABOLIC PANEL: CPT | Performed by: EMERGENCY MEDICINE

## 2024-07-01 PROCEDURE — 85027 COMPLETE CBC AUTOMATED: CPT | Performed by: EMERGENCY MEDICINE

## 2024-07-01 NOTE — PROGRESS NOTES
Chart review completed.  Email sent to facility requesting update on patient.   This care manager assistant will continue to monitor via chart review throughout SNF/STR Surveillance episode.   The patient was seen at [facility] ED for a fall she was discharged back to [facility] for STR. Update from the SNF she may transition to LTC.

## 2024-07-02 ENCOUNTER — PATIENT OUTREACH (OUTPATIENT)
Dept: FAMILY MEDICINE CLINIC | Facility: CLINIC | Age: 79
End: 2024-07-02

## 2024-07-02 ENCOUNTER — APPOINTMENT (EMERGENCY)
Dept: CT IMAGING | Facility: HOSPITAL | Age: 79
End: 2024-07-02
Payer: MEDICARE

## 2024-07-02 ENCOUNTER — APPOINTMENT (EMERGENCY)
Dept: RADIOLOGY | Facility: HOSPITAL | Age: 79
End: 2024-07-02
Payer: MEDICARE

## 2024-07-02 ENCOUNTER — TELEPHONE (OUTPATIENT)
Age: 79
End: 2024-07-02

## 2024-07-02 VITALS
HEIGHT: 68 IN | DIASTOLIC BLOOD PRESSURE: 68 MMHG | SYSTOLIC BLOOD PRESSURE: 157 MMHG | WEIGHT: 122 LBS | RESPIRATION RATE: 17 BRPM | TEMPERATURE: 97.9 F | HEART RATE: 80 BPM | BODY MASS INDEX: 18.49 KG/M2 | OXYGEN SATURATION: 99 %

## 2024-07-02 LAB
ALBUMIN SERPL BCG-MCNC: 4.2 G/DL (ref 3.5–5)
ALP SERPL-CCNC: 134 U/L (ref 34–104)
ALT SERPL W P-5'-P-CCNC: 22 U/L (ref 7–52)
ANION GAP SERPL CALCULATED.3IONS-SCNC: 14 MMOL/L (ref 4–13)
AST SERPL W P-5'-P-CCNC: 27 U/L (ref 13–39)
BILIRUB SERPL-MCNC: 0.86 MG/DL (ref 0.2–1)
BUN SERPL-MCNC: 13 MG/DL (ref 5–25)
CALCIUM SERPL-MCNC: 10.3 MG/DL (ref 8.4–10.2)
CARDIAC TROPONIN I PNL SERPL HS: 10 NG/L
CHLORIDE SERPL-SCNC: 102 MMOL/L (ref 96–108)
CO2 SERPL-SCNC: 23 MMOL/L (ref 21–32)
CREAT SERPL-MCNC: 0.85 MG/DL (ref 0.6–1.3)
GFR SERPL CREATININE-BSD FRML MDRD: 64 ML/MIN/1.73SQ M
GLUCOSE SERPL-MCNC: 136 MG/DL (ref 65–140)
POTASSIUM SERPL-SCNC: 3.7 MMOL/L (ref 3.5–5.3)
PROT SERPL-MCNC: 7.3 G/DL (ref 6.4–8.4)
SODIUM SERPL-SCNC: 139 MMOL/L (ref 135–147)

## 2024-07-02 PROCEDURE — 93005 ELECTROCARDIOGRAM TRACING: CPT

## 2024-07-02 PROCEDURE — 71260 CT THORAX DX C+: CPT

## 2024-07-02 PROCEDURE — 70450 CT HEAD/BRAIN W/O DYE: CPT

## 2024-07-02 PROCEDURE — 74177 CT ABD & PELVIS W/CONTRAST: CPT

## 2024-07-02 PROCEDURE — 73560 X-RAY EXAM OF KNEE 1 OR 2: CPT

## 2024-07-02 PROCEDURE — 72125 CT NECK SPINE W/O DYE: CPT

## 2024-07-02 RX ORDER — KETOROLAC TROMETHAMINE 30 MG/ML
15 INJECTION, SOLUTION INTRAMUSCULAR; INTRAVENOUS ONCE
Status: COMPLETED | OUTPATIENT
Start: 2024-07-02 | End: 2024-07-02

## 2024-07-02 RX ADMIN — KETOROLAC TROMETHAMINE 15 MG: 30 INJECTION, SOLUTION INTRAMUSCULAR; INTRAVENOUS at 01:17

## 2024-07-02 RX ADMIN — IOHEXOL 100 ML: 350 INJECTION, SOLUTION INTRAVENOUS at 00:22

## 2024-07-02 NOTE — TELEPHONE ENCOUNTER
Patients  called stating that patient is currently in a nursing home. He reports that the patient had a fall and had a CT scan done which shows that there are some nodules on her lungs. He would like a call back to go over those results and discuss what the next steps are. He can be reached at [phone]  
Spoke with  at length.  His wife is in the nursing home at this point.  Admitted for an encephalopathy.  She remains with good days and bad days.  Had a fall on 7 1, seen in the emergency room again but back in the nursing home now.  On her prior hospitalization, CT of the chest revealed multiple pulmonary nodules concerning for malignancy.  Since she is in the nursing home I doubt they will pursue these.  Should they get out, I told her  Gilmar to call to let us know so that we could potentially order a PET scan depending on her condition.  Just an FYI.  
No

## 2024-07-02 NOTE — INCIDENTAL FINDINGS
"The following findings require follow up:  Radiographic finding   Finding: \"Numerous pulmonary nodules concerning for malignancy\"   Follow up required: Pulmonology, possible IR and Hem/Onc evaluation.   Follow up should be done within one month(s)    Please notify the following clinician to assist with the follow up:   Dr. Alanna Moreau DO     Incidental finding results were discussed with the Patient and Patient's POA ( Gilmar) by Santiago Ojeda DO on 07/02/24.   They expressed understanding and all questions answered.  "

## 2024-07-02 NOTE — DISCHARGE INSTRUCTIONS
Daily Schaeffer has been seen for evaluation after a fall. Please give tylenol and motrin for discomfort. Apply ice to forehead contusion. Return to the emergency department if you notice lethargy, decreased urine output, dehydration, vomiting, weakness or any other symptoms of concern. Please follow up with their PCP by calling the number provided.    A CT of the chest demonstrated numerous pulmonary nodules which requires further evaluation as outpatient to exclude malignancy. Please arrange for followup with facility provider or the patient's PCP by contacting the number above.

## 2024-07-02 NOTE — PROGRESS NOTES
Outpatient Care Management Note    ADT alert received- ED discharge. Chart reviewed.    Patient seen at [facility] ED 7/1/24-7/2/24 for a fall.  Patient presented via EMS from [facility].      RN CM confirmed with Admin Coordinator SHANTI Simpson that patient was discharged back to [facility] for STR.  Patient may transition to LTC.  Admin Coordinator, SHANTI Simpson, will continue to monitor via chart review throughout SNF/STR Surveillance episode.    RN CM will outreach patient once/if patient discharged to home.

## 2024-07-02 NOTE — ED PROVIDER NOTES
"Emergency Department Trauma Note  Daily Schaeffer 78 y.o. female MRN: [id number]  Unit/Bed#: ED 06/ED 06 Encounter: [id number]      Trauma Alert: Trauma Acuity: Trauma Evaluation  Model of Arrival: Mode of Arrival: ALS via Trauma Squad Name and Number: [id number]  Trauma Team: Current Providers  Attending Provider: Santiago Ojeda DO  Registered Nurse: Yanique Vargas  Consultants:     None      History of Present Illness     Chief Complaint:   Chief Complaint   Patient presents with    Fall     Pt to ED via EMS from [facility] after staff reporting the pt \"throwing herself out of her wheelchair\" Pt landed on front of face out of wheelchair, unsure of LOC, takes ASA daily.     HPI:    Mechanism:Details of Incident: Pt reports falling forward out of her wheelchair, hitting her forward, pt unsure of LOC, takes ASA daily.          Daily Schaeffer is a 78 y.o. year old female with PMH of aortic stenosis, HTN, HLD, T2DM presenting to the [facility] ED after a fall. Patient was sitting in wheelchair when she developed lightheadedness and fell forward.Patient did strike the front of her head was noted to have bruising on forehead. Patient is uncertain if she had LOC. Patient does take ASA daily thought not on any other AC/AP medications. Patient at this time states they have have neck, chest and left knee pain. Patient denies weakness or numbness in extremities. Patient currently residing in rehab SNF. Per records patient recently admitted for auditory hallucinations and deemed to not have medical capacity. Per review of SNF records patient's baseline mental status is disoriented though follows simple commands and uses assisted device for ambulation.      History provided by:  Medical records and patient   used: No    Fall    Review of Systems   Unable to perform ROS: Dementia       Historical Information     Immunizations:   Immunization History   Administered Date(s) Administered    COVID-19 " MODERNA VACC 0.5 ML IM 03/15/2021, 04/14/2021    Influenza Split High Dose Preservative Free IM 11/09/2017, 11/14/2018, 11/18/2019, 10/28/2020, 11/04/2021, 12/08/2022    Influenza, high dose seasonal 0.7 mL 10/12/2023    Pneumococcal Conjugate 13-Valent 11/09/2017    Pneumococcal Polysaccharide PPV23 11/14/2018       Past Medical History:   Diagnosis Date    Aortic stenosis     Depression     Disease of thyroid gland     Diverticular disease 05/09/2017    Essential hypertension 05/20/2016    Hyperlipidemia     Major depression 05/20/2016    Peripheral neuropathy     Type 2 diabetes mellitus (HCC) 05/20/2016    Visual impairment      History reviewed. No pertinent family history.  Past Surgical History:   Procedure Laterality Date    BOWEL RESECTION      COLON SURGERY      EYE SURGERY      SMALL INTESTINE SURGERY      TUBAL LIGATION       Social History     Tobacco Use    Smoking status: Never     Passive exposure: Never    Smokeless tobacco: Never   Vaping Use    Vaping status: Never Used   Substance Use Topics    Alcohol use: Yes     Comment: occasional    Drug use: Never     E-Cigarette/Vaping    E-Cigarette Use Never User      E-Cigarette/Vaping Substances    Nicotine No     THC No     CBD No     Flavoring No        Family History: non-contributory    Meds/Allergies   Prior to Admission Medications   Prescriptions Last Dose Informant Patient Reported? Taking?   DULoxetine (CYMBALTA) 30 mg delayed release capsule   No No   Sig: Take 1 capsule (30 mg total) by mouth daily   acetaminophen (TYLENOL) 325 mg tablet   No No   Sig: Take 2 tablets (650 mg total) by mouth every 4 (four) hours as needed for mild pain, headaches or fever   aspirin (ECOTRIN LOW STRENGTH) 81 mg EC tablet   No No   Sig: Take 1 tablet (81 mg total) by mouth daily   atorvastatin (LIPITOR) 40 mg tablet   No No   Sig: Take 1 tablet (40 mg total) by mouth daily   levothyroxine 75 mcg tablet   No No   Sig: Take 1 tablet (75 mcg total) by mouth daily  in the early morning Take 1 tablet (75 mg) by oral route five days a week.   magnesium gluconate (MAGONATE) 500 mg tablet   No No   Sig: Take 1 tablet (500 mg total) by mouth daily   risperiDONE (RisperDAL M-TAB) 0.25 mg disintegrating tablet   No No   Sig: Take 1 tablet (0.25 mg total) by mouth every 4 (four) hours as needed for agitation   risperiDONE (RisperDAL M-TAB) 1 mg disintegrating tablet   No No   Sig: Take 1 tablet (1 mg total) by mouth daily at bedtime      Facility-Administered Medications: None       Allergies   Allergen Reactions    Wound Dressing Adhesive Rash       PHYSICAL EXAM    PE limited by: N/A    Objective   Vitals:   First set: Temperature: 97.9 °F (36.6 °C) (07/01/24 2332)  Pulse: 75 (07/01/24 2332)  Respirations: 19 (07/01/24 2332)  Blood Pressure: (!) 171/71 (07/01/24 2332)  SpO2: 98 % (07/01/24 2332)    Primary Survey:   (A) Airway: intact  (B) Breathing: b/l breath sounds  (C) Circulation: Pulses:   femoral  2/4  (D) Disabliity:  GCS Total:  14, Eye Opening:   Spontaneous = 4, Motor Response: Obeys commands = 6, and Verbal Response:  Confused = 4  (E) Expose:  Completed    Secondary Survey:   Physical Exam  Vitals and nursing note reviewed.   Constitutional:       General: She is not in acute distress.     Appearance: Normal appearance. She is not ill-appearing, toxic-appearing or diaphoretic.      Interventions: Cervical collar in place.   HENT:      Head: Normocephalic. Contusion present. No Erickson's sign, abrasion, right periorbital erythema, left periorbital erythema or laceration.        Right Ear: External ear normal.      Left Ear: External ear normal.      Nose:      Right Nostril: No epistaxis.      Left Nostril: No epistaxis.      Mouth/Throat:      Mouth: No lacerations.   Eyes:      General:         Right eye: No discharge.         Left eye: No discharge.      Pupils: Pupils are equal, round, and reactive to light.   Cardiovascular:      Rate and Rhythm: Normal rate and  regular rhythm.   Pulmonary:      Effort: Pulmonary effort is normal. No respiratory distress.      Breath sounds: Normal breath sounds. No wheezing, rhonchi or rales.   Abdominal:      General: Abdomen is flat. There is no distension.      Tenderness: There is generalized abdominal tenderness. There is no right CVA tenderness, left CVA tenderness, guarding or rebound.   Musculoskeletal:      Right shoulder: No tenderness. Normal range of motion.      Left shoulder: No tenderness. Normal range of motion.      Right upper arm: No tenderness.      Left upper arm: No tenderness.      Right elbow: No swelling. No tenderness.      Left elbow: No swelling. No tenderness.      Right forearm: No swelling, tenderness or bony tenderness.      Left forearm: No swelling, tenderness or bony tenderness.      Right wrist: No swelling, deformity, tenderness, bony tenderness or snuff box tenderness.      Left wrist: No swelling, deformity, tenderness, bony tenderness or snuff box tenderness.      Right hand: No tenderness or bony tenderness. Normal strength. Normal sensation.      Left hand: No tenderness or bony tenderness. Normal strength. Normal sensation.      Cervical back: Normal range of motion. Tenderness present. No bony tenderness.      Thoracic back: Tenderness present. No bony tenderness.      Lumbar back: No tenderness or bony tenderness.      Right hip: No deformity or bony tenderness.      Left hip: No deformity or bony tenderness.      Right upper leg: No tenderness.      Left upper leg: No tenderness.      Right knee: No swelling. No tenderness.      Left knee: No swelling. Tenderness present.      Right lower leg: No bony tenderness. No edema.      Left lower leg: No bony tenderness. No edema.      Right ankle: No tenderness.      Left ankle: No tenderness.      Right foot: No tenderness.      Left foot: No tenderness.   Neurological:      General: No focal deficit present.      Mental Status: She is alert. Mental  status is at baseline.      GCS: GCS eye subscore is 4. GCS verbal subscore is 4. GCS motor subscore is 6.   Psychiatric:         Mood and Affect: Mood normal.         Behavior: Behavior normal.         Cervical spine cleared by clinical criteria? No (imaging required)      Invasive Devices       Peripheral Intravenous Line  Duration             Peripheral IV 07/01/24 Right Antecubital <1 day                    Lab Results:   Results Reviewed       Procedure Component Value Units Date/Time    HS Troponin 0hr (reflex protocol) [[id number]]  (Normal) Collected: 07/01/24 2348    Lab Status: Final result Specimen: Blood from Arm, Right Updated: 07/02/24 0016     hs TnI 0hr 10 ng/L     Comprehensive metabolic panel [[id number]]  (Abnormal) Collected: 07/01/24 2348    Lab Status: Final result Specimen: Blood from Arm, Right Updated: 07/02/24 0011     Sodium 139 mmol/L      Potassium 3.7 mmol/L      Chloride 102 mmol/L      CO2 23 mmol/L      ANION GAP 14 mmol/L      BUN 13 mg/dL      Creatinine 0.85 mg/dL      Glucose 136 mg/dL      Calcium 10.3 mg/dL      AST 27 U/L      ALT 22 U/L      Alkaline Phosphatase 134 U/L      Total Protein 7.3 g/dL      Albumin 4.2 g/dL      Total Bilirubin 0.86 mg/dL      eGFR 64 ml/min/1.73sq m     Narrative:      National Kidney Disease Foundation guidelines for Chronic Kidney Disease (CKD):     Stage 1 with normal or high GFR (GFR > 90 mL/min/1.73 square meters)    Stage 2 Mild CKD (GFR = 60-89 mL/min/1.73 square meters)    Stage 3A Moderate CKD (GFR = 45-59 mL/min/1.73 square meters)    Stage 3B Moderate CKD (GFR = 30-44 mL/min/1.73 square meters)    Stage 4 Severe CKD (GFR = 15-29 mL/min/1.73 square meters)    Stage 5 End Stage CKD (GFR <15 mL/min/1.73 square meters)  Note: GFR calculation is accurate only with a steady state creatinine    CBC and Platelet [[id number]]  (Normal) Collected: 07/01/24 2348    Lab Status: Final result Specimen: Blood from Arm, Right Updated: 07/01/24 3000      WBC 6.85 Thousand/uL      RBC 4.17 Million/uL      Hemoglobin 13.5 g/dL      Hematocrit 40.9 %      MCV 98 fL      MCH 32.4 pg      MCHC 33.0 g/dL      RDW 13.2 %      Platelets 286 Thousands/uL      MPV 8.9 fL                    Imaging Studies:   Direct to CT: Yes  XR knee 1 or 2 views left   ED Interpretation by Santiago Ojeda DO (07/02 0052)   No acute fracture or dislocation.      CT chest abdomen pelvis w contrast   Final Result by Iain Dawn MD (07/02 0046)         1. No evidence of acute trauma in the chest, abdomen or pelvis.   2. Numerous pulmonary nodules throughout the lungs measuring up to 5 mm, concerning for malignancy.   3. Nodular opacities in the right lower lobe could represent aspiration or pneumonia.               Workstation performed: [id number]         TRAUMA - CT head wo contrast   Final Result by Iain Dawn MD (07/02 0028)         1. No acute cranial hemorrhage or mass effect.   2. Midline frontal scalp hematoma. No acute calvarial fracture.                  Workstation performed: [id number]         TRAUMA - CT spine cervical wo contrast   Final Result by Iain Dawn MD (07/02 0034)      No acute cervical spine fracture or traumatic malalignment.                  Workstation performed: [id number]         XR Trauma chest portable   ED Interpretation by Santiago Ojeda DO (07/01 6527)   No PTX. No focal infiltrate. No acute cardiopulmonary disease.            Procedures  POC FAST     Date/Time: 7/1/2024 11:45 PM    Performed by: Santiago Ojeda DO  Authorized by: Santiago Ojeda DO    Patient location:  ED  Procedure details:     Exam Type:  Diagnostic    Indications: abdominal pain      Technique: FAST      Views obtained:  Heart - Pericardial sac, LUQ - Splenorenal space, Suprapubic - Pouch of Kristian and RUQ - Murray's Pouch    Image quality: diagnostic      Image availability:  Not saved  FAST Findings:     RUQ (Hepatorenal) free fluid: absent       LUQ (Splenorenal) free fluid: absent      Suprapubic free fluid: absent      Cardiac wall motion: identified      Pericardial effusion: absent    Interpretation:     Impressions: negative             ED Course  ED Course as of 07/02/24 0248   Tue Jul 02, 2024   0103 Procedure Note: EKG  Date/Time: 07/02/24 1:03 AM   Interpreted by: Santiago Ojeda DO  Indications / Diagnosis: Chest pain  ECG reviewed by me, the ED Provider: yes   The EKG demonstrates:  Rhythm: normal sinus rhythm 82 BPM  Intervals: First degree AV block. Normal QT intervals  Axis: Normal axis  QRS/Blocks: Normal QRS  ST Changes: No acute ST/T waves changes. No MARIE. No TWI.  Compared to prior EKG performed on 6/26/2024           Medical Decision Making    78 y.o. female presenting for evaluation after a fall.  GCS14 which is reported to be baseline. Otherwise VSS and nontoxic appearing.  Bedside FAST negative.  Will order CT of head and cervical spine as patient is at risk for intracranial hemorrhage or cervical spine fracture.  Will order CBC, CMP, troponin, EKG, CXR to evaluate for anemia, electrolyte abnormality, SARA, arrhythmia, ACS, PTX, pulmonary disease.  Given chest and thoracic back pain will obtain CT of C/A/P to evaluate for spinal fracture, PTX, rib fracture or blunt abdominal injury.  Will treat symptomatically.    Repeat evaluation: VSS during ED Course. Patient resting comfortably, remains at baseline mental status.  No evidence of ACS or arrhythmia on EKG.  No acute traumatic injury identified on CT.   Cervical collar removed.  CT results reviewed with patient at bedside and with CAROLINE Schafer over the phone. Specifically emphasized finding of pulmonary nodules and need for further evaluation to exclude malignancy.      Patient given written AVS with RTED precautions if staff notices lethargy, weakness, decreased urine output, dehydration, vomiting or any other symptoms of concern. The patient was also provided written a written after  visit summary (AVS) with return precautions. I have also provided contact information for patient to follow up with their PCP.     Amount and/or Complexity of Data Reviewed  Labs: ordered.  Radiology: ordered and independent interpretation performed.    Risk  Prescription drug management.                Disposition  [facility] Transfer: No  Final diagnoses:   Fall from wheelchair, initial encounter   Forehead contusion, initial encounter   Multiple lung nodules on CT   Chest pain, unspecified   Acute neck pain   Acute pain of left knee     Time reflects when diagnosis was documented in both MDM as applicable and the Disposition within this note       Time User Action Codes Description Comment    7/2/2024 12:53 AM Santiago Ojeda [W05.0XXA] Fall from wheelchair, initial encounter     7/2/2024 12:53 AM Santiago Ojeda [S00.83XA] Forehead contusion, initial encounter     7/2/2024  1:00 AM Santiago Ojeda [R91.8] Multiple lung nodules on CT     7/2/2024  1:00 AM Santiago Ojeda [R07.9] Chest pain, unspecified     7/2/2024  1:22 AM Santiago Ojeda [M54.2] Acute neck pain     7/2/2024  1:22 AM Santiago Ojeda [M25.562] Acute pain of left knee           ED Disposition       ED Disposition   Discharge    Condition   Stable    Date/Time   Tue Jul 2, 2024 12:53 AM    Comment   Daily Schaeffer discharge to home/self care.                   Follow-up Information       Follow up With Specialties Details Why Contact Info    Alanna Moreau,  Family Medicine Schedule an appointment as soon as possible for a visit  To make appointment for reevaluation in 3-5 days. [address] [facility] [address] [phone]            Patient's Medications   Discharge Prescriptions    No medications on file     No discharge procedures on file.    PDMP Review         Value Time User    PDMP Reviewed  Yes 6/19/2024  2:14 PM MAJOR Lewis            ED Provider  Electronically Signed  by           Santiago Ojeda,   07/02/24 0249

## 2024-07-03 LAB
ATRIAL RATE: 82 BPM
P AXIS: 82 DEGREES
PR INTERVAL: 212 MS
QRS AXIS: 54 DEGREES
QRSD INTERVAL: 68 MS
QT INTERVAL: 382 MS
QTC INTERVAL: 446 MS
T WAVE AXIS: 84 DEGREES
VENTRICULAR RATE: 82 BPM

## 2024-07-03 PROCEDURE — 93010 ELECTROCARDIOGRAM REPORT: CPT | Performed by: INTERNAL MEDICINE

## 2024-07-08 ENCOUNTER — PATIENT OUTREACH (OUTPATIENT)
Dept: CASE MANAGEMENT | Facility: OTHER | Age: 79
End: 2024-07-08

## 2024-07-11 PROBLEM — N30.00 ACUTE CYSTITIS WITHOUT HEMATURIA: Status: RESOLVED | Noted: 2023-12-24 | Resolved: 2024-07-11

## 2024-07-16 ENCOUNTER — PATIENT OUTREACH (OUTPATIENT)
Dept: CASE MANAGEMENT | Facility: OTHER | Age: 79
End: 2024-07-16

## 2024-07-17 ENCOUNTER — PATIENT OUTREACH (OUTPATIENT)
Dept: FAMILY MEDICINE CLINIC | Facility: CLINIC | Age: 79
End: 2024-07-17

## 2024-07-17 NOTE — PROGRESS NOTES
Patient approved for paid senior share ride transportation services with [employer]. Welcome packet mailed to home address by [place]. Patient's spouse made aware of the approval and confirmed receipt of the welcome packet.

## 2024-07-19 ENCOUNTER — HOSPITAL ENCOUNTER (EMERGENCY)
Facility: HOSPITAL | Age: 79
Discharge: HOME | End: 2024-07-20
Attending: EMERGENCY MEDICINE
Payer: MEDICARE

## 2024-07-19 ENCOUNTER — APPOINTMENT (EMERGENCY)
Dept: CT IMAGING | Facility: HOSPITAL | Age: 79
End: 2024-07-19
Payer: MEDICARE

## 2024-07-19 DIAGNOSIS — K59.00 CONSTIPATION: Primary | ICD-10-CM

## 2024-07-19 LAB
ALBUMIN SERPL BCG-MCNC: 3.6 G/DL (ref 3.5–5)
ALP SERPL-CCNC: 72 U/L (ref 34–104)
ALT SERPL W P-5'-P-CCNC: 13 U/L (ref 7–52)
ANION GAP SERPL CALCULATED.3IONS-SCNC: 7 MMOL/L (ref 4–13)
AST SERPL W P-5'-P-CCNC: 22 U/L (ref 13–39)
BACTERIA UR QL AUTO: ABNORMAL /HPF
BASOPHILS # BLD AUTO: 0.05 THOUSANDS/ÂΜL (ref 0–0.1)
BASOPHILS NFR BLD AUTO: 1 % (ref 0–1)
BILIRUB SERPL-MCNC: 0.81 MG/DL (ref 0.2–1)
BILIRUB UR QL STRIP: NEGATIVE
BUN SERPL-MCNC: 13 MG/DL (ref 5–25)
CALCIUM SERPL-MCNC: 9.6 MG/DL (ref 8.4–10.2)
CHLORIDE SERPL-SCNC: 104 MMOL/L (ref 96–108)
CLARITY UR: ABNORMAL
CO2 SERPL-SCNC: 27 MMOL/L (ref 21–32)
COLOR UR: YELLOW
CREAT SERPL-MCNC: 0.8 MG/DL (ref 0.6–1.3)
EOSINOPHIL # BLD AUTO: 0.24 THOUSAND/ÂΜL (ref 0–0.61)
EOSINOPHIL NFR BLD AUTO: 3 % (ref 0–6)
ERYTHROCYTE [DISTWIDTH] IN BLOOD BY AUTOMATED COUNT: 12.5 % (ref 11.6–15.1)
GFR SERPL CREATININE-BSD FRML MDRD: 69 ML/MIN/1.73SQ M
GLUCOSE SERPL-MCNC: 117 MG/DL (ref 65–140)
GLUCOSE UR STRIP-MCNC: NEGATIVE MG/DL
HCT VFR BLD AUTO: 36.3 % (ref 34.8–46.1)
HGB BLD-MCNC: 11.9 G/DL (ref 11.5–15.4)
HGB UR QL STRIP.AUTO: ABNORMAL
IMM GRANULOCYTES # BLD AUTO: 0.02 THOUSAND/UL (ref 0–0.2)
IMM GRANULOCYTES NFR BLD AUTO: 0 % (ref 0–2)
KETONES UR STRIP-MCNC: NEGATIVE MG/DL
LEUKOCYTE ESTERASE UR QL STRIP: ABNORMAL
LIPASE SERPL-CCNC: 48 U/L (ref 11–82)
LYMPHOCYTES # BLD AUTO: 2.32 THOUSANDS/ÂΜL (ref 0.6–4.47)
LYMPHOCYTES NFR BLD AUTO: 28 % (ref 14–44)
MCH RBC QN AUTO: 33.2 PG (ref 26.8–34.3)
MCHC RBC AUTO-ENTMCNC: 32.8 G/DL (ref 31.4–37.4)
MCV RBC AUTO: 101 FL (ref 82–98)
MONOCYTES # BLD AUTO: 1.1 THOUSAND/ÂΜL (ref 0.17–1.22)
MONOCYTES NFR BLD AUTO: 13 % (ref 4–12)
NEUTROPHILS # BLD AUTO: 4.61 THOUSANDS/ÂΜL (ref 1.85–7.62)
NEUTS SEG NFR BLD AUTO: 55 % (ref 43–75)
NITRITE UR QL STRIP: POSITIVE
NON-SQ EPI CELLS URNS QL MICRO: ABNORMAL /HPF
NRBC BLD AUTO-RTO: 0 /100 WBCS
PH UR STRIP.AUTO: 7 [PH]
PLATELET # BLD AUTO: 183 THOUSANDS/UL (ref 149–390)
PMV BLD AUTO: 9.1 FL (ref 8.9–12.7)
POTASSIUM SERPL-SCNC: 4.6 MMOL/L (ref 3.5–5.3)
PROT SERPL-MCNC: 5.7 G/DL (ref 6.4–8.4)
PROT UR STRIP-MCNC: NEGATIVE MG/DL
RBC # BLD AUTO: 3.58 MILLION/UL (ref 3.81–5.12)
RBC #/AREA URNS AUTO: ABNORMAL /HPF
SODIUM SERPL-SCNC: 138 MMOL/L (ref 135–147)
SP GR UR STRIP.AUTO: 1.01
UROBILINOGEN UR QL STRIP.AUTO: 1 E.U./DL
WBC # BLD AUTO: 8.34 THOUSAND/UL (ref 4.31–10.16)
WBC #/AREA URNS AUTO: ABNORMAL /HPF

## 2024-07-19 PROCEDURE — 99284 EMERGENCY DEPT VISIT MOD MDM: CPT

## 2024-07-19 PROCEDURE — 36415 COLL VENOUS BLD VENIPUNCTURE: CPT | Performed by: EMERGENCY MEDICINE

## 2024-07-19 PROCEDURE — 80053 COMPREHEN METABOLIC PANEL: CPT | Performed by: EMERGENCY MEDICINE

## 2024-07-19 PROCEDURE — 81001 URINALYSIS AUTO W/SCOPE: CPT | Performed by: EMERGENCY MEDICINE

## 2024-07-19 PROCEDURE — 85025 COMPLETE CBC W/AUTO DIFF WBC: CPT | Performed by: EMERGENCY MEDICINE

## 2024-07-19 PROCEDURE — 87086 URINE CULTURE/COLONY COUNT: CPT | Performed by: EMERGENCY MEDICINE

## 2024-07-19 PROCEDURE — 83690 ASSAY OF LIPASE: CPT | Performed by: EMERGENCY MEDICINE

## 2024-07-19 PROCEDURE — 99285 EMERGENCY DEPT VISIT HI MDM: CPT | Performed by: EMERGENCY MEDICINE

## 2024-07-19 PROCEDURE — 74177 CT ABD & PELVIS W/CONTRAST: CPT

## 2024-07-19 RX ORDER — SODIUM PHOSPHATE,MONO-DIBASIC 19G-7G/118
1 ENEMA (ML) RECTAL ONCE
Status: DISCONTINUED | OUTPATIENT
Start: 2024-07-20 | End: 2024-07-19

## 2024-07-19 RX ORDER — ONDANSETRON 2 MG/ML
4 INJECTION INTRAMUSCULAR; INTRAVENOUS ONCE
Status: DISCONTINUED | OUTPATIENT
Start: 2024-07-19 | End: 2024-07-20 | Stop reason: HOSPADM

## 2024-07-19 RX ORDER — POLYETHYLENE GLYCOL 3350 17 G/17G
34 POWDER, FOR SOLUTION ORAL ONCE
Status: COMPLETED | OUTPATIENT
Start: 2024-07-20 | End: 2024-07-20

## 2024-07-19 RX ORDER — POLYETHYLENE GLYCOL 3350 17 G/17G
17 POWDER, FOR SOLUTION ORAL 2 TIMES DAILY
Qty: 510 G | Refills: 0 | Status: SHIPPED | OUTPATIENT
Start: 2024-07-19

## 2024-07-19 RX ADMIN — IOHEXOL 90 ML: 350 INJECTION, SOLUTION INTRAVENOUS at 22:51

## 2024-07-20 VITALS
TEMPERATURE: 97.8 F | HEART RATE: 68 BPM | RESPIRATION RATE: 16 BRPM | DIASTOLIC BLOOD PRESSURE: 60 MMHG | SYSTOLIC BLOOD PRESSURE: 125 MMHG | OXYGEN SATURATION: 98 %

## 2024-07-20 RX ADMIN — POLYETHYLENE GLYCOL 3350 34 G: 17 POWDER, FOR SOLUTION ORAL at 00:14

## 2024-07-20 NOTE — ED PROVIDER NOTES
History  Chief Complaint   Patient presents with    Abdominal Pain     Pt reports lower abd pain and believes she may have a bowel obstruction     Patient reports some mild lower abdominal cramping and mild bloating sensation for the past day or so.  He has never had anything like this before.  Urination has been normal.  Reports some chronic constipation.  Patient notes that she does not drink water regularly.  States no trauma to her abdomen.  No sick contacts.  Some nausea but no vomiting.  Pain slightly worse with palpation.  Nothing seems to make it better.        Prior to Admission Medications   Prescriptions Last Dose Informant Patient Reported? Taking?   DULoxetine (CYMBALTA) 30 mg delayed release capsule   No No   Sig: Take 1 capsule (30 mg total) by mouth daily   acetaminophen (TYLENOL) 325 mg tablet   No No   Sig: Take 2 tablets (650 mg total) by mouth every 4 (four) hours as needed for mild pain, headaches or fever   aspirin (ECOTRIN LOW STRENGTH) 81 mg EC tablet   No No   Sig: Take 1 tablet (81 mg total) by mouth daily   atorvastatin (LIPITOR) 40 mg tablet   No No   Sig: Take 1 tablet (40 mg total) by mouth daily   levothyroxine 75 mcg tablet   No No   Sig: Take 1 tablet (75 mcg total) by mouth daily in the early morning Take 1 tablet (75 mg) by oral route five days a week.   magnesium gluconate (MAGONATE) 500 mg tablet   No No   Sig: Take 1 tablet (500 mg total) by mouth daily   risperiDONE (RisperDAL M-TAB) 0.25 mg disintegrating tablet   No No   Sig: Take 1 tablet (0.25 mg total) by mouth every 4 (four) hours as needed for agitation   risperiDONE (RisperDAL M-TAB) 1 mg disintegrating tablet   No No   Sig: Take 1 tablet (1 mg total) by mouth daily at bedtime      Facility-Administered Medications: None       Past Medical History:   Diagnosis Date    Aortic stenosis     Depression     Disease of thyroid gland     Diverticular disease 05/09/2017    Essential hypertension 05/20/2016    Hyperlipidemia      Major depression 05/20/2016    Peripheral neuropathy     Type 2 diabetes mellitus (HCC) 05/20/2016    Visual impairment        Past Surgical History:   Procedure Laterality Date    BOWEL RESECTION      COLON SURGERY      EYE SURGERY      SMALL INTESTINE SURGERY      TUBAL LIGATION         History reviewed. No pertinent family history.  I have reviewed and agree with the history as documented.    E-Cigarette/Vaping    E-Cigarette Use Never User      E-Cigarette/Vaping Substances    Nicotine No     THC No     CBD No     Flavoring No      Social History     Tobacco Use    Smoking status: Never     Passive exposure: Never    Smokeless tobacco: Never   Vaping Use    Vaping status: Never Used   Substance Use Topics    Alcohol use: Yes     Comment: occasional    Drug use: Never       Review of Systems   Constitutional:  Negative for activity change, chills, fatigue and fever.   HENT:  Negative for congestion.    Eyes:  Negative for visual disturbance.   Respiratory:  Negative for cough, chest tightness and shortness of breath.    Cardiovascular:  Negative for chest pain.   Gastrointestinal:  Positive for abdominal pain, constipation and nausea. Negative for diarrhea and vomiting.   Genitourinary:  Negative for dysuria.   Skin:  Negative for rash.   Neurological:  Negative for dizziness, weakness and numbness.       Physical Exam  Physical Exam  Constitutional:       General: She is not in acute distress.     Appearance: She is well-developed. She is not ill-appearing, toxic-appearing or diaphoretic.   HENT:      Head: Normocephalic and atraumatic.   Eyes:      Conjunctiva/sclera: Conjunctivae normal.      Pupils: Pupils are equal, round, and reactive to light.   Cardiovascular:      Rate and Rhythm: Normal rate and regular rhythm.      Heart sounds: Normal heart sounds.   Pulmonary:      Effort: Pulmonary effort is normal. No respiratory distress.      Breath sounds: Normal breath sounds.   Abdominal:      General: Bowel  sounds are normal.      Palpations: Abdomen is soft.      Tenderness: There is abdominal tenderness in the right lower quadrant, suprapubic area and left lower quadrant. There is no right CVA tenderness, left CVA tenderness, guarding or rebound. Negative signs include Rush's sign.   Musculoskeletal:         General: Normal range of motion.      Cervical back: Normal range of motion and neck supple.   Skin:     General: Skin is warm and dry.      Capillary Refill: Capillary refill takes less than 2 seconds.   Neurological:      Mental Status: She is alert and oriented to person, place, and time.   Psychiatric:         Behavior: Behavior normal.         Vital Signs  ED Triage Vitals [07/19/24 2125]   Temperature Pulse Respirations Blood Pressure SpO2   97.8 °F (36.6 °C) 67 18 132/56 99 %      Temp Source Heart Rate Source Patient Position - Orthostatic VS BP Location FiO2 (%)   Temporal Monitor -- -- --      Pain Score       --           Vitals:    07/19/24 2130 07/19/24 2200 07/19/24 2215 07/20/24 0000   BP: 125/60 120/58 133/58 125/60   Pulse: 70 68 68 68         Visual Acuity      ED Medications  Medications   iohexol (OMNIPAQUE) 350 MG/ML injection (MULTI-DOSE) 100 mL (90 mL Intravenous Given 7/19/24 2251)   polyethylene glycol (MIRALAX) packet 34 g (34 g Oral Given 7/20/24 0014)       Diagnostic Studies  Results Reviewed       Procedure Component Value Units Date/Time    Urine Microscopic [[id number]]  (Abnormal) Collected: 07/19/24 2340    Lab Status: Final result Specimen: Urine, Other Updated: 07/19/24 2359     RBC, UA 1-2 /hpf      WBC, UA 10-20 /hpf      Epithelial Cells Occasional /hpf      Bacteria, UA Innumerable /hpf     Urine culture [[id number]] Collected: 07/19/24 2340    Lab Status: In process Specimen: Urine, Other Updated: 07/19/24 2359    UA w Reflex to Microscopic w Reflex to Culture [[id number]]  (Abnormal) Collected: 07/19/24 2340    Lab Status: Final result Specimen: Urine, Other Updated:  07/19/24 2350     Color, UA Yellow     Clarity, UA Cloudy     Specific Gravity, UA 1.015     pH, UA 7.0     Leukocytes, UA 3+     Nitrite, UA Positive     Protein, UA Negative mg/dl      Glucose, UA Negative mg/dl      Ketones, UA Negative mg/dl      Urobilinogen, UA 1.0 E.U./dl      Bilirubin, UA Negative     Occult Blood, UA 1+    CMP [[id number]]  (Abnormal) Collected: 07/19/24 2217    Lab Status: Final result Specimen: Blood from Arm, Right Updated: 07/19/24 2240     Sodium 138 mmol/L      Potassium 4.6 mmol/L      Chloride 104 mmol/L      CO2 27 mmol/L      ANION GAP 7 mmol/L      BUN 13 mg/dL      Creatinine 0.80 mg/dL      Glucose 117 mg/dL      Calcium 9.6 mg/dL      AST 22 U/L      ALT 13 U/L      Alkaline Phosphatase 72 U/L      Total Protein 5.7 g/dL      Albumin 3.6 g/dL      Total Bilirubin 0.81 mg/dL      eGFR 69 ml/min/1.73sq m     Narrative:      [employer] guidelines for Chronic Kidney Disease (CKD):     Stage 1 with normal or high GFR (GFR > 90 mL/min/1.73 square meters)    Stage 2 Mild CKD (GFR = 60-89 mL/min/1.73 square meters)    Stage 3A Moderate CKD (GFR = 45-59 mL/min/1.73 square meters)    Stage 3B Moderate CKD (GFR = 30-44 mL/min/1.73 square meters)    Stage 4 Severe CKD (GFR = 15-29 mL/min/1.73 square meters)    Stage 5 End Stage CKD (GFR <15 mL/min/1.73 square meters)  Note: GFR calculation is accurate only with a steady state creatinine    Lipase [[id number]]  (Normal) Collected: 07/19/24 2217    Lab Status: Final result Specimen: Blood from Arm, Right Updated: 07/19/24 2240     Lipase 48 u/L     CBC and differential [[id number]]  (Abnormal) Collected: 07/19/24 2228    Lab Status: Final result Specimen: Blood from Arm, Right Updated: 07/19/24 2233     WBC 8.34 Thousand/uL      RBC 3.58 Million/uL      Hemoglobin 11.9 g/dL      Hematocrit 36.3 %       fL      MCH 33.2 pg      MCHC 32.8 g/dL      RDW 12.5 %      MPV 9.1 fL      Platelets 183 Thousands/uL       nRBC 0 /100 WBCs      Segmented % 55 %      Immature Grans % 0 %      Lymphocytes % 28 %      Monocytes % 13 %      Eosinophils Relative 3 %      Basophils Relative 1 %      Absolute Neutrophils 4.61 Thousands/µL      Absolute Immature Grans 0.02 Thousand/uL      Absolute Lymphocytes 2.32 Thousands/µL      Absolute Monocytes 1.10 Thousand/µL      Eosinophils Absolute 0.24 Thousand/µL      Basophils Absolute 0.05 Thousands/µL                    CT Abdomen pelvis with contrast   Final Result by Wendy Smith MD (07/19 9706)      No acute findings in the abdomen or pelvis. Constipation and rectal fecal impaction.         Workstation performed: [id number]                    Procedures  Procedures         ED Course                                               Medical Decision Making  Patient presented with a chief complaint of abdominal pain. Labs and CT reassuring at this time.  Patient's symptoms significantly improved with treatment in the ED.  Vitals remained stable.  Patient is completely clinically nontoxic and tolerating p.o. without difficulty in the ED.  Repeat abdominal exam is benign.  Had a lengthy discussion with the patient in regards to specific signs and symptoms to watch out for that warrant prompt return to the ED. I explained that although there does not appear to be any obvious abnormality at this time that would warrant immediate intervention, if their symptoms change or worsen, they should return to the emergency department as certain diagnoses may take time to develop.  Patient was informed the risk of diagnostic uncertainty.  Patient was given specific instructions for symptomatic relief and follow-up recommendations as discussed in their after visit summary. All of their questions were answered and they were agreeable to plan.      Problems Addressed:  Constipation: chronic illness or injury with exacerbation, progression, or side effects of treatment    Amount and/or Complexity of Data  Reviewed  External Data Reviewed: notes.  Labs: ordered.  Radiology: ordered.    Risk  OTC drugs.  Prescription drug management.                 Disposition  Final diagnoses:   Constipation     Time reflects when diagnosis was documented in both MDM as applicable and the Disposition within this note       Time User Action Codes Description Comment    7/19/2024 11:50 PM Elton Aponte [K59.00] Constipation           ED Disposition       ED Disposition   Discharge    Condition   Stable    Date/Time   Fri Jul 19, 2024 11:50 PM    Comment   Daily Schaeffer discharge to home/self care.                   Follow-up Information       Follow up With Specialties Details Why Contact Info    Alanna Moreau,  Family Medicine In 1 day  [address] [place] [id number]  [phone]              Discharge Medication List as of 7/19/2024 11:53 PM        START taking these medications    Details   polyethylene glycol (GLYCOLAX) 17 GM/SCOOP powder Take 17 g by mouth 2 (two) times a day, Starting Fri 7/19/2024, Normal           CONTINUE these medications which have NOT CHANGED    Details   acetaminophen (TYLENOL) 325 mg tablet Take 2 tablets (650 mg total) by mouth every 4 (four) hours as needed for mild pain, headaches or fever, Starting Sat 6/22/2024, No Print      aspirin (ECOTRIN LOW STRENGTH) 81 mg EC tablet Take 1 tablet (81 mg total) by mouth daily, Starting Sun 6/23/2024, Normal      atorvastatin (LIPITOR) 40 mg tablet Take 1 tablet (40 mg total) by mouth daily, Starting Mon 4/1/2024, Normal      DULoxetine (CYMBALTA) 30 mg delayed release capsule Take 1 capsule (30 mg total) by mouth daily, Starting Sun 6/23/2024, Normal      levothyroxine 75 mcg tablet Take 1 tablet (75 mcg total) by mouth daily in the early morning Take 1 tablet (75 mg) by oral route five days a week., Starting Mon 4/1/2024, Normal      magnesium gluconate (MAGONATE) 500 mg tablet Take 1 tablet (500 mg total) by mouth daily, Starting  Fri 6/28/2024, No Print      !! risperiDONE (RisperDAL M-TAB) 0.25 mg disintegrating tablet Take 1 tablet (0.25 mg total) by mouth every 4 (four) hours as needed for agitation, Starting Thu 6/27/2024, No Print      !! risperiDONE (RisperDAL M-TAB) 1 mg disintegrating tablet Take 1 tablet (1 mg total) by mouth daily at bedtime, Starting u 6/27/2024, No Print       !! - Potential duplicate medications found. Please discuss with provider.          No discharge procedures on file.    PDMP Review         Value Time User    PDMP Reviewed  Yes 6/19/2024  2:14 PM MAJOR Lewis            ED Provider  Electronically Signed by             Elton Aponte MD  07/20/24 0450

## 2024-07-21 LAB — BACTERIA UR CULT: NORMAL

## 2024-07-22 ENCOUNTER — PATIENT OUTREACH (OUTPATIENT)
Dept: CASE MANAGEMENT | Facility: OTHER | Age: 79
End: 2024-07-22

## 2024-07-22 ENCOUNTER — VBI (OUTPATIENT)
Dept: FAMILY MEDICINE CLINIC | Facility: CLINIC | Age: 79
End: 2024-07-22

## 2024-07-22 NOTE — TELEPHONE ENCOUNTER
07/22/24 10:16 AM    Patient contacted post ED visit, [facility] department spoke with patient/caregiver and outreach was successful.    Thank you.  Valentina Colunga [facility]

## 2024-07-22 NOTE — PROGRESS NOTES
Chart review complete the patient discharged 7/19/24 to Home. I have removed myself from the care team, updated the Care Coordination note, and closed the episode.   Inbasket sent to the OPCM who is following the patient to inform of the discharge and hand off. A email was sent to the facility requesting discharge instructions. When Admin Coordinator has received the Discharge paperwork  Admin Coordinator will attach to this encounter.

## 2024-07-23 ENCOUNTER — PATIENT OUTREACH (OUTPATIENT)
Dept: CASE MANAGEMENT | Facility: OTHER | Age: 79
End: 2024-07-23

## 2024-07-23 NOTE — PROGRESS NOTES
"Outpatient Care Management Note    ADT alert/IB message- Handoff SHANTI Simpson. Chart reviewed.    Patient was seen in the ED at  [facility] 7/19/24-7/20/24 for complaint of abdominal pain and constipation.  Was previously in [facility] following discharge from [facility] ED in early July.  Patient now discharged to home.    RN PAIGE placed outreach call and spoke with patient who states that she is \"really doing fine\".  She denies any complaints of abdominal pain and reports that the \"blockage has loosened up\" and she has been able to move her bowels without difficulty.    Patient confirmed that she receive a copy of her discharge paperwork and medication list.  Patient reports that she has all of her medications and denied any questions or concerns. Patient declined RN CM request to review medications at this time.    Reviewed upcoming scheduled appointments with patient. Patient confirmed the scheduled appointment with Pulmonology on 8/7/24.    RN CM explained role and care management to patient. Patient states that she is managing her care and denied having any health care needs for RN CM to assist with at this time.  Patient declined need for further outreach by RN CM.    Instructed patient to notify physician with any changes in condition or symptoms of concern- patient verbalized understanding.        "

## 2024-07-26 ENCOUNTER — TELEPHONE (OUTPATIENT)
Age: 79
End: 2024-07-26

## 2024-07-26 NOTE — TELEPHONE ENCOUNTER
Home health nurse reports pt rescheduled her OT for next week. Pt is overwhelmed and is requesting the services be rescheduled for a later time in the week.  Just FYI for PCP.

## 2024-07-29 ENCOUNTER — HOSPITAL ENCOUNTER (EMERGENCY)
Facility: HOSPITAL | Age: 79
Discharge: HOME | End: 2024-07-29
Attending: EMERGENCY MEDICINE
Payer: MEDICARE

## 2024-07-29 ENCOUNTER — APPOINTMENT (EMERGENCY)
Dept: CT IMAGING | Facility: HOSPITAL | Age: 79
End: 2024-07-29
Payer: MEDICARE

## 2024-07-29 ENCOUNTER — APPOINTMENT (EMERGENCY)
Dept: RADIOLOGY | Facility: HOSPITAL | Age: 79
End: 2024-07-29
Payer: MEDICARE

## 2024-07-29 VITALS
RESPIRATION RATE: 17 BRPM | TEMPERATURE: 97 F | HEART RATE: 75 BPM | BODY MASS INDEX: 18.55 KG/M2 | DIASTOLIC BLOOD PRESSURE: 64 MMHG | WEIGHT: 122 LBS | OXYGEN SATURATION: 96 % | SYSTOLIC BLOOD PRESSURE: 128 MMHG

## 2024-07-29 DIAGNOSIS — G62.9 NEUROPATHY: Primary | ICD-10-CM

## 2024-07-29 DIAGNOSIS — R07.89 ATYPICAL CHEST PAIN: ICD-10-CM

## 2024-07-29 LAB
ALBUMIN SERPL BCG-MCNC: 4 G/DL (ref 3.5–5)
ALP SERPL-CCNC: 69 U/L (ref 34–104)
ALT SERPL W P-5'-P-CCNC: 15 U/L (ref 7–52)
ANION GAP SERPL CALCULATED.3IONS-SCNC: 13 MMOL/L (ref 4–13)
APTT PPP: 26 SECONDS (ref 23–37)
AST SERPL W P-5'-P-CCNC: 21 U/L (ref 13–39)
BASOPHILS # BLD AUTO: 0.05 THOUSANDS/ÂΜL (ref 0–0.1)
BASOPHILS NFR BLD AUTO: 1 % (ref 0–1)
BILIRUB SERPL-MCNC: 0.85 MG/DL (ref 0.2–1)
BUN SERPL-MCNC: 15 MG/DL (ref 5–25)
CALCIUM SERPL-MCNC: 9.9 MG/DL (ref 8.4–10.2)
CARDIAC TROPONIN I PNL SERPL HS: 6 NG/L
CHLORIDE SERPL-SCNC: 102 MMOL/L (ref 96–108)
CK SERPL-CCNC: 35 U/L (ref 26–192)
CO2 SERPL-SCNC: 23 MMOL/L (ref 21–32)
CREAT SERPL-MCNC: 0.99 MG/DL (ref 0.6–1.3)
EOSINOPHIL # BLD AUTO: 0.18 THOUSAND/ÂΜL (ref 0–0.61)
EOSINOPHIL NFR BLD AUTO: 2 % (ref 0–6)
ERYTHROCYTE [DISTWIDTH] IN BLOOD BY AUTOMATED COUNT: 12.7 % (ref 11.6–15.1)
GFR SERPL CREATININE-BSD FRML MDRD: 53 ML/MIN/1.73SQ M
GLUCOSE SERPL-MCNC: 105 MG/DL (ref 65–140)
HCT VFR BLD AUTO: 42.9 % (ref 34.8–46.1)
HGB BLD-MCNC: 14 G/DL (ref 11.5–15.4)
IMM GRANULOCYTES # BLD AUTO: 0.02 THOUSAND/UL (ref 0–0.2)
IMM GRANULOCYTES NFR BLD AUTO: 0 % (ref 0–2)
INR PPP: 0.99 (ref 0.84–1.19)
LYMPHOCYTES # BLD AUTO: 2.56 THOUSANDS/ÂΜL (ref 0.6–4.47)
LYMPHOCYTES NFR BLD AUTO: 34 % (ref 14–44)
MAGNESIUM SERPL-MCNC: 1.8 MG/DL (ref 1.9–2.7)
MCH RBC QN AUTO: 33.1 PG (ref 26.8–34.3)
MCHC RBC AUTO-ENTMCNC: 32.6 G/DL (ref 31.4–37.4)
MCV RBC AUTO: 101 FL (ref 82–98)
MONOCYTES # BLD AUTO: 0.74 THOUSAND/ÂΜL (ref 0.17–1.22)
MONOCYTES NFR BLD AUTO: 10 % (ref 4–12)
NEUTROPHILS # BLD AUTO: 4.05 THOUSANDS/ÂΜL (ref 1.85–7.62)
NEUTS SEG NFR BLD AUTO: 53 % (ref 43–75)
NRBC BLD AUTO-RTO: 0 /100 WBCS
PLATELET # BLD AUTO: 270 THOUSANDS/UL (ref 149–390)
PMV BLD AUTO: 9 FL (ref 8.9–12.7)
POTASSIUM SERPL-SCNC: 4.2 MMOL/L (ref 3.5–5.3)
PROT SERPL-MCNC: 6.9 G/DL (ref 6.4–8.4)
PROTHROMBIN TIME: 13.3 SECONDS (ref 11.6–14.5)
RBC # BLD AUTO: 4.23 MILLION/UL (ref 3.81–5.12)
SODIUM SERPL-SCNC: 138 MMOL/L (ref 135–147)
WBC # BLD AUTO: 7.6 THOUSAND/UL (ref 4.31–10.16)

## 2024-07-29 PROCEDURE — 80053 COMPREHEN METABOLIC PANEL: CPT | Performed by: EMERGENCY MEDICINE

## 2024-07-29 PROCEDURE — 85730 THROMBOPLASTIN TIME PARTIAL: CPT | Performed by: EMERGENCY MEDICINE

## 2024-07-29 PROCEDURE — 71045 X-RAY EXAM CHEST 1 VIEW: CPT

## 2024-07-29 PROCEDURE — 36415 COLL VENOUS BLD VENIPUNCTURE: CPT | Performed by: EMERGENCY MEDICINE

## 2024-07-29 PROCEDURE — 85025 COMPLETE CBC W/AUTO DIFF WBC: CPT | Performed by: EMERGENCY MEDICINE

## 2024-07-29 PROCEDURE — 85610 PROTHROMBIN TIME: CPT | Performed by: EMERGENCY MEDICINE

## 2024-07-29 PROCEDURE — 99285 EMERGENCY DEPT VISIT HI MDM: CPT

## 2024-07-29 PROCEDURE — 93005 ELECTROCARDIOGRAM TRACING: CPT

## 2024-07-29 PROCEDURE — 84484 ASSAY OF TROPONIN QUANT: CPT | Performed by: EMERGENCY MEDICINE

## 2024-07-29 PROCEDURE — 99284 EMERGENCY DEPT VISIT MOD MDM: CPT | Performed by: EMERGENCY MEDICINE

## 2024-07-29 PROCEDURE — 72125 CT NECK SPINE W/O DYE: CPT

## 2024-07-29 PROCEDURE — 70450 CT HEAD/BRAIN W/O DYE: CPT

## 2024-07-29 PROCEDURE — 82550 ASSAY OF CK (CPK): CPT | Performed by: EMERGENCY MEDICINE

## 2024-07-29 PROCEDURE — 83735 ASSAY OF MAGNESIUM: CPT | Performed by: EMERGENCY MEDICINE

## 2024-07-29 RX ORDER — RISPERIDONE 0.25 MG/1
0.25 TABLET, ORALLY DISINTEGRATING ORAL ONCE
Status: COMPLETED | OUTPATIENT
Start: 2024-07-29 | End: 2024-07-29

## 2024-07-29 RX ORDER — LORAZEPAM 1 MG/1
0.5 TABLET ORAL ONCE
Status: COMPLETED | OUTPATIENT
Start: 2024-07-29 | End: 2024-07-29

## 2024-07-29 RX ORDER — LANOLIN ALCOHOL/MO/W.PET/CERES
800 CREAM (GRAM) TOPICAL ONCE
Status: COMPLETED | OUTPATIENT
Start: 2024-07-29 | End: 2024-07-29

## 2024-07-29 RX ADMIN — RISPERIDONE 0.25 MG: 0.25 TABLET, ORALLY DISINTEGRATING ORAL at 18:25

## 2024-07-29 RX ADMIN — LORAZEPAM 0.5 MG: 1 TABLET ORAL at 17:09

## 2024-07-29 RX ADMIN — Medication 800 MG: at 15:05

## 2024-07-29 NOTE — ED NOTES
Patient having a lot of anxiety and hyperventilating. Provider at bedside     Ashley Callahan RN  07/29/24 1636

## 2024-07-29 NOTE — ED PROVIDER NOTES
History  Chief Complaint   Patient presents with    Pain     Patient arrives from home with EMS due to complaints of pain to both legs and both hands that began last night.     Chest Pain     Patient has chest pain describes as aching starting when she woke up this morning  Patient has some confusion and is a poor historian/ keeps changing events on why she is here  Patient complaining of HA  Patient reports a fall 2 weeks ago       HPI    78-year-old female, history of nonrheumatic aortic valve stenosis: Declined consultation with CT surgery, longstanding history of neuropathy which she is not obtained an EMG, stage III chronic disease with hypothyroidism and history of hyper lipidemia.  Presents the emergency department via EMS secondary to a chief complaint of chronic upper and lower hands and feet numbness and tingling along with chest cramping which has been constant over the last 2 days.  Recently seen in the emergency department 9 days ago with a chief complaint of lower abdominal pain found to be constipated on CT imaging.    Recent admission to the hospital for paranoid delusions as of June 2024  Prior to Admission Medications   Prescriptions Last Dose Informant Patient Reported? Taking?   DULoxetine (CYMBALTA) 30 mg delayed release capsule   No No   Sig: Take 1 capsule (30 mg total) by mouth daily   acetaminophen (TYLENOL) 325 mg tablet   No No   Sig: Take 2 tablets (650 mg total) by mouth every 4 (four) hours as needed for mild pain, headaches or fever   aspirin (ECOTRIN LOW STRENGTH) 81 mg EC tablet   No No   Sig: Take 1 tablet (81 mg total) by mouth daily   atorvastatin (LIPITOR) 40 mg tablet   No No   Sig: Take 1 tablet (40 mg total) by mouth daily   levothyroxine 75 mcg tablet   No No   Sig: Take 1 tablet (75 mcg total) by mouth daily in the early morning Take 1 tablet (75 mg) by oral route five days a week.   magnesium gluconate (MAGONATE) 500 mg tablet   No No   Sig: Take 1 tablet (500 mg total) by  mouth daily   polyethylene glycol (GLYCOLAX) 17 GM/SCOOP powder   No No   Sig: Take 17 g by mouth 2 (two) times a day   risperiDONE (RisperDAL M-TAB) 0.25 mg disintegrating tablet   No No   Sig: Take 1 tablet (0.25 mg total) by mouth every 4 (four) hours as needed for agitation   risperiDONE (RisperDAL M-TAB) 1 mg disintegrating tablet   No No   Sig: Take 1 tablet (1 mg total) by mouth daily at bedtime      Facility-Administered Medications: None       Past Medical History:   Diagnosis Date    Aortic stenosis     Depression     Disease of thyroid gland     Diverticular disease 05/09/2017    Essential hypertension 05/20/2016    Hyperlipidemia     Major depression 05/20/2016    Peripheral neuropathy     Type 2 diabetes mellitus (HCC) 05/20/2016    Visual impairment        Past Surgical History:   Procedure Laterality Date    BOWEL RESECTION      COLON SURGERY      EYE SURGERY      SMALL INTESTINE SURGERY      TUBAL LIGATION         No family history on file.  I have reviewed and agree with the history as documented.    E-Cigarette/Vaping    E-Cigarette Use Never User      E-Cigarette/Vaping Substances    Nicotine No     THC No     CBD No     Flavoring No      Social History     Tobacco Use    Smoking status: Never     Passive exposure: Never    Smokeless tobacco: Never   Vaping Use    Vaping status: Never Used   Substance Use Topics    Alcohol use: Yes     Comment: occasional    Drug use: Never       Review of Systems   Constitutional: Negative.  Negative for chills and fever.   HENT: Negative.     Eyes: Negative.    Respiratory: Negative.  Negative for chest tightness and shortness of breath.    Cardiovascular: Negative.  Negative for chest pain, palpitations and leg swelling.   Gastrointestinal: Negative.  Negative for abdominal pain.   Endocrine: Negative.    Genitourinary: Negative.    Musculoskeletal: Negative.    Skin: Negative.    Allergic/Immunologic: Negative.    Neurological: Negative.    Hematological:  Negative.    Psychiatric/Behavioral: Negative.         Physical Exam  Physical Exam  Vitals and nursing note reviewed.   Constitutional:       General: She is not in acute distress.     Appearance: She is well-developed and normal weight. She is not ill-appearing, toxic-appearing or diaphoretic.   HENT:      Head: Normocephalic and atraumatic.   Eyes:      Extraocular Movements: Extraocular movements intact.      Pupils: Pupils are equal, round, and reactive to light.   Cardiovascular:      Rate and Rhythm: Normal rate.      Heart sounds: Normal heart sounds.   Pulmonary:      Effort: Pulmonary effort is normal. No tachypnea, accessory muscle usage or respiratory distress.      Breath sounds: Normal breath sounds. No stridor.   Abdominal:      General: Bowel sounds are normal.      Palpations: Abdomen is soft.   Musculoskeletal:         General: Normal range of motion.      Cervical back: Normal range of motion and neck supple.   Skin:     Capillary Refill: Capillary refill takes less than 2 seconds.   Neurological:      General: No focal deficit present.      Mental Status: She is alert and oriented to person, place, and time.   Psychiatric:         Mood and Affect: Mood normal.         Vital Signs  ED Triage Vitals [07/29/24 1328]   Temperature Pulse Respirations Blood Pressure SpO2   (!) 97 °F (36.1 °C) 75 18 141/66 98 %      Temp Source Heart Rate Source Patient Position - Orthostatic VS BP Location FiO2 (%)   Temporal Monitor Sitting Left arm --      Pain Score       9           Vitals:    07/29/24 1430 07/29/24 1500 07/29/24 1530 07/29/24 1600   BP: 132/61 122/59 132/64 132/60   Pulse: 69 72 67 76   Patient Position - Orthostatic VS:             Visual Acuity  Visual Acuity      Flowsheet Row Most Recent Value   L Pupil Size (mm) 2   R Pupil Size (mm) 2            ED Medications  Medications   magnesium Oxide (MAG-OX) tablet 800 mg (800 mg Oral Given 7/29/24 1505)   LORazepam (ATIVAN) tablet 0.5 mg (0.5 mg  Oral Given 7/29/24 1709)       Diagnostic Studies  Results Reviewed       Procedure Component Value Units Date/Time    HS Troponin 0hr (reflex protocol) [[id number]]  (Normal) Collected: 07/29/24 1355    Lab Status: Final result Specimen: Blood from Arm, Right Updated: 07/29/24 1427     hs TnI 0hr 6 ng/L     Comprehensive metabolic panel [[id number]] Collected: 07/29/24 1355    Lab Status: Final result Specimen: Blood from Arm, Right Updated: 07/29/24 1419     Sodium 138 mmol/L      Potassium 4.2 mmol/L      Chloride 102 mmol/L      CO2 23 mmol/L      ANION GAP 13 mmol/L      BUN 15 mg/dL      Creatinine 0.99 mg/dL      Glucose 105 mg/dL      Calcium 9.9 mg/dL      AST 21 U/L      ALT 15 U/L      Alkaline Phosphatase 69 U/L      Total Protein 6.9 g/dL      Albumin 4.0 g/dL      Total Bilirubin 0.85 mg/dL      eGFR 53 ml/min/1.73sq m     Narrative:      National Kid[employer] guidelines for Chronic Kidney Disease (CKD):     Stage 1 with normal or high GFR (GFR > 90 mL/min/1.73 square meters)    Stage 2 Mild CKD (GFR = 60-89 mL/min/1.73 square meters)    Stage 3A Moderate CKD (GFR = 45-59 mL/min/1.73 square meters)    Stage 3B Moderate CKD (GFR = 30-44 mL/min/1.73 square meters)    Stage 4 Severe CKD (GFR = 15-29 mL/min/1.73 square meters)    Stage 5 End Stage CKD (GFR <15 mL/min/1.73 square meters)  Note: GFR calculation is accurate only with a steady state creatinine    CK [[id number]]  (Normal) Collected: 07/29/24 1355    Lab Status: Final result Specimen: Blood from Arm, Right Updated: 07/29/24 1419     Total CK 35 U/L     Magnesium [[id number]]  (Abnormal) Collected: 07/29/24 1355    Lab Status: Final result Specimen: Blood from Arm, Right Updated: 07/29/24 1419     Magnesium 1.8 mg/dL     Protime-INR [[id number]]  (Normal) Collected: 07/29/24 1355    Lab Status: Final result Specimen: Blood from Arm, Right Updated: 07/29/24 1417     Protime 13.3 seconds      INR 0.99    APTT [[id number]]  (Normal)  Collected: 07/29/24 1355    Lab Status: Final result Specimen: Blood from Arm, Right Updated: 07/29/24 1417     PTT 26 seconds     CBC and differential [[id number]]  (Abnormal) Collected: 07/29/24 1355    Lab Status: Final result Specimen: Blood from Arm, Right Updated: 07/29/24 1409     WBC 7.60 Thousand/uL      RBC 4.23 Million/uL      Hemoglobin 14.0 g/dL      Hematocrit 42.9 %       fL      MCH 33.1 pg      MCHC 32.6 g/dL      RDW 12.7 %      MPV 9.0 fL      Platelets 270 Thousands/uL      nRBC 0 /100 WBCs      Segmented % 53 %      Immature Grans % 0 %      Lymphocytes % 34 %      Monocytes % 10 %      Eosinophils Relative 2 %      Basophils Relative 1 %      Absolute Neutrophils 4.05 Thousands/µL      Absolute Immature Grans 0.02 Thousand/uL      Absolute Lymphocytes 2.56 Thousands/µL      Absolute Monocytes 0.74 Thousand/µL      Eosinophils Absolute 0.18 Thousand/µL      Basophils Absolute 0.05 Thousands/µL                    CT head without contrast   Final Result by Victor Hugo Bravo MD (07/29 1435)      No acute intracranial abnormality. White matter low-attenuation indicative of chronic microangiopathic changes. This appears stable.      Resolving left frontal scalp hematoma/soft tissue swelling.                  Workstation performed: [id number]         CT spine cervical without contrast   Final Result by Victor Hugo Bravo MD (07/29 1443)      No cervical spine fracture identified. Stable anterolisthesis of C3 on C4 likely due to bilateral facet arthropathy.      Stable multilevel cervical spondylosis, as described above.      Stable bilateral apical pulmonary nodules measuring up to 4 mm in maximal dimensions better seen on prior chest CT.            Workstation performed: [id number]         XR chest 1 view portable   ED Interpretation by Jordin Nicholson III, DO (07/29 1416)   Portable chest x-ray shows mild hyper inflation, no acute abnormalities noted, no osseous abnormalities, mediastinum for normal  limits, no evidence of cardiomegaly.          Final Result by India Lynch MD (07/29 1432)      No acute cardiopulmonary disease.            Workstation performed: [id number]                    Procedures  ECG 12 Lead Documentation Only    Date/Time: 7/29/2024 1:45 PM    Performed by: Jordin Nicholson III, DO  Authorized by: Jordin Nicholson III, DO    Indications / Diagnosis:  Chest pain  ECG reviewed by me, the ED Provider: yes    Patient location:  ED  Comments:      Personally reviewed this EKG that was performed the patient July 29, 2024, EKG was completed at 1:43 PM and interpreted by me at the same time, normal sinus rhythm with no acute ST abnormalities noted.    No diffuse elevations to indicate pericarditis.  No coved ST elevations greater than 2mm with negative T waves in V1-3 to indicate concern for brugada.  No biphasic T waves in V2, V3 to indicate Wellens (critical stenosis of LAD).   No elevation in aVR or deviation when compared to V1 (can be associated with ST depression in I,II, V4-6 when left main occlusion is present).            ED Course  ED Course as of 07/29/24 1721   Mon Jul 29, 2024   1343 Patient seen and evaluated, orders placed.  Standing history of chronic neuropathy in the upper and lower extremity, declined any further evaluation for her known aortic stenosis with cardiac surgery, Hejl to have an EMG which was never done, Bennie with worsening neurpathic pain with neck pain or hx of recent severe trauma, except an incidental fall two weeks ago   [address] Mel[address], case management, d/w ,  recently fired all in house staff, declined another visiting RN staff,  declined, will have case management d/w area of aging to assist w/ in house services.    Presentation has been chronic, no clinical indication for emergent treatment, patient's been seen and evaluated multiple times by her PCP and has had orders for her EMG has not been done as an  outpatient for unknown reasons, no clinical indication for further management at this time, could be performed as an outpatient.   1704 Patient complaing of SOB with significant hyperventilation, O2 saturation 99%, good waveform Pleth, has having an anxiety presentation, oxygen provided, ativan ordered.               HEART Risk Score      Flowsheet Row Most Recent Value   Heart Score Risk Calculator    History 0 Filed at: 07/29/2024 1447   ECG 0 Filed at: 07/29/2024 1447   Age 2 Filed at: 07/29/2024 1447   Risk Factors 1 Filed at: 07/29/2024 1447   Troponin 0 Filed at: 07/29/2024 1447   HEART Score 3 Filed at: 07/29/2024 1447                          SBIRT 22yo+      Flowsheet Row Most Recent Value   Initial Alcohol Screen: US AUDIT-C     1. How often do you have a drink containing alcohol? 0 Filed at: 07/29/2024 1329   2. How many drinks containing alcohol do you have on a typical day you are drinking?  0 Filed at: 07/29/2024 1329   3a. Male UNDER 65: How often do you have five or more drinks on one occasion? 0 Filed at: 07/29/2024 1329   3b. FEMALE Any Age, or MALE 65+: How often do you have 4 or more drinks on one occassion? 0 Filed at: 07/29/2024 1329   Audit-C Score 0 Filed at: 07/29/2024 1329   [place]: How many times in the past year have you...    Used an illegal drug or used a prescription medication for non-medical reasons? Never Filed at: 07/29/2024 1329                      Medical Decision Making  Longstanding history of upper and lower extremity full neuropathy, seen as an outpatient by PCP, etiology is unclear, patient is had numerous blood work testing along with an EMG that was ordered but patient never went for this particular test, presents with worsening peripheral neuropathy symptoms, all 4 extremities are well-perfused, good capillary refill, no change in the skin color, motor and sensory is intact no recent direct trauma except incidentally patient fell 2 to 3 weeks ago, imaging of the C-spine  "and head are unremarkable.  Management evaluated the patient possible services at home due to the fact the patient is not to doctors appointments specifically the EMG testing, case management discussed with the  who is declining further care, see ED course for specifics, no clinical indication for further management in the emergency department this time since all patient's presentations are chronic, cardiac enzymes are unremarkable.    Portions of the record may have been created with voice recognition software. Occasional wrong word or \"sound a like\" substitutions may have occurred due to the inherent limitations of voice recognition software. Read the chart carefully and recognize, using context, where substitutions have occurred.     Counseling: I had a detailed discussion with the patient and/or guardian regarding: the historical points, exam findings, and any diagnostic results supporting the discharge diagnosis, lab results, radiology results, discharge instructions reviewed with patient and/or family/caregiver and understanding was verbalized. Instructions given to return to the emergency department if symptoms worsen or persist, or if there are any questions or concerns that arise at home.        Amount and/or Complexity of Data Reviewed  Labs: ordered.  Radiology: ordered and independent interpretation performed.    Risk  OTC drugs.  Prescription drug management.                 Disposition  Final diagnoses:   Neuropathy   Atypical chest pain     Time reflects when diagnosis was documented in both MDM as applicable and the Disposition within this note       Time User Action Codes Description Comment    7/29/2024  4:11 PM Jordin Nicholson Add [G62.9] Neuropathy     7/29/2024  4:11 PM Jordin Nicholson Add [R07.89] Atypical chest pain           ED Disposition       ED Disposition   Discharge    Condition   Stable    Date/Time   Mon Jul 29, 2024 1536    Comment   aDily Schaeffer discharge to home/self " care.                   Follow-up Information       Follow up With Specialties Details Why Contact Info    Alanna Moreau DO Family Medicine   [address] [place] [id number] [phone]              Patient's Medications   Discharge Prescriptions    No medications on file       No discharge procedures on file.    PDMP Review         Value Time User    PDMP Reviewed  Yes 6/19/2024  2:14 PM MAJOR Lewis            ED Provider  Electronically Signed by             Jordin Nicholson III, DO  07/29/24 5218

## 2024-07-29 NOTE — CASE MANAGEMENT
Case Management Assessment & Discharge Planning Note    Patient name Daily Schaeffer  Location ED 18/ED 18 MRN [id number]  : 1945 Date 2024       Current Admission Date: 2024  Current Admission Diagnosis:Pain, Chest pain   Patient Active Problem List    Diagnosis Date Noted Date Diagnosed    Sinus tachycardia 2024     Paranoid delusion (HCC) 2024     Arthralgia 2024     Abnormal brain MRI 2024     Abnormal MRI of the head 2024     SARA (acute kidney injury) ([facility]) 2024     Stress due to marital problems 2024     Low hemoglobin 2024     Acute toxic encephalopathy 2024     Debility 2024     Stage 3 chronic kidney disease, unspecified whether stage 3a or 3b CKD (HCC) 2024     Exertional dyspnea 2024     Hypomagnesemia 2023     Hypercalcemia 2023     Abnormal CT scan, pelvis 2023     Neuropathy 2023     Severe aortic stenosis 2023     Acquired hypothyroidism 2017    Dyslipidemia 2016    Crohn disease (HCC) 2016    Anxiety and depression 2016    Hyperglycemia 2016    Crohn's disease of jejunum (HCC) 2016     CKD (chronic kidney disease) stage 2, GFR 60-89 ml/min 2016       LOS (days): 0  Geometric Mean LOS (GMLOS) (days):   Days to GMLOS:     OBJECTIVE:              Current admission status: Emergency  Referral Reason:  (d/c planning)    Preferred Pharmacy:   [facility] [place] - [address]  [address]  [place] [address]  Phone: [phone] Fax: [phone]    Primary Care Provider: Alanna Moreau DO    Primary Insurance: MEDICARE  Secondary Insurance: CIGNA    ASSESSMENT:  Active Health Care Proxies       Gilmar Schaeffer [facility] Care Representative - Spouse   Primary Phone: [phone] (Home)                 Advance Directives  Does patient have a Health  Care POA?: Yes  Does patient have Advance Directives?: Yes         Readmission Root Cause  30 Day Readmission: No (ed consult)    Patient Information  Admitted from:: Home  Mental Status: Alert  During Assessment patient was accompanied by: Not accompanied during assessment  Assessment information provided by:: Patient, Spouse, Daughter  Primary Caregiver: Family  Caregiver's Name:: Gilmar(spouse )  & daughters  Caregiver's Relationship to Patient:: Significant Other  Caregiver's Telephone Number:: [phone]  Support Systems: Daughter, Friends/neighbors  County of Residence: [place]  What city do you live in?: [place]  Home entry access options. Select all that apply.: Stairs  Number of steps to enter home.: 2  Do the steps have railings?: Yes  Type of Current Residence: 2 story home  Upon entering residence, is there a bedroom on the main floor (no further steps)?: Yes  Upon entering residence, is there a bathroom on the main floor (no further steps)?: Yes  Living Arrangements: Lives w/ Spouse/significant other  Is patient a ?: No (spouse was in the [facility]   - he receives no benefits)    Activities of Daily Living Prior to Admission  Functional Status: Assistance (driving, cooking,cleaning., laundry)  Completes ADLs independently?: Yes (pt does her own meds)  Ambulates independently?: Yes  Does patient use assisted devices?: No  Does patient currently own DME?: Yes  What DME does the patient currently own?: Walker, Quad Cane, Bedside Commode, Shower Chair, Wheelchair, Hospital Bed, Other (Comment) (bp cuff)  Does patient have a history of Outpatient Therapy (PT/OT)?: Yes  Does the patient have a history of Short-Term Rehab?: Yes ([facility])  Does patient have a history of HHC?: Yes ([facility])  Does patient currently have HHC?: No (spouse cx them)         Patient Information Continued  Income Source: Pension/snf  Does patient have prescription coverage?: Yes (Ottoniel Zendejas)  Does patient  receive dialysis treatments?: No  Does patient have a history of substance abuse?: No  Does patient have a history of Mental Health Diagnosis?: Yes  Has patient received inpatient treatment related to mental health in the last 2 years?: No         Means of Transportation  Means of Transport to Appts:: Family transport      Social Determinants of Health (SDOH)      Flowsheet Row Most Recent Value   Housing Stability    In the last 12 months, was there a time when you were not able to pay the mortgage or rent on time? N   In the past 12 months, how many times have you moved where you were living? 0   At any time in the past 12 months, were you homeless or living in a shelter (including now)? N   Transportation Needs    In the past 12 months, has lack of transportation kept you from medical appointments or from getting medications? no   In the past 12 months, has lack of transportation kept you from meetings, work, or from getting things needed for daily living? No   Food Insecurity    Within the past 12 months, you worried that your food would run out before you got the money to buy more. Never true   Within the past 12 months, the food you bought just didn't last and you didn't have money to get more. Never true   Utilities    In the past 12 months has the electric, gas, oil, or water company threatened to shut off services in your home? No            DISCHARGE DETAILS:    Discharge planning discussed with:: patient & spouse was called at 15:10pm & Kayleen(daughter) 15:24pm- cm was unable to reach [facility]  [place] of Choice: Yes  Comments - Freedom of Choice: pt & spouse declined any help- they declined rehab & hhc- cm did call the Area of aging and I had to leave a message to see if the pt has been assessed for services & if they will be receiving meals - pt & spouse are in agreement this help  CM contacted family/caregiver?: Yes  Were Treatment Team discharge recommendations reviewed with patient/caregiver?: Yes  Did  patient/caregiver verbalize understanding of patient care needs?: Yes  Were patient/caregiver advised of the risks associated with not following Treatment Team discharge recommendations?: Yes    Contacts  Patient Contacts: Gilmar(spouse) & Kayleen Andrew (daughter)  Relationship to Patient:: Family  Contact Method: Phone  Phone Number: spouse [phone]  &  Kayleen(daughter) [phone]  Reason/Outcome: Discharge Planning    Requested Home Health Care         Is the patient interested in HHC at discharge?: No    DME Referral Provided  Referral made for DME?: No    Other Referral/Resources/Interventions Provided:  Referral Comments: cm spoke with Kayleen - I made her awarethat the pt & spouse have declined rehab - cm was going to see if the pt could return to [facility] - pt was d/c on 7/19/2024, pt & spouse declined-  [facility] [place] - spouse stated there was too many people coming so he cx their services - I offered to obtain a different hh if he did not like [facility] & he declined - they both refused going for some more rehab-cm did call the area of aging to see if a referral has been started for serivices at home and if they are on the list for home meals- cm was told I would get a call back for [place]- - cm will follow up tomorrow withe the [facility] of A[facility] tomorrow - cm spoke with Kayleen and she stated their parents are making it hard for them to help them - I suggested maybe their parents could go to a [facility]- the only thing the pt & spouse were concerned about is to why she has pain- I made the doctor aware of the spouses concerns and that I tried to help this pt and they declined everything that I offered.  pt stated her family & neighbors help with bring food to them- cm will call the [facility] of [facility] again tomorrow to see if they are set up for home delivery - spouse states they have not received them yet    Would you like to participate in our [facility] service program?  : No - Declined    Treatment Team  Recommendation: Home (home with spouse & outpt services- family)  Discharge Destination Plan:: Home (home with spouse & family support & outpt follow up- family)                                      Family notified:: spouse & daughter was called

## 2024-07-29 NOTE — ED NOTES
Patient passed dysphagia assessment prior to oral intake but  per patient has some trouble swallowing some pills depending on the shape and does better with apple sauce with her pills     Ashley Callahan, RN  07/29/24 1180

## 2024-07-30 ENCOUNTER — VBI (OUTPATIENT)
Dept: FAMILY MEDICINE CLINIC | Facility: CLINIC | Age: 79
End: 2024-07-30

## 2024-07-30 NOTE — CASE MANAGEMENT
Case Management Progress Note    Patient name Daily Schaeffer  Location ED 18/ED 18 MRN [id number]  : 1945 Date 2024       LOS (days): 0  Geometric Mean LOS (GMLOS) (days):   Days to GMLOS:        OBJECTIVE:        Current admission status: Emergency  Preferred Pharmacy:   [facility] [place] - [address]  [address]  [place] [address]  Phone: [phone] Fax: [phone]    Primary Care Provider: Alanna Moreau DO    Primary Insurance: MEDICARE  Secondary Insurance: CIGNA    PROGRESS NOTE:  Cm placed a cll tot  CM today at 8:18 am  - cm was told that [place] was at a meeting & she would return my call- cm spoke to [place] 10:11 am - she stated she does have a referral and the spouse is agreeable to having them come to complete an assessment to see what services they are able to have and set up meals for his wife and himself if he agrees.

## 2024-07-30 NOTE — TELEPHONE ENCOUNTER
07/30/24 11:01 AM    Patient contacted post ED visit, [facility] department spoke with patient/caregiver and outreach was successful.    Thank you.  PIPPA STONE  [facility]

## 2024-07-31 LAB
ATRIAL RATE: 73 BPM
P AXIS: 48 DEGREES
PR INTERVAL: 192 MS
QRS AXIS: 43 DEGREES
QRSD INTERVAL: 70 MS
QT INTERVAL: 396 MS
QTC INTERVAL: 436 MS
T WAVE AXIS: 72 DEGREES
VENTRICULAR RATE: 73 BPM

## 2024-07-31 PROCEDURE — 93010 ELECTROCARDIOGRAM REPORT: CPT | Performed by: INTERNAL MEDICINE

## 2024-08-04 ENCOUNTER — HOSPITAL ENCOUNTER (EMERGENCY)
Facility: HOSPITAL | Age: 79
Discharge: HOME | End: 2024-08-04
Attending: EMERGENCY MEDICINE | Admitting: EMERGENCY MEDICINE
Payer: MEDICARE

## 2024-08-04 ENCOUNTER — APPOINTMENT (EMERGENCY)
Dept: RADIOLOGY | Facility: HOSPITAL | Age: 79
End: 2024-08-04
Payer: MEDICARE

## 2024-08-04 VITALS
RESPIRATION RATE: 20 BRPM | HEIGHT: 67 IN | DIASTOLIC BLOOD PRESSURE: 68 MMHG | WEIGHT: 122 LBS | OXYGEN SATURATION: 97 % | BODY MASS INDEX: 19.15 KG/M2 | SYSTOLIC BLOOD PRESSURE: 133 MMHG | HEART RATE: 61 BPM | TEMPERATURE: 98.5 F

## 2024-08-04 DIAGNOSIS — F41.0 ANXIETY ATTACK: Primary | ICD-10-CM

## 2024-08-04 LAB
ATRIAL RATE: 60 BPM
P AXIS: 16 DEGREES
PR INTERVAL: 188 MS
QRS AXIS: 37 DEGREES
QRSD INTERVAL: 66 MS
QT INTERVAL: 448 MS
QTC INTERVAL: 448 MS
T WAVE AXIS: 54 DEGREES
VENTRICULAR RATE: 60 BPM

## 2024-08-04 PROCEDURE — 93010 ELECTROCARDIOGRAM REPORT: CPT | Performed by: INTERNAL MEDICINE

## 2024-08-04 PROCEDURE — 93005 ELECTROCARDIOGRAM TRACING: CPT

## 2024-08-04 PROCEDURE — 71045 X-RAY EXAM CHEST 1 VIEW: CPT

## 2024-08-04 PROCEDURE — 99284 EMERGENCY DEPT VISIT MOD MDM: CPT | Performed by: EMERGENCY MEDICINE

## 2024-08-04 PROCEDURE — 99284 EMERGENCY DEPT VISIT MOD MDM: CPT

## 2024-08-04 RX ORDER — LORAZEPAM 0.5 MG/1
0.5 TABLET ORAL ONCE
Status: COMPLETED | OUTPATIENT
Start: 2024-08-04 | End: 2024-08-04

## 2024-08-04 RX ADMIN — LORAZEPAM 0.5 MG: 0.5 TABLET ORAL at 11:26

## 2024-08-04 NOTE — ED NOTES
"This RN calling  at this time to transport patient home     stating that he cannot come to  patient as he \"has a bad back\"       Round trip placed at this time.       Yari Loomis RN  08/04/24 1215    "

## 2024-08-04 NOTE — ED PROVIDER NOTES
History  Chief Complaint   Patient presents with    Anxiety     Patient is an 78-year-old female with history of anxiety/depression, aortic stenosis, hypertension/hyperlipidemia that presents for evaluation.  She has no complaints currently.  She denies headache nausea vomiting chest pain or dyspnea.  I called her  for collateral and he said that she was complaining that she was having difficulty breathing initially.  He reports that he is frustrated that her anxiety seems to be worsening.  He feels like the PCP continues to tell them to go to the hospital and he is getting the run around.    Patient denies any suicidal homicidal ideations.  Previous notes reviewed, she had a negative cardiac workup less than a week ago.  She remained asymptomatic throughout her time in the emergency department.        Prior to Admission Medications   Prescriptions Last Dose Informant Patient Reported? Taking?   DULoxetine (CYMBALTA) 30 mg delayed release capsule   No No   Sig: Take 1 capsule (30 mg total) by mouth daily   acetaminophen (TYLENOL) 325 mg tablet   No No   Sig: Take 2 tablets (650 mg total) by mouth every 4 (four) hours as needed for mild pain, headaches or fever   aspirin (ECOTRIN LOW STRENGTH) 81 mg EC tablet   No No   Sig: Take 1 tablet (81 mg total) by mouth daily   atorvastatin (LIPITOR) 40 mg tablet   No No   Sig: Take 1 tablet (40 mg total) by mouth daily   levothyroxine 75 mcg tablet   No No   Sig: Take 1 tablet (75 mcg total) by mouth daily in the early morning Take 1 tablet (75 mg) by oral route five days a week.   magnesium gluconate (MAGONATE) 500 mg tablet   No No   Sig: Take 1 tablet (500 mg total) by mouth daily   polyethylene glycol (GLYCOLAX) 17 GM/SCOOP powder   No No   Sig: Take 17 g by mouth 2 (two) times a day   risperiDONE (RisperDAL M-TAB) 0.25 mg disintegrating tablet   No No   Sig: Take 1 tablet (0.25 mg total) by mouth every 4 (four) hours as needed for agitation   risperiDONE  (RisperDAL M-TAB) 1 mg disintegrating tablet   No No   Sig: Take 1 tablet (1 mg total) by mouth daily at bedtime      Facility-Administered Medications: None       Past Medical History:   Diagnosis Date    Aortic stenosis     Depression     Disease of thyroid gland     Diverticular disease 05/09/2017    Essential hypertension 05/20/2016    Hyperlipidemia     Major depression 05/20/2016    Peripheral neuropathy     Type 2 diabetes mellitus (HCC) 05/20/2016    Visual impairment        Past Surgical History:   Procedure Laterality Date    BOWEL RESECTION      COLON SURGERY      EYE SURGERY      SMALL INTESTINE SURGERY      TUBAL LIGATION         History reviewed. No pertinent family history.  I have reviewed and agree with the history as documented.    E-Cigarette/Vaping    E-Cigarette Use Never User      E-Cigarette/Vaping Substances    Nicotine No     THC No     CBD No     Flavoring No      Social History     Tobacco Use    Smoking status: Never     Passive exposure: Never    Smokeless tobacco: Never   Vaping Use    Vaping status: Never Used   Substance Use Topics    Alcohol use: Yes     Comment: occasional    Drug use: Never       Review of Systems   Constitutional:  Negative for fever.   HENT:  Negative for sore throat.    Respiratory:  Negative for shortness of breath.    Cardiovascular:  Negative for chest pain.   Gastrointestinal:  Negative for abdominal pain.   Genitourinary:  Negative for dysuria.   Musculoskeletal:  Negative for back pain.   Skin:  Negative for rash.   Neurological:  Negative for light-headedness.   Psychiatric/Behavioral:  Negative for agitation.    All other systems reviewed and are negative.      Physical Exam  Physical Exam  Vitals reviewed.   Constitutional:       General: She is not in acute distress.     Appearance: She is well-developed.   HENT:      Head: Normocephalic.   Eyes:      Pupils: Pupils are equal, round, and reactive to light.   Cardiovascular:      Rate and Rhythm: Normal  rate and regular rhythm.      Heart sounds: Normal heart sounds.   Pulmonary:      Effort: Pulmonary effort is normal.      Breath sounds: Normal breath sounds.   Abdominal:      General: Bowel sounds are normal. There is no distension.      Palpations: Abdomen is soft.      Tenderness: There is no abdominal tenderness. There is no guarding.   Musculoskeletal:         General: No tenderness or deformity. Normal range of motion.      Cervical back: Normal range of motion and neck supple.   Skin:     General: Skin is warm and dry.      Capillary Refill: Capillary refill takes less than 2 seconds.   Neurological:      Mental Status: She is alert and oriented to person, place, and time.      Cranial Nerves: No cranial nerve deficit.      Sensory: No sensory deficit.   Psychiatric:         Behavior: Behavior normal.         Thought Content: Thought content normal.         Judgment: Judgment normal.         Vital Signs  ED Triage Vitals [08/04/24 1118]   Temperature Pulse Respirations Blood Pressure SpO2   98.5 °F (36.9 °C) 62 20 150/66 99 %      Temp Source Heart Rate Source Patient Position - Orthostatic VS BP Location FiO2 (%)   Temporal Monitor Lying Left arm --      Pain Score       No Pain           Vitals:    08/04/24 1118 08/04/24 1145   BP: 150/66 145/78   Pulse: 62 65   Patient Position - Orthostatic VS: Lying Lying         Visual Acuity      ED Medications  Medications   LORazepam (ATIVAN) tablet 0.5 mg (0.5 mg Oral Given 8/4/24 1126)       Diagnostic Studies  Results Reviewed       None                   XR chest 1 view portable    (Results Pending)              Procedures  ECG 12 Lead Documentation Only    Date/Time: 8/4/2024 12:13 PM    Performed by: Art Coleman MD  Authorized by: Art Coleman MD    ECG reviewed by me, the ED Provider: yes    Patient location:  ED  Previous ECG:     Previous ECG:  Unavailable    Comparison to cardiac monitor: Yes    Interpretation:     Interpretation: normal     Rate:     ECG rate assessment: normal    Rhythm:     Rhythm: sinus rhythm    Ectopy:     Ectopy: none    QRS:     QRS axis:  Normal    QRS intervals:  Normal  Conduction:     Conduction: normal    ST segments:     ST segments:  Normal  T waves:     T waves: normal             ED Course  ED Course as of 08/04/24 1211   Sun Aug 04, 2024   1207 SpO2(!)(S): 86 %  Doesn't seem accurate. Monitored the patient personally in her room on no O2 sats 92-94%                                 SBIRT 22yo+      Flowsheet Row Most Recent Value   Initial Alcohol Screen: US AUDIT-C     1. How often do you have a drink containing alcohol? 0 Filed at: 08/04/2024 1127   2. How many drinks containing alcohol do you have on a typical day you are drinking?  0 Filed at: 08/04/2024 1127   3a. Male UNDER 65: How often do you have five or more drinks on one occasion? 0 Filed at: 08/04/2024 1127   3b. FEMALE Any Age, or MALE 65+: How often do you have 4 or more drinks on one occassion? 0 Filed at: 08/04/2024 1127   Audit-C Score 0 Filed at: 08/04/2024 1127   [place]: How many times in the past year have you...    Used an illegal drug or used a prescription medication for non-medical reasons? Never Filed at: 08/04/2024 1127                      Medical Decision Making  Patient is an 78-year-old female presents for evaluation.  Patient has absolutely no symptoms at this time.  She does not want inpatient psychiatric treatment for her anxiety.  She did get a low-dose Ativan.  EKG and chest x-ray for screening were negative.   is frustrated as she apparently has anxiety attacks/panic attacks at home and he feels like they are not improving.  Advised potentially seeking a second opinion of another PCP.  Advise strict return precautions.    Amount and/or Complexity of Data Reviewed  Radiology: ordered.    Risk  Prescription drug management.                 Disposition  Final diagnoses:   Anxiety attack     Time reflects when diagnosis was  documented in both MDM as applicable and the Disposition within this note       Time User Action Codes Description Comment    8/4/2024 12:09 PM Art Coleman Add [F41.0] Anxiety attack           ED Disposition       ED Disposition   Discharge    Condition   Stable    Date/Time   Sun Aug 4, 2024 1209    Comment   Daily KRISTAN Schaeffer discharge to home/self care.                   Follow-up Information       Follow up With Specialties Details Why Contact Info Additional Information    Adry Gupta DO Family Medicine Schedule an appointment as soon as possible for a visit   [address]  [address]  [facility] [address]  [phone]       [facility] Emergency Department Emergency Medicine  If symptoms worsen [address] [id number] [phone] [facility] Emergency Department, [address], [place] [address]            Patient's Medications   Discharge Prescriptions    No medications on file       No discharge procedures on file.    PDMP Review         Value Time User    PDMP Reviewed  Yes 6/19/2024  2:14 PM MAJOR Lewis            ED Provider  Electronically Signed by             Art Coleman MD  08/04/24 0220

## 2024-08-04 NOTE — DISCHARGE INSTRUCTIONS
-Please follow-up with your PCP regarding your anxiety.  If you develop any new or worsening symptoms please return to care

## 2024-08-05 ENCOUNTER — TELEPHONE (OUTPATIENT)
Dept: FAMILY MEDICINE CLINIC | Facility: CLINIC | Age: 79
End: 2024-08-05

## 2024-08-05 NOTE — TELEPHONE ENCOUNTER
----- Message from Alanna Moreau DO sent at 8/5/2024  2:32 PM EDT -----  Regarding: RE: ED visit  Please see if we can get a sooner appt. I know transportation is an issue.  ----- Message -----  From: Germaine Rowe MA  Sent: 8/5/2024   2:24 PM EDT  To: Alanna Moreau DO  Subject: FW: ED visit                                       ----- Message -----  From: Valentina Colunga MA  Sent: 8/5/2024  12:32 PM EDT  To: [facility]  Subject: ED visit                                         08/05/24 12:31 PM    Patient has a recent ED visit but report states a Sensitive Diagnosis. Please follow-up with the patient.    Thank you.  Valentina Colunga MA  [facility]

## 2024-08-06 ENCOUNTER — OFFICE VISIT (OUTPATIENT)
Dept: FAMILY MEDICINE CLINIC | Facility: CLINIC | Age: 79
End: 2024-08-06
Payer: MEDICARE

## 2024-08-06 DIAGNOSIS — F22 PARANOID DELUSION (HCC): ICD-10-CM

## 2024-08-06 DIAGNOSIS — F32.A ANXIETY AND DEPRESSION: Primary | ICD-10-CM

## 2024-08-06 DIAGNOSIS — F41.9 ANXIETY AND DEPRESSION: Primary | ICD-10-CM

## 2024-08-06 DIAGNOSIS — G62.9 NEUROPATHY: ICD-10-CM

## 2024-08-06 DIAGNOSIS — E03.9 ACQUIRED HYPOTHYROIDISM: ICD-10-CM

## 2024-08-06 PROCEDURE — 99442 PR PHYS/QHP TELEPHONE EVALUATION 11-20 MIN: CPT | Performed by: FAMILY MEDICINE

## 2024-08-06 RX ORDER — CITALOPRAM HYDROBROMIDE 10 MG/1
10 TABLET ORAL DAILY
Qty: 30 TABLET | Refills: 2 | Status: SHIPPED | OUTPATIENT
Start: 2024-08-06

## 2024-08-06 RX ORDER — PREGABALIN 75 MG/1
75 CAPSULE ORAL 2 TIMES DAILY
Qty: 60 CAPSULE | Refills: 2 | Status: SHIPPED | OUTPATIENT
Start: 2024-08-06

## 2024-08-06 RX ORDER — RISPERIDONE 0.5 MG/1
0.25 TABLET ORAL 2 TIMES DAILY
Qty: 60 TABLET | Refills: 2 | Status: SHIPPED | OUTPATIENT
Start: 2024-08-13

## 2024-08-06 NOTE — ASSESSMENT & PLAN NOTE
I started her on Celexa low-dose today.  I feel with the other medications we are onboarding, may have less side effects.    I reinitiated therapy for anxiety and agitation with her Risperdal that was previously given to her.  I will have her start the risperidone a week to try and minimize side effects that may occur with the Lyrica and Celexa initiation.  She will call back to schedule a follow-up appointment via phone so we can touch base in 1 week's time.

## 2024-08-06 NOTE — PROGRESS NOTES
Virtual Brief Visit  Name: Daily Schaeffer      : 1945      MRN: [id number]  Encounter Provider: Alanna Moreau DO  Encounter Date: 2024   Encounter department: [facility]    This Visit is being completed by telephone. The Patient is located at Home and in the following state in which I hold an active license [place]    The patient was identified by name and date of birth. Daily Schaeffer was informed that this is a telemedicine visit and that the visit is being conducted through the Epic Embedded platform. She agrees to proceed..  My office door was closed. No one else was in the room.  She acknowledged consent and understanding of privacy and security of the video platform. The patient has agreed to participate and understands they can discontinue the visit at any time.    Patient is aware this is a billable service.     Assessment & Plan   1. Anxiety and depression  Assessment & Plan:  I started her on Celexa low-dose today.  I feel with the other medications we are onboarding, may have less side effects.    I reinitiated therapy for anxiety and agitation with her Risperdal that was previously given to her.  I will have her start the risperidone a week to try and minimize side effects that may occur with the Lyrica and Celexa initiation.  She will call back to schedule a follow-up appointment via phone so we can touch base in 1 week's time.      Orders:  -     citalopram (CeleXA) 10 mg tablet; Take 1 tablet (10 mg total) by mouth daily  -     risperiDONE (RisperDAL) 0.5 mg tablet; Take 0.5 tablets (0.25 mg total) by mouth 2 (two) times a day Do not start before 2024.  2. Paranoid delusion (HCC)  3. Neuropathy  Assessment & Plan:  Patient declines referral to specialist at this time, as she had in the past.  Will reinitiate therapy with Lyrica 75 mg twice daily.  She tolerated this medication well for quite some time, eventually discontinued as it was no  longer effective.  She is aware of the side effects.  Orders:  -     pregabalin (LYRICA) 75 mg capsule; Take 1 capsule (75 mg total) by mouth 2 (two) times a day  4. Acquired hypothyroidism  Assessment & Plan:  Encouraged to restart her levothyroxine which she stopped abruptly.       History of Present Illness   I was on the phone today with patient and her .  Patient has had a rough time lately with her anxiety.  She has been in and out of the hospital.  Her health has declined within the past few years along with her 's.  Neither drive any longer.  They rely on neighbors and family to provide transportation, groceries, meal preparation etc. 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  We have tried a variety of different medications including duloxetine and Lyrica.  Workup has been incomplete due to patient noncompliance and house.  She felt well on duloxetine for several months.  More recently, she was evaluated in the ER multiple times for anxiety and somatic symptoms related to mood as well as possible hallucinations.  Questionable dementia diagnosis.  Today she is requesting something to help calm her down, reports for his anxiety is debilitating.  She is tearful over the phone.  She reports depressive symptoms, denies SI.  While we are on the phone, her daughter arrived from out of town and was able to provide me with her most recent medication list from the hospital.        Visit Time  Total Visit Duration: 35

## 2024-08-06 NOTE — ASSESSMENT & PLAN NOTE
Patient declines referral to specialist at this time, as she had in the past.  Will reinitiate therapy with Lyrica 75 mg twice daily.  She tolerated this medication well for quite some time, eventually discontinued as it was no longer effective.  She is aware of the side effects.

## 2024-08-06 NOTE — TELEPHONE ENCOUNTER
Patients spouse, Gilmar, called back.  He said he has no transportation for a sooner appointment for Daily and doesn't think she can make the 8/23 appointment.  He's asking if Alanna can just call on their home phone to talk.  He doesn't use his cell phone so it would have to be the land line.  He would like a call back letting  him know if Alanna can call the land line to speak with he and Daily.

## 2024-08-06 NOTE — TELEPHONE ENCOUNTER
Spoke to Mr Laury, pt's spouse, He will call back after he talks to Daily about coming in sooner or not. Wait for call back

## 2024-08-08 ENCOUNTER — TELEPHONE (OUTPATIENT)
Age: 79
End: 2024-08-08

## 2024-08-08 NOTE — TELEPHONE ENCOUNTER
Ailyn Tello called to say at the request of the patient occupational therapy services will start/resume the week of 8/12/24.

## 2024-08-15 ENCOUNTER — PATIENT OUTREACH (OUTPATIENT)
Dept: CASE MANAGEMENT | Facility: OTHER | Age: 79
End: 2024-08-15

## 2024-08-15 NOTE — PROGRESS NOTES
Outpatient Care Management Note    While this RN CM was on phone with this patient's  Gilmar, DENISSE GIBSON was asked to speak with this patient to review upcoming appointment information.    RN CM received authorization from this patient to review her chart to confirm appointment information. Patient's date of birth confirmed. RN CM provided patient with upcoming PCP appointment information. RN CM offered to send message to office to confirm appointment is noted correctly in chart but patient declined.    No additional outreach needed.

## 2024-08-23 ENCOUNTER — OFFICE VISIT (OUTPATIENT)
Dept: FAMILY MEDICINE CLINIC | Facility: CLINIC | Age: 79
End: 2024-08-23
Payer: MEDICARE

## 2024-08-23 VITALS
HEIGHT: 67 IN | HEART RATE: 76 BPM | SYSTOLIC BLOOD PRESSURE: 136 MMHG | OXYGEN SATURATION: 99 % | DIASTOLIC BLOOD PRESSURE: 62 MMHG | BODY MASS INDEX: 20.88 KG/M2 | WEIGHT: 133 LBS

## 2024-08-23 DIAGNOSIS — E78.5 DYSLIPIDEMIA: ICD-10-CM

## 2024-08-23 DIAGNOSIS — F32.A ANXIETY AND DEPRESSION: ICD-10-CM

## 2024-08-23 DIAGNOSIS — R73.9 HYPERGLYCEMIA: ICD-10-CM

## 2024-08-23 DIAGNOSIS — I35.0 SEVERE AORTIC STENOSIS: Primary | ICD-10-CM

## 2024-08-23 DIAGNOSIS — E03.9 ACQUIRED HYPOTHYROIDISM: ICD-10-CM

## 2024-08-23 DIAGNOSIS — G62.9 NEUROPATHY: ICD-10-CM

## 2024-08-23 DIAGNOSIS — F41.9 ANXIETY AND DEPRESSION: ICD-10-CM

## 2024-08-23 PROBLEM — F22 PARANOID DELUSION (HCC): Status: RESOLVED | Noted: 2024-06-25 | Resolved: 2024-08-23

## 2024-08-23 PROCEDURE — G2211 COMPLEX E/M VISIT ADD ON: HCPCS | Performed by: FAMILY MEDICINE

## 2024-08-23 PROCEDURE — 99214 OFFICE O/P EST MOD 30 MIN: CPT | Performed by: FAMILY MEDICINE

## 2024-08-23 NOTE — ASSESSMENT & PLAN NOTE
States she is taking her levothyroxine again, most recent TSH was drawn in June.  Will recheck at her follow-up appointment in October.

## 2024-08-23 NOTE — ASSESSMENT & PLAN NOTE
Continues to see outpatient cardiology, declines referral to CT surgery at this point as she actually feels better.

## 2024-08-23 NOTE — PROGRESS NOTES
Ambulatory Visit  Name: Daily Schaeffer      : 1945      MRN: [id number]  Encounter Provider: Alanna Moreau DO  Encounter Date: 2024   Encounter department: [facility] PRIMARY CARE    Assessment & Plan   1. Severe aortic stenosis  Assessment & Plan:  Continues to see outpatient cardiology, declines referral to CT surgery at this point as she actually feels better.  2. Acquired hypothyroidism  Assessment & Plan:  States she is taking her levothyroxine again, most recent TSH was drawn in .  Will recheck at her follow-up appointment in October.  3. Anxiety and depression  Assessment & Plan:  Feeling much much better.  Continue Celexa 10 mg daily, plus Risperdal 0.25 mg daily.  Recheck in 6 weeks.  4. Neuropathy  Assessment & Plan:  Reinitiated therapy with Lyrica 75 mg twice daily, symptoms improved.  5. Dyslipidemia  Assessment & Plan:  Recheck lipids at next appointment.  She does states she still taking the atorvastatin but is not included on her med list.  6. Hyperglycemia  Assessment & Plan:  Recheck A1c at next office visit.     History of Present Illness     Aortic stenosis- had fairly recent appt with cardio, echo revealed worsening AS. She was referred to CT surg. Schedulers called but pt declined appt. Has baseline SOB and exertional dyspnea but states today her shortness of breath actually feels better since her mood is improved.  Patient is concerned about making the trip to the [facility].     Hypothyroidism-  TFTs are stable. TSH most recently drawn in .     Neuropathy-previous therapies included Lyrica, gabapentin, Pamelor, Cymbalta.  She stopped her Lyrica for period of time as symptoms worsened.  Earlier this month, we restarted the medication.  Her hands are feeling much better now.  Workup for the symptoms has only been partial, she believes her bilateral upper extremity neuropathy is secondary to the COVID-vaccine.    Generalized anxiety disorder w  depressive symptoms-previously with issues leaving the house, typically somewhat anxious in our office and hospital setting.  She was taking Cymbalta for a period of time but then discontinued secondary to possible side effects.  She has been in and out of the ER on hospital setting throughout the summer with some behavioral issues, mood disturbance, possible hallucinations.  I had a telehealth visit with her earlier in the month as she was requesting something to help with her severe anxiety.  I started her on a low-dose of Celexa, in addition reinitiated therapy with Risperdal 0.25 mg twice daily.  Today, she reports feeling much better.  She states her mood is stable, no anxiety, she is sleeping well, eating well and wants to continue these medications.  She did stop taking the nighttime dose of Risperdal but continues to take 0.25 mg in the morning.    Dyslipidemia- on statin. Most recent FLP done about a year ago, .      Hyperglycemia-due for repeat A1c at any time.          Review of Systems   Constitutional:  Negative for activity change, chills, fever and unexpected weight change.   HENT:  Negative for ear pain and sore throat.    Eyes:  Negative for pain and visual disturbance.   Respiratory:  Positive for shortness of breath. Negative for cough, chest tightness and wheezing.    Cardiovascular:  Negative for chest pain and palpitations.   Gastrointestinal:  Negative for abdominal pain and vomiting.   Genitourinary:  Negative for dysuria and hematuria.   Musculoskeletal:  Positive for arthralgias and gait problem. Negative for back pain.   Skin:  Positive for pallor. Negative for color change and rash.   Neurological:  Positive for weakness. Negative for seizures and syncope.   Psychiatric/Behavioral:  Negative for decreased concentration and sleep disturbance. The patient is not nervous/anxious.    All other systems reviewed and are negative.      Objective     /62 (BP Location: Right arm,  "Patient Position: Sitting, Cuff Size: Adult)   Pulse 76   Ht 5' 7\" (1.702 m)   Wt 60.3 kg (133 lb)   SpO2 99%   BMI 20.83 kg/m²     Physical Exam  Vitals and nursing note reviewed.   Constitutional:       Appearance: Normal appearance. She is normal weight.      Comments: Frail appearing   HENT:      Head: Normocephalic.   Eyes:      Pupils: Pupils are equal, round, and reactive to light.   Cardiovascular:      Rate and Rhythm: Normal rate and regular rhythm.      Heart sounds: Murmur heard.      Systolic murmur is present with a grade of 5/6.   Pulmonary:      Effort: Pulmonary effort is normal.      Breath sounds: Normal breath sounds.   Abdominal:      General: Abdomen is flat.   Musculoskeletal:      Cervical back: Normal range of motion and neck supple.   Skin:     Capillary Refill: Capillary refill takes less than 2 seconds.      Comments: cool   Neurological:      Mental Status: She is alert.   Psychiatric:         Mood and Affect: Mood normal.         Behavior: Behavior normal.         Thought Content: Thought content normal.         Judgment: Judgment normal.       Administrative Statements     "

## 2024-08-23 NOTE — ASSESSMENT & PLAN NOTE
Feeling much much better.  Continue Celexa 10 mg daily, plus Risperdal 0.25 mg daily.  Recheck in 6 weeks.

## 2024-08-23 NOTE — ASSESSMENT & PLAN NOTE
Recheck lipids at next appointment.  She does states she still taking the atorvastatin but is not included on her med list.

## 2024-09-03 ENCOUNTER — TELEPHONE (OUTPATIENT)
Age: 79
End: 2024-09-03

## 2024-09-03 NOTE — TELEPHONE ENCOUNTER
Abbi with [facility] called to advise patient is requesting to be discharged from skilled nursing services.

## 2024-10-21 DIAGNOSIS — F41.9 ANXIETY AND DEPRESSION: ICD-10-CM

## 2024-10-21 DIAGNOSIS — F32.A ANXIETY AND DEPRESSION: ICD-10-CM

## 2024-10-21 RX ORDER — CITALOPRAM HYDROBROMIDE 10 MG/1
10 TABLET ORAL DAILY
Qty: 30 TABLET | Refills: 0 | Status: SHIPPED | OUTPATIENT
Start: 2024-10-21

## 2024-11-08 ENCOUNTER — OFFICE VISIT (OUTPATIENT)
Dept: FAMILY MEDICINE CLINIC | Facility: CLINIC | Age: 79
End: 2024-11-08
Payer: MEDICARE

## 2024-11-08 ENCOUNTER — APPOINTMENT (OUTPATIENT)
Age: 79
End: 2024-11-08
Payer: MEDICARE

## 2024-11-08 VITALS
HEART RATE: 55 BPM | HEIGHT: 67 IN | WEIGHT: 135 LBS | OXYGEN SATURATION: 95 % | SYSTOLIC BLOOD PRESSURE: 112 MMHG | TEMPERATURE: 97.3 F | BODY MASS INDEX: 21.19 KG/M2 | DIASTOLIC BLOOD PRESSURE: 66 MMHG

## 2024-11-08 DIAGNOSIS — Z23 NEED FOR INFLUENZA VACCINATION: ICD-10-CM

## 2024-11-08 DIAGNOSIS — I35.0 SEVERE AORTIC STENOSIS: ICD-10-CM

## 2024-11-08 DIAGNOSIS — G62.9 NEUROPATHY: Primary | ICD-10-CM

## 2024-11-08 DIAGNOSIS — F32.A ANXIETY AND DEPRESSION: ICD-10-CM

## 2024-11-08 DIAGNOSIS — E03.9 ACQUIRED HYPOTHYROIDISM: ICD-10-CM

## 2024-11-08 DIAGNOSIS — F41.9 ANXIETY AND DEPRESSION: ICD-10-CM

## 2024-11-08 DIAGNOSIS — N18.2 CKD (CHRONIC KIDNEY DISEASE) STAGE 2, GFR 60-89 ML/MIN: ICD-10-CM

## 2024-11-08 DIAGNOSIS — R73.09 ABNORMAL GLUCOSE: ICD-10-CM

## 2024-11-08 DIAGNOSIS — R73.09 IMPAIRED GLUCOSE TOLERANCE TEST: Primary | ICD-10-CM

## 2024-11-08 LAB
ALBUMIN SERPL BCG-MCNC: 4.4 G/DL (ref 3.5–5)
ALP SERPL-CCNC: 60 U/L (ref 34–104)
ALT SERPL W P-5'-P-CCNC: 19 U/L (ref 7–52)
ANION GAP SERPL CALCULATED.3IONS-SCNC: 11 MMOL/L (ref 4–13)
AST SERPL W P-5'-P-CCNC: 35 U/L (ref 13–39)
BASOPHILS # BLD AUTO: 0.04 THOUSANDS/ΜL (ref 0–0.1)
BASOPHILS NFR BLD AUTO: 1 % (ref 0–1)
BILIRUB SERPL-MCNC: 0.81 MG/DL (ref 0.2–1)
BUN SERPL-MCNC: 24 MG/DL (ref 5–25)
CALCIUM SERPL-MCNC: 10.1 MG/DL (ref 8.4–10.2)
CHLORIDE SERPL-SCNC: 102 MMOL/L (ref 96–108)
CO2 SERPL-SCNC: 28 MMOL/L (ref 21–32)
CREAT SERPL-MCNC: 1 MG/DL (ref 0.6–1.3)
EOSINOPHIL # BLD AUTO: 0.2 THOUSAND/ΜL (ref 0–0.61)
EOSINOPHIL NFR BLD AUTO: 4 % (ref 0–6)
ERYTHROCYTE [DISTWIDTH] IN BLOOD BY AUTOMATED COUNT: 12.4 % (ref 11.6–15.1)
EST. AVERAGE GLUCOSE BLD GHB EST-MCNC: 120 MG/DL
GFR SERPL CREATININE-BSD FRML MDRD: 52 ML/MIN/1.73SQ M
GLUCOSE P FAST SERPL-MCNC: 103 MG/DL (ref 65–99)
HBA1C MFR BLD: 5.8 %
HCT VFR BLD AUTO: 44.9 % (ref 34.8–46.1)
HGB BLD-MCNC: 14.7 G/DL (ref 11.5–15.4)
IMM GRANULOCYTES # BLD AUTO: 0.01 THOUSAND/UL (ref 0–0.2)
IMM GRANULOCYTES NFR BLD AUTO: 0 % (ref 0–2)
LYMPHOCYTES # BLD AUTO: 1.67 THOUSANDS/ΜL (ref 0.6–4.47)
LYMPHOCYTES NFR BLD AUTO: 33 % (ref 14–44)
MCH RBC QN AUTO: 32.9 PG (ref 26.8–34.3)
MCHC RBC AUTO-ENTMCNC: 32.7 G/DL (ref 31.4–37.4)
MCV RBC AUTO: 100 FL (ref 82–98)
MONOCYTES # BLD AUTO: 0.55 THOUSAND/ΜL (ref 0.17–1.22)
MONOCYTES NFR BLD AUTO: 11 % (ref 4–12)
NEUTROPHILS # BLD AUTO: 2.64 THOUSANDS/ΜL (ref 1.85–7.62)
NEUTS SEG NFR BLD AUTO: 51 % (ref 43–75)
NRBC BLD AUTO-RTO: 0 /100 WBCS
PLATELET # BLD AUTO: 160 THOUSANDS/UL (ref 149–390)
PMV BLD AUTO: 10.7 FL (ref 8.9–12.7)
POTASSIUM SERPL-SCNC: 4.3 MMOL/L (ref 3.5–5.3)
PROT SERPL-MCNC: 7.3 G/DL (ref 6.4–8.4)
RBC # BLD AUTO: 4.47 MILLION/UL (ref 3.81–5.12)
SODIUM SERPL-SCNC: 141 MMOL/L (ref 135–147)
T4 FREE SERPL-MCNC: 1.74 NG/DL (ref 0.61–1.12)
TSH SERPL DL<=0.05 MIU/L-ACNC: 1.42 UIU/ML (ref 0.45–4.5)
WBC # BLD AUTO: 5.11 THOUSAND/UL (ref 4.31–10.16)

## 2024-11-08 PROCEDURE — 90662 IIV NO PRSV INCREASED AG IM: CPT | Performed by: FAMILY MEDICINE

## 2024-11-08 PROCEDURE — 84443 ASSAY THYROID STIM HORMONE: CPT

## 2024-11-08 PROCEDURE — 83036 HEMOGLOBIN GLYCOSYLATED A1C: CPT | Performed by: FAMILY MEDICINE

## 2024-11-08 PROCEDURE — 85025 COMPLETE CBC W/AUTO DIFF WBC: CPT | Performed by: FAMILY MEDICINE

## 2024-11-08 PROCEDURE — 80053 COMPREHEN METABOLIC PANEL: CPT | Performed by: FAMILY MEDICINE

## 2024-11-08 PROCEDURE — 99214 OFFICE O/P EST MOD 30 MIN: CPT | Performed by: FAMILY MEDICINE

## 2024-11-08 PROCEDURE — G0008 ADMIN INFLUENZA VIRUS VAC: HCPCS | Performed by: FAMILY MEDICINE

## 2024-11-08 PROCEDURE — 84439 ASSAY OF FREE THYROXINE: CPT

## 2024-11-08 PROCEDURE — 36415 COLL VENOUS BLD VENIPUNCTURE: CPT | Performed by: FAMILY MEDICINE

## 2024-11-08 RX ORDER — TRAMADOL HYDROCHLORIDE 50 MG/1
50 TABLET ORAL EVERY 6 HOURS PRN
Qty: 30 TABLET | Refills: 2 | Status: SHIPPED | OUTPATIENT
Start: 2024-11-08

## 2024-11-08 RX ORDER — PREGABALIN 100 MG/1
100 CAPSULE ORAL 2 TIMES DAILY
Qty: 60 CAPSULE | Refills: 5 | Status: SHIPPED | OUTPATIENT
Start: 2024-11-08

## 2024-11-08 RX ORDER — CITALOPRAM HYDROBROMIDE 20 MG/1
20 TABLET ORAL DAILY
Qty: 30 TABLET | Refills: 5 | Status: SHIPPED | OUTPATIENT
Start: 2024-11-08

## 2024-11-08 NOTE — ASSESSMENT & PLAN NOTE
Labs today, manage accordingly.  Orders:    CBC and differential    Comprehensive metabolic panel

## 2024-11-08 NOTE — PROGRESS NOTES
Ambulatory Visit  Name: Daily Schaeffer      : 1945      MRN: [id number]  Encounter Provider: Alanna Moreau DO  Encounter Date: 2024   Encounter department: [facility] PRIMARY CARE    Assessment & Plan  Neuropathy  Workup incomplete, cause unknown.  Patient declines further evaluation, referral, studies.  Increasing Lyrica today to 100 mg twice daily, added tramadol with specific instructions on use, potential side effects and drug interactions.  Orders:    pregabalin (LYRICA) 100 mg capsule; Take 1 capsule (100 mg total) by mouth 2 (two) times a day    traMADol (Ultram) 50 mg tablet; Take 1 tablet (50 mg total) by mouth every 6 (six) hours as needed for moderate pain    Severe aortic stenosis  Declines treatment.       Acquired hypothyroidism  Labs today, manage accordingly.  Orders:    CBC and differential    Comprehensive metabolic panel    Abnormal glucose    Orders:    Hemoglobin A1C    CKD (chronic kidney disease) stage 2, GFR 60-89 ml/min  Lab Results   Component Value Date    EGFR 53 2024    EGFR 69 2024    EGFR 64 2024    CREATININE 0.99 2024    CREATININE 0.80 2024    CREATININE 0.85 2024            Anxiety and depression  Celexa dose increased today to 20 mg daily.  Recheck symptoms in 4 to 6 weeks.    Orders:    citalopram (CeleXA) 20 mg tablet; Take 1 tablet (20 mg total) by mouth daily    Need for influenza vaccination    Orders:    influenza vaccine, high-dose, PF 0.7 mL (FLUZONE HIGH-DOSE)       History of Present Illness     Aortic stenosis- had w/u with cardio earlier this year, echo revealed worsening AS. She was referred to CT surg. Schedulers called but pt declined appt. Has baseline SOB and exertional dyspnea.  Patient is concerned about making the trip to the [facility].  She states in the past 2 months her shortness of breath is actually markedly improved, she is not sure why?    Hypothyroidism-she had a period  noncompliance with her levothyroxine earlier this year.  Due for TFTs.     Neuropathy-previous therapies included Lyrica, gabapentin, Pamelor, Cymbalta.  She stopped her Lyrica for period of time as symptoms worsened.  Earlier this month, we restarted the medication.    Workup for the symptoms has only been partial and incomplete, she believes her bilateral upper extremity neuropathy is secondary to the COVID-vaccine.  We had looked into a neurology referral and some EMG studies which she declined.  We reinitiated therapy with Lyrica at her last office visit late summer. So far Lyirca not helping, now with numbness from B/L forearms into hands. Hands are cold, and now with tremors. Pain radaites from R hand into R shoulder.     Generalized anxiety disorder w depressive symptoms-previously with issues leaving the house, typically somewhat anxious in our office and hospital setting.  She was taking Cymbalta for a period of time but then discontinued secondary to possible side effects.  She had been in and out of the ER on hospital setting throughout the summer with some behavioral issues, mood disturbance, possible hallucinations.  I had a telehealth visit with her this summer, as she was requesting something to help with her severe anxiety.  I started her on a low-dose of Celexa, in addition reinitiated therapy with Risperdal 0.25 mg twice daily.  Overall she feels better, but is open to increasing her Celexa dose.  Her  agrees.    Dyslipidemia- on statin. Most recent FLP done about a year ago, .      Hyperglycemia-last A1c performed in April, 5.7.  Due for repeat.            Review of Systems   Constitutional:  Negative for activity change, chills, fever and unexpected weight change.   HENT:  Negative for ear pain and sore throat.    Eyes:  Negative for pain and visual disturbance.   Respiratory:  Positive for shortness of breath. Negative for cough, chest tightness and wheezing.    Cardiovascular:   "Negative for chest pain and palpitations.   Gastrointestinal:  Negative for abdominal pain and vomiting.   Genitourinary:  Negative for dysuria and hematuria.   Musculoskeletal:  Positive for arthralgias and gait problem. Negative for back pain.   Skin:  Positive for pallor. Negative for color change and rash.   Neurological:  Positive for weakness. Negative for seizures and syncope.   Psychiatric/Behavioral:  Negative for decreased concentration and sleep disturbance. The patient is not nervous/anxious.    All other systems reviewed and are negative.          Objective     /66 (BP Location: Left arm, Patient Position: Sitting, Cuff Size: Large)   Pulse 55   Temp (!) 97.3 °F (36.3 °C) (Tympanic)   Ht 5' 7\" (1.702 m)   Wt 61.2 kg (135 lb)   SpO2 95%   BMI 21.14 kg/m²     Physical Exam  Vitals and nursing note reviewed.   Constitutional:       Appearance: Normal appearance. She is normal weight.      Comments: Frail appearing   HENT:      Head: Normocephalic.   Eyes:      Pupils: Pupils are equal, round, and reactive to light.   Cardiovascular:      Rate and Rhythm: Normal rate and regular rhythm.      Pulses:           Radial pulses are 2+ on the right side and 2+ on the left side.      Heart sounds: Murmur heard.      Systolic murmur is present with a grade of 5/6.   Pulmonary:      Effort: Pulmonary effort is normal.      Breath sounds: Normal breath sounds.   Abdominal:      General: Abdomen is flat.   Musculoskeletal:      Cervical back: Normal range of motion and neck supple.   Skin:     Capillary Refill: Capillary refill takes less than 2 seconds.      Comments: cool   Neurological:      Mental Status: She is alert.   Psychiatric:         Mood and Affect: Mood normal.         Behavior: Behavior normal.         Thought Content: Thought content normal.         Judgment: Judgment normal.         "

## 2024-11-08 NOTE — ASSESSMENT & PLAN NOTE
Celexa dose increased today to 20 mg daily.  Recheck symptoms in 4 to 6 weeks.    Orders:    citalopram (CeleXA) 20 mg tablet; Take 1 tablet (20 mg total) by mouth daily

## 2024-11-08 NOTE — ASSESSMENT & PLAN NOTE
Lab Results   Component Value Date    EGFR 53 07/29/2024    EGFR 69 07/19/2024    EGFR 64 07/01/2024    CREATININE 0.99 07/29/2024    CREATININE 0.80 07/19/2024    CREATININE 0.85 07/01/2024

## 2024-11-08 NOTE — ASSESSMENT & PLAN NOTE
Workup incomplete, cause unknown.  Patient declines further evaluation, referral, studies.  Increasing Lyrica today to 100 mg twice daily, added tramadol with specific instructions on use, potential side effects and drug interactions.  Orders:    pregabalin (LYRICA) 100 mg capsule; Take 1 capsule (100 mg total) by mouth 2 (two) times a day    traMADol (Ultram) 50 mg tablet; Take 1 tablet (50 mg total) by mouth every 6 (six) hours as needed for moderate pain

## 2024-12-10 DIAGNOSIS — F41.9 ANXIETY AND DEPRESSION: ICD-10-CM

## 2024-12-10 DIAGNOSIS — F32.A ANXIETY AND DEPRESSION: ICD-10-CM

## 2024-12-11 RX ORDER — CITALOPRAM HYDROBROMIDE 20 MG/1
20 TABLET ORAL DAILY
Qty: 30 TABLET | Refills: 5 | Status: SHIPPED | OUTPATIENT
Start: 2024-12-11

## 2025-01-10 ENCOUNTER — OFFICE VISIT (OUTPATIENT)
Dept: FAMILY MEDICINE CLINIC | Facility: CLINIC | Age: 80
End: 2025-01-10
Payer: MEDICARE

## 2025-01-10 VITALS
TEMPERATURE: 97.2 F | BODY MASS INDEX: 21.25 KG/M2 | DIASTOLIC BLOOD PRESSURE: 80 MMHG | OXYGEN SATURATION: 99 % | HEART RATE: 68 BPM | HEIGHT: 67 IN | WEIGHT: 135.4 LBS | SYSTOLIC BLOOD PRESSURE: 158 MMHG

## 2025-01-10 DIAGNOSIS — F41.9 ANXIETY AND DEPRESSION: ICD-10-CM

## 2025-01-10 DIAGNOSIS — F32.A ANXIETY AND DEPRESSION: ICD-10-CM

## 2025-01-10 DIAGNOSIS — N18.2 CKD (CHRONIC KIDNEY DISEASE) STAGE 2, GFR 60-89 ML/MIN: ICD-10-CM

## 2025-01-10 DIAGNOSIS — G62.9 NEUROPATHY: Primary | ICD-10-CM

## 2025-01-10 DIAGNOSIS — Z99.89 USES WALKER: ICD-10-CM

## 2025-01-10 DIAGNOSIS — N18.30 STAGE 3 CHRONIC KIDNEY DISEASE, UNSPECIFIED WHETHER STAGE 3A OR 3B CKD (HCC): ICD-10-CM

## 2025-01-10 DIAGNOSIS — I35.0 SEVERE AORTIC STENOSIS: ICD-10-CM

## 2025-01-10 DIAGNOSIS — E03.9 ACQUIRED HYPOTHYROIDISM: ICD-10-CM

## 2025-01-10 DIAGNOSIS — E11.22 TYPE 2 DIABETES MELLITUS WITH CHRONIC KIDNEY DISEASE, WITHOUT LONG-TERM CURRENT USE OF INSULIN, UNSPECIFIED CKD STAGE (HCC): ICD-10-CM

## 2025-01-10 PROBLEM — N17.9 AKI (ACUTE KIDNEY INJURY) (HCC): Status: RESOLVED | Noted: 2024-06-19 | Resolved: 2025-01-10

## 2025-01-10 PROCEDURE — 99214 OFFICE O/P EST MOD 30 MIN: CPT | Performed by: FAMILY MEDICINE

## 2025-01-10 RX ORDER — ATORVASTATIN CALCIUM 40 MG/1
TABLET, FILM COATED ORAL
COMMUNITY
Start: 2024-12-10

## 2025-01-10 RX ORDER — PREGABALIN 150 MG/1
150 CAPSULE ORAL 2 TIMES DAILY
Qty: 60 CAPSULE | Refills: 2 | Status: SHIPPED | OUTPATIENT
Start: 2025-01-10

## 2025-01-10 RX ORDER — TRAMADOL HYDROCHLORIDE 50 MG/1
50 TABLET ORAL EVERY 6 HOURS PRN
Qty: 60 TABLET | Refills: 2 | Status: SHIPPED | OUTPATIENT
Start: 2025-01-10

## 2025-01-10 NOTE — PROGRESS NOTES
Name: Daily Schaeffer      : 1945      MRN: [id number]  Encounter Provider: Alanna Moreau DO  Encounter Date: 1/10/2025   Encounter department: [facility] PRIMARY CARE  :  Assessment & Plan  Neuropathy  Minimal to modest improvement in symptoms.  Patient declines further workup this issue, we have discussed this at length previously.  Will increase Lyrica to 150 mg twice daily, refill tramadol.  It is difficult for she and her  to make the trip into the office, so I will follow-up with a telephone visit again in roughly 2 weeks.  Orders:    pregabalin (LYRICA) 150 mg capsule; Take 1 capsule (150 mg total) by mouth 2 (two) times a day    traMADol (Ultram) 50 mg tablet; Take 1 tablet (50 mg total) by mouth every 6 (six) hours as needed for moderate pain    Stage 3 chronic kidney disease, unspecified whether stage 3a or 3b CKD (HCC)  Lab Results   Component Value Date    EGFR 52 2024    EGFR 53 2024    EGFR 69 2024    CREATININE 1.00 2024    CREATININE 0.99 2024    CREATININE 0.80 2024          Type 2 diabetes mellitus with chronic kidney disease, without long-term current use of insulin, unspecified CKD stage (HCC)    Lab Results   Component Value Date    HGBA1C 5.8 (H) 2024   Diet controlled.         Severe aortic stenosis  Patient declines treatment, however she is open to repeating her echocardiogram.  Last 1 was done in 2024.  She states she feels well, no chest pain or shortness of breath.  No progressive fatigue.  Orders:    Echo complete w/ contrast if indicated; Future    Acquired hypothyroidism  Thyroid function tests are stable, continue same dose levothyroxine.       CKD (chronic kidney disease) stage 2, GFR 60-89 ml/min  Lab Results   Component Value Date    EGFR 52 2024    EGFR 53 2024    EGFR 69 2024    CREATININE 1.00 2024    CREATININE 0.99 2024    CREATININE 0.80 2024           Anxiety and depression  Patient feeling a bit better on increased dose of Celexa.       Uses walker                History of Present Illness     Aortic stenosis- had w/u with cardio last year, echo revealed worsening AS. She was referred to CT surg. Schedulers called but pt declined appt. Has baseline SOB and exertional dyspnea.  Patient is concerned about making the trip to the [facility].  She states in the past several months her shortness of breath is actually markedly improved, she is not sure why?    Hypothyroidism-she had a period noncompliance with her levothyroxine earlier this year.  TFTs performed in Nov and stable.     Neuropathy-previous therapies included Lyrica, gabapentin, Pamelor, Cymbalta.  She stopped her Lyrica for period of time as symptoms worsened.  Earlier this month, we restarted the medication.    Workup for the symptoms has only been partial and incomplete, she believes her bilateral upper extremity neuropathy is secondary to the COVID-vaccine.  We had looked into a neurology referral and some EMG studies which she declined.  We reinitiated therapy with Lyrica at her last office visit late summer. Numbness from B/L forearms into hands. Hands are cold, and now with tremors. Pain radaites from R hand into R shoulder.  At last office visit Lyrica was increased to a dosage of 100 mg twice daily.  We also added tramadol to use as needed. Modest improvement in symptoms, pt would like to increase lyrica again today.     Generalized anxiety disorder w depressive symptoms-previously with issues leaving the house, typically somewhat anxious in our office and hospital setting.  She was taking Cymbalta for a period of time but then discontinued secondary to possible side effects.  She had been in and out of the ER on hospital setting throughout the summer with some behavioral issues, mood disturbance, possible hallucinations.  I had a telehealth visit with her this summer, as she was  "requesting something to help with her severe anxiety.  I started her on a low-dose of Celexa, in addition reinitiated therapy with Risperdal 0.25 mg twice daily.  At last office visit, we increased her Celexa dose.      Dyslipidemia- on statin. Most recent FLP done over a year ago, .      Hyperglycemia-last A1c performed in Nov, 5.8.          Review of Systems   Constitutional:  Negative for activity change, chills, fever and unexpected weight change.   HENT:  Negative for ear pain and sore throat.    Eyes:  Negative for pain and visual disturbance.   Respiratory:  Positive for shortness of breath. Negative for cough, chest tightness and wheezing.    Cardiovascular:  Negative for chest pain and palpitations.   Gastrointestinal:  Negative for abdominal pain and vomiting.   Genitourinary:  Negative for dysuria and hematuria.   Musculoskeletal:  Positive for arthralgias and gait problem. Negative for back pain.   Skin:  Positive for pallor. Negative for color change and rash.   Neurological:  Positive for weakness. Negative for seizures and syncope.   Psychiatric/Behavioral:  Negative for decreased concentration and sleep disturbance. The patient is not nervous/anxious.    All other systems reviewed and are negative.      Objective   /80 (BP Location: Left arm, Patient Position: Sitting, Cuff Size: Adult)   Pulse 68   Temp (!) 97.2 °F (36.2 °C) (Tympanic)   Ht 5' 7\" (1.702 m)   Wt 61.4 kg (135 lb 6.4 oz)   SpO2 99%   BMI 21.21 kg/m²      Physical Exam  Vitals and nursing note reviewed.   Constitutional:       Appearance: Normal appearance. She is normal weight.      Comments: Frail appearing   HENT:      Head: Normocephalic.   Eyes:      Pupils: Pupils are equal, round, and reactive to light.   Cardiovascular:      Rate and Rhythm: Normal rate and regular rhythm.      Pulses:           Radial pulses are 2+ on the right side and 2+ on the left side.      Heart sounds: Murmur heard.      Systolic " murmur is present with a grade of 5/6.   Pulmonary:      Effort: Pulmonary effort is normal.      Breath sounds: Normal breath sounds.   Abdominal:      General: Abdomen is flat.   Musculoskeletal:      Cervical back: Normal range of motion and neck supple.   Skin:     Capillary Refill: Capillary refill takes less than 2 seconds.      Comments: cool   Neurological:      Mental Status: She is alert.   Psychiatric:         Mood and Affect: Mood normal.         Behavior: Behavior normal.         Thought Content: Thought content normal.         Judgment: Judgment normal.

## 2025-01-10 NOTE — ASSESSMENT & PLAN NOTE
Minimal to modest improvement in symptoms.  Patient declines further workup this issue, we have discussed this at length previously.  Will increase Lyrica to 150 mg twice daily, refill tramadol.  It is difficult for she and her  to make the trip into the office, so I will follow-up with a telephone visit again in roughly 2 weeks.  Orders:    pregabalin (LYRICA) 150 mg capsule; Take 1 capsule (150 mg total) by mouth 2 (two) times a day    traMADol (Ultram) 50 mg tablet; Take 1 tablet (50 mg total) by mouth every 6 (six) hours as needed for moderate pain

## 2025-01-10 NOTE — ASSESSMENT & PLAN NOTE
Lab Results   Component Value Date    EGFR 52 11/08/2024    EGFR 53 07/29/2024    EGFR 69 07/19/2024    CREATININE 1.00 11/08/2024    CREATININE 0.99 07/29/2024    CREATININE 0.80 07/19/2024

## 2025-01-10 NOTE — ASSESSMENT & PLAN NOTE
Patient declines treatment, however she is open to repeating her echocardiogram.  Last 1 was done in February 2024.  She states she feels well, no chest pain or shortness of breath.  No progressive fatigue.  Orders:    Echo complete w/ contrast if indicated; Future

## 2025-01-19 ENCOUNTER — RA CDI HCC (OUTPATIENT)
Dept: OTHER | Facility: HOSPITAL | Age: 80
End: 2025-01-19

## 2025-01-27 ENCOUNTER — OFFICE VISIT (OUTPATIENT)
Dept: FAMILY MEDICINE CLINIC | Facility: CLINIC | Age: 80
End: 2025-01-27
Payer: MEDICARE

## 2025-01-27 DIAGNOSIS — G62.9 NEUROPATHY: Primary | ICD-10-CM

## 2025-01-27 PROCEDURE — 98013 SYNCH AUDIO-ONLY EST LOW 20: CPT | Performed by: FAMILY MEDICINE

## 2025-01-27 RX ORDER — GABAPENTIN 100 MG/1
100 CAPSULE ORAL 3 TIMES DAILY
Qty: 90 CAPSULE | Refills: 2 | Status: SHIPPED | OUTPATIENT
Start: 2025-01-27

## 2025-01-27 NOTE — PROGRESS NOTES
Virtual Brief Visit  Name: Daily Schaeffer      : 1945      MRN: [id number]  Encounter Provider: Alanna Moreau DO  Encounter Date: 2025   Encounter department: [facility]    This Visit is being completed by telephone. The Patient is located at Home and in the following state in which I hold an active license [place]    The patient was identified by name and date of birth. Daily Schaeffer was informed that this is a telemedicine visit and that the visit is being conducted through the Epic Embedded platform. She agrees to proceed..  My office door was closed. No one else was in the room.  She acknowledged consent and understanding of privacy and security of the video platform. The patient has agreed to participate and understands they can discontinue the visit at any time.    Patient is aware this is a billable service.     :  Assessment & Plan  Neuropathy  Discussed potential SE's. Use caution with this med, call me with any issues. Has AWV scheduled for Apr,  f/u then.   Orders:  •  gabapentin (Neurontin) 100 mg capsule; Take 1 capsule (100 mg total) by mouth 3 (three) times a day        History of Present Illness   I called pt today to discuss her B/L UE pain, paresthesias, neuropathy. At last OV 2 weeks ago, she requested an increase in her lyrica dosing. We incr'd to 150mg BID. Takes celexa for mood stabilization. Has Rx for ultram for pain. Her  reports her tremors worsened and she seemed more confused on the higher dosing. She stopped the medication and her tremors resolved and her thoughts are clearer. Lyrica was also increased in 2024. Pain is about the same. Adjusting the dose of lyrica did not seem to minimize her pain. She states ultram is rather ineffective. She is willing to try an alternative Rx.          Visit Time  Total Visit Duration: 12

## 2025-01-27 NOTE — ASSESSMENT & PLAN NOTE
Discussed potential SE's. Use caution with this med, call me with any issues. Has AWV scheduled for Apr, will f/u then.   Orders:  •  gabapentin (Neurontin) 100 mg capsule; Take 1 capsule (100 mg total) by mouth 3 (three) times a day

## 2025-02-24 ENCOUNTER — HOSPITAL ENCOUNTER (EMERGENCY)
Facility: HOSPITAL | Age: 80
Discharge: HOME | End: 2025-02-24
Attending: EMERGENCY MEDICINE | Admitting: EMERGENCY MEDICINE
Payer: MEDICARE

## 2025-02-24 ENCOUNTER — APPOINTMENT (EMERGENCY)
Dept: CT IMAGING | Facility: HOSPITAL | Age: 80
End: 2025-02-24
Payer: MEDICARE

## 2025-02-24 VITALS
HEART RATE: 73 BPM | RESPIRATION RATE: 18 BRPM | TEMPERATURE: 98.1 F | OXYGEN SATURATION: 96 % | HEIGHT: 67 IN | DIASTOLIC BLOOD PRESSURE: 53 MMHG | BODY MASS INDEX: 21.25 KG/M2 | SYSTOLIC BLOOD PRESSURE: 108 MMHG | WEIGHT: 135.36 LBS

## 2025-02-24 DIAGNOSIS — F41.9 ANXIETY: ICD-10-CM

## 2025-02-24 DIAGNOSIS — G89.29 CHRONIC PAIN: Primary | ICD-10-CM

## 2025-02-24 DIAGNOSIS — R42 DIZZINESS: ICD-10-CM

## 2025-02-24 LAB
2HR DELTA HS TROPONIN: 0 NG/L
ALBUMIN SERPL BCG-MCNC: 4.1 G/DL (ref 3.5–5)
ALP SERPL-CCNC: 57 U/L (ref 34–104)
ALT SERPL W P-5'-P-CCNC: 14 U/L (ref 7–52)
ANION GAP SERPL CALCULATED.3IONS-SCNC: 14 MMOL/L (ref 4–13)
AST SERPL W P-5'-P-CCNC: 26 U/L (ref 13–39)
BASOPHILS # BLD AUTO: 0.02 THOUSANDS/ÂΜL (ref 0–0.1)
BASOPHILS NFR BLD AUTO: 0 % (ref 0–1)
BILIRUB SERPL-MCNC: 1.11 MG/DL (ref 0.2–1)
BUN SERPL-MCNC: 24 MG/DL (ref 5–25)
CALCIUM SERPL-MCNC: 10.5 MG/DL (ref 8.4–10.2)
CARDIAC TROPONIN I PNL SERPL HS: 9 NG/L (ref ?–50)
CARDIAC TROPONIN I PNL SERPL HS: 9 NG/L (ref ?–50)
CHLORIDE SERPL-SCNC: 104 MMOL/L (ref 96–108)
CO2 SERPL-SCNC: 21 MMOL/L (ref 21–32)
CREAT SERPL-MCNC: 0.98 MG/DL (ref 0.6–1.3)
EOSINOPHIL # BLD AUTO: 0.04 THOUSAND/ÂΜL (ref 0–0.61)
EOSINOPHIL NFR BLD AUTO: 1 % (ref 0–6)
ERYTHROCYTE [DISTWIDTH] IN BLOOD BY AUTOMATED COUNT: 12.6 % (ref 11.6–15.1)
FLUAV AG UPPER RESP QL IA.RAPID: NEGATIVE
FLUBV AG UPPER RESP QL IA.RAPID: NEGATIVE
GFR SERPL CREATININE-BSD FRML MDRD: 54 ML/MIN/1.73SQ M
GLUCOSE SERPL-MCNC: 95 MG/DL (ref 65–140)
HCT VFR BLD AUTO: 44 % (ref 34.8–46.1)
HGB BLD-MCNC: 15.2 G/DL (ref 11.5–15.4)
IMM GRANULOCYTES # BLD AUTO: 0.01 THOUSAND/UL (ref 0–0.2)
IMM GRANULOCYTES NFR BLD AUTO: 0 % (ref 0–2)
LYMPHOCYTES # BLD AUTO: 1.69 THOUSANDS/ÂΜL (ref 0.6–4.47)
LYMPHOCYTES NFR BLD AUTO: 32 % (ref 14–44)
MAGNESIUM SERPL-MCNC: 1.8 MG/DL (ref 1.9–2.7)
MCH RBC QN AUTO: 33.5 PG (ref 26.8–34.3)
MCHC RBC AUTO-ENTMCNC: 34.5 G/DL (ref 31.4–37.4)
MCV RBC AUTO: 97 FL (ref 82–98)
MONOCYTES # BLD AUTO: 0.57 THOUSAND/ÂΜL (ref 0.17–1.22)
MONOCYTES NFR BLD AUTO: 11 % (ref 4–12)
NEUTROPHILS # BLD AUTO: 2.96 THOUSANDS/ÂΜL (ref 1.85–7.62)
NEUTS SEG NFR BLD AUTO: 56 % (ref 43–75)
NRBC BLD AUTO-RTO: 0 /100 WBCS
PLATELET # BLD AUTO: 174 THOUSANDS/UL (ref 149–390)
PMV BLD AUTO: 9.9 FL (ref 8.9–12.7)
POTASSIUM SERPL-SCNC: 3.7 MMOL/L (ref 3.5–5.3)
PROT SERPL-MCNC: 6.6 G/DL (ref 6.4–8.4)
RBC # BLD AUTO: 4.54 MILLION/UL (ref 3.81–5.12)
SARS-COV+SARS-COV-2 AG RESP QL IA.RAPID: NEGATIVE
SODIUM SERPL-SCNC: 139 MMOL/L (ref 135–147)
WBC # BLD AUTO: 5.29 THOUSAND/UL (ref 4.31–10.16)

## 2025-02-24 PROCEDURE — 93005 ELECTROCARDIOGRAM TRACING: CPT

## 2025-02-24 PROCEDURE — 87804 INFLUENZA ASSAY W/OPTIC: CPT | Performed by: EMERGENCY MEDICINE

## 2025-02-24 PROCEDURE — 99285 EMERGENCY DEPT VISIT HI MDM: CPT | Performed by: EMERGENCY MEDICINE

## 2025-02-24 PROCEDURE — 83735 ASSAY OF MAGNESIUM: CPT | Performed by: EMERGENCY MEDICINE

## 2025-02-24 PROCEDURE — 85025 COMPLETE CBC W/AUTO DIFF WBC: CPT | Performed by: EMERGENCY MEDICINE

## 2025-02-24 PROCEDURE — 80053 COMPREHEN METABOLIC PANEL: CPT | Performed by: EMERGENCY MEDICINE

## 2025-02-24 PROCEDURE — 99285 EMERGENCY DEPT VISIT HI MDM: CPT

## 2025-02-24 PROCEDURE — 84484 ASSAY OF TROPONIN QUANT: CPT | Performed by: EMERGENCY MEDICINE

## 2025-02-24 PROCEDURE — 70450 CT HEAD/BRAIN W/O DYE: CPT

## 2025-02-24 PROCEDURE — 36415 COLL VENOUS BLD VENIPUNCTURE: CPT | Performed by: EMERGENCY MEDICINE

## 2025-02-24 PROCEDURE — 87811 SARS-COV-2 COVID19 W/OPTIC: CPT | Performed by: EMERGENCY MEDICINE

## 2025-02-24 RX ORDER — MECLIZINE HCL 12.5 MG 12.5 MG/1
25 TABLET ORAL ONCE
Status: COMPLETED | OUTPATIENT
Start: 2025-02-24 | End: 2025-02-24

## 2025-02-24 RX ORDER — OXYCODONE HYDROCHLORIDE 5 MG/1
5 TABLET ORAL ONCE
Refills: 0 | Status: COMPLETED | OUTPATIENT
Start: 2025-02-24 | End: 2025-02-24

## 2025-02-24 RX ORDER — MECLIZINE HYDROCHLORIDE 25 MG/1
25 TABLET ORAL 3 TIMES DAILY PRN
Qty: 30 TABLET | Refills: 0 | Status: SHIPPED | OUTPATIENT
Start: 2025-02-24

## 2025-02-24 RX ORDER — LORAZEPAM 0.5 MG/1
0.5 TABLET ORAL ONCE
Status: COMPLETED | OUTPATIENT
Start: 2025-02-24 | End: 2025-02-24

## 2025-02-24 RX ORDER — LANOLIN ALCOHOL/MO/W.PET/CERES
400 CREAM (GRAM) TOPICAL ONCE
Status: COMPLETED | OUTPATIENT
Start: 2025-02-24 | End: 2025-02-24

## 2025-02-24 RX ORDER — OXYCODONE HYDROCHLORIDE 5 MG/1
5 TABLET ORAL EVERY 6 HOURS PRN
Qty: 12 TABLET | Refills: 0 | Status: SHIPPED | OUTPATIENT
Start: 2025-02-24 | End: 2025-03-06

## 2025-02-24 RX ADMIN — OXYCODONE HYDROCHLORIDE 5 MG: 5 TABLET ORAL at 15:23

## 2025-02-24 RX ADMIN — MECLIZINE HYDROCHLORIDE 25 MG: 12.5 TABLET ORAL at 14:00

## 2025-02-24 RX ADMIN — Medication 400 MG: at 15:23

## 2025-02-24 RX ADMIN — LORAZEPAM 0.5 MG: 0.5 TABLET ORAL at 14:00

## 2025-02-24 NOTE — ED NOTES
Unable to obtain Iv access. Provider notified of possible delay     Marie Ibarra RN  02/24/25 [id number]

## 2025-02-24 NOTE — ED PROVIDER NOTES
Time reflects when diagnosis was documented in both MDM as applicable and the Disposition within this note       Time User Action Codes Description Comment    2/24/2025  5:27 PM Nitin Romo [G89.29] Chronic pain     2/24/2025  5:27 PM Nitin Romo [R42] Dizziness     2/24/2025  5:27 PM Nitin Romo [F41.9] Anxiety           ED Disposition       ED Disposition   Discharge    Condition   Stable    Date/Time   Mon Feb 24, 2025  5:27 PM    Comment   Daily Schaeffer discharge to home/self care.                   Assessment & Plan       Medical Decision Making  Diff includes anxiety, symptomatic aortic stenosis, possible electrolyte or volume depletion, vascular neuropathy, less likely stroke.  Consider viral illness, fibromyalgia, cervical radiculopathy    Amount and/or Complexity of Data Reviewed  Labs: ordered.  Radiology: ordered.    Risk  OTC drugs.  Prescription drug management.             Medications   LORazepam (ATIVAN) tablet 0.5 mg (0.5 mg Oral Given 2/24/25 1400)   meclizine (ANTIVERT) tablet 25 mg (25 mg Oral Given 2/24/25 1400)   oxyCODONE (ROXICODONE) IR tablet 5 mg (5 mg Oral Given 2/24/25 1523)   magnesium Oxide (MAG-OX) tablet 400 mg (400 mg Oral Given 2/24/25 1523)       ED Risk Strat Scores                            SBIRT 22yo+      Flowsheet Row Most Recent Value   Initial Alcohol Screen: US AUDIT-C     1. How often do you have a drink containing alcohol? 0 Filed at: 02/24/2025 1319   2. How many drinks containing alcohol do you have on a typical day you are drinking?  0 Filed at: 02/24/2025 1319   3a. Male UNDER 65: How often do you have five or more drinks on one occasion? 0 Filed at: 02/24/2025 1319   3b. FEMALE Any Age, or MALE 65+: How often do you have 4 or more drinks on one occassion? 0 Filed at: 02/24/2025 1319   Audit-C Score 0 Filed at: 02/24/2025 1319   [place]: How many times in the past year have you...    Used an illegal drug or used a prescription medication for non-medical  reasons? Never Filed at: 02/24/2025 5505                            History of Present Illness       Chief Complaint   Patient presents with    Medical Problem     Pt reports generalized pain across her body and dizziness when walking. Pt feels her diabetic neuropathy is the cause        Past Medical History:   Diagnosis Date    SARA (acute kidney injury) ([facility]) 06/19/2024    Aortic stenosis     Depression     Disease of thyroid gland     Diverticular disease 05/09/2017    Essential hypertension 05/20/2016    Hyperlipidemia     Major depression 05/20/2016    Paranoid delusion ([facility]) 06/25/2024    Peripheral neuropathy     Type 2 diabetes mellitus ([facility]) 05/20/2016    Visual impairment       Past Surgical History:   Procedure Laterality Date    BOWEL RESECTION      COLON SURGERY      EYE SURGERY      SMALL INTESTINE SURGERY      TUBAL LIGATION        History reviewed. No pertinent family history.   Social History     Tobacco Use    Smoking status: Never     Passive exposure: Never    Smokeless tobacco: Never   Vaping Use    Vaping status: Never Used   Substance Use Topics    Alcohol use: Yes     Comment: occasional    Drug use: Never      E-Cigarette/Vaping    E-Cigarette Use Never User       E-Cigarette/Vaping Substances    Nicotine No     THC No     CBD No     Flavoring No       I have reviewed and agree with the history as documented.     Hx from patient, , med records - biba from home with multiple complaints - patient has chronic neuropathy treated by primary care, neurologist is too far away.  She also has aortic stenosis but declines surgery.  Nothing is helping with the neuropathy described as pain across shoulders down entire body.  She has associated dizziness, room spinning sensation over last several days.  She has chest pressure.  Poor historian, no SI/ HI.        Review of Systems   Constitutional:  Negative for chills and fever.   HENT:  Negative for rhinorrhea and sore throat.    Respiratory:   Positive for chest tightness and shortness of breath.    Cardiovascular:  Negative for chest pain.   Gastrointestinal:  Negative for constipation, diarrhea, nausea and vomiting.   Genitourinary:  Negative for dysuria and frequency.   Skin:  Negative for rash.   Neurological:  Positive for dizziness, weakness and numbness. Negative for speech difficulty and headaches.   Psychiatric/Behavioral:  The patient is nervous/anxious.    All other systems reviewed and are negative.          Objective       ED Triage Vitals   Temperature Pulse Blood Pressure Respirations SpO2 Patient Position - Orthostatic VS   02/24/25 1319 02/24/25 1317 02/24/25 1317 02/24/25 1317 02/24/25 1317 02/24/25 1317   98.1 °F (36.7 °C) 82 159/67 18 99 % Sitting      Temp src Heart Rate Source BP Location FiO2 (%) Pain Score    -- 02/24/25 1317 02/24/25 1317 -- 02/24/25 1317     Monitor Left arm  10 - Worst Possible Pain      Vitals      Date and Time Temp Pulse SpO2 Resp BP Pain Score FACES Pain Rating User   02/24/25 1730 -- 73 96 % 18 108/53 -- -- [facility]   02/24/25 1630 -- 85 97 % 15 138/63 -- -- [facility]   02/24/25 1600 -- 74 97 % 16 123/58 -- -- [facility]   02/24/25 1530 -- 70 99 % 15 136/63 -- -- [facility]   02/24/25 1515 -- 69 99 % 17 129/59 -- -- [facility]   02/24/25 1400 -- 75 98 % 18 151/63 -- -- [facility]   02/24/25 1319 98.1 °F (36.7 °C) -- -- -- -- -- -- [facility]   02/24/25 1318 -- -- 99 % -- -- -- -- [facility]   02/24/25 1317 -- 82 99 % 18 159/67 10 - Worst Possible Pain -- [facility]            Physical Exam  Vitals and nursing note reviewed.   Constitutional:       General: She is in acute distress.      Appearance: She is well-developed.   HENT:      Head: Normocephalic and atraumatic.      Right Ear: External ear normal.      Left Ear: External ear normal.      Nose: Nose normal.   Eyes:      Conjunctiva/sclera: Conjunctivae normal.      Pupils: Pupils are equal, round, and reactive to light.   Cardiovascular:      Rate and Rhythm: Normal rate and regular rhythm.      Heart sounds:  Normal heart sounds.   Pulmonary:      Effort: Pulmonary effort is normal. No respiratory distress.      Breath sounds: Normal breath sounds. No wheezing.   Abdominal:      General: Bowel sounds are normal. There is no distension.      Palpations: Abdomen is soft.      Tenderness: There is no abdominal tenderness.   Musculoskeletal:         General: No deformity. Normal range of motion.      Cervical back: Normal range of motion and neck supple. No bony tenderness. No spinous process tenderness.      Thoracic back: No bony tenderness.      Lumbar back: Tenderness present.   Skin:     General: Skin is warm and dry.      Findings: No rash.   Neurological:      General: No focal deficit present.      Mental Status: She is alert and oriented to person, place, and time.      GCS: GCS eye subscore is 4. GCS verbal subscore is 5. GCS motor subscore is 6.      Cranial Nerves: No cranial nerve deficit.      Sensory: No sensory deficit.      Motor: No weakness.      Coordination: Coordination normal. Finger-Nose-Finger Test and Heel to Shin Test normal.   Psychiatric:         Mood and Affect: Mood is anxious. Affect is tearful.         Thought Content: Thought content does not include suicidal ideation.      Comments: hyperventilating         Results Reviewed       Procedure Component Value Units Date/Time    HS Troponin I 2hr [[id number]]  (Normal) Collected: 02/24/25 1649    Lab Status: Final result Specimen: Blood from Arm, Left Updated: 02/24/25 1723     hs TnI 2hr 9 ng/L      Delta 2hr hsTnI 0 ng/L     HS Troponin 0hr (reflex protocol) [[id number]]  (Normal) Collected: 02/24/25 1447    Lab Status: Final result Specimen: Blood from Arm, Left Updated: 02/24/25 1514     hs TnI 0hr 9 ng/L     Comprehensive metabolic panel [[id number]]  (Abnormal) Collected: 02/24/25 1447    Lab Status: Final result Specimen: Blood from Arm, Left Updated: 02/24/25 1508     Sodium 139 mmol/L      Potassium 3.7 mmol/L      Chloride 104 mmol/L       CO2 21 mmol/L      ANION GAP 14 mmol/L      BUN 24 mg/dL      Creatinine 0.98 mg/dL      Glucose 95 mg/dL      Calcium 10.5 mg/dL      AST 26 U/L      ALT 14 U/L      Alkaline Phosphatase 57 U/L      Total Protein 6.6 g/dL      Albumin 4.1 g/dL      Total Bilirubin 1.11 mg/dL      eGFR 54 ml/min/1.73sq m     Narrative:      National Kidney Disease Foundation guidelines for Chronic Kidney Disease (CKD):     Stage 1 with normal or high GFR (GFR > 90 mL/min/1.73 square meters)    Stage 2 Mild CKD (GFR = 60-89 mL/min/1.73 square meters)    Stage 3A Moderate CKD (GFR = 45-59 mL/min/1.73 square meters)    Stage 3B Moderate CKD (GFR = 30-44 mL/min/1.73 square meters)    Stage 4 Severe CKD (GFR = 15-29 mL/min/1.73 square meters)    Stage 5 End Stage CKD (GFR <15 mL/min/1.73 square meters)  Note: GFR calculation is accurate only with a steady state creatinine    Magnesium [[id number]]  (Abnormal) Collected: 02/24/25 1447    Lab Status: Final result Specimen: Blood from Arm, Left Updated: 02/24/25 1508     Magnesium 1.8 mg/dL     CBC and differential [[id number]] Collected: 02/24/25 1447    Lab Status: Final result Specimen: Blood from Arm, Left Updated: 02/24/25 1452     WBC 5.29 Thousand/uL      RBC 4.54 Million/uL      Hemoglobin 15.2 g/dL      Hematocrit 44.0 %      MCV 97 fL      MCH 33.5 pg      MCHC 34.5 g/dL      RDW 12.6 %      MPV 9.9 fL      Platelets 174 Thousands/uL      nRBC 0 /100 WBCs      Segmented % 56 %      Immature Grans % 0 %      Lymphocytes % 32 %      Monocytes % 11 %      Eosinophils Relative 1 %      Basophils Relative 0 %      Absolute Neutrophils 2.96 Thousands/µL      Absolute Immature Grans 0.01 Thousand/uL      Absolute Lymphocytes 1.69 Thousands/µL      Absolute Monocytes 0.57 Thousand/µL      Eosinophils Absolute 0.04 Thousand/µL      Basophils Absolute 0.02 Thousands/µL     FLU/COVID Rapid Antigen (30 min. TAT) - Preferred screening test in ED [[id number]]  (Normal) Collected: 02/24/25 1044     Lab Status: Final result Specimen: Nares from Nose Updated: 02/24/25 1442     SARS COV Rapid Antigen Negative     Influenza A Rapid Antigen Negative     Influenza B Rapid Antigen Negative    Narrative:      This test has been performed using the [employer] Mercedes 2 FLU+SARS Antigen test under the Emergency Use Authorization (EUA). This test has been validated by the  and verified by the performing laboratory. The Mercedes uses lateral flow immunofluorescent sandwich assay to detect SARS-COV, Influenza A and Influenza B Antigen.     The [employer] Mercedes 2 SARS Antigen test does not differentiate between SARS-CoV and SARS-CoV-2.     Negative results are presumptive and may be confirmed with a molecular assay, if necessary, for patient management. Negative results do not rule out SARS-CoV-2 or influenza infection and should not be used as the sole basis for treatment or patient management decisions. A negative test result may occur if the level of antigen in a sample is below the limit of detection of this test.     Positive results are indicative of the presence of viral antigens, but do not rule out bacterial infection or co-infection with other viruses.     All test results should be used as an adjunct to clinical observations and other information available to the provider.    FOR PEDIATRIC PATIENTS - copy/paste COVID Guidelines URL to browser: [email or web address]            CT head without contrast   Final Interpretation by Brodie Butcher MD (02/24 1528)      No acute intracranial abnormality.      The study was marked in EPIC for immediate notification.            Workstation performed: [id number]             ECG 12 Lead Documentation Only    Date/Time: 2/24/2025 2:16 PM    Performed by: Nitin Romo DO  Authorized by: Nitin Romo DO    Indications / Diagnosis:  Chest pressure  ECG reviewed by me, the ED Provider: yes    Patient location:  ED  Previous  ECG:     Previous ECG:  Unavailable  Interpretation:     Interpretation: normal    Rate:     ECG rate:  76    ECG rate assessment: normal    Rhythm:     Rhythm: sinus rhythm    Ectopy:     Ectopy: none    QRS:     QRS axis:  Normal    QRS intervals:  Normal  Conduction:     Conduction: normal    ST segments:     ST segments:  Normal  T waves:     T waves: normal    Comments:      This EKG was interpreted by me.      ED Medication and Procedure Management   Prior to Admission Medications   Prescriptions Last Dose Informant Patient Reported? Taking?   acetaminophen (TYLENOL) 325 mg tablet   No No   Sig: Take 2 tablets (650 mg total) by mouth every 4 (four) hours as needed for mild pain, headaches or fever   aspirin (ECOTRIN LOW STRENGTH) 81 mg EC tablet   No No   Sig: Take 1 tablet (81 mg total) by mouth daily   atorvastatin (LIPITOR) 40 mg tablet   Yes No   citalopram (CeleXA) 20 mg tablet   No No   Sig: Take 1 tablet (20 mg total) by mouth daily   gabapentin (Neurontin) 100 mg capsule   No No   Sig: Take 1 capsule (100 mg total) by mouth 3 (three) times a day   levothyroxine 75 mcg tablet   No No   Sig: Take 1 tablet (75 mcg total) by mouth daily in the early morning Take 1 tablet (75 mg) by oral route five days a week.   magnesium gluconate (MAGONATE) 500 mg tablet   No No   Sig: Take 1 tablet (500 mg total) by mouth daily   polyethylene glycol (GLYCOLAX) 17 GM/SCOOP powder   No No   Sig: Take 17 g by mouth 2 (two) times a day   risperiDONE (RisperDAL) 0.5 mg tablet   No No   Sig: Take 0.5 tablets (0.25 mg total) by mouth 2 (two) times a day Do not start before August 13, 2024.   traMADol (Ultram) 50 mg tablet   No No   Sig: Take 1 tablet (50 mg total) by mouth every 6 (six) hours as needed for moderate pain      Facility-Administered Medications: None     Discharge Medication List as of 2/24/2025  5:37 PM        START taking these medications    Details   meclizine (ANTIVERT) 25 mg tablet Take 1 tablet (25 mg  total) by mouth 3 (three) times a day as needed for dizziness, Starting Mon 2/24/2025, Normal      oxyCODONE (Roxicodone) 5 immediate release tablet Take 1 tablet (5 mg total) by mouth every 6 (six) hours as needed for severe pain for up to 10 days Max Daily Amount: 20 mg, Starting Mon 2/24/2025, Until Thu 3/6/2025 at 2359, Normal           CONTINUE these medications which have NOT CHANGED    Details   acetaminophen (TYLENOL) 325 mg tablet Take 2 tablets (650 mg total) by mouth every 4 (four) hours as needed for mild pain, headaches or fever, Starting Sat 6/22/2024, No Print      aspirin (ECOTRIN LOW STRENGTH) 81 mg EC tablet Take 1 tablet (81 mg total) by mouth daily, Starting Sun 6/23/2024, Normal      atorvastatin (LIPITOR) 40 mg tablet Historical Med      citalopram (CeleXA) 20 mg tablet Take 1 tablet (20 mg total) by mouth daily, Starting Wed 12/11/2024, Normal      gabapentin (Neurontin) 100 mg capsule Take 1 capsule (100 mg total) by mouth 3 (three) times a day, Starting Mon 1/27/2025, Normal      levothyroxine 75 mcg tablet Take 1 tablet (75 mcg total) by mouth daily in the early morning Take 1 tablet (75 mg) by oral route five days a week., Starting Mon 4/1/2024, Normal      magnesium gluconate (MAGONATE) 500 mg tablet Take 1 tablet (500 mg total) by mouth daily, Starting Fri 6/28/2024, No Print      polyethylene glycol (GLYCOLAX) 17 GM/SCOOP powder Take 17 g by mouth 2 (two) times a day, Starting Fri 7/19/2024, Normal      risperiDONE (RisperDAL) 0.5 mg tablet Take 0.5 tablets (0.25 mg total) by mouth 2 (two) times a day Do not start before August 13, 2024., Starting Tue 8/13/2024, Normal      traMADol (Ultram) 50 mg tablet Take 1 tablet (50 mg total) by mouth every 6 (six) hours as needed for moderate pain, Starting Fri 1/10/2025, Normal             ED SEPSIS DOCUMENTATION   Time reflects when diagnosis was documented in both MDM as applicable and the Disposition within this note       Time User Action  Codes Description Comment    2/24/2025  5:27 PM Nitin Romo [G89.29] Chronic pain     2/24/2025  5:27 PM Nitin Romo [R42] Dizziness     2/24/2025  5:27 PM Nitin Romo [F41.9] Anxiety             patient felt better after meds in ER, ambulated well with walker        Nitin Romo,   02/24/25 1927

## 2025-02-24 NOTE — ED NOTES
Pt ambulated with a walker independently in the [place] without sign of distress     Marie Ibarra RN  02/24/25 9962

## 2025-02-24 NOTE — DISCHARGE INSTRUCTIONS
July 29, 2024 ct of cervical spine - anterolisthesis of C3 on C4 likely due to bilateral facet arthropathy, cervical spondylosis - review with primary care

## 2025-02-25 ENCOUNTER — VBI (OUTPATIENT)
Dept: FAMILY MEDICINE CLINIC | Facility: CLINIC | Age: 80
End: 2025-02-25

## 2025-02-25 LAB
ATRIAL RATE: 76 BPM
ATRIAL RATE: 79 BPM
P AXIS: 66 DEGREES
P AXIS: 66 DEGREES
PR INTERVAL: 182 MS
PR INTERVAL: 202 MS
QRS AXIS: 29 DEGREES
QRS AXIS: 56 DEGREES
QRSD INTERVAL: 62 MS
QRSD INTERVAL: 62 MS
QT INTERVAL: 400 MS
QT INTERVAL: 422 MS
QTC INTERVAL: 450 MS
QTC INTERVAL: 483 MS
T WAVE AXIS: 57 DEGREES
T WAVE AXIS: 70 DEGREES
VENTRICULAR RATE: 76 BPM
VENTRICULAR RATE: 79 BPM

## 2025-02-25 PROCEDURE — 93010 ELECTROCARDIOGRAM REPORT: CPT | Performed by: INTERNAL MEDICINE

## 2025-02-25 NOTE — TELEPHONE ENCOUNTER
02/25/25 11:42 AM    Patient contacted post ED visit, first outreach attempt made. Message was left for patient to return a call to the [facility] Department at [facility]: Phone [phone].    Thank you.  [facility] Colunga MA  [facility]

## 2025-02-26 ENCOUNTER — TELEPHONE (OUTPATIENT)
Dept: PHYSICAL THERAPY | Facility: OTHER | Age: 80
End: 2025-02-26

## 2025-02-26 NOTE — TELEPHONE ENCOUNTER
02/26/25 3:59 PM    Patient contacted post ED visit, second outreach attempt made. Message was left for patient to return a call to the [facility] Department at [facility]: Phone [phone].    Thank you.  [facility] Colunga MA  [facility]

## 2025-02-27 NOTE — TELEPHONE ENCOUNTER
02/27/25 12:03 PM    Patient contacted post ED visit, [facility] department spoke with patient/caregiver and outreach was successful.    Thank you.  [facility] Colunga MA  [facility]

## 2025-04-28 ENCOUNTER — HOSPITAL ENCOUNTER (INPATIENT)
Facility: HOSPITAL | Age: 80
LOS: 7 days | Discharge: NURSING, REHAB OR LONG-TERM CARE | DRG: 306 | End: 2025-05-05
Attending: INTERNAL MEDICINE | Admitting: INTERNAL MEDICINE
Payer: MEDICARE

## 2025-04-28 ENCOUNTER — HOSPITAL ENCOUNTER (EMERGENCY)
Facility: HOSPITAL | Age: 80
End: 2025-04-28
Attending: EMERGENCY MEDICINE
Payer: MEDICARE

## 2025-04-28 ENCOUNTER — APPOINTMENT (EMERGENCY)
Dept: RADIOLOGY | Facility: HOSPITAL | Age: 80
End: 2025-04-28
Payer: MEDICARE

## 2025-04-28 VITALS
SYSTOLIC BLOOD PRESSURE: 109 MMHG | HEART RATE: 53 BPM | BODY MASS INDEX: 21.19 KG/M2 | OXYGEN SATURATION: 93 % | DIASTOLIC BLOOD PRESSURE: 53 MMHG | HEIGHT: 67 IN | TEMPERATURE: 98.4 F | RESPIRATION RATE: 14 BRPM | WEIGHT: 135 LBS

## 2025-04-28 DIAGNOSIS — K08.9 POOR DENTITION: ICD-10-CM

## 2025-04-28 DIAGNOSIS — A41.9 SEPSIS (HCC): ICD-10-CM

## 2025-04-28 DIAGNOSIS — R07.9 CHEST PAIN: Primary | ICD-10-CM

## 2025-04-28 DIAGNOSIS — R94.31 DIFFUSE ST SEGMENT DEPRESSION: ICD-10-CM

## 2025-04-28 DIAGNOSIS — G47.00 INSOMNIA: ICD-10-CM

## 2025-04-28 DIAGNOSIS — K59.00 CONSTIPATION: ICD-10-CM

## 2025-04-28 DIAGNOSIS — N39.0 UTI (URINARY TRACT INFECTION): ICD-10-CM

## 2025-04-28 DIAGNOSIS — R06.09 EXERTIONAL DYSPNEA: ICD-10-CM

## 2025-04-28 DIAGNOSIS — I35.0 SEVERE AORTIC STENOSIS: ICD-10-CM

## 2025-04-28 DIAGNOSIS — R78.81 BACTEREMIA: ICD-10-CM

## 2025-04-28 DIAGNOSIS — R94.31 ABNORMAL EKG: ICD-10-CM

## 2025-04-28 DIAGNOSIS — G93.41 ACUTE METABOLIC ENCEPHALOPATHY: ICD-10-CM

## 2025-04-28 LAB
2HR DELTA HS TROPONIN: 6 NG/L
4HR DELTA HS TROPONIN: 12 NG/L
ALBUMIN SERPL BCG-MCNC: 4.4 G/DL (ref 3.5–5)
ALP SERPL-CCNC: 52 U/L (ref 34–104)
ALT SERPL W P-5'-P-CCNC: 13 U/L (ref 7–52)
ANION GAP SERPL CALCULATED.3IONS-SCNC: 15 MMOL/L (ref 4–13)
APTT PPP: 28 SECONDS (ref 23–34)
AST SERPL W P-5'-P-CCNC: 24 U/L (ref 13–39)
ATRIAL RATE: 115 BPM
BACTERIA UR QL AUTO: ABNORMAL /HPF
BASOPHILS # BLD AUTO: 0.03 THOUSANDS/ÂΜL (ref 0–0.1)
BASOPHILS NFR BLD AUTO: 1 % (ref 0–1)
BILIRUB SERPL-MCNC: 0.82 MG/DL (ref 0.2–1)
BILIRUB UR QL STRIP: NEGATIVE
BUN SERPL-MCNC: 25 MG/DL (ref 5–25)
CALCIUM SERPL-MCNC: 10.7 MG/DL (ref 8.4–10.2)
CARDIAC TROPONIN I PNL SERPL HS: 11 NG/L (ref ?–50)
CARDIAC TROPONIN I PNL SERPL HS: 17 NG/L (ref ?–50)
CARDIAC TROPONIN I PNL SERPL HS: 23 NG/L (ref ?–50)
CHLORIDE SERPL-SCNC: 101 MMOL/L (ref 96–108)
CLARITY UR: CLEAR
CO2 SERPL-SCNC: 23 MMOL/L (ref 21–32)
COLOR UR: ABNORMAL
CREAT SERPL-MCNC: 0.98 MG/DL (ref 0.6–1.3)
EOSINOPHIL # BLD AUTO: 0.17 THOUSAND/ÂΜL (ref 0–0.61)
EOSINOPHIL NFR BLD AUTO: 3 % (ref 0–6)
ERYTHROCYTE [DISTWIDTH] IN BLOOD BY AUTOMATED COUNT: 12.1 % (ref 11.6–15.1)
FLUAV AG UPPER RESP QL IA.RAPID: NEGATIVE
FLUBV AG UPPER RESP QL IA.RAPID: NEGATIVE
GFR SERPL CREATININE-BSD FRML MDRD: 54 ML/MIN/1.73SQ M
GLUCOSE SERPL-MCNC: 104 MG/DL (ref 65–140)
GLUCOSE SERPL-MCNC: 116 MG/DL (ref 65–140)
GLUCOSE UR STRIP-MCNC: NEGATIVE MG/DL
HCT VFR BLD AUTO: 49.2 % (ref 34.8–46.1)
HGB BLD-MCNC: 16.7 G/DL (ref 11.5–15.4)
HGB UR QL STRIP.AUTO: ABNORMAL
IMM GRANULOCYTES # BLD AUTO: 0.01 THOUSAND/UL (ref 0–0.2)
IMM GRANULOCYTES NFR BLD AUTO: 0 % (ref 0–2)
INR PPP: 0.92 (ref 0.85–1.19)
KETONES UR STRIP-MCNC: ABNORMAL MG/DL
LACTATE SERPL-SCNC: 0.9 MMOL/L (ref 0.5–2)
LACTATE SERPL-SCNC: 4.4 MMOL/L (ref 0.5–2)
LEUKOCYTE ESTERASE UR QL STRIP: ABNORMAL
LYMPHOCYTES # BLD AUTO: 2.21 THOUSANDS/ÂΜL (ref 0.6–4.47)
LYMPHOCYTES NFR BLD AUTO: 40 % (ref 14–44)
MCH RBC QN AUTO: 33.1 PG (ref 26.8–34.3)
MCHC RBC AUTO-ENTMCNC: 33.9 G/DL (ref 31.4–37.4)
MCV RBC AUTO: 98 FL (ref 82–98)
MONOCYTES # BLD AUTO: 0.58 THOUSAND/ÂΜL (ref 0.17–1.22)
MONOCYTES NFR BLD AUTO: 11 % (ref 4–12)
NEUTROPHILS # BLD AUTO: 2.53 THOUSANDS/ÂΜL (ref 1.85–7.62)
NEUTS SEG NFR BLD AUTO: 45 % (ref 43–75)
NITRITE UR QL STRIP: POSITIVE
NON-SQ EPI CELLS URNS QL MICRO: ABNORMAL /HPF
NRBC BLD AUTO-RTO: 0 /100 WBCS
PH UR STRIP.AUTO: 7 [PH]
PLATELET # BLD AUTO: 175 THOUSANDS/UL (ref 149–390)
PMV BLD AUTO: 9.9 FL (ref 8.9–12.7)
POTASSIUM SERPL-SCNC: 4.1 MMOL/L (ref 3.5–5.3)
PROCALCITONIN SERPL-MCNC: 0.08 NG/ML
PROT SERPL-MCNC: 7.5 G/DL (ref 6.4–8.4)
PROT UR STRIP-MCNC: ABNORMAL MG/DL
PROTHROMBIN TIME: 12.9 SECONDS (ref 12.3–15)
QRS AXIS: 35 DEGREES
QRSD INTERVAL: 100 MS
QT INTERVAL: 388 MS
QTC INTERVAL: 495 MS
RBC # BLD AUTO: 5.04 MILLION/UL (ref 3.81–5.12)
RBC #/AREA URNS AUTO: ABNORMAL /HPF
SARS-COV+SARS-COV-2 AG RESP QL IA.RAPID: NEGATIVE
SODIUM SERPL-SCNC: 139 MMOL/L (ref 135–147)
SP GR UR STRIP.AUTO: 1.02
T WAVE AXIS: 80 DEGREES
TSH SERPL DL<=0.05 MIU/L-ACNC: 1.34 UIU/ML (ref 0.45–4.5)
UROBILINOGEN UR QL STRIP.AUTO: 1 E.U./DL
VENTRICULAR RATE: 98 BPM
WBC # BLD AUTO: 5.53 THOUSAND/UL (ref 4.31–10.16)
WBC #/AREA URNS AUTO: ABNORMAL /HPF

## 2025-04-28 PROCEDURE — 85025 COMPLETE CBC W/AUTO DIFF WBC: CPT | Performed by: EMERGENCY MEDICINE

## 2025-04-28 PROCEDURE — 96374 THER/PROPH/DIAG INJ IV PUSH: CPT

## 2025-04-28 PROCEDURE — 84443 ASSAY THYROID STIM HORMONE: CPT | Performed by: EMERGENCY MEDICINE

## 2025-04-28 PROCEDURE — 83605 ASSAY OF LACTIC ACID: CPT | Performed by: EMERGENCY MEDICINE

## 2025-04-28 PROCEDURE — 99223 1ST HOSP IP/OBS HIGH 75: CPT | Performed by: INTERNAL MEDICINE

## 2025-04-28 PROCEDURE — 36415 COLL VENOUS BLD VENIPUNCTURE: CPT | Performed by: EMERGENCY MEDICINE

## 2025-04-28 PROCEDURE — 93005 ELECTROCARDIOGRAM TRACING: CPT

## 2025-04-28 PROCEDURE — 87154 CUL TYP ID BLD PTHGN 6+ TRGT: CPT | Performed by: EMERGENCY MEDICINE

## 2025-04-28 PROCEDURE — 87811 SARS-COV-2 COVID19 W/OPTIC: CPT | Performed by: EMERGENCY MEDICINE

## 2025-04-28 PROCEDURE — 87077 CULTURE AEROBIC IDENTIFY: CPT | Performed by: EMERGENCY MEDICINE

## 2025-04-28 PROCEDURE — 87040 BLOOD CULTURE FOR BACTERIA: CPT | Performed by: EMERGENCY MEDICINE

## 2025-04-28 PROCEDURE — 80053 COMPREHEN METABOLIC PANEL: CPT | Performed by: EMERGENCY MEDICINE

## 2025-04-28 PROCEDURE — 81001 URINALYSIS AUTO W/SCOPE: CPT | Performed by: EMERGENCY MEDICINE

## 2025-04-28 PROCEDURE — 85730 THROMBOPLASTIN TIME PARTIAL: CPT | Performed by: EMERGENCY MEDICINE

## 2025-04-28 PROCEDURE — 99285 EMERGENCY DEPT VISIT HI MDM: CPT | Performed by: EMERGENCY MEDICINE

## 2025-04-28 PROCEDURE — 85610 PROTHROMBIN TIME: CPT | Performed by: EMERGENCY MEDICINE

## 2025-04-28 PROCEDURE — 84145 PROCALCITONIN (PCT): CPT | Performed by: EMERGENCY MEDICINE

## 2025-04-28 PROCEDURE — 82948 REAGENT STRIP/BLOOD GLUCOSE: CPT

## 2025-04-28 PROCEDURE — 99285 EMERGENCY DEPT VISIT HI MDM: CPT

## 2025-04-28 PROCEDURE — 96375 TX/PRO/DX INJ NEW DRUG ADDON: CPT

## 2025-04-28 PROCEDURE — 96365 THER/PROPH/DIAG IV INF INIT: CPT

## 2025-04-28 PROCEDURE — 87804 INFLUENZA ASSAY W/OPTIC: CPT | Performed by: EMERGENCY MEDICINE

## 2025-04-28 PROCEDURE — 71045 X-RAY EXAM CHEST 1 VIEW: CPT

## 2025-04-28 PROCEDURE — 96361 HYDRATE IV INFUSION ADD-ON: CPT

## 2025-04-28 PROCEDURE — 84484 ASSAY OF TROPONIN QUANT: CPT | Performed by: EMERGENCY MEDICINE

## 2025-04-28 PROCEDURE — 93010 ELECTROCARDIOGRAM REPORT: CPT | Performed by: INTERNAL MEDICINE

## 2025-04-28 PROCEDURE — 87181 SC STD AGAR DILUTION PER AGT: CPT | Performed by: EMERGENCY MEDICINE

## 2025-04-28 PROCEDURE — 96376 TX/PRO/DX INJ SAME DRUG ADON: CPT

## 2025-04-28 RX ORDER — ATORVASTATIN CALCIUM 40 MG/1
40 TABLET, FILM COATED ORAL
Status: DISCONTINUED | OUTPATIENT
Start: 2025-04-28 | End: 2025-05-05 | Stop reason: HOSPADM

## 2025-04-28 RX ORDER — ASPIRIN 81 MG/1
81 TABLET ORAL DAILY
Status: DISCONTINUED | OUTPATIENT
Start: 2025-04-28 | End: 2025-05-05 | Stop reason: HOSPADM

## 2025-04-28 RX ORDER — GABAPENTIN 100 MG/1
100 CAPSULE ORAL 3 TIMES DAILY
Status: DISCONTINUED | OUTPATIENT
Start: 2025-04-28 | End: 2025-05-05 | Stop reason: HOSPADM

## 2025-04-28 RX ORDER — LORAZEPAM 0.5 MG/1
0.5 TABLET ORAL ONCE
Status: COMPLETED | OUTPATIENT
Start: 2025-04-28 | End: 2025-04-28

## 2025-04-28 RX ORDER — TRAMADOL HYDROCHLORIDE 50 MG/1
50 TABLET ORAL EVERY 6 HOURS PRN
Status: DISCONTINUED | OUTPATIENT
Start: 2025-04-28 | End: 2025-05-01

## 2025-04-28 RX ORDER — HYDROMORPHONE HCL IN WATER/PF 6 MG/30 ML
0.2 PATIENT CONTROLLED ANALGESIA SYRINGE INTRAVENOUS EVERY 4 HOURS PRN
Refills: 0 | Status: DISCONTINUED | OUTPATIENT
Start: 2025-04-28 | End: 2025-04-29

## 2025-04-28 RX ORDER — RISPERIDONE 0.25 MG/1
0.25 TABLET ORAL 2 TIMES DAILY
Status: DISCONTINUED | OUTPATIENT
Start: 2025-04-28 | End: 2025-05-05 | Stop reason: HOSPADM

## 2025-04-28 RX ORDER — LEVOTHYROXINE SODIUM 75 UG/1
75 TABLET ORAL
Status: DISCONTINUED | OUTPATIENT
Start: 2025-04-28 | End: 2025-05-05 | Stop reason: HOSPADM

## 2025-04-28 RX ORDER — LORAZEPAM 2 MG/ML
1 INJECTION INTRAMUSCULAR ONCE
Status: COMPLETED | OUTPATIENT
Start: 2025-04-28 | End: 2025-04-28

## 2025-04-28 RX ORDER — LORAZEPAM 2 MG/ML
0.25 INJECTION INTRAMUSCULAR ONCE
Status: COMPLETED | OUTPATIENT
Start: 2025-04-28 | End: 2025-04-28

## 2025-04-28 RX ORDER — HEPARIN SODIUM 5000 [USP'U]/ML
5000 INJECTION, SOLUTION INTRAVENOUS; SUBCUTANEOUS EVERY 8 HOURS SCHEDULED
Status: DISCONTINUED | OUTPATIENT
Start: 2025-04-28 | End: 2025-05-05 | Stop reason: HOSPADM

## 2025-04-28 RX ORDER — ACETAMINOPHEN 325 MG/1
650 TABLET ORAL EVERY 6 HOURS PRN
Status: DISCONTINUED | OUTPATIENT
Start: 2025-04-28 | End: 2025-05-05 | Stop reason: HOSPADM

## 2025-04-28 RX ORDER — SODIUM CHLORIDE 9 MG/ML
3 INJECTION INTRAVENOUS
Status: DISCONTINUED | OUTPATIENT
Start: 2025-04-28 | End: 2025-04-28 | Stop reason: HOSPADM

## 2025-04-28 RX ORDER — TEMAZEPAM 7.5 MG/1
7.5 CAPSULE ORAL ONCE
Status: COMPLETED | OUTPATIENT
Start: 2025-04-28 | End: 2025-04-28

## 2025-04-28 RX ADMIN — RISPERIDONE 0.25 MG: 0.25 TABLET, FILM COATED ORAL at 22:12

## 2025-04-28 RX ADMIN — CEFTRIAXONE SODIUM 1000 MG: 10 INJECTION, POWDER, FOR SOLUTION INTRAVENOUS at 01:46

## 2025-04-28 RX ADMIN — SODIUM CHLORIDE 500 ML: 0.9 INJECTION, SOLUTION INTRAVENOUS at 00:49

## 2025-04-28 RX ADMIN — SODIUM CHLORIDE 1000 ML: 0.9 INJECTION, SOLUTION INTRAVENOUS at 01:42

## 2025-04-28 RX ADMIN — ATORVASTATIN CALCIUM 40 MG: 40 TABLET, FILM COATED ORAL at 22:12

## 2025-04-28 RX ADMIN — HEPARIN SODIUM 5000 UNITS: 5000 INJECTION INTRAVENOUS; SUBCUTANEOUS at 22:11

## 2025-04-28 RX ADMIN — LORAZEPAM 0.25 MG: 2 INJECTION INTRAMUSCULAR; INTRAVENOUS at 15:47

## 2025-04-28 RX ADMIN — GABAPENTIN 100 MG: 100 CAPSULE ORAL at 22:12

## 2025-04-28 RX ADMIN — MORPHINE SULFATE 2 MG: 2 INJECTION, SOLUTION INTRAMUSCULAR; INTRAVENOUS at 00:49

## 2025-04-28 RX ADMIN — LORAZEPAM 1 MG: 2 INJECTION INTRAMUSCULAR; INTRAVENOUS at 00:51

## 2025-04-28 RX ADMIN — LORAZEPAM 0.5 MG: 0.5 TABLET ORAL at 23:04

## 2025-04-28 RX ADMIN — MORPHINE SULFATE 2 MG: 2 INJECTION, SOLUTION INTRAMUSCULAR; INTRAVENOUS at 05:34

## 2025-04-28 RX ADMIN — TEMAZEPAM 7.5 MG: 7.5 CAPSULE ORAL at 23:46

## 2025-04-28 RX ADMIN — MORPHINE SULFATE 2 MG: 2 INJECTION, SOLUTION INTRAMUSCULAR; INTRAVENOUS at 03:43

## 2025-04-28 RX ADMIN — MORPHINE SULFATE 2 MG: 2 INJECTION, SOLUTION INTRAMUSCULAR; INTRAVENOUS at 22:00

## 2025-04-28 RX ADMIN — ASPIRIN 81 MG: 81 TABLET, COATED ORAL at 22:12

## 2025-04-28 NOTE — EMTALA/ACUTE CARE TRANSFER
[facility] EMERGENCY DEPARTMENT  [address] [place] [address]  Dept: [phone]      EMTALA TRANSFER CONSENT    NAME Daily ANDRADE 1945                              MRN [id number]    I have been informed of my rights regarding examination, treatment, and transfer   by Dr. Elton Aponte MD    Benefits: Specialized equipment and/or services available at the receiving facility (Include comment)________________________ (interventional Cardiology)    Risks: Potential for delay in receiving treatment, Potential deterioration of medical condition, Loss of IV, Increased discomfort during transfer, Possible worsening of condition or death during transfer      Transfer Request   I acknowledge that my medical condition has been evaluated and explained to me by the emergency department physician or other qualified medical person and/or my attending physician who has recommended and offered to me further medical examination and treatment. I understand the Hospital's obligation with respect to the treatment and stabilization of my emergency medical condition. I nevertheless request to be transferred. I release the Hospital, the doctor, and any other persons caring for me from all responsibility or liability for any injury or ill effects that may result from my transfer and agree to accept all responsibility for the consequences of my choice to transfer, rather than receive stabilizing treatment at the Hospital. I understand that because the transfer is my request, my insurance may not provide reimbursement for the services.  The Hospital will assist and direct me and my family in how to make arrangements for transfer, but the hospital is not liable for any fees charged by the transport service.  In spite of this understanding, I refuse to consent to further medical examination and treatment which has been offered to me, and request transfer to  Accepting Facility Name, City & State : [facility]. I authorize the performance of emergency medical procedures and treatments upon me in both transit and upon arrival at the receiving facility.  Additionally, I authorize the release of any and all medical records to the receiving facility and request they be transported with me, if possible.    I authorize the performance of emergency medical procedures and treatments upon me in both transit and upon arrival at the receiving facility.  Additionally, I authorize the release of any and all medical records to the receiving facility and request they be transported with me, if possible.  I understand that the safest mode of transportation during a medical emergency is an ambulance and that the Hospital advocates the use of this mode of transport. Risks of traveling to the receiving facility by car, including absence of medical control, life sustaining equipment, such as oxygen, and medical personnel has been explained to me and I fully understand them.    (RUCHI CORRECT BOX BELOW)  [  ]  I consent to the stated transfer and to be transported by ambulance/helicopter.  [  ]  I consent to the stated transfer, but refuse transportation by ambulance and accept full responsibility for my transportation by car.  I understand the risks of non-ambulance transfers and I exonerate the Hospital and its staff from any deterioration in my condition that results from this refusal.    X___________________________________________    DATE  25  TIME________  Signature of patient or legally responsible individual signing on patient behalf           RELATIONSHIP TO PATIENT_________________________          Provider Certification    NAME Daily Schaeffer                                        [facility] 1945                              MRN [id number]    A medical screening exam was performed on the above named patient.  Based on the examination:    Condition Necessitating Transfer The  primary encounter diagnosis was Chest pain. Diagnoses of UTI (urinary tract infection), Sepsis (HCC), and Diffuse ST segment depression were also pertinent to this visit.    Patient Condition: The patient has been stabilized such that within reasonable medical probability, no material deterioration of the patient condition or the condition of the unborn child(vignesh) is likely to result from the transfer    Reason for Transfer: Level of Care needed not available at this facility, Patient/Family request, No bed available at level of patient's needs    Transfer Requirements: Facility [facility]   Space available and qualified personnel available for treatment as acknowledged by    Agreed to accept transfer and to provide appropriate medical treatment as acknowledged by       Dr. Medrano  Appropriate medical records of the examination and treatment of the patient are provided at the time of transfer   STAFF INITIAL WHEN COMPLETED _______  Transfer will be performed by qualified personnel from    and appropriate transfer equipment as required, including the use of necessary and appropriate life support measures.    Provider Certification: I have examined the patient and explained the following risks and benefits of being transferred/refusing transfer to the patient/family:  General risk, such as traffic hazards, adverse weather conditions, rough terrain or turbulence, possible failure of equipment (including vehicle or aircraft), or consequences of actions of persons outside the control of the transport personnel, Unanticipated needs of medical equipment and personnel during transport, Risk of worsening condition, The possibility of a transport vehicle being unavailable      Based on these reasonable risks and benefits to the patient and/or the unborn child(vignesh), and based upon the information available at the time of the patient’s examination, I certify that the medical benefits reasonably to be expected from the  provision of appropriate medical treatments at another medical facility outweigh the increasing risks, if any, to the individual’s medical condition, and in the case of labor to the unborn child, from effecting the transfer.    X____________________________________________ DATE 04/28/25        TIME_______      ORIGINAL - SEND TO MEDICAL RECORDS   COPY - SEND WITH PATIENT DURING TRANSFER

## 2025-04-28 NOTE — ED NOTES
"Pt rang call bell to inquire about what she is waiting for. I let her know that we are awaiting her transfer to [facility] for interventional cardiology. Pt stated \"I never gave consent for this\" despite the EMTALA papers being signed, and asked for \"something to knock me out\". Provider made aware.     Mervat Fountain RN  04/28/25 9593    "

## 2025-04-28 NOTE — ED NOTES
Pt requested lights shut off and curtain closed so she can try and get some sleep.     Mervat Fountain RN  04/28/25 7430

## 2025-04-28 NOTE — ED PROVIDER NOTES
Time reflects when diagnosis was documented in both MDM as applicable and the Disposition within this note       Time User Action Codes Description Comment    4/28/2025  2:27 AM Elton Carrillo Add [R07.9] Chest pain     4/28/2025  2:27 AM Elton Carrillo Add [N39.0] UTI (urinary tract infection)     4/28/2025  2:27 AM Elton Carrillo Add [A41.9] Sepsis (HCC)     4/28/2025  2:27 AM Elton Carrillo Add [R94.31] ST segment depression     4/28/2025  2:28 AM Elton Carrillo Remove [R94.31] ST segment depression     4/28/2025  2:28 AM Elton Carrillo Add [R94.31] Diffuse ST segment depression           ED Disposition       ED Disposition   Transfer to Another Facility-In Network    Condition   --    Date/Time   Mon Apr 28, 2025  2:27 AM    Comment   Daily Schaeffer should be transferred out to [facility].               Assessment & Plan       Medical Decision Making  79-year-old female presents with chest pain and findings suspicious for possible STEMI.  Contacted interventional cardiology who suggested that the patient come for catheterization but suggested this was more likely an NSTEMI.  Also suspicious for urinary tract infection.  Patient was extremely anxious and responded favorably to lorazepam.  Aspirin held off due to bordering on toxicity from 1 g dosage earlier in the day.  Cardiology recommended Brilinta and heparin only if troponins start to significantly increase.    Amount and/or Complexity of Data Reviewed  Independent Historian: EMS  External Data Reviewed: notes.  Labs: ordered.  Radiology: ordered.  ECG/medicine tests: ordered and independent interpretation performed.  Discussion of management or test interpretation with external provider(s): Case discussed with interventional cardiologist on-call through PACS    Risk  OTC drugs.  Prescription drug management.  Parenteral controlled substances.             Medications   morphine injection 2 mg (2 mg Intravenous Given 4/28/25 0049)   sodium chloride  0.9 % bolus 500 mL (0 mL Intravenous Stopped 4/28/25 0120)   LORazepam (ATIVAN) injection 1 mg (1 mg Intravenous Given 4/28/25 0051)   sodium chloride 0.9 % bolus 1,000 mL (0 mL Intravenous Stopped 4/28/25 0700)   ceftriaxone (ROCEPHIN) 1 g/50 mL in dextrose IVPB (0 mg Intravenous Stopped 4/28/25 0300)   morphine injection 2 mg (2 mg Intravenous Given 4/28/25 0343)   morphine injection 2 mg (2 mg Intravenous Given 4/28/25 0534)   LORazepam (ATIVAN) injection 0.25 mg (0.25 mg Intravenous Given 4/28/25 1547)       ED Risk Strat Scores   HEART Risk Score      Flowsheet Row Most Recent Value   Heart Score Risk Calculator    History 2 Filed at: 04/28/2025 1856   ECG 2 Filed at: 04/28/2025 1856   Age 2 Filed at: 04/28/2025 1856   Risk Factors 2 Filed at: 04/28/2025 1856   Troponin 0 Filed at: 04/28/2025 1856   HEART Score 8 Filed at: 04/28/2025 1856          HEART Risk Score      Flowsheet Row Most Recent Value   Heart Score Risk Calculator    History 2 Filed at: 04/28/2025 1856   ECG 2 Filed at: 04/28/2025 1856   Age 2 Filed at: 04/28/2025 1856   Risk Factors 2 Filed at: 04/28/2025 1856   Troponin 0 Filed at: 04/28/2025 1856   HEART Score 8 Filed at: 04/28/2025 1856                      No data recorded        SBIRT 20yo+      Flowsheet Row Most Recent Value   Initial Alcohol Screen: US AUDIT-C     1. How often do you have a drink containing alcohol? 0 Filed at: 04/28/2025 0038   2. How many drinks containing alcohol do you have on a typical day you are drinking?  0 Filed at: 04/28/2025 0038   3b. FEMALE Any Age, or MALE 65+: How often do you have 4 or more drinks on one occassion? 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    Peripheral neuropathy     Type 2 diabetes mellitus ([facility]) 05/20/2016    Visual impairment       Past Surgical History:   Procedure Laterality Date    BOWEL RESECTION      COLON SURGERY      EYE SURGERY      SMALL INTESTINE SURGERY      TUBAL LIGATION        Family History   Problem Relation Age of Onset    Drug abuse Daughter     Other (drug overdose) Daughter       Social History     Tobacco Use    Smoking status: Never     Passive exposure: Never    Smokeless tobacco: Never   Vaping Use    Vaping status: Never Used   Substance Use Topics    Alcohol use: Yes     Comment: occasional    Drug use: Never      E-Cigarette/Vaping    E-Cigarette Use Never User       E-Cigarette/Vaping Substances    Nicotine No     THC No     CBD No     Flavoring No       I have reviewed and agree with the history as documented.     79-year-old female who is a poor historian reports chest pain and some urinary discomfort.  Patient has a history of frequent urinary tract infections.  Also has known risk factors for MI.  Patient was found to have abnormal EKG which improved during the course of the ambulance ride.  Patient reports that the chest pain is diffuse around the chest and alternates between sharp and crushing.  Nothing seems to make it better or worse.  Unclear if she has ever had this set of symptoms before.  Patient notes that she took nearly 1 g of aspirin this morning when she started to have chest pains.        Review of Systems   Unable to perform ROS: Other           Objective       ED Triage Vitals [04/28/25 0035]   Temperature Pulse Blood Pressure Respirations SpO2 Patient Position - Orthostatic VS   98.4 °F (36.9 °C) 86 119/86 22 97 % Lying      Temp Source Heart Rate Source BP Location FiO2 (%) Pain Score    Temporal Monitor Left arm -- 8      Vitals      Date and  Time Temp Pulse SpO2 Resp BP Pain Score FACES Pain Rating [facility]   04/28/25 1545 -- 53 93 % 14 109/53 -- -- NAD   04/28/25 1500 -- 58 93 % 17 108/53 -- --    04/28/25 1400 -- 57 93 % 16 106/51 -- --    04/28/25 1300 -- 55 94 % 21 116/55 -- --    04/28/25 1100 -- 56 96 % 16 116/51 -- --    04/28/25 1000 -- 53 91 % 15 99/53 -- --    04/28/25 0900 -- 62 94 % 14 102/53 -- --    04/28/25 0800 -- 56 93 % 14 103/50 -- --    04/28/25 0700 -- 56 96 % 14 106/55 -- --    04/28/25 0600 -- 57 94 % 16 113/47 -- --    04/28/25 0545 -- 58 97 % 15 110/53 -- --    04/28/25 0534 -- -- -- -- -- 8 --    04/28/25 0530 -- 56 96 % 15 102/55 -- --    04/28/25 0515 -- 56 97 % 16 111/53 -- --    04/28/25 0500 -- 56 93 % 15 112/54 -- --    04/28/25 0445 -- 65 100 % 17 112/51 -- --    04/28/25 0400 -- 60 96 % 17 105/52 -- --    04/28/25 0343 -- -- -- -- -- 8 --    04/28/25 0049 -- -- -- -- -- 8 --    04/28/25 0045 -- -- -- -- -- 8 --    04/28/25 0035 98.4 °F (36.9 °C) 86 97 % 22 119/86 8 --             Physical Exam  Constitutional:       Appearance: She is ill-appearing.   HENT:      Head: Normocephalic and atraumatic.      Right Ear: External ear normal.      Left Ear: External ear normal.      Nose: Nose normal.      Mouth/Throat:      Mouth: Mucous membranes are moist.      Pharynx: Oropharynx is clear.   Eyes:      Conjunctiva/sclera: Conjunctivae normal.      Pupils: Pupils are equal, round, and reactive to light.   Cardiovascular:      Rate and Rhythm: Normal rate and regular rhythm.      Heart sounds: Normal heart sounds.   Pulmonary:      Effort: Pulmonary effort is normal. No respiratory distress.      Breath sounds: Normal breath sounds.   Abdominal:      General: Bowel sounds are normal.      Palpations: Abdomen is soft.      Tenderness: There is abdominal tenderness (suprapubic).   Musculoskeletal:         General: Normal range of motion.      Cervical back: Normal range of motion and neck  supple.   Skin:     General: Skin is warm and dry.      Capillary Refill: Capillary refill takes less than 2 seconds.   Neurological:      Mental Status: She is alert and oriented to person, place, and time.   Psychiatric:         Mood and Affect: Mood normal.         Behavior: Behavior normal.         Results Reviewed       Procedure Component Value Units Date/Time    Blood culture #1 [[id number]] Collected: 04/28/25 0047    Lab Status: Preliminary result Specimen: Blood from Arm, Right Updated: 04/29/25 0801     Blood Culture No Growth at 24 hrs.    Blood Culture Identification Panel [[id number]]  (Abnormal) Collected: 04/28/25 0125    Lab Status: Preliminary result Specimen: Blood from Arm, Right Updated: 04/29/25 0052     Streptococcus Detected    Narrative:      Routine culture and susceptiblity to follow for confirmation.    Film Array panel tests for 11 gram positive organisms, 15 gram negative organisms, 7 yeast species and 10 resistance genes.     Blood culture #2 [[id number]]  (Abnormal) Collected: 04/28/25 0125    Lab Status: Preliminary result Specimen: Blood from Arm, Right Updated: 04/28/25 2328     Gram Stain Result Gram positive cocci in chains    HS Troponin I 4hr [[id number]]  (Normal) Collected: 04/28/25 0539    Lab Status: Final result Specimen: Blood from Arm, Right Updated: 04/28/25 0606     hs TnI 4hr 23 ng/L      Delta 4hr hsTnI 12 ng/L     Lactic acid 2 Hours [[id number]]  (Normal) Collected: 04/28/25 0445    Lab Status: Final result Specimen: Blood from Arm, Right Updated: 04/28/25 0511     LACTIC ACID 0.9 mmol/L     Narrative:      Result may be elevated if tourniquet was used during collection.    HS Troponin I 2hr [[id number]]  (Normal) Collected: 04/28/25 0342    Lab Status: Final result Specimen: Blood from Arm, Right Updated: 04/28/25 0418     hs TnI 2hr 17 ng/L      Delta 2hr hsTnI 6 ng/L     Urine Microscopic [[id number]]  (Abnormal) Collected: 04/28/25 0145    Lab Status: Final result  Specimen: Urine, Other Updated: 04/28/25 0205     RBC, UA 0-1 /hpf      WBC, UA 0-5 /hpf      Epithelial Cells Occasional /hpf      Bacteria, UA Moderate /hpf     UA w Reflex to Microscopic w Reflex to Culture [[id number]]  (Abnormal) Collected: 04/28/25 0145    Lab Status: Final result Specimen: Urine, Other Updated: 04/28/25 0204     Color, UA Orange     Clarity, UA Clear     Specific Gravity, UA 1.020     pH, UA 7.0     Leukocytes, UA Trace     Nitrite, UA Positive     Protein, UA 1+ mg/dl      Glucose, UA Negative mg/dl      Ketones, UA Trace mg/dl      Urobilinogen, UA 1.0 E.U./dl      Bilirubin, UA Negative     Occult Blood, UA 3+    TSH, 3rd generation [[id number]]  (Normal) Collected: 04/28/25 0047    Lab Status: Final result Specimen: Blood from Arm, Right Updated: 04/28/25 0140     TSH 3RD GENERATON 1.344 uIU/mL     Procalcitonin [[id number]]  (Normal) Collected: 04/28/25 0047    Lab Status: Final result Specimen: Blood from Arm, Right Updated: 04/28/25 0133     Procalcitonin 0.08 ng/ml     Lactic acid [[id number]]  (Abnormal) Collected: 04/28/25 0047    Lab Status: Final result Specimen: Blood from Arm, Right Updated: 04/28/25 0131     LACTIC ACID 4.4 mmol/L     Narrative:      Result may be elevated if tourniquet was used during collection.    HS Troponin 0hr (reflex protocol) [[id number]]  (Normal) Collected: 04/28/25 0047    Lab Status: Final result Specimen: Blood from Arm, Right Updated: 04/28/25 0130     hs TnI 0hr 11 ng/L     Comprehensive metabolic panel [[id number]]  (Abnormal) Collected: 04/28/25 0047    Lab Status: Final result Specimen: Blood from Arm, Right Updated: 04/28/25 0127     Sodium 139 mmol/L      Potassium 4.1 mmol/L      Chloride 101 mmol/L      CO2 23 mmol/L      ANION GAP 15 mmol/L      BUN 25 mg/dL      Creatinine 0.98 mg/dL      Glucose 116 mg/dL      Calcium 10.7 mg/dL      AST 24 U/L      ALT 13 U/L      Alkaline Phosphatase 52 U/L      Total Protein 7.5 g/dL      Albumin 4.4 g/dL       Total Bilirubin 0.82 mg/dL      eGFR 54 ml/min/1.73sq m     Narrative:      National Kid[employer] Disease Foundation guidelines for Chronic Kidney Disease (CKD):     Stage 1 with normal or high GFR (GFR > 90 mL/min/1.73 square meters)    Stage 2 Mild CKD (GFR = 60-89 mL/min/1.73 square meters)    Stage 3A Moderate CKD (GFR = 45-59 mL/min/1.73 square meters)    Stage 3B Moderate CKD (GFR = 30-44 mL/min/1.73 square meters)    Stage 4 Severe CKD (GFR = 15-29 mL/min/1.73 square meters)    Stage 5 End Stage CKD (GFR <15 mL/min/1.73 square meters)  Note: GFR calculation is accurate only with a steady state creatinine    Protime-INR [[id number]]  (Normal) Collected: 04/28/25 0047    Lab Status: Final result Specimen: Blood from Arm, Right Updated: 04/28/25 0123     Protime 12.9 seconds      INR 0.92    Narrative:      INR Therapeutic Range    Indication                                             INR Range      Atrial Fibrillation                                               2.0-3.0  Hypercoagulable State                                    2.0.2.3  Left Ventricular Asist Device                            2.0-3.0  Mechanical Heart Valve                                  -    Aortic(with afib, MI, embolism, HF, LA enlargement,    and/or coagulopathy)                                     2.0-3.0 (2.5-3.5)     Mitral                                                             2.5-3.5  Prosthetic/Bioprosthetic Heart Valve               2.0-3.0  Venous thromboembolism (VTE: VT, PE        2.0-3.0    APTT [[id number]]  (Normal) Collected: 04/28/25 0047    Lab Status: Final result Specimen: Blood from Arm, Right Updated: 04/28/25 0123     PTT 28 seconds     FLU/COVID Rapid Antigen (30 min. TAT) - Preferred screening test in ED [[id number]]  (Normal) Collected: 04/28/25 0058    Lab Status: Final result Specimen: Nares from Nose Updated: 04/28/25 0122     SARS COV Rapid Antigen Negative     Influenza A Rapid Antigen Negative     Influenza  B Rapid Antigen Negative    Narrative:      This test has been performed using the [employer] Mercedes 2 FLU+SARS Antigen test under the Emergency Use Authorization (EUA). This test has been validated by the  and verified by the performing laboratory. The Mercedes uses lateral flow immunofluorescent sandwich assay to detect SARS-COV, Influenza A and Influenza B Antigen.     The Quidel Mercedes 2 SARS Antigen test does not differentiate between SARS-CoV and SARS-CoV-2.     Negative results are presumptive and may be confirmed with a molecular assay, if necessary, for patient management. Negative results do not rule out SARS-CoV-2 or influenza infection and should not be used as the sole basis for treatment or patient management decisions. A negative test result may occur if the level of antigen in a sample is below the limit of detection of this test.     Positive results are indicative of the presence of viral antigens, but do not rule out bacterial infection or co-infection with other viruses.     All test results should be used as an adjunct to clinical observations and other information available to the provider.    FOR PEDIATRIC PATIENTS - copy/paste COVID Guidelines URL to browser: [email or web address]    CBC and differential [[id number]]  (Abnormal) Collected: 04/28/25 0047    Lab Status: Final result Specimen: Blood from Arm, Right Updated: 04/28/25 0106     WBC 5.53 Thousand/uL      RBC 5.04 Million/uL      Hemoglobin 16.7 g/dL      Hematocrit 49.2 %      MCV 98 fL      MCH 33.1 pg      MCHC 33.9 g/dL      RDW 12.1 %      MPV 9.9 fL      Platelets 175 Thousands/uL      nRBC 0 /100 WBCs      Segmented % 45 %      Immature Grans % 0 %      Lymphocytes % 40 %      Monocytes % 11 %      Eosinophils Relative 3 %      Basophils Relative 1 %      Absolute Neutrophils 2.53 Thousands/µL      Absolute Immature Grans 0.01 Thousand/uL      Absolute Lymphocytes 2.21  Thousands/µL      Absolute Monocytes 0.58 Thousand/µL      Eosinophils Absolute 0.17 Thousand/µL      Basophils Absolute 0.03 Thousands/µL             X-ray chest 1 view portable   Final Interpretation by Julio César Byrd MD (04/28 0907)      No acute cardiopulmonary disease.            Resident: Patricio Buchanan I, the attending radiologist, have reviewed the images and agree with the final report above.      Workstation performed: [id number]             ECG 12 Lead Documentation Only    Date/Time: 4/28/2025 1:45 AM    Performed by: Elton Aponte MD  Authorized by: Elton Aponte MD    Indications / Diagnosis:  CP  Patient location:  ED  Previous ECG:     Comparison to cardiac monitor: Yes    Interpretation:     Interpretation: abnormal    Rate:     ECG rate assessment: normal    Rhythm:     Rhythm: sinus rhythm    Ectopy:     Ectopy: none    QRS:     QRS axis:  Normal  ST segments:     ST segments:  Abnormal    Elevation:  V1 and aVL    Depression:  AVF and V3      ED Medication and Procedure Management   Prior to Admission Medications   Prescriptions Last Dose Informant Patient Reported? Taking?   acetaminophen (TYLENOL) 325 mg tablet   No No   Sig: Take 2 tablets (650 mg total) by mouth every 4 (four) hours as needed for mild pain, headaches or fever   Patient not taking: Reported on 4/28/2025   aspirin (ECOTRIN LOW STRENGTH) 81 mg EC tablet   No No   Sig: Take 1 tablet (81 mg total) by mouth daily   atorvastatin (LIPITOR) 40 mg tablet   Yes No   citalopram (CeleXA) 20 mg tablet   No No   Sig: Take 1 tablet (20 mg total) by mouth daily   Patient not taking: Reported on 4/28/2025   gabapentin (Neurontin) 100 mg capsule   No No   Sig: Take 1 capsule (100 mg total) by mouth 3 (three) times a day   levothyroxine 75 mcg tablet   No No   Sig: Take 1 tablet (75 mcg total) by mouth daily in the early morning Take 1 tablet (75 mg) by oral route five days a week.   magnesium gluconate (MAGONATE) 500 mg  tablet   No No   Sig: Take 1 tablet (500 mg total) by mouth daily   meclizine (ANTIVERT) 25 mg tablet   No No   Sig: Take 1 tablet (25 mg total) by mouth 3 (three) times a day as needed for dizziness   Patient not taking: Reported on 4/28/2025   polyethylene glycol (GLYCOLAX) 17 GM/SCOOP powder   No No   Sig: Take 17 g by mouth 2 (two) times a day   Patient not taking: Reported on 4/28/2025   risperiDONE (RisperDAL) 0.5 mg tablet   No No   Sig: Take 0.5 tablets (0.25 mg total) by mouth 2 (two) times a day Do not start before August 13, 2024.   traMADol (Ultram) 50 mg tablet   No No   Sig: Take 1 tablet (50 mg total) by mouth every 6 (six) hours as needed for moderate pain      Facility-Administered Medications: None     Discharge Medication List as of 4/28/2025  6:58 PM        CONTINUE these medications which have NOT CHANGED    Details   acetaminophen (TYLENOL) 325 mg tablet Take 2 tablets (650 mg total) by mouth every 4 (four) hours as needed for mild pain, headaches or fever, Starting Sat 6/22/2024, No Print      aspirin (ECOTRIN LOW STRENGTH) 81 mg EC tablet Take 1 tablet (81 mg total) by mouth daily, Starting Sun 6/23/2024, Normal      atorvastatin (LIPITOR) 40 mg tablet Historical Med      citalopram (CeleXA) 20 mg tablet Take 1 tablet (20 mg total) by mouth daily, Starting Wed 12/11/2024, Normal      gabapentin (Neurontin) 100 mg capsule Take 1 capsule (100 mg total) by mouth 3 (three) times a day, Starting Mon 1/27/2025, Normal      levothyroxine 75 mcg tablet Take 1 tablet (75 mcg total) by mouth daily in the early morning Take 1 tablet (75 mg) by oral route five days a week., Starting Mon 4/1/2024, Normal      magnesium gluconate (MAGONATE) 500 mg tablet Take 1 tablet (500 mg total) by mouth daily, Starting Fri 6/28/2024, No Print      meclizine (ANTIVERT) 25 mg tablet Take 1 tablet (25 mg total) by mouth 3 (three) times a day as needed for dizziness, Starting Mon 2/24/2025, Normal      polyethylene glycol  (GLYCOLAX) 17 GM/SCOOP powder Take 17 g by mouth 2 (two) times a day, Starting Fri 7/19/2024, Normal      risperiDONE (RisperDAL) 0.5 mg tablet Take 0.5 tablets (0.25 mg total) by mouth 2 (two) times a day Do not start before August 13, 2024., Starting Tue 8/13/2024, Normal      traMADol (Ultram) 50 mg tablet Take 1 tablet (50 mg total) by mouth every 6 (six) hours as needed for moderate pain, Starting Fri 1/10/2025, Normal           No discharge procedures on file.  ED SEPSIS DOCUMENTATION   Time reflects when diagnosis was documented in both MDM as applicable and the Disposition within this note       Time User Action Codes Description Comment    4/28/2025  2:27 AM Elton Carrillo [R07.9] Chest pain     4/28/2025  2:27 AM Elton Carrillo [N39.0] UTI (urinary tract infection)     4/28/2025  2:27 AM Elton Carrillo [A41.9] Sepsis (HCC)     4/28/2025  2:27 AM Elton Carrilol [R94.31] ST segment depression     4/28/2025  2:28 AM Elton Carrillo Remove [R94.31] ST segment depression     4/28/2025  2:28 AM Elton Carrillo [R94.31] Diffuse ST segment depression                  Elton Aponte MD  04/30/25 5532

## 2025-04-29 ENCOUNTER — APPOINTMENT (INPATIENT)
Dept: NON INVASIVE DIAGNOSTICS | Facility: HOSPITAL | Age: 80
DRG: 306 | End: 2025-04-29
Payer: MEDICARE

## 2025-04-29 ENCOUNTER — APPOINTMENT (INPATIENT)
Dept: RADIOLOGY | Facility: HOSPITAL | Age: 80
DRG: 306 | End: 2025-04-29
Payer: MEDICARE

## 2025-04-29 PROBLEM — E87.20 LACTIC ACIDOSIS: Status: ACTIVE | Noted: 2025-04-29

## 2025-04-29 PROBLEM — R26.2 AMBULATORY DYSFUNCTION: Status: ACTIVE | Noted: 2025-04-29

## 2025-04-29 PROBLEM — F10.90 ALCOHOL USE: Status: ACTIVE | Noted: 2025-04-29

## 2025-04-29 PROBLEM — R54 FRAILTY: Status: ACTIVE | Noted: 2025-04-29

## 2025-04-29 PROBLEM — G47.00 INSOMNIA: Status: ACTIVE | Noted: 2025-04-29

## 2025-04-29 PROBLEM — G93.41 ACUTE METABOLIC ENCEPHALOPATHY: Status: ACTIVE | Noted: 2025-04-29

## 2025-04-29 PROBLEM — R78.81 BACTEREMIA: Status: ACTIVE | Noted: 2025-04-29

## 2025-04-29 PROBLEM — N39.0 UTI (URINARY TRACT INFECTION): Status: ACTIVE | Noted: 2025-04-29

## 2025-04-29 PROBLEM — F32.A DEPRESSION: Status: ACTIVE | Noted: 2025-04-29

## 2025-04-29 PROBLEM — R41.89 COGNITIVE IMPAIRMENT: Status: ACTIVE | Noted: 2025-04-29

## 2025-04-29 LAB
ANION GAP SERPL CALCULATED.3IONS-SCNC: 8 MMOL/L (ref 4–13)
AORTIC ROOT: 2.8 CM
ASCENDING AORTA: 3 CM
ATRIAL RATE: 59 BPM
ATRIAL RATE: 59 BPM
ATRIAL RATE: 67 BPM
AV LVOT MEAN GRADIENT: 2 MMHG
AV LVOT PEAK GRADIENT: 3 MMHG
BASOPHILS # BLD AUTO: 0.03 THOUSANDS/ÂΜL (ref 0–0.1)
BASOPHILS NFR BLD AUTO: 1 % (ref 0–1)
BSA FOR ECHO PROCEDURE: 1.64 M2
BUN SERPL-MCNC: 14 MG/DL (ref 5–25)
CALCIUM SERPL-MCNC: 9.6 MG/DL (ref 8.4–10.2)
CHLORIDE SERPL-SCNC: 103 MMOL/L (ref 96–108)
CO2 SERPL-SCNC: 28 MMOL/L (ref 21–32)
CREAT SERPL-MCNC: 0.79 MG/DL (ref 0.6–1.3)
DOP CALC LVOT AREA: 3.14 CM2
DOP CALC LVOT CARDIAC INDEX: 2.57 L/MIN/M2
DOP CALC LVOT CARDIAC OUTPUT: 4.24 L/MIN
DOP CALC LVOT DIAMETER: 2 CM
DOP CALC LVOT PEAK VEL VTI: 20.8 CM
DOP CALC LVOT PEAK VEL: 0.84 M/S
DOP CALC LVOT STROKE INDEX: 38.2 ML/M2
DOP CALC LVOT STROKE VOLUME: 65.31
E WAVE DECELERATION TIME: 276 MS
E/A RATIO: 0.55
EOSINOPHIL # BLD AUTO: 0.23 THOUSAND/ÂΜL (ref 0–0.61)
EOSINOPHIL NFR BLD AUTO: 4 % (ref 0–6)
ERYTHROCYTE [DISTWIDTH] IN BLOOD BY AUTOMATED COUNT: 12.4 % (ref 11.6–15.1)
EST. AVERAGE GLUCOSE BLD GHB EST-MCNC: 108 MG/DL
FRACTIONAL SHORTENING: 32 (ref 28–44)
GFR SERPL CREATININE-BSD FRML MDRD: 70 ML/MIN/1.73SQ M
GLUCOSE SERPL-MCNC: 94 MG/DL (ref 65–140)
HBA1C MFR BLD: 5.4 %
HCT VFR BLD AUTO: 42.7 % (ref 34.8–46.1)
HGB BLD-MCNC: 14.3 G/DL (ref 11.5–15.4)
IMM GRANULOCYTES # BLD AUTO: 0.01 THOUSAND/UL (ref 0–0.2)
IMM GRANULOCYTES NFR BLD AUTO: 0 % (ref 0–2)
INTERVENTRICULAR SEPTUM IN DIASTOLE (PARASTERNAL SHORT AXIS VIEW): 0.9 CM
INTERVENTRICULAR SEPTUM: 0.9 CM (ref 0.6–1.1)
LAAS-AP2: 15.5 CM2
LAAS-AP4: 13.2 CM2
LEFT ATRIUM SIZE: 2.9 CM
LEFT ATRIUM VOLUME (MOD BIPLANE): 33 ML
LEFT ATRIUM VOLUME INDEX (MOD BIPLANE): 20 ML/M2
LEFT INTERNAL DIMENSION IN SYSTOLE: 2.5 CM (ref 2.1–4)
LEFT VENTRICULAR INTERNAL DIMENSION IN DIASTOLE: 3.7 CM (ref 3.5–6)
LEFT VENTRICULAR POSTERIOR WALL IN END DIASTOLE: 0.8 CM
LEFT VENTRICULAR STROKE VOLUME: 35 ML
LVSV (TEICH): 35 ML
LYMPHOCYTES # BLD AUTO: 2.57 THOUSANDS/ÂΜL (ref 0.6–4.47)
LYMPHOCYTES NFR BLD AUTO: 40 % (ref 14–44)
MAGNESIUM SERPL-MCNC: 1.6 MG/DL (ref 1.9–2.7)
MCH RBC QN AUTO: 33.4 PG (ref 26.8–34.3)
MCHC RBC AUTO-ENTMCNC: 33.5 G/DL (ref 31.4–37.4)
MCV RBC AUTO: 100 FL (ref 82–98)
MONOCYTES # BLD AUTO: 0.73 THOUSAND/ÂΜL (ref 0.17–1.22)
MONOCYTES NFR BLD AUTO: 11 % (ref 4–12)
MV E'TISSUE VEL-LAT: 8 CM/S
MV E'TISSUE VEL-SEP: 5 CM/S
MV PEAK A VEL: 1.14 M/S
MV PEAK E VEL: 63 CM/S
MV STENOSIS PRESSURE HALF TIME: 80 MS
MV VALVE AREA P 1/2 METHOD: 2.75
NEUTROPHILS # BLD AUTO: 2.81 THOUSANDS/ÂΜL (ref 1.85–7.62)
NEUTS SEG NFR BLD AUTO: 44 % (ref 43–75)
NRBC BLD AUTO-RTO: 0 /100 WBCS
P AXIS: 61 DEGREES
P AXIS: 68 DEGREES
P AXIS: 91 DEGREES
PLATELET # BLD AUTO: 147 THOUSANDS/UL (ref 149–390)
PMV BLD AUTO: 9.5 FL (ref 8.9–12.7)
POTASSIUM SERPL-SCNC: 3.6 MMOL/L (ref 3.5–5.3)
PR INTERVAL: 194 MS
PR INTERVAL: 194 MS
PR INTERVAL: 198 MS
QRS AXIS: 14 DEGREES
QRS AXIS: 34 DEGREES
QRS AXIS: 44 DEGREES
QRSD INTERVAL: 68 MS
QRSD INTERVAL: 68 MS
QRSD INTERVAL: 76 MS
QT INTERVAL: 436 MS
QT INTERVAL: 452 MS
QT INTERVAL: 452 MS
QTC INTERVAL: 447 MS
QTC INTERVAL: 447 MS
QTC INTERVAL: 461 MS
RBC # BLD AUTO: 4.28 MILLION/UL (ref 3.81–5.12)
RIGHT ATRIUM AREA SYSTOLE A4C: 13.2 CM2
RIGHT VENTRICLE ID DIMENSION: 3.4 CM
SL CV LEFT ATRIUM LENGTH A2C: 5 CM
SL CV LV EF: 65
SL CV PED ECHO LEFT VENTRICLE DIASTOLIC VOLUME (MOD BIPLANE) 2D: 58 ML
SL CV PED ECHO LEFT VENTRICLE SYSTOLIC VOLUME (MOD BIPLANE) 2D: 22 ML
SODIUM SERPL-SCNC: 139 MMOL/L (ref 135–147)
T WAVE AXIS: 73 DEGREES
T WAVE AXIS: 81 DEGREES
T WAVE AXIS: 87 DEGREES
TRICUSPID ANNULAR PLANE SYSTOLIC EXCURSION: 1.8 CM
VENTRICULAR RATE: 59 BPM
VENTRICULAR RATE: 59 BPM
VENTRICULAR RATE: 67 BPM
WBC # BLD AUTO: 6.38 THOUSAND/UL (ref 4.31–10.16)

## 2025-04-29 PROCEDURE — 80048 BASIC METABOLIC PNL TOTAL CA: CPT | Performed by: INTERNAL MEDICINE

## 2025-04-29 PROCEDURE — 97163 PT EVAL HIGH COMPLEX 45 MIN: CPT

## 2025-04-29 PROCEDURE — 85025 COMPLETE CBC W/AUTO DIFF WBC: CPT | Performed by: INTERNAL MEDICINE

## 2025-04-29 PROCEDURE — 93010 ELECTROCARDIOGRAM REPORT: CPT | Performed by: INTERNAL MEDICINE

## 2025-04-29 PROCEDURE — 99223 1ST HOSP IP/OBS HIGH 75: CPT | Performed by: NURSE PRACTITIONER

## 2025-04-29 PROCEDURE — 83036 HEMOGLOBIN GLYCOSYLATED A1C: CPT | Performed by: INTERNAL MEDICINE

## 2025-04-29 PROCEDURE — 99222 1ST HOSP IP/OBS MODERATE 55: CPT | Performed by: INTERNAL MEDICINE

## 2025-04-29 PROCEDURE — 83735 ASSAY OF MAGNESIUM: CPT | Performed by: INTERNAL MEDICINE

## 2025-04-29 PROCEDURE — 99233 SBSQ HOSP IP/OBS HIGH 50: CPT | Performed by: PHYSICIAN ASSISTANT

## 2025-04-29 PROCEDURE — 93880 EXTRACRANIAL BILAT STUDY: CPT | Performed by: SURGERY

## 2025-04-29 PROCEDURE — 99223 1ST HOSP IP/OBS HIGH 75: CPT | Performed by: THORACIC SURGERY (CARDIOTHORACIC VASCULAR SURGERY)

## 2025-04-29 PROCEDURE — 97535 SELF CARE MNGMENT TRAINING: CPT

## 2025-04-29 PROCEDURE — 87040 BLOOD CULTURE FOR BACTERIA: CPT | Performed by: INTERNAL MEDICINE

## 2025-04-29 PROCEDURE — G0545 PR INHERENT VISIT TO INPT: HCPCS | Performed by: INTERNAL MEDICINE

## 2025-04-29 PROCEDURE — 93306 TTE W/DOPPLER COMPLETE: CPT

## 2025-04-29 PROCEDURE — 70355 PANORAMIC X-RAY OF JAWS: CPT

## 2025-04-29 PROCEDURE — 93306 TTE W/DOPPLER COMPLETE: CPT | Performed by: INTERNAL MEDICINE

## 2025-04-29 PROCEDURE — 93005 ELECTROCARDIOGRAM TRACING: CPT

## 2025-04-29 PROCEDURE — 97167 OT EVAL HIGH COMPLEX 60 MIN: CPT

## 2025-04-29 PROCEDURE — 93880 EXTRACRANIAL BILAT STUDY: CPT

## 2025-04-29 RX ORDER — SODIUM CHLORIDE, SODIUM LACTATE, POTASSIUM CHLORIDE, CALCIUM CHLORIDE 600; 310; 30; 20 MG/100ML; MG/100ML; MG/100ML; MG/100ML
75 INJECTION, SOLUTION INTRAVENOUS CONTINUOUS
Status: DISCONTINUED | OUTPATIENT
Start: 2025-04-29 | End: 2025-04-29

## 2025-04-29 RX ORDER — QUETIAPINE FUMARATE 25 MG/1
12.5 TABLET, FILM COATED ORAL 2 TIMES DAILY PRN
Status: DISCONTINUED | OUTPATIENT
Start: 2025-04-29 | End: 2025-04-29

## 2025-04-29 RX ORDER — MAGNESIUM SULFATE HEPTAHYDRATE 40 MG/ML
2 INJECTION, SOLUTION INTRAVENOUS ONCE
Status: COMPLETED | OUTPATIENT
Start: 2025-04-29 | End: 2025-04-29

## 2025-04-29 RX ORDER — LORAZEPAM 0.5 MG/1
0.5 TABLET ORAL ONCE AS NEEDED
Status: COMPLETED | OUTPATIENT
Start: 2025-04-29 | End: 2025-04-29

## 2025-04-29 RX ORDER — HALOPERIDOL 5 MG/ML
2 INJECTION INTRAMUSCULAR ONCE
Status: COMPLETED | OUTPATIENT
Start: 2025-04-29 | End: 2025-04-29

## 2025-04-29 RX ORDER — OLANZAPINE 2.5 MG/1
2.5 TABLET, FILM COATED ORAL 2 TIMES DAILY PRN
Status: DISCONTINUED | OUTPATIENT
Start: 2025-04-29 | End: 2025-05-05 | Stop reason: HOSPADM

## 2025-04-29 RX ORDER — SODIUM CHLORIDE, SODIUM LACTATE, POTASSIUM CHLORIDE, CALCIUM CHLORIDE 600; 310; 30; 20 MG/100ML; MG/100ML; MG/100ML; MG/100ML
75 INJECTION, SOLUTION INTRAVENOUS CONTINUOUS
Status: DISCONTINUED | OUTPATIENT
Start: 2025-04-30 | End: 2025-04-30

## 2025-04-29 RX ADMIN — RISPERIDONE 0.25 MG: 0.25 TABLET, FILM COATED ORAL at 17:11

## 2025-04-29 RX ADMIN — MAGNESIUM SULFATE HEPTAHYDRATE 2 G: 40 INJECTION, SOLUTION INTRAVENOUS at 16:21

## 2025-04-29 RX ADMIN — ASPIRIN 81 MG: 81 TABLET, COATED ORAL at 11:07

## 2025-04-29 RX ADMIN — CEFTRIAXONE SODIUM 1000 MG: 10 INJECTION, POWDER, FOR SOLUTION INTRAVENOUS at 02:42

## 2025-04-29 RX ADMIN — HALOPERIDOL LACTATE 2 MG: 5 INJECTION, SOLUTION INTRAMUSCULAR at 20:27

## 2025-04-29 RX ADMIN — LORAZEPAM 0.5 MG: 0.5 TABLET ORAL at 01:44

## 2025-04-29 RX ADMIN — HEPARIN SODIUM 5000 UNITS: 5000 INJECTION INTRAVENOUS; SUBCUTANEOUS at 16:19

## 2025-04-29 RX ADMIN — RISPERIDONE 0.25 MG: 0.25 TABLET, FILM COATED ORAL at 11:07

## 2025-04-29 RX ADMIN — GABAPENTIN 100 MG: 100 CAPSULE ORAL at 16:19

## 2025-04-29 RX ADMIN — GABAPENTIN 100 MG: 100 CAPSULE ORAL at 11:07

## 2025-04-29 RX ADMIN — ATORVASTATIN CALCIUM 40 MG: 40 TABLET, FILM COATED ORAL at 16:19

## 2025-04-29 RX ADMIN — SODIUM CHLORIDE, SODIUM LACTATE, POTASSIUM CHLORIDE, AND CALCIUM CHLORIDE 75 ML/HR: .6; .31; .03; .02 INJECTION, SOLUTION INTRAVENOUS at 23:45

## 2025-04-29 RX ADMIN — HEPARIN SODIUM 5000 UNITS: 5000 INJECTION INTRAVENOUS; SUBCUTANEOUS at 05:22

## 2025-04-29 RX ADMIN — SODIUM CHLORIDE, SODIUM LACTATE, POTASSIUM CHLORIDE, AND CALCIUM CHLORIDE 75 ML/HR: .6; .31; .03; .02 INJECTION, SOLUTION INTRAVENOUS at 09:28

## 2025-04-29 RX ADMIN — LEVOTHYROXINE SODIUM 75 MCG: 0.07 TABLET ORAL at 05:22

## 2025-04-29 NOTE — ASSESSMENT & PLAN NOTE
Per pharmacy last refill of sythroid was in October  TSH 1.344 (4/28/25) ? Ongoing evaluation in the setting of questionable medication compliance, synthroid restarted this admission

## 2025-04-29 NOTE — ASSESSMENT & PLAN NOTE
Lab Results   Component Value Date    EGFR 70 04/29/2025    EGFR 54 04/28/2025    EGFR 54 02/24/2025    CREATININE 0.79 04/29/2025    CREATININE 0.98 04/28/2025    CREATININE 0.98 02/24/2025   Baseline creatinine 0.9-1  Creatinine at baseline  Monitor

## 2025-04-29 NOTE — PLAN OF CARE
Problem: PHYSICAL THERAPY ADULT  Goal: Performs mobility at highest level of function for planned discharge setting.  See evaluation for individualized goals.  Description: Treatment/Interventions: ADL retraining, Functional transfer training, LE strengthening/ROM, Elevations, Therapeutic exercise, Endurance training, Cognitive reorientation, Patient/family training, Equipment eval/education, Bed mobility, Gait training, Compensatory technique education, Spoke to case management, Spoke to nursing, OT          See flowsheet documentation for full assessment, interventions and recommendations.  Note: Prognosis: Good  Problem List: Decreased strength, Decreased endurance, Decreased mobility, Impaired balance, Decreased coordination, Decreased cognition, Impaired judgement, Decreased safety awareness  Assessment: Pt is 79 y.o. female seen for PT evaluation s/p admit to [facility] on 4/28/2025  [place] a [place] [facility] [place] w/ CP dx including chest pain ; UTI and severe AS. Pt seen by CT surgery and no intervention/ TAVR  until recovery from infection. PT consulted for mobility and to assist w/ d/c plannin recommendations.  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  Due to acute medical issues, ongoing medical workup for primary dx; pain, fall risk, increased reliance on more restrictive AD compared to baseline;  decreased activity tolerance compared to baseline, increased assistance needed from caregiver at current time, continuous monitoring, trending labs;  multiple lines, impaired cognition;  decline in overall functional mobility status; health  management issues; note unstable clinical picture (high complexity).  Presently pt is confused and a poor historian and info obtained from EMR-  At baseline, pt resides w/ spouse in a 2SH w/ 3 AMRIE; has FFSU w/ main bed and bath located there. Pt was MI w/ RW PTA- neighbors assist w/ transport/ driving.Currently,  pt  is requiring Chip A for bed skills; Chip for functional transfers and Chip for ambulation w/ RW 15'x2.   Pt presents functioning below baseline and currently w/ overall mobility deficits 2* to: decreased LE strength/AROM; limited flexibility;  generalized weakness/ deconditioning; decreased endurance; decreased activity tolerance; decreased coordination; impaired balance; gait deviations; decreased safety awareness; SOB/HYATT; fatigue; impaired safety and judgement; limited insight into current deficits; bed/ chair alarms; multiple lines; impaired cognition; impulsive.   Pt currently at risk for falls.  (Please find additional objective findings from PT assessment regarding body systems outlined above.) Pt will continue to benefit from skilled PT interventions to address stated impairments; to maximize functional potential; for ongoing pt/ family training; and DME needs.  PT is recommending PT Resource Intensity Level  2 (rehab)  on d/c.      PT will follow for STG as outlined on eval. Pt/ family agreeable to PT POC and goals as stated.        Rehab Resource Intensity Level, PT: II (Moderate Resource Intensity)    See flowsheet documentation for full assessment.

## 2025-04-29 NOTE — ASSESSMENT & PLAN NOTE
Progressing, likely progression of underlying dementia  TSH 1.344 (4/28/25)  Vitamin B 12 level 270, recommend supplementation goal level greater than 270  Pt was seen by Dr Sierra (6/25/25) does not have capacity  Although at present pt likely has component of increased confusion suspect that at baseline pt has progressing cognitive decline, leading to medication non compliance and altered circadian rhythm at home  Recommend cognitive testing as outpatient

## 2025-04-29 NOTE — ASSESSMENT & PLAN NOTE
Lab Results   Component Value Date    EGFR 54 04/28/2025    EGFR 54 02/24/2025    EGFR 52 11/08/2024    CREATININE 0.98 04/28/2025    CREATININE 0.98 02/24/2025    CREATININE 1.00 11/08/2024

## 2025-04-29 NOTE — OCCUPATIONAL THERAPY NOTE
Occupational Therapy Evaluation and Treatment     Patient Name: Daily Schaeffer  Today's Date: 4/29/2025  Problem List  Principal Problem:    Chest pain  Active Problems:    Severe aortic stenosis    Dyslipidemia    Acquired hypothyroidism    Neuropathy    Stage 3 chronic kidney disease (HCC)    Bacteremia    Possible UTI (urinary tract infection)    Lactic acidosis    Acute metabolic encephalopathy    Cognitive impairment    Past Medical History  Past Medical History:   Diagnosis Date    SARA (acute kidney injury) ([facility]) 06/19/2024    Aortic stenosis     Depression     Disease of thyroid gland     Diverticular disease 05/09/2017    Essential hypertension 05/20/2016    Hyperlipidemia     Major depression 05/20/2016    Paranoid delusion ([facility]) 06/25/2024    Peripheral neuropathy     Type 2 diabetes mellitus ([facility]) 05/20/2016    Visual impairment      Past Surgical History  Past Surgical History:   Procedure Laterality Date    BOWEL RESECTION      COLON SURGERY      EYE SURGERY      SMALL INTESTINE SURGERY      TUBAL LIGATION           04/29/25 1016   OT Last Visit   OT Visit Date 04/29/25   Note Type   Note type Evaluation   Pain Assessment   Pain Assessment Tool 0-10   Pain Score No Pain   Restrictions/Precautions   Weight Bearing Precautions Per Order No   Other Precautions Cognitive;Chair Alarm;Bed Alarm;1:1;Multiple lines;Telemetry;Fall Risk  (Virutal 1:1)   Home Living   Type of Home House   Home Layout Two level;Able to live on main level with bedroom/bathroom;Performs ADLs on one level  (2 MARIE)   Bathroom Shower/Tub Tub/shower unit   Bathroom Toilet Standard   Bathroom Equipment Shower chair   Home Equipment Walker  (rollator, pt reports using rollator)   Additional Comments (S)  pt ? historian, information obtained from pt interview   Prior Function   Level of [place] Independent with ADLs;Independent with functional mobility;Needs assistance with IADLS   Lives With Spouse   Receives Help From Family    IADLs Family/Friend/Other provides transportation;Family/Friend/Other provides meals;Family/Friend/Other provides medication management   Falls in the last 6 months 0   Vocational Retired   Comments (S)  pt ? historian, information obtained from pt interview   Lifestyle   Autonomy Pt ? historian, pt reports (I) with ADLs and functional mobility using rollator. pt requires assistance for IADLs. Pt -  and retired   Reciprocal Relationships family   Service to Others retired   ADL   Where Assessed Edge of bed  (in bathroom)   Eating Assistance 5  Supervision/Setup   Grooming Assistance 5  Supervision/Setup   UB Bathing Assistance 4  Minimal Assistance   LB Bathing Assistance 2  Maximal Assistance   UB Dressing Assistance 4  Minimal Assistance   LB Dressing Assistance 2  Maximal Assistance   Toileting Assistance  3  Moderate Assistance   Toileting Deficit Supervison/safety;Increased time to complete;Perineal hygiene  (Pt requires MOD A to complete mirna hygiene)   Bed Mobility   Supine to Sit 4  Minimal assistance   Additional items Assist x 1;Increased time required;Verbal cues;LE management   Additional Comments Pt attempting to get OOB upon OT arrival   Transfers   Sit to Stand 3  Moderate assistance   Additional items Assist x 1;Increased time required;Verbal cues   Stand to Sit 3  Moderate assistance   Additional items Assist x 1;Increased time required;Verbal cues   Toilet transfer 3  Moderate assistance   Additional items Assist x 1;Increased time required;Standard toilet;Verbal cues   Additional Comments with rw   Functional Mobility   Functional Mobility 3  Moderate assistance   Additional Comments Pt requires MOD Ax1 and rw to ambulate short household distances   Additional items Rolling walker   Balance   Static Sitting Fair   Dynamic Sitting Fair -   Static Standing Poor +   Dynamic Standing Poor   Ambulatory Poor   Activity Tolerance   Activity Tolerance Patient tolerated treatment well   Nurse Made  Aware RN Cleared and update   RUE Assessment   RUE Assessment WFL   LUE Assessment   LUE Assessment WFL   Hand Function   Gross Motor Coordination Impaired   Fine Motor Coordination Impaired   Cognition   Overall Cognitive Status Impaired   Arousal/Participation Alert;Responsive;Cooperative   Attention Attends with cues to redirect   Orientation Level Oriented to person;Disoriented to place;Disoriented to time;Disoriented to situation   Memory Decreased recall of precautions;Decreased recall of recent events;Decreased short term memory;Decreased recall of biographical information;Decreased long term memory   Following Commands Follows one step commands with increased time or repetition   Comments Pt agreeable to therapy. Pt requires cues for safety and redirection throughout session   Assessment   Limitation Decreased ADL status;Decreased Safe judgement during ADL;Decreased cognition;Decreased endurance;Decreased self-care trans;Decreased high-level ADLs   Prognosis Fair   Assessment Pt is a 80 y/o female that was admitted to [facility] 4/28/2025 with chest pain. Pt with active OT orders and activity orders. Pt  has a past medical history of SARA (acute kidney injury) ([facility]), Aortic stenosis, Depression, Disease of thyroid gland, Diverticular disease, Essential hypertension, Hyperlipidemia, Major depression, Paranoid delusion ([facility]), Peripheral neuropathy, Type 2 diabetes mellitus ([facility]), and Visual impairment. Pt ? Historian, per pt report lives with spouse in a two level house with FFSU, 2 MARIE, standard toilet, and tub shower unit with shower chair. Pt reports using rollator for functional mobility. Prior to admission pt reports (I) ADLs and functional mobility, pt reports she requires assistance for IADLs. Pt currently requires MIN A to complete UB ADLs, MOD A to complete toileting, functional transfers, and ambulate short distances with rw, and MAX A to don complete LB ADLs. Pt limited by decreased ADL  status, functional transfers, functional mobility, and activity tolerance. Pt supine in bed at begning of session, pt seated in bedside chair at end of session with alarm set and items within reach. The patient's raw score on the AM-PAC Daily Activity Inpatient Short Form is 15. A raw score of less than 19 suggests the patient may benefit from discharge to post-acute rehabilitation services. Please refer to the recommendation of the Occupational Therapist for safe discharge planning.  Recommend Level II moderate intensity OT services at d/c to maximize pt function.   Goals   Patient Goals to go to the bathroom   [facility] Time Frame 10-14   Plan   Treatment Interventions ADL retraining;Functional transfer training;UE strengthening/ROM;Endurance training;Cognitive reorientation;Patient/family training;Equipment evaluation/education;Neuromuscular reeducation;Fine motor coordination activities;Compensatory technique education;Continued evaluation;Energy conservation;Activityengagement   Goal Expiration Date 05/13/25   OT Frequency 2-3x/wk   Discharge Recommendation   Rehab Resource Intensity Level, OT II (Moderate Resource Intensity)   AM-PAC Daily Activity Inpatient   Lower Body Dressing 2   Bathing 2   Toileting 2   Upper Body Dressing 3   Grooming 3   Eating 3   Daily Activity Raw Score 15   Daily Activity Standardized Score (Calc for Raw Score >=11) 34.69   AM-PAC Applied Cognition Inpatient   Following a Speech/Presentation 1   Understanding Ordinary Conversation 2   Taking Medications 1   Remembering Where Things Are Placed or Put Away 2   Remembering List of 4-5 Errands 1   Taking Care of Complicated Tasks 1   Applied Cognition Raw Score 8   Applied Cognition Standardized Score 19.32   Additional Treatment Session   Start Time 1001   End Time 1015   Treatment Assessment Additional treatment took place 10:01-10:16 address ADL retraining, functional transfer training, and activity tolerance/endurance. Pt currently  requires MOD A to complete functional transfers, ambulate short distances with rw, and complete toileting. Pt is limited by decreased ADL status, functional transfers, functional mobility, and activity tolerance. Pt seated in bedside chair at end of session with alarm set and all items within reach.   Additional Treatment Day 1   End of Consult   Education Provided Yes;Family or social support of family present for education by provider   Patient Position at End of Consult Bedside chair;Bed/Chair alarm activated;All needs within reach   Nurse Communication Nurse aware of consult     Goals:    Pt will complete functional transfers with MOD IND and appropriate AD to maximize pt safety.    Pt will complete bed mobility with MOD IND  to maximize pt safety.    Pt will complete grooming tasks with MOD IND to maximize pt independence.    Pt will complete LB ADLs with MOD IND  to maximize pt independence.    Pt will complete UB ADLs with MOD IND to maximize pt independence.    Pt will complete toileting with MOD IND to maximize pt independence.    Pt will complete functional household distance mobility with MOD IND and appropriate AD to maximize pt safety.    Pt will complete simulated IADL tasks with MIN A to maximize pt independence.     Pt will be able to tolerate 30 minutes of functional activity during therapy session.    Pt will follow single step directions with increased time or repeating directions 2X to maximize pt safety.     Pt will participate in continued cognitive assessment for safe discharge planning.      JUANITO Cee, OTR/L

## 2025-04-29 NOTE — PLAN OF CARE
Problem: OCCUPATIONAL THERAPY ADULT  Goal: Performs self-care activities at highest level of function for planned discharge setting.  See evaluation for individualized goals.  Description: Treatment Interventions: ADL retraining, Functional transfer training, UE strengthening/ROM, Endurance training, Cognitive reorientation, Patient/family training, Equipment evaluation/education, Neuromuscular reeducation, Fine motor coordination activities, Compensatory technique education, Continued evaluation, Energy conservation, Activityengagement          See flowsheet documentation for full assessment, interventions and recommendations.   Outcome: Progressing  Note: Limitation: Decreased ADL status, Decreased Safe judgement during ADL, Decreased cognition, Decreased endurance, Decreased self-care trans, Decreased high-level ADLs  Prognosis: Fair  Assessment: Pt is a 80 y/o female that was admitted to [facility] 4/28/2025 with chest pain. Pt with active OT orders and activity orders. Pt  has a past medical history of SARA (acute kidney injury) ([facility]), Aortic stenosis, Depression, Disease of thyroid gland, Diverticular disease, Essential hypertension, Hyperlipidemia, Major depression, Paranoid delusion ([facility]), Peripheral neuropathy, Type 2 diabetes mellitus ([facility]), and Visual impairment. Pt ? Historian, per pt report lives with spouse in a two level house with FFSU, 2 MARIE, standard toilet, and tub shower unit with shower chair. Pt reports using rollator for functional mobility. Prior to admission pt reports (I) ADLs and functional mobility, pt reports she requires assistance for IADLs. Pt currently requires MIN A to complete UB ADLs, MOD A to complete toileting, functional transfers, and ambulate short distances with rw, and MAX A to don complete LB ADLs. Pt limited by decreased ADL status, functional transfers, functional mobility, and activity tolerance. Pt supine in bed at begning of session, pt seated in bedside  chair at end of session with alarm set and items within reach. The patient's raw score on the AM-PAC Daily Activity Inpatient Short Form is 15. A raw score of less than 19 suggests the patient may benefit from discharge to post-acute rehabilitation services. Please refer to the recommendation of the Occupational Therapist for safe discharge planning.  Recommend Level II moderate intensity OT services at d/c to maximize pt function.     Rehab Resource Intensity Level, OT: II (Moderate Resource Intensity)

## 2025-04-29 NOTE — ASSESSMENT & PLAN NOTE
Presented to [facility] on 4/28/25 with progressively worsening right-sided chest pain since 1.5 months, initially with exertion and since 3 days with rest  EKG showed diffuse ST depression  Troponin - negative  Transferred to [facility] 4/28 for cardiac catheterization  Ct ASA, statin  Check echo  Monitor on telemetry

## 2025-04-29 NOTE — ASSESSMENT & PLAN NOTE
Lab Units 09/21/23  1118   TRIGLYCERIDES mg/dL 159*   HDL mg/dL 72   LDL CALC mg/dL 103*   PTA atorvastatin 40 mg p.o. daily

## 2025-04-29 NOTE — ASSESSMENT & PLAN NOTE
Presented with right-sided chest pain for 1 and 1/2 months, worsened for 3 days associated with dyspnea on exertion  EKG on presentation revealed diffuse ST depression on precordial leads  Troponin 11 => 17 => 23

## 2025-04-29 NOTE — ASSESSMENT & PLAN NOTE
1 of 2 blood cultures from initial presentation to [facility] positive for gram positive cocci in chains  Suspect contaminant  Has dysuria and chills since 3 days  UA - nitrite positive but 0-5 WBC's hence culture not done  CXR - negative  Ct Ceftriaxone until repeat blood cultures available.

## 2025-04-29 NOTE — H&P
H&P - Hospitalist   Name: Daily Schaeffer 79 y.o. female I MRN: [id number]  Unit/Bed#: [facility] 422-01 I Date of Admission: 4/28/2025   Date of Service: 4/29/2025 I Hospital Day: 1     Assessment & Plan  Chest pain  Presented to [facility] on 4/28/25 with progressively worsening right-sided chest pain since 1.5 months, initially with exertion and since 3 days with rest  EKG showed diffuse ST depression  Troponin - negative - hence heparin gtt, Brilinta not added  Transferred to [facility] 4/28 for cardiac catheterization  Ct ASA, statin  Check echo  Monitor on telemetry  Bacteremia  1 of 2 blood cultures from initial presentation to [facility] positive for gram positive cocci in chains  Possible contaminant  Has dysuria and chills since 3 days  UA - nitrite positive but 0-5 WBC's hence culture not done  CXR - negative  Received 1 dose of Ceftriaxone last night  Ct Ceftriaxone  Repeat blood cultures  F/u final results  Possible UTI (urinary tract infection)  Ceftriaxone as above  If blood cultures contaminant treat for 3 days  Lactic acidosis  Lactic acid elevated at 4.4 on initial presentation to [facility] and normalized with IV fluids  Severe aortic stenosis  Latest echo done 2/21/2024 showed EF of 65% and severe aortic stenosis  Repeat echo  Monitor volume status  Neuropathy  Continue Gabapentin  Acquired hypothyroidism  TSH normal  Continue levothyroxine 75 mcg daily  Stage 3 chronic kidney disease (HCC)  Lab Results   Component Value Date    EGFR 54 04/28/2025    EGFR 54 02/24/2025    EGFR 52 11/08/2024    CREATININE 0.98 04/28/2025    CREATININE 0.98 02/24/2025    CREATININE 1.00 11/08/2024   Baseline creatinine 0.9-1  Creatinine at baseline  Monitor      VTE Pharmacologic Prophylaxis: VTE Score: 4 Moderate Risk (Score 3-4) - Pharmacological DVT Prophylaxis Ordered: heparin.  Code Status: Level 1 - Full Code   Discussion with family: Attempted to update  () via  phone. Unable to contact.    Anticipated Length of Stay: Patient will be admitted on an inpatient basis with an anticipated length of stay of greater than 2 midnights secondary to chest pain awaiting evaluation with cardiac catheterization.    History of Present Illness   Chief Complaint: Right-sided chest pain    Daily Schaeffer is a 79 y.o. female with a PMH of severe aortic stenosis, hypothyroidism, neuropathy, CKD 3, type 2 diabetes not on medications who presents as a transfer from [facility] for cardiac catheterization.  She presented there on 4/28/2025 with right-sided chest pain since 1.5 months initially with exertion and since 3 days with rest.  She has shortness of breath with mild exertion. She states she has been feeling unwell since 3 days.  She has noted dysuria and chills since 3 days.  EKG showed diffuse ST depression and per discussion with cardiology she was transferred to undergo cardiac catheterization.  No cough.  No nausea, vomiting or diarrhea.     Review of Systems   Constitutional:  Positive for chills.   Respiratory:  Positive for shortness of breath.    Cardiovascular:  Positive for chest pain.   Genitourinary:  Positive for dysuria.   All other systems reviewed and are negative.      Historical Information   Past Medical History:   Diagnosis Date    SARA (acute kidney injury) ([facility]) 06/19/2024    Aortic stenosis     Depression     Disease of thyroid gland     Diverticular disease 05/09/2017    Essential hypertension 05/20/2016    Hyperlipidemia     Major depression 05/20/2016    Paranoid delusion ([facility]) 06/25/2024    Peripheral neuropathy     Type 2 diabetes mellitus ([facility]) 05/20/2016    Visual impairment      Past Surgical History:   Procedure Laterality Date    BOWEL RESECTION      COLON SURGERY      EYE SURGERY      SMALL INTESTINE SURGERY      TUBAL LIGATION       Social History     Tobacco Use    Smoking status: Never     Passive exposure: Never    Smokeless tobacco: Never    Vaping Use    Vaping status: Never Used   Substance and Sexual Activity    Alcohol use: Yes     Comment: occasional    Drug use: Never    Sexual activity: Not Currently     Partners: Male     Birth control/protection: Female Sterilization     E-Cigarette/Vaping    E-Cigarette Use Never User      E-Cigarette/Vaping Substances    Nicotine No     THC No     CBD No     Flavoring No      Family history non-contributory  Social History:  Marital Status: /Civil Union     Meds/Allergies   I have reviewed home medications with patient personally.  Prior to Admission medications    Medication Sig Start Date End Date Taking? Authorizing Provider   aspirin (ECOTRIN LOW STRENGTH) 81 mg EC tablet Take 1 tablet (81 mg total) by mouth daily 6/23/24  Yes Timothy Holt DO   atorvastatin (LIPITOR) 40 mg tablet  12/10/24  Yes Historical Provider, MD   gabapentin (Neurontin) 100 mg capsule Take 1 capsule (100 mg total) by mouth 3 (three) times a day 1/27/25  Yes Alanna Moreau DO   levothyroxine 75 mcg tablet Take 1 tablet (75 mcg total) by mouth daily in the early morning Take 1 tablet (75 mg) by oral route five days a week. 4/1/24  Yes Alanna Moreau DO   magnesium gluconate (MAGONATE) 500 mg tablet Take 1 tablet (500 mg total) by mouth daily 6/28/24  Yes MAJOR Garrett   risperiDONE (RisperDAL) 0.5 mg tablet Take 0.5 tablets (0.25 mg total) by mouth 2 (two) times a day Do not start before August 13, 2024. 8/13/24  Yes Alanna Moreau DO   traMADol (Ultram) 50 mg tablet Take 1 tablet (50 mg total) by mouth every 6 (six) hours as needed for moderate pain 1/10/25  Yes Alanna Moreau DO   acetaminophen (TYLENOL) 325 mg tablet Take 2 tablets (650 mg total) by mouth every 4 (four) hours as needed for mild pain, headaches or fever  Patient not taking: Reported on 4/28/2025 6/22/24   Timothy Holt DO   citalopram (CeleXA) 20 mg tablet Take 1 tablet (20 mg total)  by mouth daily  Patient not taking: Reported on 4/28/2025 12/11/24   Alanna Moreau, DO   meclizine (ANTIVERT) 25 mg tablet Take 1 tablet (25 mg total) by mouth 3 (three) times a day as needed for dizziness  Patient not taking: Reported on 4/28/2025 2/24/25   Nitin OMARI Demetrius, DO   polyethylene glycol (GLYCOLAX) 17 GM/SCOOP powder Take 17 g by mouth 2 (two) times a day  Patient not taking: Reported on 4/28/2025 7/19/24   Elton Aponte MD     Allergies   Allergen Reactions    Wound Dressing Adhesive Rash       Objective :  Temp:  [98.4 °F (36.9 °C)] 98.4 °F (36.9 °C)  HR:  [53-65] 53  BP: ()/(47-55) 109/53  Resp:  [14-21] 14  SpO2:  [91 %-100 %] 93 %  O2 Device: None (Room air)    Physical Exam  Vitals reviewed.   HENT:      Head: Normocephalic.      Nose: Nose normal.      Mouth/Throat:      Mouth: Mucous membranes are moist.   Eyes:      Extraocular Movements: Extraocular movements intact.   Cardiovascular:      Rate and Rhythm: Normal rate and regular rhythm.   Pulmonary:      Effort: Pulmonary effort is normal. No respiratory distress.      Breath sounds: Normal breath sounds. No wheezing.   Abdominal:      General: Bowel sounds are normal. There is no distension.      Palpations: Abdomen is soft.      Tenderness: There is no abdominal tenderness.   Musculoskeletal:         General: No swelling.      Cervical back: Neck supple.   Skin:     General: Skin is warm and dry.   Neurological:      General: No focal deficit present.      Mental Status: She is alert and oriented to person, place, and time.   Psychiatric:         Mood and Affect: Mood normal.         Behavior: Behavior normal.            Lab Results: I have reviewed the following results:  Results from last 7 days   Lab Units 04/28/25  0047   WBC Thousand/uL 5.53   HEMOGLOBIN g/dL 16.7*   HEMATOCRIT % 49.2*   PLATELETS Thousands/uL 175   SEGS PCT % 45   LYMPHO PCT % 40   MONO PCT % 11   EOS PCT % 3     Results from last 7 days   Lab Units  04/28/25  0047   SODIUM mmol/L 139   POTASSIUM mmol/L 4.1   CHLORIDE mmol/L 101   CO2 mmol/L 23   BUN mg/dL 25   CREATININE mg/dL 0.98   ANION GAP mmol/L 15*   CALCIUM mg/dL 10.7*   ALBUMIN g/dL 4.4   TOTAL BILIRUBIN mg/dL 0.82   ALK PHOS U/L 52   ALT U/L 13   AST U/L 24   GLUCOSE RANDOM mg/dL 116     Results from last 7 days   Lab Units 04/28/25  0047   INR  0.92     Results from last 7 days   Lab Units 04/28/25  2208   POC GLUCOSE mg/dl 104     Lab Results   Component Value Date    HGBA1C 5.8 (H) 11/08/2024    HGBA1C 5.7 (H) 04/01/2024    HGBA1C 6.1 (H) 09/21/2023     Results from last 7 days   Lab Units 04/28/25  0445 04/28/25  0047   LACTIC ACID mmol/L 0.9 4.4*   PROCALCITONIN ng/ml  --  0.08       Imaging Results Review: I reviewed radiology reports from this admission including: chest xray.  Other Study Results Review: EKG was reviewed.     Administrative Statements   I have spent a total time of 76 minutes in caring for this patient on the day of the visit/encounter including Diagnostic results, Counseling / Coordination of care, Documenting in the medical record, Reviewing/placing orders in the medical record (including tests, medications, and/or procedures), Obtaining or reviewing history  , and Communicating with other healthcare professionals .    ** Please Note: This note has been constructed using a voice recognition system. **

## 2025-04-29 NOTE — ASSESSMENT & PLAN NOTE
Lab Results   Component Value Date    EGFR 54 04/28/2025    EGFR 54 02/24/2025    EGFR 52 11/08/2024    CREATININE 0.98 04/28/2025    CREATININE 0.98 02/24/2025    CREATININE 1.00 11/08/2024   Baseline creatinine 0.9-1  Creatinine at baseline  Monitor

## 2025-04-29 NOTE — ASSESSMENT & PLAN NOTE
Exacerbated by hospitalization   At risk secondary to age, gait instability, cognitive impairment  Fall precautions  PT/OT

## 2025-04-29 NOTE — CONSULTS
Consultation - General Cardiology Team   Daily Schaeffer 79 y.o. female MRN: [id number]  Unit/Bed#: [facility] 422-01 Encounter: [id number]          Inpatient consult to Cardiology     Date/Time  4/29/2025 10:23 AM     Performed by  Conor Johnson MD   Authorized by  Ashley Broussard MD           PCP: Alanna Moreau DO     History of Present Illness   Physician Requesting Consult: Adryan Gallegos MD  Reason for Consult / Principal Problem: chest pain      Assessment & Plan  Chest pain  Presented with right-sided chest pain for 1 and 1/2 months, worsened for 3 days associated with dyspnea on exertion  EKG on presentation revealed diffuse ST depression on precordial leads  Troponin 11 => 17 => 23    Severe aortic stenosis  2/21/2024 TTE: LVEF 65%, G1 DD, LAE, severe AS, peak velocity 4, mean gradient 38, DVI 0.25, aortic area 0.84, MAC  Patient was referred for CT surgery for evaluation of TAVR, patient has not been seen by CT surgery  Acquired hypothyroidism  Lab Results   Component Value Date    TVG8NHZDAGUB 1.344 04/28/2025   PTA levothyroxine 75 mcg p.o. daily  Neuropathy    Stage 3 chronic kidney disease (HCC)  Lab Results   Component Value Date    EGFR 54 04/28/2025    EGFR 54 02/24/2025    EGFR 52 11/08/2024    CREATININE 0.98 04/28/2025    CREATININE 0.98 02/24/2025    CREATININE 1.00 11/08/2024     Bacteremia  Patient is afebrile  WBC on arrival 5.5  UA positive  Chest x-ray revealed no acute cardiopulmonary disease  Possible UTI (urinary tract infection)  UA positive  Procalcitonin 0.08  Lactic acidosis  Lactic acid 4.4 on arrival, now resolved  Dyslipidemia      Lab Units 09/21/23  1118   TRIGLYCERIDES mg/dL 159*   HDL mg/dL 72   LDL CALC mg/dL 103*   PTA atorvastatin 40 mg p.o. daily      PLAN:  If repeat blood culture negative, plan cath tomorrow  2.  Continue abx for UTI per primary team  3.  Recommend CT surgery consultation for TAVR. VAS carotid has been ordered.          HPI: Daily Schaeffer is a 79  y.o. year old female with a past medical history of hyperlipidemia, severe aortic stenosis, hypothyroidism, CKD 3 A, hypertension, type 2 diabetes not on medications, peripheral neuropathy who presented with right-sided chest pain.  Patient initially presented to [facility] on 4/28/2025 for right-sided chest pain for 1 and half months and worsening for the past 3 days.  Also complained of dyspnea on exertion, dysuria and chills for 3 days.  EKG on arrival revealed sinus rhythm with ST depression on the precordial leads.  Patient was transferred to [facility] for cardiac catheterization.        Review of Systems   Constitutional:  Negative for chills and fever.   HENT:  Negative for congestion, rhinorrhea, sneezing and sore throat.    Eyes:  Negative for pain and discharge.   Respiratory:  Positive for shortness of breath. Negative for cough, chest tightness and wheezing.    Cardiovascular:  Positive for chest pain. Negative for leg swelling.   Gastrointestinal:  Negative for abdominal pain, nausea and vomiting.   Endocrine: Negative for polydipsia, polyphagia and polyuria.   Genitourinary:  Negative for flank pain, frequency and urgency.   Musculoskeletal:  Negative for arthralgias, back pain and joint swelling.   Skin:  Negative for color change and pallor.   Neurological:  Positive for dizziness. Negative for weakness, light-headedness and headaches.   Psychiatric/Behavioral:  Negative for agitation and confusion.      Review of system was conducted and was negative except for as stated in the HPI.      Historical Information   Past Medical History:   Diagnosis Date    SARA (acute kidney injury) ([facility]) 06/19/2024    Aortic stenosis     Depression     Disease of thyroid gland     Diverticular disease 05/09/2017    Essential hypertension 05/20/2016    Hyperlipidemia     Major depression 05/20/2016    Paranoid delusion ([facility]) 06/25/2024    Peripheral neuropathy     Type 2 diabetes mellitus  "([facility]) 05/20/2016    Visual impairment      Past Surgical History:   Procedure Laterality Date    BOWEL RESECTION      COLON SURGERY      EYE SURGERY      SMALL INTESTINE SURGERY      TUBAL LIGATION       Social History     Substance and Sexual Activity   Alcohol Use Yes    Comment: occasional     Social History     Substance and Sexual Activity   Drug Use Never     Social History     Tobacco Use   Smoking Status Never    Passive exposure: Never   Smokeless Tobacco Never     Family History: No family history on file.    Meds/Allergies   Hospital Medications:   Current Facility-Administered Medications:     acetaminophen (TYLENOL) tablet 650 mg, Q6H PRN    aspirin (ECOTRIN LOW STRENGTH) EC tablet 81 mg, Daily    atorvastatin (LIPITOR) tablet 40 mg, Daily With Dinner    cefTRIAXone (ROCEPHIN) 1,000 mg in dextrose 5 % 50 mL IVPB, Q24H, Last Rate: 1,000 mg (04/29/25 0242)    gabapentin (NEURONTIN) capsule 100 mg, TID    heparin (porcine) subcutaneous injection 5,000 Units, Q8H NIECY    HYDROmorphone HCl (DILAUDID) injection 0.2 mg, Q4H PRN    levothyroxine tablet 75 mcg, Early Morning    morphine injection 2 mg, Once    risperiDONE (RisperDAL) tablet 0.25 mg, BID    traMADol (ULTRAM) tablet 50 mg, Q6H PRN  Home Medications:   Medications Prior to Admission:     aspirin (ECOTRIN LOW STRENGTH) 81 mg EC tablet    atorvastatin (LIPITOR) 40 mg tablet    gabapentin (Neurontin) 100 mg capsule    levothyroxine 75 mcg tablet    magnesium gluconate (MAGONATE) 500 mg tablet    risperiDONE (RisperDAL) 0.5 mg tablet    traMADol (Ultram) 50 mg tablet    acetaminophen (TYLENOL) 325 mg tablet    citalopram (CeleXA) 20 mg tablet    meclizine (ANTIVERT) 25 mg tablet    polyethylene glycol (GLYCOLAX) 17 GM/SCOOP powder    Allergies   Allergen Reactions    Wound Dressing Adhesive Rash       Objective   Vitals: Blood pressure 95/52, pulse 63, temperature 98.5 °F (36.9 °C), temperature source Oral, height 5' 8\" (1.727 m), SpO2 98%.  Orthostatic " Blood Pressures      Flowsheet Row Most Recent Value   Blood Pressure 95/52 filed at 04/29/2025 0249   Patient Position - Orthostatic VS Lying filed at 04/29/2025 0249              Invasive Devices       Peripheral Intravenous Line  Duration             Peripheral IV 04/28/25 Left;Ventral (anterior) Forearm 1 day    Peripheral IV 04/28/25 Right;Ventral (anterior) Forearm 1 day                    Physical Exam  Constitutional:       General: She is not in acute distress.     Appearance: She is well-developed. She is not diaphoretic.   HENT:      Head: Normocephalic.      Mouth/Throat:      Pharynx: No oropharyngeal exudate.   Eyes:      Pupils: Pupils are equal, round, and reactive to light.   Cardiovascular:      Rate and Rhythm: Normal rate and regular rhythm.      Heart sounds: Murmur heard.   Pulmonary:      Effort: Pulmonary effort is normal. No respiratory distress.      Breath sounds: No wheezing or rales.   Abdominal:      General: Bowel sounds are normal.      Palpations: Abdomen is soft.      Tenderness: There is no abdominal tenderness.   Musculoskeletal:         General: No tenderness. Normal range of motion.      Cervical back: Normal range of motion.      Right lower leg: No edema.      Left lower leg: No edema.   Skin:     General: Skin is warm.   Neurological:      Mental Status: She is alert and oriented to person, place, and time.           Lab Results: I have personally reviewed pertinent lab results.    Results from last 7 days   Lab Units 04/28/25  0539 04/28/25  0342 04/28/25  0047   HS TNI 0HR ng/L  --   --  11   HS TNI 2HR ng/L  --  17  --    HS TNI 4HR ng/L 23  --   --          Results from last 7 days   Lab Units 04/28/25  0047   POTASSIUM mmol/L 4.1   CO2 mmol/L 23   CHLORIDE mmol/L 101   BUN mg/dL 25   CREATININE mg/dL 0.98     Results from last 7 days   Lab Units 04/28/25  0047   HEMOGLOBIN g/dL 16.7*   HEMATOCRIT % 49.2*   PLATELETS Thousands/uL 175             Imaging: Results Review  Statement: I reviewed radiology reports from this admission including: chest xray.      Telemetry:   NSR    EKG:    Encounter Date: 04/28/25   ECG 12 lead   Result Value    Ventricular Rate 67    Atrial Rate 67    ND Interval 198    QRSD Interval 76    QT Interval 436    QTC Interval 461    P Axis 91    QRS Axis 14    T Wave Axis 73           ECHO:  No results found for this or any previous visit.    Results for orders placed during the hospital encounter of 02/21/24    Echo complete w/ contrast if indicated    Interpretation Summary    Left Ventricle: Left ventricular cavity size is normal. Wall thickness is mildly increased. The left ventricular ejection fraction is 65%. Systolic function is normal. Wall motion is normal. Diastolic function is mildly abnormal, consistent with grade I (abnormal) relaxation.    Left Atrium: The atrium is mildly dilated.    Aortic Valve: The leaflets are thickened. The leaflets are calcified. There is reduced mobility. There is severe stenosis. The aortic valve peak velocity is 4.0 m/s. The aortic valve mean gradient is 38 mmHg. The dimensionless velocity index is 0.25. The aortic valve area is 0.84 cm2. The stroke volume index is 47.30 ml/m2.    Mitral Valve: There is annular calcification.          Conor Johnson MD  Cardiology Fellow   PGY-4    ==========================================================================================      ** Please Note: Fluency DirectDictation voice to text software may have been used in the creation of this document. **

## 2025-04-29 NOTE — PLAN OF CARE
Problem: Prexisting or High Potential for Compromised Skin Integrity  Goal: Skin integrity is maintained or improved  Description: INTERVENTIONS:- Identify patients at risk for skin breakdown- Assess and monitor skin integrity- Assess and monitor nutrition and hydration status- Monitor labs - Assess for incontinence - Turn and reposition patient- Assist with mobility/ambulation- Relieve pressure over bony prominences- Avoid friction and shearing- Provide appropriate hygiene as needed including keeping skin clean and dry- Evaluate need for skin moisturizer/barrier cream- Collaborate with interdisciplinary team - Patient/family teaching- Consider wound care consult   Outcome: Progressing     Problem: PAIN - ADULT  Goal: Verbalizes/displays adequate comfort level or baseline comfort level  Description: Interventions:- Encourage patient to monitor pain and request assistance- Assess pain using appropriate pain scale- Administer analgesics based on type and severity of pain and evaluate response- Implement non-pharmacological measures as appropriate and evaluate response- Consider cultural and social influences on pain and pain management- Notify physician/advanced practitioner if interventions unsuccessful or patient reports new pain  Outcome: Progressing     Problem: INFECTION - ADULT  Goal: Absence or prevention of progression during hospitalization  Description: INTERVENTIONS:- Assess and monitor for signs and symptoms of infection- Monitor lab/diagnostic results- Monitor all insertion sites, i.e. indwelling lines, tubes, and drains- Monitor endotracheal if appropriate and nasal secretions for changes in amount and color- [place] appropriate cooling/warming therapies per order- Administer medications as ordered- Instruct and encourage patient and family to use good hand hygiene technique- Identify and instruct in appropriate isolation precautions for identified infection/condition  Outcome: Progressing      Problem: SAFETY ADULT  Goal: Patient will remain free of falls  Description: INTERVENTIONS:- Educate patient/family on patient safety including physical limitations- Instruct patient to call for assistance with activity - Consult OT/PT to assist with strengthening/mobility - Keep Call bell within reach- Keep bed low and locked with side rails adjusted as appropriate- Keep care items and personal belongings within reach- Initiate and maintain comfort rounds- Make Fall Risk Sign visible to staff- Offer Toileting every 2 Hours, in advance of need- Initiate/Maintain bed alarm- Obtain necessary fall risk management equipment:  - Apply yellow socks and bracelet for high fall risk patients- Consider moving patient to room near nurses station  Outcome: Progressing

## 2025-04-29 NOTE — ASSESSMENT & PLAN NOTE
Patient disoriented, impulsive on virtual 1:1  D/W daughter, patient has not been taking meds at home, sleep wake cycle disrupted, not eating.   Daughter also reports alcohol use at home. States her mental status declined that last time she was in the hospital and she did better in rehab. Once she got home, she declined again. Patient's  reported that she has been getting more confused the last few weeks.  Continue Risperdal  Prn zyprexa for severe agitation  Will consult Geriatrics. Will defer to them if we should restart Celexa.

## 2025-04-29 NOTE — ASSESSMENT & PLAN NOTE
Unclear how much has been consumed  Zoya reports he has seen wine and whiskey on the counter  Ongoing observation

## 2025-04-29 NOTE — ASSESSMENT & PLAN NOTE
Latest echo done 2/21/2024 showed EF of 65% and severe aortic stenosis  Repeat echo  Monitor volume status  CT surgery consulted for TAVR workup

## 2025-04-29 NOTE — ASSESSMENT & PLAN NOTE
Single blood culture with Strep in setting of poor venous access.  No apparent source of infection.  No SSTI.  Edentulous.  UA, CXR negative.  Consider endocarditis in setting of AS but only one set positive.  Consider contaminant.  Clinically stable without sepsis.      Continue ceftriaxone for now  Follow up ID of GPCs to determine if further work-up or treatment indicated  Follow up repeat blood cultures  Follow up TTE  Follow temperatures closely  Recheck WBC in AM to monitor infection  Supportive care as per the primary service

## 2025-04-29 NOTE — CONSULTS
Consultation - Cardiac Surgery   Daily Schaeffer 79 y.o. female MRN: [id number]  Unit/Bed#: [facility] 422-01 Encounter: [id number]    Physician Requesting Consult: Adryan Gallegos MD    Reason for Consult / Principal Problem: severe AS    Inpatient consult to Cardiac Surgery  Consult performed by: Sang Gray PA-C  Consult ordered by: Conor Johnson MD      History of Present Illness: Daily Schaeffer is a 79 y.o. 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 Hx of ex lap colon resection x 2 for diverticulitis.     She presented to [facility] ED on 4/28/2025 with right-sided exertional chest pain for 1.5 months, and recently for approx 3 days has been occurring at rest. Also with SOB with mild-moderate exertion that has been present for over a year. She states she has been feeling unwell for 3 days. She also has noted dysuria and chills since 3 days. CXR unremarkable. EKG showed diffuse ST depression. Had lactic acidosis of 4.4. Started on IVFs.  UA abnormal and concerning for UTI. Blood cultures drawn.  She was admitted for further workup and eventual cardiac cath. Troponins negative so far. Blood cultures grew 1 of 2 for strep. She is currently receiving IV ceftriaxone. She was transferred to [facility] for cardiac cath. Cardiology and cardiac surgery have been consulted.      She is currently lying in bed. Denies SOB/CP at this time. She is able to recall  year/month, name/, and location. Currently calm and pleasant. Able to relay some of her history and presentation. She admits to the worsening exertional CP at rest recently, and HYATT. Admits to dysuria and lower abd pain currently. Denies fevers/ chills. Denies hematuria, or other bleeding.     She lives in a house with her . He apparently has many medical problems. Also reported marital issues causing stress and contributing to psychiatric issues is past per medical records. She has ambulatory dysfnx at baseline requiring rolling walker, does steps poorly, has had falls in past. She does some ADLs, but her upper extremity paresthesias limit then. She does not drive. Denies smoking, rare alcohol use, denies drug use.     Has no teeth on lower jaw, has some remaining teeth on top. Has not seen dentist recently, her former dentist retired.     Past Medical History:  Past Medical History:   Diagnosis Date    SARA (acute kidney injury) ([facility]) 2024    Aortic stenosis     Depression     Disease of thyroid gland     Diverticular disease 2017    Essential hypertension 2016    Hyperlipidemia     Major depression 2016    Paranoid delusion ([facility]) 2024    Peripheral neuropathy     Type 2 diabetes mellitus ([facility]) 2016    Visual impairment        Past Surgical History:   Past Surgical History:   Procedure Laterality Date    BOWEL RESECTION      COLON SURGERY      EYE SURGERY      SMALL INTESTINE SURGERY      TUBAL LIGATION         Family History:  No family history on file.    Social History:  Social History     Substance and Sexual Activity   Alcohol Use Yes    Comment: occasional     Social History     Substance and Sexual Activity   Drug Use Never     Social History     Tobacco Use   Smoking Status Never    Passive exposure: Never   Smokeless Tobacco Never     Marital Status: /Civil Union    Home Medications:   Prior to Admission medications    Medication Sig Start Date End Date  Taking? Authorizing Provider   aspirin (ECOTRIN LOW STRENGTH) 81 mg EC tablet Take 1 tablet (81 mg total) by mouth daily 6/23/24  Yes Timothy Holt DO   atorvastatin (LIPITOR) 40 mg tablet  12/10/24  Yes Historical Provider, MD   gabapentin (Neurontin) 100 mg capsule Take 1 capsule (100 mg total) by mouth 3 (three) times a day 1/27/25  Yes Alanna Moreau DO   levothyroxine 75 mcg tablet Take 1 tablet (75 mcg total) by mouth daily in the early morning Take 1 tablet (75 mg) by oral route five days a week. 4/1/24  Yes Alanna Moreau DO   magnesium gluconate (MAGONATE) 500 mg tablet Take 1 tablet (500 mg total) by mouth daily 6/28/24  Yes MAJOR Garrett   risperiDONE (RisperDAL) 0.5 mg tablet Take 0.5 tablets (0.25 mg total) by mouth 2 (two) times a day Do not start before August 13, 2024. 8/13/24  Yes Alanna Moreau DO   traMADol (Ultram) 50 mg tablet Take 1 tablet (50 mg total) by mouth every 6 (six) hours as needed for moderate pain 1/10/25  Yes Alanna Moreau DO   acetaminophen (TYLENOL) 325 mg tablet Take 2 tablets (650 mg total) by mouth every 4 (four) hours as needed for mild pain, headaches or fever  Patient not taking: Reported on 4/28/2025 6/22/24   Timothy Holt DO   citalopram (CeleXA) 20 mg tablet Take 1 tablet (20 mg total) by mouth daily  Patient not taking: Reported on 4/28/2025 12/11/24   Alanna Moreau DO   meclizine (ANTIVERT) 25 mg tablet Take 1 tablet (25 mg total) by mouth 3 (three) times a day as needed for dizziness  Patient not taking: Reported on 4/28/2025 2/24/25   Nitin Romo,    polyethylene glycol (GLYCOLAX) 17 GM/SCOOP powder Take 17 g by mouth 2 (two) times a day  Patient not taking: Reported on 4/28/2025 7/19/24   Elton Aponte MD       Inpatient Medications:  Scheduled Meds:   Current Facility-Administered Medications   Medication Dose Route Frequency Provider Last Rate    acetaminophen  650 mg  Oral Q6H PRN Ashley Broussard MD      aspirin  81 mg Oral Daily Ashley Broussard MD      atorvastatin  40 mg Oral Daily With Dinner Ashley Broussard MD      cefTRIAXone  1,000 mg Intravenous Q24H Ashley Broussard MD 1,000 mg (04/29/25 0242)    gabapentin  100 mg Oral TID Ashley Broussard MD      heparin (porcine)  5,000 Units Subcutaneous Q8H [facility] Ashley Broussard MD      lactated ringers  75 mL/hr Intravenous Continuous Gerri Saeed PA-C 75 mL/hr (04/29/25 0928)    levothyroxine  75 mcg Oral Early Morning Ashley Broussard MD      morphine injection  2 mg Intravenous Once Tamia EMILY Rodriges PA-C      QUEtiapine  12.5 mg Oral BID PRN Gerri Saeed PA-C      risperiDONE  0.25 mg Oral BID Ashley Broussard MD      traMADol  50 mg Oral Q6H PRN Ashley Broussard MD       Continuous Infusions: lactated ringers, 75 mL/hr, Last Rate: 75 mL/hr (04/29/25 0928)      PRN Meds:  acetaminophen, 650 mg, Q6H PRN  QUEtiapine, 12.5 mg, BID PRN  traMADol, 50 mg, Q6H PRN        Allergies:  Allergies   Allergen Reactions    Wound Dressing Adhesive Rash       Review of Systems:  Review of Systems   Constitutional:  Negative for chills and fever.   HENT:  Negative for trouble swallowing.    Eyes:  Negative for visual disturbance.   Respiratory:  Positive for chest tightness and shortness of breath.    Cardiovascular:  Positive for chest pain. Negative for palpitations and leg swelling.   Gastrointestinal:  Positive for abdominal pain. Negative for anal bleeding, blood in stool, nausea and vomiting.   Genitourinary:  Positive for dysuria. Negative for hematuria.   Musculoskeletal:  Positive for arthralgias and gait problem.   Skin:  Negative for rash.   Neurological:  Negative for dizziness, seizures, speech difficulty, weakness and light-headedness.   Psychiatric/Behavioral:  Negative for agitation.        Vital Signs:     Vitals:    04/29/25 0249 04/29/25 0749 04/29/25 0800 04/29/25 1118   BP: 95/52 149/60  140/92   BP Location:  "Right arm Right arm     Pulse: 63 71  65   Resp:  19  19   Temp: 98.5 °F (36.9 °C) 98 °F (36.7 °C)  99 °F (37.2 °C)   TempSrc: Oral Oral     SpO2: 98% 96% 98% 98%   Weight:  56.1 kg (123 lb 10.9 oz)     Height:  5' 7\" (1.702 m)       Invasive Devices       Peripheral Intravenous Line  Duration             Peripheral IV 04/28/25 Left;Ventral (anterior) Forearm 1 day    Peripheral IV 04/28/25 Right;Ventral (anterior) Forearm 1 day                  Physical Exam  Constitutional:       General: She is not in acute distress.     Appearance: Normal appearance. She is normal weight. She is not toxic-appearing.      Comments: Elderly, frail appearing   HENT:      Head: Normocephalic.      Nose: Nose normal.      Mouth/Throat:      Mouth: Mucous membranes are dry.   Eyes:      Extraocular Movements: Extraocular movements intact.   Cardiovascular:      Rate and Rhythm: Normal rate.      Pulses: Normal pulses.      Heart sounds: Murmur heard.      Systolic murmur is present with a grade of 3/6.   Pulmonary:      Effort: Pulmonary effort is normal.      Breath sounds: Normal breath sounds.   Abdominal:      General: Bowel sounds are normal.      Palpations: Abdomen is soft.      Tenderness: There is abdominal tenderness.      Comments: Lower abd pain to palpation   Musculoskeletal:      Right lower leg: No edema.      Left lower leg: No edema.   Skin:     General: Skin is warm and dry.   Neurological:      Mental Status: She is alert and oriented to person, place, and time. Mental status is at baseline.   Psychiatric:         Mood and Affect: Mood normal.         Behavior: Behavior normal.         Lab Results:     Results from last 7 days   Lab Units 04/29/25  0713 04/28/25  0047   WBC Thousand/uL 6.38 5.53   HEMOGLOBIN g/dL 14.3 16.7*   HEMATOCRIT % 42.7 49.2*   PLATELETS Thousands/uL 147* 175     Results from last 7 days   Lab Units 04/29/25  0713 04/28/25  0047   POTASSIUM mmol/L 3.6 4.1   CHLORIDE mmol/L 103 101   CO2 " mmol/L 28 23   BUN mg/dL 14 25   CREATININE mg/dL 0.79 0.98   CALCIUM mg/dL 9.6 10.7*     Results from last 7 days   Lab Units 04/28/25  0047   INR  0.92   PTT seconds 28     Lab Results   Component Value Date    HGBA1C 5.4 04/29/2025     Lab Results   Component Value Date    CKTOTAL 35 07/29/2024       Imaging Studies:     Updated echo pending    Cardiac catheterization pending    Echo 2/21/24: LVEF 65%, RV size/fnx normal, severe AS, PG 63, MG 38, DVI 0.25, MARIA ALEJANDRA 0.84, MAC, trace MR/TR.      CXR: No acute cardiopulmonary disease.     EKG: Sinus rhythm with Baseline artifact, T wave abnormality, consider lateral ischemia     Results Review Statement: I personally reviewed the following image studies in PACS and associated radiology reports: EKG, chest xray, and Echocardiogram. My interpretation of the radiology images/reports is: same as above.    Assessment:  Principal Problem:    Chest pain  Active Problems:    Severe aortic stenosis    Dyslipidemia    Acquired hypothyroidism    Neuropathy    Stage 3 chronic kidney disease (HCC)    Bacteremia    Possible UTI (urinary tract infection)    Lactic acidosis    Impression:  - presentation with worsening angina at rest and UTI symptoms  - severe AS, symptomatic. 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 Hx of severe anxiety, depressions, paranoia.     Plan:  - check updated TTE  - needs cardiac cath per cardiology  - recommend ID consult for antibiotic recommendations and consideration for JAN to rule out true bacteremia/endocarditis.   - patient now agreeable to TAVR workup. Would be a poor open surgical candidate  - TAVR workup while inpt, then outpt follow up once acute issues are resolved. Can check TAVR CTA while here. Obtain dental clearance with panorex and  oral surgery consult. Will need carotid US.   - once workup completed and acute issues resolved, can follow up outpt for further TAVR eval/planning with Dr. Dale.     Daily Schaeffer was comfortable with our recommendations, and their questions were answered to their satisfaction.  We will continue to evaluate the patient daily with further recommendations as work up is completed.  Thank you for allowing us to participate in the care of this patient.     SIGNATURE: Sang Gray PA-C  DATE: April 29, 2025  TIME: 12:18 PM    * This note was completed in part utilizing [email or web address] direct voice recognition software.   Grammatical errors, random word insertion, spelling mistakes, and incomplete sentences may be an occasional consequence of the system secondary to software limitations, ambient noise and hardware issues. At the time of dictation, efforts were made to edit, clarify and /or correct errors. Please read the chart carefully and recognize, using context, where substitutions have occurred.  If you have any questions or concerns about the context, text or information contained within the body of this dictation, please contact myself, the provider, for further clarification.

## 2025-04-29 NOTE — ASSESSMENT & PLAN NOTE
Reason for initial presentation.  In setting of sever AS, diffuse ST depression on ECG.  Cardiology evaluation ongoing

## 2025-04-29 NOTE — PROGRESS NOTES
Progress Note - Hospitalist   Name: Daily Schaeffer 79 y.o. female I MRN: [id number]  [facility] I Date of Admission: 4/28/2025   Date of Service: 4/29/2025 I Hospital Day: 1    Assessment & Plan  Chest pain  Presented to [facility] on 4/28/25 with progressively worsening right-sided chest pain since 1.5 months, initially with exertion and since 3 days with rest  EKG showed diffuse ST depression  Troponin - negative  Transferred to [facility] 4/28 for cardiac catheterization  Ct ASA, statin  Check echo  Monitor on telemetry  Bacteremia  1 of 2 blood cultures from initial presentation to [facility] positive for gram positive cocci in chains  Suspect contaminant  Has dysuria and chills since 3 days  UA - nitrite positive but 0-5 WBC's hence culture not done  CXR - negative  Ct Ceftriaxone until repeat blood cultures available.  Possible UTI (urinary tract infection)  Ceftriaxone as above  If blood cultures contaminant treat for 3 days  Acute metabolic encephalopathy  Patient disoriented, impulsive on virtual 1:1  D/W daughter, patient has not been taking meds at home, sleep wake cycle disrupted, not eating.   Daughter also reports alcohol use at home. States her mental status declined that last time she was in the hospital and she did better in rehab. Once she got home, she declined again. Patient's  reported that she has been getting more confused the last few weeks.  Continue Risperdal  Prn zyprexa for severe agitation  Will consult Geriatrics. Will defer to them if we should restart Celexa.   Lactic acidosis  Lactic acid elevated at 4.4 on initial presentation to [facility] and normalized with IV fluids  Severe aortic stenosis  Latest echo done 2/21/2024 showed EF of 65% and severe aortic stenosis  Repeat echo  Monitor volume status  CT surgery consulted for TAVR workup  Neuropathy  Continue Gabapentin  Acquired hypothyroidism  TSH normal  Continue levothyroxine 75  mcg daily  Stage 3 chronic kidney disease (HCC)  Lab Results   Component Value Date    EGFR 70 04/29/2025    EGFR 54 04/28/2025    EGFR 54 02/24/2025    CREATININE 0.79 04/29/2025    CREATININE 0.98 04/28/2025    CREATININE 0.98 02/24/2025   Baseline creatinine 0.9-1  Creatinine at baseline  Monitor  Dyslipidemia      VTE Pharmacologic Prophylaxis: VTE Score: 4 Moderate Risk (Score 3-4) - Pharmacological DVT Prophylaxis Ordered: heparin.    Mobility:   Basic Mobility Inpatient Raw Score: 16  JH-HLM Goal: 5: Stand one or more mins  JH-HLM Achieved: 6: Walk 10 steps or more  JH-HLM Goal achieved. Continue to encourage appropriate mobility.    Patient Centered Rounds: I performed bedside rounds with nursing staff today.   Discussions with Specialists or Other Care Team Provider: Cardiology, CT surgery, case management    Education and Discussions with Family / Patient: Updated  ( and daughter) via phone.    Current Length of Stay: 1 day(s)  Current Patient Status: Inpatient   Certification Statement: The patient will continue to require additional inpatient hospital stay due to cardiac workup  Discharge Plan: Anticipate discharge in 48-72 hrs to discharge location to be determined pending rehab evaluations.    Code Status: Level 1 - Full Code    Subjective   Patient was confused and disoriented this morning.  She was trying to get out of bed to go to another hospital.  Spoke with patient's daughter on the phone.  Per patient's , she has been getting more confused at home the last few weeks.  Daughter does not think she has been taking her medications and has been eating very little.  Also her sleep/wake cycle has been disrupted.    Objective :  Temp:  [98 °F (36.7 °C)-99 °F (37.2 °C)] 99 °F (37.2 °C)  HR:  [53-71] 65  BP: ()/(51-92) 140/92  Resp:  [14-21] 19  SpO2:  [93 %-98 %] 98 %  O2 Device: None (Room air)    Body mass index is 19.37 kg/m².     Input and Output Summary (last 24  hours):     Intake/Output Summary (Last 24 hours) at 4/29/2025 1226  Last data filed at 4/29/2025 1201  Gross per 24 hour   Intake 400 ml   Output 700 ml   Net -300 ml       Physical Exam  Constitutional:       Appearance: Normal appearance.   Cardiovascular:      Rate and Rhythm: Normal rate and regular rhythm.      Heart sounds: No murmur heard.  Pulmonary:      Effort: Pulmonary effort is normal.      Breath sounds: Normal breath sounds.   Abdominal:      General: Bowel sounds are normal. There is no distension.      Palpations: Abdomen is soft.      Tenderness: There is no abdominal tenderness.   Skin:     General: Skin is warm and dry.   Neurological:      General: No focal deficit present.      Mental Status: She is alert. She is disoriented.   Psychiatric:         Mood and Affect: Mood normal.         Cognition and Memory: Cognition is impaired.         Judgment: Judgment is impulsive.           Lines/Drains:        Telemetry:  Telemetry Orders (From admission, onward)               24 Hour Telemetry Monitoring  Continuous x 24 Hours (Telem)        Expiring   Question:  Reason for 24 Hour Telemetry  Answer:  PCI/EP study (including pacer and ICD implementation), Cardiac surgery, MI, abnormal cardiac cath, and chest pain- rule out MI                                  Lab Results: I have reviewed the following results:   Results from last 7 days   Lab Units 04/29/25  0713   WBC Thousand/uL 6.38   HEMOGLOBIN g/dL 14.3   HEMATOCRIT % 42.7   PLATELETS Thousands/uL 147*   SEGS PCT % 44   LYMPHO PCT % 40   MONO PCT % 11   EOS PCT % 4     Results from last 7 days   Lab Units 04/29/25  0713 04/28/25  0047   SODIUM mmol/L 139 139   POTASSIUM mmol/L 3.6 4.1   CHLORIDE mmol/L 103 101   CO2 mmol/L 28 23   BUN mg/dL 14 25   CREATININE mg/dL 0.79 0.98   ANION GAP mmol/L 8 15*   CALCIUM mg/dL 9.6 10.7*   ALBUMIN g/dL  --  4.4   TOTAL BILIRUBIN mg/dL  --  0.82   ALK PHOS U/L  --  52   ALT U/L  --  13   AST U/L  --  24    GLUCOSE RANDOM mg/dL 94 116     Results from last 7 days   Lab Units 04/28/25  0047   INR  0.92     Results from last 7 days   Lab Units 04/28/25  2208   POC GLUCOSE mg/dl 104     Results from last 7 days   Lab Units 04/29/25  0713   HEMOGLOBIN A1C % 5.4     Results from last 7 days   Lab Units 04/28/25  0445 04/28/25  0047   LACTIC ACID mmol/L 0.9 4.4*   PROCALCITONIN ng/ml  --  0.08       Recent Cultures (last 7 days):   Results from last 7 days   Lab Units 04/29/25  0057 04/28/25  0125 04/28/25  0047   BLOOD CULTURE  Received in Microbiology Lab. Culture in Progress.  Received in Microbiology Lab. Culture in Progress.  --  No Growth at 24 hrs.   GRAM STAIN RESULT   --  Gram positive cocci in chains*  --        Imaging Results Review: I reviewed radiology reports from this admission including: chest xray.      Last 24 Hours Medication List:     Current Facility-Administered Medications:     acetaminophen (TYLENOL) tablet 650 mg, Q6H PRN    aspirin (ECOTRIN LOW STRENGTH) EC tablet 81 mg, Daily    atorvastatin (LIPITOR) tablet 40 mg, Daily With Dinner    cefTRIAXone (ROCEPHIN) 1,000 mg in dextrose 5 % 50 mL IVPB, Q24H, Last Rate: 1,000 mg (04/29/25 0242)    gabapentin (NEURONTIN) capsule 100 mg, TID    heparin (porcine) subcutaneous injection 5,000 Units, Q8H [facility]    [START ON 4/30/2025] lactated ringers infusion, Continuous    levothyroxine tablet 75 mcg, Early Morning    morphine injection 2 mg, Once    QUEtiapine (SEROquel) tablet 12.5 mg, BID PRN    risperiDONE (RisperDAL) tablet 0.25 mg, BID    traMADol (ULTRAM) tablet 50 mg, Q6H PRN    Administrative Statements   Today, Patient Was Seen By: Gerri Saeed PA-C      **Please Note: This note may have been constructed using a voice recognition system.**

## 2025-04-29 NOTE — ASSESSMENT & PLAN NOTE
For pain, you may take Tylenol 1000mg every 8 hours and ibuprofen 400mg every 6 hours for pain.  Use crutches to be weightbearing as tolerated.  Follow up with orthopedics for further evaluation with persistent symptoms.   Latest echo done 2/21/2024 showed EF of 65% and severe aortic stenosis  Repeat echo  Monitor volume status

## 2025-04-29 NOTE — PHYSICAL THERAPY NOTE
PHYSICAL THERAPY EVALUATION  NAME:  Daily Schaeffer  DATE: 04/29/25    AGE:   79 y.o.  Mrn:   [id number]  ADMIT DX:  Chest pain [R07.9]    Past Medical History:   Diagnosis Date    SARA (acute kidney injury) ([facility]) 06/19/2024    Aortic stenosis     Depression     Disease of thyroid gland     Diverticular disease 05/09/2017    Essential hypertension 05/20/2016    Hyperlipidemia     Major depression 05/20/2016    Paranoid delusion ([facility]) 06/25/2024    Peripheral neuropathy     Type 2 diabetes mellitus ([facility]) 05/20/2016    Visual impairment      Past Surgical History:   Procedure Laterality Date    BOWEL RESECTION      COLON SURGERY      EYE SURGERY      SMALL INTESTINE SURGERY      TUBAL LIGATION         Length Of Stay: 1  PHYSICAL THERAPY EVALUATION :    04/29/25 1150   PT Last Visit   PT Visit Date 04/29/25   Note Type   Note type Evaluation   Pain Assessment   Pain Assessment Tool 0-10   Pain Score No Pain   Restrictions/Precautions   Weight Bearing Precautions Per Order No   Other Precautions 1:1;Cognitive;Chair Alarm;Bed Alarm;Multiple lines;Fall Risk   Home Living   Type of Home House   Home Layout Two level;Stairs to enter with rails;Able to live on main level with bedroom/bathroom   Bathroom Shower/Tub Tub/shower unit   Bathroom Toilet Standard   Bathroom Equipment Shower chair   Home Equipment Walker   Additional Comments Presently pt is confused and a poor historian and info obtained from EMR-  At baseline, pt resides w/ spouse in a 2SH w/ 3 MARIE; has [facility] w/ main bed and bath located there. Pt was MI w/ RW PTA- neighbors assist w/ transport/ driving   Prior Function   Level of [place] Independent with ADLs;Independent with functional mobility;Needs assistance with IADLS   Lives With Spouse   Receives Help From Family   IADLs Family/Friend/Other provides transportation;Family/Friend/Other provides meals;Family/Friend/Other provides medication management   Falls in the last 6 months 0   Vocational Retired    Cognition   Overall Cognitive Status Impaired   Attention Attends with cues to redirect   Orientation Level Oriented to person;Disoriented to place;Disoriented to time;Disoriented to situation   Memory Decreased recall of precautions;Decreased recall of recent events;Decreased short term memory;Decreased recall of biographical information;Decreased long term memory   Following Commands Follows one step commands with increased time or repetition   Comments cues for safety/ impulsive- overall pleasant w/ cues for task focus/ redirection   RUE Assessment   RUE Assessment WFL   LUE Assessment   LUE Assessment WFL   RLE Assessment   RLE Assessment WFL  (3+/5 - functional assessment)   LLE Assessment   LLE Assessment WFL   Coordination   Movements are Fluid and Coordinated 1   Light Touch   RLE Light Touch Grossly intact   LLE Light Touch Grossly intact   Bed Mobility   Supine to Sit 4  Minimal assistance   Additional items Assist x 1;Impulsive   Transfers   Sit to Stand 4  Minimal assistance   Additional items Assist x 1;Impulsive;Verbal cues   Stand to Sit 3  Moderate assistance   Additional items Assist x 1;Impulsive;Verbal cues   Additional Comments RW   Ambulation/Elevation   Gait pattern Improper Weight shift;Decreased foot clearance;Shuffling;Ataxia;Excessively slow   Gait Assistance 4  Minimal assist  (> modA w/ fatigue)   Assistive Device Rolling walker   Distance 15'x2   Balance   Static Sitting Fair   Dynamic Sitting Fair -   Static Standing Poor +   Dynamic Standing Poor +   Ambulatory Poor   Endurance Deficit   Endurance Deficit Yes   Activity Tolerance   Activity Tolerance Patient tolerated treatment well   Medical Staff Made Aware RN and CM for d/c planning   Nurse Made Aware yes   Assessment   Prognosis Good   Problem List Decreased strength;Decreased endurance;Decreased mobility;Impaired balance;Decreased coordination;Decreased cognition;Impaired judgement;Decreased safety awareness   Assessment Pt is  79 y.o. female seen for PT evaluation s/p admit to [facility] on 4/28/2025  as a transfer from [facility] where she presented w/ CP dx including chest pain ; UTI and severe AS. Pt seen by CT surgery and no intervention/ TAVR  until recovery from infection. PT consulted for mobility and to assist w/ d/c plannin recommendations.  Pt  has a past medical history of SARA (acute kidney injury) ([facility]) (06/19/2024), Aortic stenosis, Depression, Disease of thyroid gland, Diverticular disease (05/09/2017), Essential hypertension (05/20/2016), Hyperlipidemia, Major depression (05/20/2016), Paranoid delusion ([facility]) (06/25/2024), Peripheral neuropathy, Type 2 diabetes mellitus ([facility]) (05/20/2016), and Visual impairment.  Due to acute medical issues, ongoing medical workup for primary dx; pain, fall risk, increased reliance on more restrictive AD compared to baseline;  decreased activity tolerance compared to baseline, increased assistance needed from caregiver at current time, continuous monitoring, trending labs;  multiple lines, impaired cognition;  decline in overall functional mobility status; health management issues; note unstable clinical picture (high complexity).  Presently pt is confused and a poor historian and info obtained from EMR-  At baseline, pt resides w/ spouse in a 2SH w/ 3 MARIE; has [facility] w/ main bed and bath located there. Pt was MI w/ RW PTA- neighbors assist w/ transport/ driving.Currently,  pt  is requiring Chip A for bed skills; Chip for functional transfers and Chip for ambulation w/ RW 15'x2.   Pt presents functioning below baseline and currently w/ overall mobility deficits 2* to: decreased LE strength/AROM; limited flexibility;  generalized weakness/ deconditioning; decreased endurance; decreased activity tolerance; decreased coordination; impaired balance; gait deviations; decreased safety awareness; SOB/HYATT; fatigue; impaired safety and judgement; limited insight into current deficits; bed/  chair alarms; multiple lines; impaired cognition; impulsive.   Pt currently at risk for falls.  (Please find additional objective findings from PT assessment regarding body systems outlined above.) Pt will continue to benefit from skilled PT interventions to address stated impairments; to maximize functional potential; for ongoing pt/ family training; and DME needs.  PT is recommending PT Resource Intensity Level  2 (rehab)  on d/c.      PT will follow for STG as outlined on eval. Pt/ family agreeable to PT POC and goals as stated.   Goals   Patient Goals to see her family- go home   STG Expiration Date 05/13/25   Short Term Goal #1 In 14 days pt will:  Complete bed mobility skills with MI to facilitate safe return to previous living environment and decrease burden on caregivers.  Perform all functional transfers with MI  to facilitate safe return to previous living environment.    Ambulate  with 'x2 MI' without LOB and stable vitals for safe ambulation in home/ community environment.   Complete stair training up/ down 3 step/s w/  S for safe access to previous living environment and to increase community access.    Improve balance by 1 grade in order to decrease fall risk.      Actively participate w/ PT for ongoing pt and family education; DME needs and D/C planning to promote highest level of function in least restrictive environment.   PT Treatment Day 0   Plan   Treatment/Interventions ADL retraining;Functional transfer training;LE strengthening/ROM;Elevations;Therapeutic exercise;Endurance training;Cognitive reorientation;Patient/family training;Equipment eval/education;Bed mobility;Gait training;Compensatory technique education;Spoke to case management;Spoke to nursing;OT   PT Frequency 2-3x/wk   Discharge Recommendation   Rehab Resource Intensity Level, PT II (Moderate Resource Intensity)   AM-PAC Basic Mobility Inpatient   Turning in Flat Bed Without Bedrails 3   Lying on Back to Sitting on Edge of  Flat Bed Without Bedrails 3   Moving Bed to Chair 2   Standing Up From Chair Using Arms 3   Walk in Room 2   Climb 3-5 Stairs With Railing 2   Basic Mobility Inpatient Raw Score 15   Basic Mobility Standardized Score 36.97   [facility] Highest Level Of Mobility   -[facility] Goal 4: Move to chair/commode   -HL Achieved 7: Walk 25 feet or more   End of Consult   Patient Position at End of Consult Bedside chair;Bed/Chair alarm activated;All needs within reach  (+ on vir 1:1)     The patient's AM-PAC Basic Mobility Inpatient Short Form Raw Score is 15. A Raw score of less than or equal to 16 suggests the patient may benefit from discharge to post-acute rehabilitation services. Please also refer to the recommendation of the Physical Therapist for safe discharge planning.

## 2025-04-29 NOTE — ASSESSMENT & PLAN NOTE
Lab Results   Component Value Date    EGFR 70 04/29/2025    EGFR 54 04/28/2025    EGFR 54 02/24/2025    CREATININE 0.79 04/29/2025    CREATININE 0.98 04/28/2025    CREATININE 0.98 02/24/2025      Principal Discharge DX:	Vertigo   1

## 2025-04-29 NOTE — ASSESSMENT & PLAN NOTE
Lactic acid elevated at 4.4 on initial presentation to [facility] and normalized with IV fluids

## 2025-04-29 NOTE — ASSESSMENT & PLAN NOTE
ID On consult  Single BC with strep, consider contaminant  No apparent source of infection  Continue ceftriaxone for now

## 2025-04-29 NOTE — CONSULTS
Consultation - Geriatric Medicine   Name: Daily Schaeffer 79 y.o. female I MRN: [id number]  Unit/Bed#: [facility] 422-01 I Date of Admission: 4/28/2025   Date of Service: 4/29/2025 I Hospital Day: 1   Inpatient consult to Gerontology  Consult performed by: MAJOR Wilson  Consult ordered by: Gerri Saeed PA-C        Physician Requesting Evaluation: Adryan Gallegos MD   Reason for Evaluation / Principal Problem: altered mental status    Assessment & Plan  Chest pain  Cardiology on consult  Acute metabolic encephalopathy  Multifactorial risk factors: infection, hospitalization, in the setting of underlying cognitive impairment, hx of metabolic encephalopathy with prior hospitalization/UTI  Maintain delirium precautions:  Provide frequent redirection, reorientation, distraction techniques  Avoid deliriogenic medications such as tramadol, benzodiazepines, anticholinergics,  Benadryl  Treat pain, See geriatric pain protocol  Monitor for constipation and urinary retention  Encourage early and frequent moblization, OOB  Encourage Hydration/ Nutrition  Implement sleep hygiene, limit night time interuptions, group activities  Qtc 461 (4/28/25)  Acquired hypothyroidism  Per pharmacy last refill of sythroid was in October  TSH 1.344 (4/28/25) ? Ongoing evaluation in the setting of questionable medication compliance, synthroid restarted this admission    Neuropathy  Chronic  Was previously on lyrica in January switched to gabapentin  Agree with low dose gabapentin 100 mg po TID    Bacteremia  ID On consult  Single BC with strep, consider contaminant  No apparent source of infection  Continue ceftriaxone for now    Cognitive impairment  Progressing, likely progression of underlying dementia  TSH 1.344 (4/28/25)  Vitamin B 12 level 270, recommend supplementation goal level greater than 270  Pt was seen by Dr Sierra (6/25/25) does not have capacity  Although at present pt likely has component of increased confusion suspect that  at baseline pt has progressing cognitive decline, leading to medication non compliance and altered circadian rhythm at home  Recommend cognitive testing as outpatient     Frailty  Clinical Frail Scale: 6- Moderately Frail  Lives with , per family both need increased support   Grandson checks in every 2 weeks  Case management on consult  PT/OT  Recommend increased support home with daily checks versus assisted living  Depression  Chronic  Exacerbated by death of daughter from overdose  Ok to restart celexa  Alcohol use  Unclear how much has been consumed  Grandson reports he has seen wine and whiskey on the counter  Ongoing observation  Insomnia  Chronic  With altered circadian rhythm  Medications restarted on admission which should help, along with routine, daytime meals, daytime interactions  Will order melatonin QHS prn  Ambulatory dysfunction  Exacerbated by hospitalization   At risk secondary to age, gait instability, cognitive impairment  Fall precautions  PT/OT      History of Present Illness   Hx and PE limited by: altered mental status  HPI: Daily Schaeffer is a 79 y.o. year old female who presents to [facility] with pain to her right chest for the past month and half. She reports the last 3 days pain has been at rest. She is transferred to [facility] for cardiac catheterization.    She has hypothyroidism, neuropathy, CKD3,     Prior to arrival she lives at home with her . She needs assistance with IADLs. She is independent with ADLs. She ambulates with a roller walker. She has prior falls. She sleeps during the day and is awake at night. Her grandson check in every 2 weeks. He drops off groceries. He states he recently has seen wine and whiskey bottles on the counter. He is uncertain how much they have been drinking.     She is lying in bed, oriented x 1, confused. She is unkempt appearing.     Spoke at length with simba, who was visiting to review HPI     Review of Systems    Constitutional:  Negative for unexpected weight change.   HENT:  Negative for hearing loss.    Eyes:  Negative for visual disturbance.   Respiratory:  Negative for cough.    Cardiovascular:  Negative for chest pain.   Gastrointestinal:  Negative for constipation.   Genitourinary:  Negative for difficulty urinating.   Musculoskeletal:  Positive for gait problem.   Neurological:  Negative for weakness.   Psychiatric/Behavioral:  Positive for sleep disturbance.            Historical Information   Past Medical History:   Diagnosis Date    SARA (acute kidney injury) ([facility]) 06/19/2024    Aortic stenosis     Depression     Disease of thyroid gland     Diverticular disease 05/09/2017    Essential hypertension 05/20/2016    Hyperlipidemia     Major depression 05/20/2016    Paranoid delusion ([facility]) 06/25/2024    Peripheral neuropathy     Type 2 diabetes mellitus ([facility]) 05/20/2016    Visual impairment      Past Surgical History:   Procedure Laterality Date    BOWEL RESECTION      COLON SURGERY      EYE SURGERY      SMALL INTESTINE SURGERY      TUBAL LIGATION       Social History     Tobacco Use    Smoking status: Never     Passive exposure: Never    Smokeless tobacco: Never   Vaping Use    Vaping status: Never Used   Substance and Sexual Activity    Alcohol use: Yes     Comment: occasional    Drug use: Never    Sexual activity: Not Currently     Partners: Male     Birth control/protection: Female Sterilization     E-Cigarette/Vaping    E-Cigarette Use Never User      E-Cigarette/Vaping Substances    Nicotine No     THC No     CBD No     Flavoring No      No family history on file.  Social History     Tobacco Use    Smoking status: Never     Passive exposure: Never    Smokeless tobacco: Never   Vaping Use    Vaping status: Never Used   Substance and Sexual Activity    Alcohol use: Yes     Comment: occasional    Drug use: Never    Sexual activity: Not Currently     Partners: Male     Birth control/protection: Female Sterilization        Current Facility-Administered Medications:     acetaminophen (TYLENOL) tablet 650 mg, Q6H PRN    aspirin (ECOTRIN LOW STRENGTH) EC tablet 81 mg, Daily    atorvastatin (LIPITOR) tablet 40 mg, Daily With Dinner    cefTRIAXone (ROCEPHIN) 1,000 mg in dextrose 5 % 50 mL IVPB, Q24H, Last Rate: 1,000 mg (04/29/25 0242)    gabapentin (NEURONTIN) capsule 100 mg, TID    heparin (porcine) subcutaneous injection 5,000 Units, Q8H NIECY    [START ON 4/30/2025] lactated ringers infusion, Continuous    levothyroxine tablet 75 mcg, Early Morning    morphine injection 2 mg, Once    OLANZapine (ZyPREXA) tablet 2.5 mg, BID PRN    risperiDONE (RisperDAL) tablet 0.25 mg, BID    traMADol (ULTRAM) tablet 50 mg, Q6H PRN  Wound dressing adhesive    Meds/Allergies   Home medication review  [facility] pharmacy   Refills not current    1/10/25 lrycia - discontinued  Tramadol 50 mg po      gabapentin 100 mg po TID 1/27/25 #90 tablets  Citalopram 20 mg po daily, last refill 12/10/24 # 90 days  Atorvastatin 40 mg po daily, last refill 12/10/24 # 90 days    Risperidone 0.5 mg po BID, last refill 9/17/24 # 30 days  Levothyroxine 75 mcg po daily, last refill 10/31/24 # 90 days        Personally confirmed with pharmacist    Objective :  Temp:  [98 °F (36.7 °C)-99 °F (37.2 °C)] 99 °F (37.2 °C)  HR:  [53-71] 65  BP: ()/(51-92) 140/92  Resp:  [14-21] 19  SpO2:  [93 %-98 %] 98 %  O2 Device: None (Room air)    Physical Exam  Vitals and nursing note reviewed.   HENT:      Head: Normocephalic.      Mouth/Throat:      Pharynx: No oropharyngeal exudate.   Eyes:      General:         Right eye: No discharge.         Left eye: No discharge.   Cardiovascular:      Rate and Rhythm: Normal rate and regular rhythm.      Pulses: Normal pulses.   Pulmonary:      Effort: Pulmonary effort is normal.      Breath sounds: Normal breath sounds.   Abdominal:      General: Bowel sounds are normal.   Musculoskeletal:         General: Normal range of  motion.      Cervical back: Normal range of motion.   Skin:     General: Skin is warm and dry.   Neurological:      Mental Status: She is alert. She is disoriented.   Psychiatric:         Mood and Affect: Mood normal.           Lab Results: I have reviewed the following results:CBC/BMP:   .     04/29/25  0713   WBC 6.38   HGB 14.3   HCT 42.7   *   SODIUM 139   K 3.6      CO2 28   BUN 14   CREATININE 0.79   GLUC 94   MG 1.6*        Imaging Results Review: I reviewed radiology reports from this admission including: procedure reports.  Other Study Results Review: EKG was reviewed.     Therapies:   Basic Mobility Inpatient Raw Score: 16  -[facility] Goal: 5: Stand one or more mins  -[facility] Achieved: 6: Walk 10 steps or more      VTE Prophylaxis: Sequential compression device (Venodyne)     Code Status: Level 1 - Full Code      Family and Social Support:   Living Arrangements: Lives w/ Spouse/significant other  Support Systems: Self; Spouse/significant other; Children; Friends/neighbors  Assistance Needed: none  Type of Current Residence: Private residence  Current Home Care Services: No      Goals of Care: full code    I have spent a total time of 90 minutes in caring for this patient on the day of the visit/encounter including Diagnostic results, Prognosis, Risks and benefits of tx options, Instructions for management, Patient and family education, Importance of tx compliance, Risk factor reductions, Impressions, Counseling / Coordination of care, Documenting in the medical record, Reviewing/placing orders in the medical record (including tests, medications, and/or procedures), Obtaining or reviewing history  , and Communicating with other healthcare professionals .

## 2025-04-29 NOTE — ASSESSMENT & PLAN NOTE
Multifactorial risk factors: infection, hospitalization, in the setting of underlying cognitive impairment, hx of metabolic encephalopathy with prior hospitalization/UTI  Maintain delirium precautions:  Provide frequent redirection, reorientation, distraction techniques  Avoid deliriogenic medications such as tramadol, benzodiazepines, anticholinergics,  Benadryl  Treat pain, See geriatric pain protocol  Monitor for constipation and urinary retention  Encourage early and frequent moblization, OOB  Encourage Hydration/ Nutrition  Implement sleep hygiene, limit night time interuptions, group activities  Qtc 461 (4/28/25)

## 2025-04-29 NOTE — ASSESSMENT & PLAN NOTE
Patient is afebrile  WBC on arrival 5.5  UA positive  Chest x-ray revealed no acute cardiopulmonary disease

## 2025-04-29 NOTE — ASSESSMENT & PLAN NOTE
1 of 2 blood cultures from initial presentation to [facility] positive for gram positive cocci in chains  Possible contaminant  Has dysuria and chills since 3 days  UA - nitrite positive but 0-5 WBC's hence culture not done  CXR - negative  Received 1 dose of Ceftriaxone last night  Ct Ceftriaxone  Repeat blood cultures  F/u final results

## 2025-04-29 NOTE — ASSESSMENT & PLAN NOTE
Clinical Frail Scale: 6- Moderately Frail  Lives with , per family both need increased support   Grandson checks in every 2 weeks  Case management on consult  PT/OT  Recommend increased support home with daily checks versus assisted living

## 2025-04-29 NOTE — ASSESSMENT & PLAN NOTE
2/21/2024 TTE: LVEF 65%, G1 DD, LAE, severe AS, peak velocity 4, mean gradient 38, DVI 0.25, aortic area 0.84, MAC  Patient was referred for CT surgery for evaluation of TAVR, patient has not been seen by CT surgery

## 2025-04-29 NOTE — CASE MANAGEMENT
Case Management Assessment & Discharge Planning Note    Patient name Daily cShaeffer  Location [facility] 422/[facility] 422-01 MRN [id number]  : 1945 Date 2025       Current Admission Date: 2025  Current Admission Diagnosis:Chest pain   Patient Active Problem List    Diagnosis Date Noted Date Diagnosed    Bacteremia 2025     Possible UTI (urinary tract infection) 2025     Lactic acidosis 2025     Acute metabolic encephalopathy 2025     Cognitive impairment 2025     Frailty 2025     Depression 2025     Alcohol use 2025     Insomnia 2025     Ambulatory dysfunction 2025     Chest pain 2025     Sinus tachycardia 2024     Arthralgia 2024     Abnormal brain MRI 2024     Abnormal MRI of the head 2024     Stress due to marital problems 2024     Low hemoglobin 2024     Acute toxic encephalopathy 2024     Debility 2024     Stage 3 chronic kidney disease (HCC) 2024     Exertional dyspnea 2024     Hypomagnesemia 2023     Hypercalcemia 2023     Abnormal CT scan, pelvis 2023     Neuropathy 2023     Severe aortic stenosis 2023     Acquired hypothyroidism 2017    Dyslipidemia 2016    Crohn disease (HCC) 2016    Anxiety and depression 2016    Hyperglycemia 2016    Crohn's disease of jejunum (HCC) 2016     CKD (chronic kidney disease) stage 2, GFR 60-89 ml/min 2016       LOS (days): 1  Geometric Mean LOS (GMLOS) (days): 1.7  Days to GMLOS:0.9     OBJECTIVE:    Risk of Unplanned Readmission Score: 15.4         Current admission status: Inpatient       Preferred Pharmacy:   [facility] [place] - [address]  [place] [address]  Phone: [phone] Fax: [phone]    Primary Care Provider: Alanna Moreau, DO    Primary  Insurance: MEDICARE  Secondary Insurance: [employer]    ASSESSMENT:  Active Health Care Proxies       Gilmar Schaeffer [facility] Care Representative - Spouse   Primary Phone: [phone] (Home)                 Readmission Root Cause  30 Day Readmission: No    Patient Information  Admitted from:: Home  Mental Status: Confused  During Assessment patient was accompanied by: Not accompanied during assessment  Assessment information provided by:: Spouse  Primary Caregiver: Self  Support Systems: Self, Spouse/significant other, Family members  What city do you live in[place]  Home entry access options. Select all that apply.: Stairs  Number of steps to enter home.: 3  Do the steps have railings?: Yes  Type of Current Residence: [address] [place] [address]  Upon entering residence, is there a bedroom on the main floor (no further steps)?: Yes  Upon entering residence, is there a bathroom on the main floor (no further steps)?: Yes  Living Arrangements: Lives w/ Spouse/significant other    Activities of Daily Living Prior to Admission  Functional Status: Independent  Completes ADLs independently?: Yes  Ambulates independently?: Yes  Does patient use assisted devices?: Yes  Assisted Devices (DME) used: Walker  Does patient have a history of Outpatient Therapy (PT/OT)?: No  Does the patient have a history of Short-Term Rehab?: Yes  Does patient have a history of HHC?: Yes  Does patient currently have HHC?: No         Patient Information Continued  Income Source: Pension/prison  Does patient have prescription coverage?: Yes  Can the patient afford their medications and any related supplies (such as glucometers or test strips)?: Yes  Does patient receive dialysis treatments?: No  Does patient have a history of Mental Health Diagnosis?: No         Means of Transportation  Means of Transport to Appts:: Other (Comment) (Neighbor)          DISCHARGE DETAILS:    Discharge planning discussed with:: Patient's Gilmar  Freedom of Choice:  Yes  Comments - Freedom of Choice: CM spoke to patient's spouse regarding level 2 recommendations for patient. He inquried how referral process works for rehab. CM explained that referrals would be placed using patient's zip code and that family would get a list of accepting facilities to choose from.  CM contacted family/caregiver?: Yes  Were Treatment Team discharge recommendations reviewed with patient/caregiver?: Yes  Did patient/caregiver verbalize understanding of patient care needs?: N/A- going to facility  Were patient/caregiver advised of the risks associated with not following Treatment Team discharge recommendations?: Yes           Additional Comments: CM called patient's  and introduced self and explained role. Patient reports that he lives with his wife in [address]. He reports that she uses a walker at baseline. He reports she has gone to rehab and used HHC in the past. He reports that neighbors provide transport to appointments.

## 2025-04-29 NOTE — QUICK NOTE
Quick note: Dr Dale spoke with patient about TAVR work-up and planning. She is refusing TAVR at this time. We will sign off. If she changes her mind, when her acute illness is improved and she is ready for discharge, she can follow-up with Dr Dale as an outpatient.    Jemma Martinez PA-C  3:35 PM  4/29/2025

## 2025-04-29 NOTE — CONSULTS
"Consultation - Infectious Disease   Name: Daily Schaeffer 79 y.o. female I MRN: [id number]  Unit/Bed#: [facility] 422-01 I Date of Admission: 4/28/2025   Date of Service: 4/29/2025 I Hospital Day: 1   Inpatient consult to Infectious Diseases  Consult performed by: Emily Mata MD  Consult ordered by: Gerri Saeed PA-C        Physician Requesting Evaluation: Adryan Gallegos MD   Reason for Evaluation / Principal Problem: Bacteremia    Assessment & Plan  Bacteremia  Single blood culture with Strep in setting of poor venous access.  No apparent source of infection.  No SSTI.  Edentulous.  UA, CXR negative.  Consider endocarditis in setting of AS but only one set positive.  Consider contaminant.  Clinically stable without sepsis.      Continue ceftriaxone for now  Follow up ID of GPCs to determine if further work-up or treatment indicated  Follow up repeat blood cultures  Follow up TTE  Follow temperatures closely  Recheck WBC in AM to monitor infection  Supportive care as per the primary service  Chest pain  Reason for initial presentation.  In setting of sever AS, diffuse ST depression on ECG.  Cardiology evaluation ongoing  Severe aortic stenosis  With symptoms.  May need TAVR.  CT surgery evaluation ongoing.    I have discussed with FREDA Domínguez with CT surgery the above plan to continue IV antibiotics pending final blood cultures. He agrees with the plan.    Antibiotics:  Ceftriaxone #2    History of Present Illness   Daily Schaeffer is a 79 y.o. year old female who is know to have AS.  Presented to the ED with progressive chest pain and SOB.  Symptoms have been ongoing for months.  Upon presentation was noted to be afebrile.  Had normal WBC.  ECG showed diffuse ST depression and cardiac cath is pending.  She has had a single blood culture come back growing Strep.  She does endorse that they had a hard time getting blood from her in the ED since she was \"dehydrated\".  Has been on ceftriaxone.  Repeat blood cultures are " pending.  We are asked to comment on further evaluation and management.    Denies trouble passing her urine.  Has no teeth - wears an upper plate.    A complete review of systems is negative other than that noted in the HPI.    Medical History Review: I have reviewed the patient's PMH, PSH, Social History, Family History, Meds, and Allergies     Objective :  Temp:  [98 °F (36.7 °C)-99 °F (37.2 °C)] 99 °F (37.2 °C)  HR:  [53-71] 65  BP: ()/(51-92) 140/92  Resp:  [14-19] 19  SpO2:  [93 %-98 %] 98 %  O2 Device: None (Room air)    General:  No acute distress  Psychiatric:  Awake and alert  Pulmonary:  Normal respiratory excursion without accessory muscle use  Abdomen:  Soft, nontender  Extremities:  No edema, declines to remove her sock slippers  Skin:  No rashes      Lab Results: I have reviewed the following results:  Results from last 7 days   Lab Units 04/29/25  0713 04/28/25  0047   WBC Thousand/uL 6.38 5.53   HEMOGLOBIN g/dL 14.3 16.7*   PLATELETS Thousands/uL 147* 175     Results from last 7 days   Lab Units 04/29/25  0713 04/28/25  0047   SODIUM mmol/L 139 139   POTASSIUM mmol/L 3.6 4.1   CHLORIDE mmol/L 103 101   CO2 mmol/L 28 23   BUN mg/dL 14 25   CREATININE mg/dL 0.79 0.98   EGFR ml/min/1.73sq m 70 54   CALCIUM mg/dL 9.6 10.7*   AST U/L  --  24   ALT U/L  --  13   ALK PHOS U/L  --  52   ALBUMIN g/dL  --  4.4     Results from last 7 days   Lab Units 04/29/25  0057 04/28/25  0125 04/28/25  0047   BLOOD CULTURE  Received in Microbiology Lab. Culture in Progress.  Received in Microbiology Lab. Culture in Progress.  --  No Growth at 24 hrs.   GRAM STAIN RESULT   --  Gram positive cocci in chains*  --      Results from last 7 days   Lab Units 04/28/25  0047   PROCALCITONIN ng/ml 0.08                   Imaging Results Review: I personally reviewed the following image studies in PACS and associated radiology reports: chest xray. My interpretation of the radiology images/reports is: no pneumonia.

## 2025-04-29 NOTE — ASSESSMENT & PLAN NOTE
Presented to [facility] on 4/28/25 with progressively worsening right-sided chest pain since 1.5 months, initially with exertion and since 3 days with rest  EKG showed diffuse ST depression  Troponin - negative - hence heparin gtt, Brilinta not added  Transferred to [facility] 4/28 for cardiac catheterization  Ct ASA, statin  Check echo  Monitor on telemetry

## 2025-04-29 NOTE — ASSESSMENT & PLAN NOTE
Lactic acid elevated at 4.4 on initial presentation to [facility] and normalized with IV fluids

## 2025-04-29 NOTE — ASSESSMENT & PLAN NOTE
Lab Results   Component Value Date    [id number] 1.344 04/28/2025   PTA levothyroxine 75 mcg p.o. daily

## 2025-04-29 NOTE — ASSESSMENT & PLAN NOTE
Chronic  With altered circadian rhythm  Medications restarted on admission which should help, along with routine, daytime meals, daytime interactions  Will order melatonin QHS prn

## 2025-04-30 ENCOUNTER — RESULTS FOLLOW-UP (OUTPATIENT)
Dept: EMERGENCY DEPT | Facility: HOSPITAL | Age: 80
End: 2025-04-30

## 2025-04-30 ENCOUNTER — APPOINTMENT (INPATIENT)
Dept: NON INVASIVE DIAGNOSTICS | Facility: HOSPITAL | Age: 80
DRG: 306 | End: 2025-04-30
Payer: MEDICARE

## 2025-04-30 LAB
ANION GAP SERPL CALCULATED.3IONS-SCNC: 6 MMOL/L (ref 4–13)
AORTIC VALVE MEAN VELOCITY: 28.5 M/S
AV AREA BY CONTINUOUS VTI: 0.8 CM2
AV AREA PEAK VELOCITY: 0.8 CM2
AV LVOT MEAN GRADIENT: 2 MMHG
AV LVOT PEAK GRADIENT: 3 MMHG
AV MEAN PRESS GRAD SYS DOP V1V2: 35 MMHG
AV ORIFICE AREA US: 0.79 CM2
AV PEAK GRADIENT: 46 MMHG
AV VELOCITY RATIO: 0.25
AV VMAX SYS DOP: 3.4 M/S
BSA FOR ECHO PROCEDURE: 1.64 M2
BUN SERPL-MCNC: 9 MG/DL (ref 5–25)
CALCIUM SERPL-MCNC: 9.6 MG/DL (ref 8.4–10.2)
CHLORIDE SERPL-SCNC: 106 MMOL/L (ref 96–108)
CO2 SERPL-SCNC: 29 MMOL/L (ref 21–32)
CREAT SERPL-MCNC: 0.73 MG/DL (ref 0.6–1.3)
DOP CALC AO VTI: 97.82 CM
DOP CALC LVOT AREA: 3.14 CM2
DOP CALC LVOT CARDIAC INDEX: 2.42 L/MIN/M2
DOP CALC LVOT CARDIAC OUTPUT: 3.96 L/MIN
DOP CALC LVOT DIAMETER: 2 CM
DOP CALC LVOT PEAK VEL VTI: 24.57 CM
DOP CALC LVOT PEAK VEL: 0.85 M/S
DOP CALC LVOT STROKE INDEX: 47 ML/M2
DOP CALC LVOT STROKE VOLUME: 77.15
ERYTHROCYTE [DISTWIDTH] IN BLOOD BY AUTOMATED COUNT: 12.4 % (ref 11.6–15.1)
GFR SERPL CREATININE-BSD FRML MDRD: 77 ML/MIN/1.73SQ M
GLUCOSE SERPL-MCNC: 102 MG/DL (ref 65–140)
HCT VFR BLD AUTO: 40 % (ref 34.8–46.1)
HGB BLD-MCNC: 13.7 G/DL (ref 11.5–15.4)
MAGNESIUM SERPL-MCNC: 2 MG/DL (ref 1.9–2.7)
MCH RBC QN AUTO: 33.8 PG (ref 26.8–34.3)
MCHC RBC AUTO-ENTMCNC: 34.3 G/DL (ref 31.4–37.4)
MCV RBC AUTO: 99 FL (ref 82–98)
PLATELET # BLD AUTO: 166 THOUSANDS/UL (ref 149–390)
PMV BLD AUTO: 10.8 FL (ref 8.9–12.7)
POTASSIUM SERPL-SCNC: 4.7 MMOL/L (ref 3.5–5.3)
RBC # BLD AUTO: 4.05 MILLION/UL (ref 3.81–5.12)
SL CV LV EF: 60
SODIUM SERPL-SCNC: 141 MMOL/L (ref 135–147)
STREPTOCOCCUS DNA BLD POS NAA+NON-PROBE: DETECTED
WBC # BLD AUTO: 4.44 THOUSAND/UL (ref 4.31–10.16)

## 2025-04-30 PROCEDURE — 93308 TTE F-UP OR LMTD: CPT

## 2025-04-30 PROCEDURE — 85027 COMPLETE CBC AUTOMATED: CPT | Performed by: PHYSICIAN ASSISTANT

## 2025-04-30 PROCEDURE — 93321 DOPPLER ECHO F-UP/LMTD STD: CPT

## 2025-04-30 PROCEDURE — 93325 DOPPLER ECHO COLOR FLOW MAPG: CPT

## 2025-04-30 PROCEDURE — 99232 SBSQ HOSP IP/OBS MODERATE 35: CPT | Performed by: INTERNAL MEDICINE

## 2025-04-30 PROCEDURE — 99233 SBSQ HOSP IP/OBS HIGH 50: CPT | Performed by: INTERNAL MEDICINE

## 2025-04-30 PROCEDURE — 93308 TTE F-UP OR LMTD: CPT | Performed by: INTERNAL MEDICINE

## 2025-04-30 PROCEDURE — G0545 PR INHERENT VISIT TO INPT: HCPCS | Performed by: INTERNAL MEDICINE

## 2025-04-30 PROCEDURE — 93321 DOPPLER ECHO F-UP/LMTD STD: CPT | Performed by: INTERNAL MEDICINE

## 2025-04-30 PROCEDURE — 83735 ASSAY OF MAGNESIUM: CPT | Performed by: PHYSICIAN ASSISTANT

## 2025-04-30 PROCEDURE — 93325 DOPPLER ECHO COLOR FLOW MAPG: CPT | Performed by: INTERNAL MEDICINE

## 2025-04-30 PROCEDURE — 80048 BASIC METABOLIC PNL TOTAL CA: CPT | Performed by: PHYSICIAN ASSISTANT

## 2025-04-30 PROCEDURE — 99232 SBSQ HOSP IP/OBS MODERATE 35: CPT | Performed by: PHYSICIAN ASSISTANT

## 2025-04-30 RX ORDER — MAGNESIUM SULFATE HEPTAHYDRATE 40 MG/ML
2 INJECTION, SOLUTION INTRAVENOUS ONCE
Status: COMPLETED | OUTPATIENT
Start: 2025-04-30 | End: 2025-04-30

## 2025-04-30 RX ADMIN — MAGNESIUM SULFATE HEPTAHYDRATE 2 G: 40 INJECTION, SOLUTION INTRAVENOUS at 10:25

## 2025-04-30 RX ADMIN — RISPERIDONE 0.25 MG: 0.25 TABLET, FILM COATED ORAL at 18:36

## 2025-04-30 RX ADMIN — GABAPENTIN 100 MG: 100 CAPSULE ORAL at 20:08

## 2025-04-30 RX ADMIN — Medication 6 MG: at 20:08

## 2025-04-30 RX ADMIN — RISPERIDONE 0.25 MG: 0.25 TABLET, FILM COATED ORAL at 12:45

## 2025-04-30 RX ADMIN — HEPARIN SODIUM 5000 UNITS: 5000 INJECTION INTRAVENOUS; SUBCUTANEOUS at 14:00

## 2025-04-30 RX ADMIN — GABAPENTIN 100 MG: 100 CAPSULE ORAL at 18:36

## 2025-04-30 RX ADMIN — CEFTRIAXONE SODIUM 2000 MG: 10 INJECTION, POWDER, FOR SOLUTION INTRAVENOUS at 01:29

## 2025-04-30 RX ADMIN — GABAPENTIN 100 MG: 100 CAPSULE ORAL at 12:44

## 2025-04-30 RX ADMIN — OLANZAPINE 2.5 MG: 2.5 TABLET, FILM COATED ORAL at 01:47

## 2025-04-30 RX ADMIN — ATORVASTATIN CALCIUM 40 MG: 40 TABLET, FILM COATED ORAL at 18:37

## 2025-04-30 NOTE — ASSESSMENT & PLAN NOTE
Lab Results   Component Value Date    EGFR 77 04/30/2025    EGFR 70 04/29/2025    EGFR 54 04/28/2025    CREATININE 0.73 04/30/2025    CREATININE 0.79 04/29/2025    CREATININE 0.98 04/28/2025   Stable

## 2025-04-30 NOTE — ASSESSMENT & PLAN NOTE
Diffuse ST depression likely due to severe aortic stenosis  Troponins normal  No plan for any invasive workup considering altered mental status and metabolic encephalopathy

## 2025-04-30 NOTE — ASSESSMENT & PLAN NOTE
Presented to [facility] on 4/28/25 with progressively worsening right-sided chest pain since 1.5 months, initially with exertion and since 3 days with rest  EKG showed diffuse ST depression  Troponin - negative  Transferred to [facility] 4/28 for cardiac catheterization  Ct ASA, statin  Check echo - EF60%. Severe aortic stenosis  Monitor on telemetry  Given patient's acute encephalopathy and history of medication non adherence, decision was made not to pursue c cath. Continue medical managment

## 2025-04-30 NOTE — ASSESSMENT & PLAN NOTE
Progressing, likely progression of underlying dementia  TSH 1.344 (4/28/25)  Vitamin B 12 level (06/21/24) 270  Recommend vitamin b12 supplementation for goal level > 400  CT head (07/29/24) moderate chronic microangiopathic changes  Pt previously evaluated by Dr Sierra (6/25/25) performed in impaired range of functioning, does not have capacity  Although at present pt likely has component of increased confusion suspect that at baseline pt has progressing cognitive decline, leading to medication non compliance and altered circadian rhythm at home  Recommend cognitive testing as outpatient

## 2025-04-30 NOTE — PROGRESS NOTES
Progress Note - Geriatric Medicine   Name: Daily Schaeffer 79 y.o. female I MRN: [id number]  Unit/Bed#: [facility] 422-01 I Date of Admission: 4/28/2025   Date of Service: 4/30/2025 I Hospital Day: 2    Assessment & Plan  Chest pain  Cardiology on consult  Acute metabolic encephalopathy  Current mentation: MARTHA  Patient requiring haldol, zyprexa overnight, also received scheduled risperidone   Sleepy on exam in the morning and afternoon  Requiring 1:1 sitter for safety    Multifactorial risk factors: infection, hospitalization, in the setting of underlying cognitive impairment, hx of metabolic encephalopathy with prior hospitalization/UTI, altered sleep/wake cycle  Maintain delirium precautions:  Provide frequent redirection, reorientation, distraction techniques  Avoid deliriogenic medications such as tramadol, benzodiazepines, anticholinergics,  Benadryl  Treat pain, See geriatric pain protocol  Monitor for constipation and urinary retention  Encourage early and frequent moblization, OOB  Encourage Hydration/ Nutrition  Implement sleep hygiene, limit night time interuptions, group activities    If all other interventions are unsuccessful for acute agitation and behaviors, would recommend zyprexa 2.5 mg IM/Po Q8 hours as needed  Would avoid benzodiazepines such as Ativan as these can worsen delirium     QTc interval 457 on 04/29/25  Cognitive impairment  Progressing, likely progression of underlying dementia  TSH 1.344 (4/28/25)  Vitamin B 12 level (06/21/24) 270  Recommend vitamin b12 supplementation for goal level > 400  CT head (07/29/24) moderate chronic microangiopathic changes  Pt previously evaluated by Dr Sierra (6/25/25) performed in impaired range of functioning, does not have capacity  Although at present pt likely has component of increased confusion suspect that at baseline pt has progressing cognitive decline, leading to medication non compliance and altered circadian rhythm at home  Recommend cognitive  testing as outpatient     Acquired hypothyroidism  Per pharmacy last refill of sythroid was in October  TSH 1.344 (4/28/25) ? Ongoing evaluation in the setting of questionable medication compliance, synthroid restarted this admission    Neuropathy  Chronic  Was previously on lyrica in January switched to gabapentin  Agree with low dose gabapentin 100 mg po TID    Bacteremia  ID on consult  Single BC with strep, consider contaminant  No apparent source of infection  Repeat TTE on 04/30/24  Repeat blood cultures pending, no growth at 24 hours  Continue ceftriaxone for now    Frailty  Clinical Frail Scale: 6- Moderately Frail  Lives with , per family both need increased support   Grandson checks in every 2 weeks  Case management on consult  PT/OT  Recommend increased support home with daily checks versus assisted living  Depression  Chronic  Exacerbated by death of daughter from overdose  Ok to restart celexa 20 mg daily  Alcohol use  Unclear how much has been consumed  Grandson reports he has seen wine and whiskey on the counter  Ongoing observation  Insomnia  Chronic  With altered circadian rhythm  Patient with overnight behaviors requiring multiple antipsychotic medications, zyprexa, haldol  Medications restarted on admission which should help, along with routine, daytime meals, daytime interactions  Will order melatonin QHS prn  Ambulatory dysfunction  Exacerbated by hospitalization   At risk secondary to age, gait instability, cognitive impairment  Fall precautions  PT/OT    Subjective   Daily is seen today for a geriatric follow up.  Upon entering the room on the first visit she was seen laying in bed comfortable.  Unable to participate in assessment due to medications she received overnight.  1:1 sitter in place for safety.  Spoke with RN, Ana Maria who stated that patient was aggressive, combative overnight.  She received multiple medications to help control behaviors and was placed in bilateral soft  wrist restraints d/t safety by overnight RN.  Wrist restraints were able to be removed earlier in the morning.      She was visited again in the afternoon, continued to be resting soundly.  1:1 sitter still in place.  Spoke again with RN, Ana Maria who stated that patient woke up earlier around lunch, she was able to give some medications, patient ate some of her lunch and then went back to resting.       Objective :  Temp:  [96.8 °F (36 °C)-97.9 °F (36.6 °C)] 97.9 °F (36.6 °C)  HR:  [46-76] 67  BP: ()/(43-73) 81/43  Resp:  [17] 17  SpO2:  [97 %-100 %] 100 %  O2 Device: None (Room air)    Physical Exam  Vitals and nursing note reviewed.   HENT:      Head: Normocephalic.      Mouth/Throat:      Pharynx: No oropharyngeal exudate.   Eyes:      General:         Right eye: No discharge.         Left eye: No discharge.   Cardiovascular:      Rate and Rhythm: Normal rate and regular rhythm.      Pulses: Normal pulses.      Heart sounds: Murmur heard.   Pulmonary:      Effort: Pulmonary effort is normal.      Breath sounds: Normal breath sounds.   Abdominal:      General: Bowel sounds are normal.   Musculoskeletal:         General: Normal range of motion.      Cervical back: Normal range of motion.   Skin:     General: Skin is warm and dry.   Neurological:      Comments: MARTHA           Lab Results: I have reviewed the following results:CBC/BMP:   .     04/30/25  0529   WBC 4.44   HGB 13.7   HCT 40.0      SODIUM 141   K 4.7      CO2 29   BUN 9   CREATININE 0.73   GLUC 102   MG 2.0      Imaging Results Review: I reviewed radiology reports from this admission including: CT head and xray(s).  Other Study Results Review: EKG was reviewed.     Therapies:   Basic Mobility Inpatient Raw Score: 15  -[facility] Goal: 4: Move to chair/commode  -HL Achieved: 1: Laying in bed      VTE Prophylaxis: VTE covered by:  heparin (porcine), Subcutaneous, 5,000 Units at 04/29/25 1619    and Sequential compression device (Venodyne)      Code Status: Level 1 - Full Code      Family and Social Support: Spouse: Gilmar, Daughters: Kayleen Bright  Living Arrangements: Lives w/ Spouse/significant other  Support Systems: Self; Spouse/significant other; Family members  Assistance Needed: none  Type of Current Residence: [address] [place] [address] home  Current Home Care Services: No  Discharge planning discussed with:: Patient's , Gilmar  Freedom of Choice: Yes      Goals of Care: TBD    I have spent a total time of 35 minutes in caring for this patient on the day of the visit/encounter including Diagnostic results, Prognosis, Risks and benefits of tx options, Instructions for management, Patient and family education, Importance of tx compliance, Risk factor reductions, Impressions, Counseling / Coordination of care, Documenting in the medical record, Reviewing/placing orders in the medical record (including tests, medications, and/or procedures), Obtaining or reviewing history  , and Communicating with other healthcare professionals .

## 2025-04-30 NOTE — ASSESSMENT & PLAN NOTE
Patient disoriented, impulsive on 1:1. REquired zyprexa, IV Haldol and restraints last evening.  D/W daughter, patient has not been taking meds at home, sleep wake cycle disrupted, not eating.   Daughter also reports alcohol use at home, though  denies that she has been drinking. States her mental status declined that last time she was in the hospital and she did better in rehab. Once she got home, she did well for about a year, then she declined again. Patient's  reported that she has been getting more confused the last few weeks.  Continue Risperdal  Prn zyprexa for severe agitation  Will consult Geriatrics. Will defer to them if we should restart Celexa.

## 2025-04-30 NOTE — ASSESSMENT & PLAN NOTE
Lactic acid elevated at 4.4 on initial presentation to [facility] and normalized with IV fluids

## 2025-04-30 NOTE — PROGRESS NOTES
Progress Note - Hospitalist   Name: Daily Schaeffer 79 y.o. female I MRN: [id number]  Unit/Bed#: [facility] 422-01 I Date of Admission: 4/28/2025   Date of Service: 4/30/2025 I Hospital Day: 2    Assessment & Plan  Chest pain  Presented to [facility] on 4/28/25 with progressively worsening right-sided chest pain since 1.5 months, initially with exertion and since 3 days with rest  EKG showed diffuse ST depression  Troponin - negative  Transferred to [facility] 4/28 for cardiac catheterization  Ct ASA, statin  Check echo - EF60%. Severe aortic stenosis  Monitor on telemetry  Given patient's acute encephalopathy and history of medication non adherence, decision was made not to pursue c cath. Continue medical managment  Bacteremia  1 of 2 blood cultures from initial presentation to [facility] positive for gram positive cocci in chains. Blood culture ID panel -  strep  Suspect contaminant  Has dysuria and chills since 3 days  UA - nitrite positive but 0-5 WBC's hence culture not done  CXR - negative  Continue Ceftriaxone until ID of strep strain available.  Repeat BC negative thus far  ID following and input appreciated  Possible UTI (urinary tract infection)  Unclear if true infection. UA did not reflex to culture.   Patient did report some dysuria on chart review.   Ceftriaxone as above  Acute metabolic encephalopathy  Patient disoriented, impulsive on 1:1. REquired zyprexa, IV Haldol and restraints last evening.  D/W daughter, patient has not been taking meds at home, sleep wake cycle disrupted, not eating.   Daughter also reports alcohol use at home, though  denies that she has been drinking. States her mental status declined that last time she was in the hospital and she did better in rehab. Once she got home, she did well for about a year, then she declined again. Patient's  reported that she has been getting more confused the last few weeks.  Continue Risperdal  Prn zyprexa for  severe agitation  Will consult Geriatrics. Will defer to them if we should restart Celexa.   Lactic acidosis  Lactic acid elevated at 4.4 on initial presentation to [facility] and normalized with IV fluids  Severe aortic stenosis  Latest echo done 2/21/2024 showed EF of 65% and severe aortic stenosis  Repeat echo noted  Monitor volume status  CT surgery consulted for TAVR workup. Patient declines at this time.   Neuropathy  Continue Gabapentin  Acquired hypothyroidism  TSH normal  Continue levothyroxine 75 mcg daily  Stage 3 chronic kidney disease (HCC)  Lab Results   Component Value Date    EGFR 77 04/30/2025    EGFR 70 04/29/2025    EGFR 54 04/28/2025    CREATININE 0.73 04/30/2025    CREATININE 0.79 04/29/2025    CREATININE 0.98 04/28/2025   Baseline creatinine 0.9-1  Creatinine at baseline  Monitor  Ambulatory dysfunction  PT recommending rehab. Family in agreement  Cognitive impairment  Needs formal outpatient cognitive testing  Dyslipidemia    Frailty    Depression    Alcohol use    Insomnia      VTE Pharmacologic Prophylaxis: VTE Score: 4 Moderate Risk (Score 3-4) - Pharmacological DVT Prophylaxis Ordered: heparin.    Mobility:   Basic Mobility Inpatient Raw Score: 15  JH-HLM Goal: 4: Move to chair/commode  JH-HLM Achieved: 1: Laying in bed  JH-HLM Goal NOT achieved. Continue with multidisciplinary rounding and encourage appropriate mobility to improve upon JH-HLM goals.    Patient Centered Rounds: I performed bedside rounds with nursing staff today.   Discussions with Specialists or Other Care Team Provider: case management, Cardiology    Education and Discussions with Family / Patient: Updated  (both daughters) via phone.    Current Length of Stay: 2 day(s)  Current Patient Status: Inpatient   Certification Statement: The patient will continue to require additional inpatient hospital stay due to mood stabilization  Discharge Plan: Anticipate discharge in 48-72 hrs to rehab  facility.    Code Status: Level 1 - Full Code    Subjective   Patient disoriented and agitated last night requiring zyprexa, Haldol, and restraints. Was sleeping earlier this morning. Patient re-evaluated when awake and without complaints. Patient was calm, though nursing still reports patient agitated at times.     Objective :  Temp:  [96.8 °F (36 °C)-97.9 °F (36.6 °C)] 96.8 °F (36 °C)  HR:  [46-76] 57  BP: ()/(49-92) 121/57  Resp:  [17-18] 17  SpO2:  [97 %-99 %] 97 %  O2 Device: None (Room air)    Body mass index is 19.26 kg/m².     Input and Output Summary (last 24 hours):     Intake/Output Summary (Last 24 hours) at 4/30/2025 1411  Last data filed at 4/30/2025 1331  Gross per 24 hour   Intake 0 ml   Output 400 ml   Net -400 ml       Physical Exam  Vitals and nursing note reviewed.   Constitutional:       General: She is not in acute distress.     Appearance: She is well-developed.   HENT:      Head: Normocephalic and atraumatic.   Cardiovascular:      Rate and Rhythm: Normal rate and regular rhythm.      Heart sounds: Murmur heard.      No friction rub.   Pulmonary:      Effort: Pulmonary effort is normal. No respiratory distress.      Breath sounds: Normal breath sounds. No wheezing.   Abdominal:      General: Bowel sounds are normal. There is no distension.      Palpations: Abdomen is soft.      Tenderness: There is no abdominal tenderness. There is no guarding or rebound.   Skin:     General: Skin is warm and dry.      Findings: No rash.   Neurological:      Mental Status: She is alert. She is disoriented.      Cranial Nerves: No cranial nerve deficit.   Psychiatric:         Behavior: Behavior is agitated.         Cognition and Memory: Cognition is impaired.         Judgment: Judgment is impulsive.           Lines/Drains:        Telemetry:  Telemetry Orders (From admission, onward)               24 Hour Telemetry Monitoring  Continuous x 24 Hours (Telem)        Expiring   Question:  Reason for 24 Hour  Telemetry  Answer:  PCI/EP study (including pacer and ICD implementation), Cardiac surgery, MI, abnormal cardiac cath, and chest pain- rule out MI                     Telemetry Reviewed: Normal Sinus Rhythm  Indication for Continued Telemetry Use: Acute MI/Unstable Angina/Rule out ACS               Lab Results: I have reviewed the following results:   Results from last 7 days   Lab Units 04/30/25  0529 04/29/25 0713   WBC Thousand/uL 4.44 6.38   HEMOGLOBIN g/dL 13.7 14.3   HEMATOCRIT % 40.0 42.7   PLATELETS Thousands/uL 166 147*   SEGS PCT %  --  44   LYMPHO PCT %  --  40   MONO PCT %  --  11   EOS PCT %  --  4     Results from last 7 days   Lab Units 04/30/25  0529 04/29/25  0713 04/28/25 0047   SODIUM mmol/L 141   < > 139   POTASSIUM mmol/L 4.7   < > 4.1   CHLORIDE mmol/L 106   < > 101   CO2 mmol/L 29   < > 23   BUN mg/dL 9   < > 25   CREATININE mg/dL 0.73   < > 0.98   ANION GAP mmol/L 6   < > 15*   CALCIUM mg/dL 9.6   < > 10.7*   ALBUMIN g/dL  --   --  4.4   TOTAL BILIRUBIN mg/dL  --   --  0.82   ALK PHOS U/L  --   --  52   ALT U/L  --   --  13   AST U/L  --   --  24   GLUCOSE RANDOM mg/dL 102   < > 116    < > = values in this interval not displayed.     Results from last 7 days   Lab Units 04/28/25  0047   INR  0.92     Results from last 7 days   Lab Units 04/28/25  2208   POC GLUCOSE mg/dl 104     Results from last 7 days   Lab Units 04/29/25  0713   HEMOGLOBIN A1C % 5.4     Results from last 7 days   Lab Units 04/28/25  0445 04/28/25  0047   LACTIC ACID mmol/L 0.9 4.4*   PROCALCITONIN ng/ml  --  0.08       Recent Cultures (last 7 days):   Results from last 7 days   Lab Units 04/29/25  0057 04/28/25  0125 04/28/25  0047   BLOOD CULTURE  No Growth at 24 hrs.  No Growth at 24 hrs.  --  No Growth at 48 hrs.   GRAM STAIN RESULT   --  Gram positive cocci in chains*  Gram positive rods*  --        Imaging Results Review: I reviewed radiology reports from this admission including: xray(s), Ultrasound(s), and  Echocardiogram.      Last 24 Hours Medication List:     Current Facility-Administered Medications:     acetaminophen (TYLENOL) tablet 650 mg, Q6H PRN    aspirin (ECOTRIN LOW STRENGTH) EC tablet 81 mg, Daily    atorvastatin (LIPITOR) tablet 40 mg, Daily With Dinner    cefTRIAXone (ROCEPHIN) 2,000 mg in dextrose 5 % 50 mL IVPB, Q24H, Last Rate: 2,000 mg (04/30/25 0129)    gabapentin (NEURONTIN) capsule 100 mg, TID    heparin (porcine) subcutaneous injection 5,000 Units, Q8H NIECY    levothyroxine tablet 75 mcg, Early Morning    magnesium sulfate 2 g/50 mL IVPB (premix) 2 g, Once, Last Rate: 2 g (04/30/25 1025)    melatonin tablet 6 mg, HS PRN    morphine injection 2 mg, Once    OLANZapine (ZyPREXA) tablet 2.5 mg, BID PRN    risperiDONE (RisperDAL) tablet 0.25 mg, BID    traMADol (ULTRAM) tablet 50 mg, Q6H PRN    Administrative Statements   Today, Patient Was Seen By: Gerri Saeed PA-C      **Please Note: This note may have been constructed using a voice recognition system.**

## 2025-04-30 NOTE — PROGRESS NOTES
"General Cardiology Progress Note   Daily Schaeffer 79 y.o. female MRN: [id number]  Unit/Bed#: [facility] 422-01 Encounter: [id number]      Assessment & Plan  Chest pain  Diffuse ST depression likely due to severe aortic stenosis  Troponins normal  No plan for any invasive workup considering altered mental status and metabolic encephalopathy  Severe aortic stenosis  Severe aortic stenosis as of 2024, patient did not follow through with referral to CT surgery  Now refusing again, despite symptoms.  Stage 3 chronic kidney disease (HCC)  Lab Results   Component Value Date    EGFR 77 04/30/2025    EGFR 70 04/29/2025    EGFR 54 04/28/2025    CREATININE 0.73 04/30/2025    CREATININE 0.79 04/29/2025    CREATININE 0.98 04/28/2025   Stable  Bacteremia  Single strep blood culture, no apparent source of infection per ID.,  Likely contaminant  Possible UTI (urinary tract infection)  Unclear if definitive, but getting ceftriaxone empirically  Lactic acidosis  Lactic acid 4.4 on arrival, now resolved  Acute metabolic encephalopathy    Cognitive impairment    Frailty    Depression    Alcohol use      Plan:    No plan for any further invasive workup, patient currently refusing TAVR, we will further discuss outpatient with her primary cardiologist    Subjective:   Patient seen and examined.      Encephalopathic    Review of Systems   Unable to perform ROS: Mental status change       Objective   Vitals: Blood pressure (!) 81/43, pulse 67, temperature 97.9 °F (36.6 °C), resp. rate 17, height 5' 7\" (1.702 m), weight 55.8 kg (123 lb), SpO2 100%., Body mass index is 19.26 kg/m²., I/O last 3 completed shifts:  In: 748.8 [P.O.:520; I.V.:228.8]  Out: 1100 [Urine:1100]  No intake/output data recorded.  Wt Readings from Last 3 Encounters:   04/30/25 55.8 kg (123 lb)   04/28/25 61.2 kg (135 lb)   02/24/25 61.4 kg (135 lb 5.8 oz)       Intake/Output Summary (Last 24 hours) at 4/30/2025 1615  Last data filed at 4/30/2025 1331  Gross per 24 hour   Intake " 0 ml   Output --   Net 0 ml     I/O last 3 completed shifts:  In: 748.8 [P.O.:520; I.V.:228.8]  Out: 1100 [Urine:1100]    No significant arrhythmias seen on telemetry review.     Physical Exam  Constitutional:       General: She is not in acute distress.     Appearance: She is not diaphoretic.   HENT:      Head: Normocephalic.   Eyes:      Conjunctiva/sclera: Conjunctivae normal.   Neck:      Vascular: No JVD.   Cardiovascular:      Rate and Rhythm: Normal rate and regular rhythm.      Heart sounds: Murmur heard.      No gallop.   Pulmonary:      Effort: Pulmonary effort is normal. No respiratory distress.      Breath sounds: Normal breath sounds. No wheezing or rales.   Abdominal:      General: Bowel sounds are normal. There is no distension.      Palpations: Abdomen is soft.      Tenderness: There is no abdominal tenderness.   Musculoskeletal:         General: Normal range of motion.      Cervical back: Normal range of motion and neck supple.      Right lower leg: No edema.      Left lower leg: No edema.   Skin:     General: Skin is warm and dry.   Neurological:      Mental Status: She is alert and oriented to person, place, and time.         Meds/Allergies   Allergies   Allergen Reactions    Wound Dressing Adhesive Rash       Current Facility-Administered Medications:     acetaminophen (TYLENOL) tablet 650 mg, 650 mg, Oral, Q6H PRN, Ashley Broussard MD    aspirin (ECOTRIN LOW STRENGTH) EC tablet 81 mg, 81 mg, Oral, Daily, Ashley Broussard MD, 81 mg at 04/29/25 1107    atorvastatin (LIPITOR) tablet 40 mg, 40 mg, Oral, Daily With Dinner, Ashley Broussard MD, 40 mg at 04/29/25 1619    cefTRIAXone (ROCEPHIN) 2,000 mg in dextrose 5 % 50 mL IVPB, 2,000 mg, Intravenous, Q24H, Emily Mata MD, Last Rate: 100 mL/hr at 04/30/25 0129, 2,000 mg at 04/30/25 0129    gabapentin (NEURONTIN) capsule 100 mg, 100 mg, Oral, TID, Ashley Broussard MD, 100 mg at 04/30/25 1244    heparin (porcine) subcutaneous injection 5,000  "Units, 5,000 Units, Subcutaneous, Q8H NIECY, Ashley Broussard MD, 5,000 Units at 04/29/25 1619    levothyroxine tablet 75 mcg, 75 mcg, Oral, Early Morning, Ashley Broussard MD, 75 mcg at 04/29/25 0522    melatonin tablet 6 mg, 6 mg, Oral, HS PRN, Tuesday M MAJOR Tao    morphine injection 2 mg, 2 mg, Intravenous, Once, Tamia Rodriges PA-C    OLANZapine (ZyPREXA) tablet 2.5 mg, 2.5 mg, Oral, BID PRN, Gerri Saeed PA-C, 2.5 mg at 04/30/25 0147    risperiDONE (RisperDAL) tablet 0.25 mg, 0.25 mg, Oral, BID, Ashley Broussrad MD, 0.25 mg at 04/30/25 1245    traMADol (ULTRAM) tablet 50 mg, 50 mg, Oral, Q6H PRN, Ashley Broussard MD    Laboratory Results:        CBC with diff:   Results from last 7 days   Lab Units 04/30/25  0529 04/29/25  0713 04/28/25  0047   WBC Thousand/uL 4.44 6.38 5.53   HEMOGLOBIN g/dL 13.7 14.3 16.7*   HEMATOCRIT % 40.0 42.7 49.2*   MCV fL 99* 100* 98   PLATELETS Thousands/uL 166 147* 175   RBC Million/uL 4.05 4.28 5.04   MCH pg 33.8 33.4 33.1   MCHC g/dL 34.3 33.5 33.9   RDW % 12.4 12.4 12.1   MPV fL 10.8 9.5 9.9   NRBC AUTO /100 WBCs  --  0 0       CMP:  Results from last 7 days   Lab Units 04/30/25  0529 04/29/25  0713 04/28/25  0047   SODIUM mmol/L 141 139 139   POTASSIUM mmol/L 4.7 3.6 4.1   CHLORIDE mmol/L 106 103 101   CO2 mmol/L 29 28 23   BUN mg/dL 9 14 25   CREATININE mg/dL 0.73 0.79 0.98   CALCIUM mg/dL 9.6 9.6 10.7*   AST U/L  --   --  24   ALT U/L  --   --  13   ALK PHOS U/L  --   --  52   EGFR ml/min/1.73sq m 77 70 54       BMP:  Results from last 7 days   Lab Units 04/30/25 0529 04/29/25 0713 04/28/25  0047   SODIUM mmol/L 141 139 139   POTASSIUM mmol/L 4.7 3.6 4.1   CHLORIDE mmol/L 106 103 101   CO2 mmol/L 29 28 23   BUN mg/dL 9 14 25   CREATININE mg/dL 0.73 0.79 0.98   CALCIUM mg/dL 9.6 9.6 10.7*       NT-proBNP: No results for input(s): \"NTBNP\" in the last 72 hours.     Magnesium:   Results from last 7 days   Lab Units 04/30/25 0529 04/29/25  0713   MAGNESIUM mg/dL 2.0 " "1.6*       Coags:   Results from last 7 days   Lab Units 04/28/25  0047   PTT seconds 28   INR  0.92       TSH:    Results from last 7 days   Lab Units 04/28/25  0047   TSH 3RD GENERATON uIU/mL 1.344       Hemoglobin A1C )  Results from last 7 days   Lab Units 04/29/25  0713   HEMOGLOBIN A1C % 5.4       Lipid Profile:   No results found for: \"CHOL\"  Lab Results   Component Value Date    HDL 72 09/21/2023    HDL 91 12/29/2022    HDL 90 11/04/2021     Lab Results   Component Value Date    LDLCALC 103 (H) 09/21/2023    LDLCALC 72 12/29/2022    LDLCALC 146 (H) 11/04/2021     No results found for: \"LDLDIRECT\"  Lab Results   Component Value Date    TRIG 159 (H) 09/21/2023    TRIG 147 12/29/2022    TRIG 183 (H) 11/04/2021       Cardiac testing:   EKG personally reviewed by Harlan Nails MD.     Cath:  ECHO:  Stress TEST:  Other:        Harlan Nails MD    Portions of the record may have been created with voice recognition software.  Occasional wrong word or \"sound a like\" substitutions may have occurred due to the inherent limitations of voice recognition software.  Read the chart carefully and recognize, using context, where substitutions have occurred.        "

## 2025-04-30 NOTE — PROGRESS NOTES
Progress Note - Infectious Disease   Name: Daily Schaeffer 79 y.o. female I MRN: [id number]  Unit/Bed#: [facility] 422-01 I Date of Admission: 4/28/2025   Date of Service: 4/30/2025 I Hospital Day: 2    Assessment & Plan  Bacteremia  Single blood culture with Strep, also GPRs in setting of poor venous access.  No apparent source of infection.  No SSTI.  Edentulous.  UA, CXR negative.  Consider endocarditis in setting of AS but only one set positive.  Repeat blood cultures 4/29 negative.  Suspect contaminant.  Clinically stable without sepsis.      Continue ceftriaxone for now  Follow up ID of GPCs to determine if further work-up or treatment indicated  Follow up repeat blood cultures  Follow up TTE  Follow temperatures closely  Recheck WBC in AM to monitor infection  Supportive care as per the primary service  Chest pain  Reason for initial presentation.  In setting of sever AS, diffuse ST depression on ECG.  Cardiology evaluation ongoing  Severe aortic stenosis  With symptoms.  TAVR recommended but patient declining.        Antibiotics:  Ceftriaxone #3    Subjective   Patient sleeping soundyl.  Was reportedly agitated overnight and received multiple medicatoms/    Objective :  Temp:  [97.4 °F (36.3 °C)-99 °F (37.2 °C)] 97.4 °F (36.3 °C)  HR:  [46-76] 46  BP: (111-144)/(49-92) 144/61  Resp:  [17-19] 17  SpO2:  [98 %-99 %] 98 %  O2 Device: None (Room air)    General:  No acute distress  Psychiatric:  Awake and alert  Pulmonary:  Normal respiratory excursion without accessory muscle use  Abdomen:  Soft, nontender  Extremities:  No edema  Skin:  No rashes      Lab Results: I have reviewed the following results:  Results from last 7 days   Lab Units 04/30/25  0529 04/29/25  0713 04/28/25  0047   WBC Thousand/uL 4.44 6.38 5.53   HEMOGLOBIN g/dL 13.7 14.3 16.7*   PLATELETS Thousands/uL 166 147* 175     Results from last 7 days   Lab Units 04/30/25  0529 04/29/25  0713 04/28/25  0047   SODIUM mmol/L 141 139 139   POTASSIUM mmol/L  4.7 3.6 4.1   CHLORIDE mmol/L 106 103 101   CO2 mmol/L 29 28 23   BUN mg/dL 9 14 25   CREATININE mg/dL 0.73 0.79 0.98   EGFR ml/min/1.73sq m 77 70 54   CALCIUM mg/dL 9.6 9.6 10.7*   AST U/L  --   --  24   ALT U/L  --   --  13   ALK PHOS U/L  --   --  52   ALBUMIN g/dL  --   --  4.4     Results from last 7 days   Lab Units 04/29/25  0057 04/28/25  0125 04/28/25  0047   BLOOD CULTURE  No Growth at 24 hrs.  No Growth at 24 hrs.  --  No Growth at 48 hrs.   GRAM STAIN RESULT   --  Gram positive cocci in chains*  Gram positive rods*  --      Results from last 7 days   Lab Units 04/28/25 0047   PROCALCITONIN ng/ml 0.08

## 2025-04-30 NOTE — ASSESSMENT & PLAN NOTE
Unclear if true infection. UA did not reflex to culture.   Patient did report some dysuria on chart review.   Ceftriaxone as above

## 2025-04-30 NOTE — ASSESSMENT & PLAN NOTE
Severe aortic stenosis as of 2024, patient did not follow through with referral to CT surgery  Now refusing again, despite symptoms.

## 2025-04-30 NOTE — ASSESSMENT & PLAN NOTE
1 of 2 blood cultures from initial presentation to [facility] positive for gram positive cocci in chains. Blood culture ID panel -  strep  Suspect contaminant  Has dysuria and chills since 3 days  UA - nitrite positive but 0-5 WBC's hence culture not done  CXR - negative  Continue Ceftriaxone until ID of strep strain available.  Repeat BC negative thus far  ID following and input appreciated

## 2025-04-30 NOTE — ASSESSMENT & PLAN NOTE
Current mentation: MARTHA  Patient requiring haldol, zyprexa overnight, also received scheduled risperidone   Sleepy on exam in the morning and afternoon  Requiring 1:1 sitter for safety    Multifactorial risk factors: infection, hospitalization, in the setting of underlying cognitive impairment, hx of metabolic encephalopathy with prior hospitalization/UTI, altered sleep/wake cycle  Maintain delirium precautions:  Provide frequent redirection, reorientation, distraction techniques  Avoid deliriogenic medications such as tramadol, benzodiazepines, anticholinergics,  Benadryl  Treat pain, See geriatric pain protocol  Monitor for constipation and urinary retention  Encourage early and frequent moblization, OOB  Encourage Hydration/ Nutrition  Implement sleep hygiene, limit night time interuptions, group activities    If all other interventions are unsuccessful for acute agitation and behaviors, would recommend zyprexa 2.5 mg IM/Po Q8 hours as needed  Would avoid benzodiazepines such as Ativan as these can worsen delirium     QTc interval 457 on 04/29/25

## 2025-04-30 NOTE — ASSESSMENT & PLAN NOTE
Per pharmacy last refill of sythroid was in October  TSH 1.344 (4/28/25) ? Ongoing evaluation in the setting of questionable medication compliance, synthroid restarted this admission     Refills of Norvasc and omeprazole sent.

## 2025-04-30 NOTE — ASSESSMENT & PLAN NOTE
Latest echo done 2/21/2024 showed EF of 65% and severe aortic stenosis  Repeat echo noted  Monitor volume status  CT surgery consulted for TAVR workup. Patient declines at this time.

## 2025-04-30 NOTE — ASSESSMENT & PLAN NOTE
Lab Results   Component Value Date    EGFR 77 04/30/2025    EGFR 70 04/29/2025    EGFR 54 04/28/2025    CREATININE 0.73 04/30/2025    CREATININE 0.79 04/29/2025    CREATININE 0.98 04/28/2025   Baseline creatinine 0.9-1  Creatinine at baseline  Monitor

## 2025-04-30 NOTE — ASSESSMENT & PLAN NOTE
Single blood culture with Strep, also GPRs in setting of poor venous access.  No apparent source of infection.  No SSTI.  Edentulous.  UA, CXR negative.  Consider endocarditis in setting of AS but only one set positive.  Repeat blood cultures 4/29 negative.  Suspect contaminant.  Clinically stable without sepsis.      Continue ceftriaxone for now  Follow up ID of GPCs to determine if further work-up or treatment indicated  Follow up repeat blood cultures  Follow up TTE  Follow temperatures closely  Recheck WBC in AM to monitor infection  Supportive care as per the primary service

## 2025-04-30 NOTE — ASSESSMENT & PLAN NOTE
Chronic  With altered circadian rhythm  Patient with overnight behaviors requiring multiple antipsychotic medications, zyprexa, haldol  Medications restarted on admission which should help, along with routine, daytime meals, daytime interactions  Will order melatonin QHS prn

## 2025-04-30 NOTE — ASSESSMENT & PLAN NOTE
ID on consult  Single BC with strep, consider contaminant  No apparent source of infection  Repeat TTE on 04/30/24  Repeat blood cultures pending, no growth at 24 hours  Continue ceftriaxone for now

## 2025-04-30 NOTE — ASSESSMENT & PLAN NOTE
Chronic  Was previously on lyrica in January switched to gabapentin  Agree with low dose gabapentin 100 mg po TID

## 2025-05-01 LAB
ANION GAP SERPL CALCULATED.3IONS-SCNC: 7 MMOL/L (ref 4–13)
ATRIAL RATE: 53 BPM
BUN SERPL-MCNC: 15 MG/DL (ref 5–25)
CALCIUM SERPL-MCNC: 9.2 MG/DL (ref 8.4–10.2)
CHLORIDE SERPL-SCNC: 105 MMOL/L (ref 96–108)
CO2 SERPL-SCNC: 26 MMOL/L (ref 21–32)
CREAT SERPL-MCNC: 0.93 MG/DL (ref 0.6–1.3)
ERYTHROCYTE [DISTWIDTH] IN BLOOD BY AUTOMATED COUNT: 12.4 % (ref 11.6–15.1)
GFR SERPL CREATININE-BSD FRML MDRD: 57 ML/MIN/1.73SQ M
GLUCOSE SERPL-MCNC: 104 MG/DL (ref 65–140)
HCT VFR BLD AUTO: 43.2 % (ref 34.8–46.1)
HGB BLD-MCNC: 13.7 G/DL (ref 11.5–15.4)
MAGNESIUM SERPL-MCNC: 2.1 MG/DL (ref 1.9–2.7)
MCH RBC QN AUTO: 33 PG (ref 26.8–34.3)
MCHC RBC AUTO-ENTMCNC: 31.7 G/DL (ref 31.4–37.4)
MCV RBC AUTO: 104 FL (ref 82–98)
P AXIS: -17 DEGREES
PLATELET # BLD AUTO: 141 THOUSANDS/UL (ref 149–390)
PMV BLD AUTO: 10.6 FL (ref 8.9–12.7)
POTASSIUM SERPL-SCNC: 4.1 MMOL/L (ref 3.5–5.3)
PR INTERVAL: 222 MS
QRS AXIS: 21 DEGREES
QRSD INTERVAL: 84 MS
QT INTERVAL: 486 MS
QTC INTERVAL: 457 MS
RBC # BLD AUTO: 4.15 MILLION/UL (ref 3.81–5.12)
SODIUM SERPL-SCNC: 138 MMOL/L (ref 135–147)
T WAVE AXIS: 85 DEGREES
VENTRICULAR RATE: 53 BPM
WBC # BLD AUTO: 5.71 THOUSAND/UL (ref 4.31–10.16)

## 2025-05-01 PROCEDURE — 80048 BASIC METABOLIC PNL TOTAL CA: CPT | Performed by: PHYSICIAN ASSISTANT

## 2025-05-01 PROCEDURE — 99232 SBSQ HOSP IP/OBS MODERATE 35: CPT | Performed by: NURSE PRACTITIONER

## 2025-05-01 PROCEDURE — G0545 PR INHERENT VISIT TO INPT: HCPCS | Performed by: INTERNAL MEDICINE

## 2025-05-01 PROCEDURE — 97530 THERAPEUTIC ACTIVITIES: CPT

## 2025-05-01 PROCEDURE — 97110 THERAPEUTIC EXERCISES: CPT

## 2025-05-01 PROCEDURE — 93010 ELECTROCARDIOGRAM REPORT: CPT | Performed by: INTERNAL MEDICINE

## 2025-05-01 PROCEDURE — 97116 GAIT TRAINING THERAPY: CPT

## 2025-05-01 PROCEDURE — 85027 COMPLETE CBC AUTOMATED: CPT | Performed by: PHYSICIAN ASSISTANT

## 2025-05-01 PROCEDURE — 99232 SBSQ HOSP IP/OBS MODERATE 35: CPT | Performed by: INTERNAL MEDICINE

## 2025-05-01 PROCEDURE — 83735 ASSAY OF MAGNESIUM: CPT | Performed by: PHYSICIAN ASSISTANT

## 2025-05-01 RX ORDER — TRAZODONE HYDROCHLORIDE 50 MG/1
25 TABLET ORAL
Status: DISCONTINUED | OUTPATIENT
Start: 2025-05-01 | End: 2025-05-05 | Stop reason: HOSPADM

## 2025-05-01 RX ORDER — CALCIUM CARBONATE 500 MG/1
1000 TABLET, CHEWABLE ORAL 3 TIMES DAILY PRN
Status: DISCONTINUED | OUTPATIENT
Start: 2025-05-01 | End: 2025-05-05 | Stop reason: HOSPADM

## 2025-05-01 RX ADMIN — RISPERIDONE 0.25 MG: 0.25 TABLET, FILM COATED ORAL at 09:08

## 2025-05-01 RX ADMIN — ATORVASTATIN CALCIUM 40 MG: 40 TABLET, FILM COATED ORAL at 16:04

## 2025-05-01 RX ADMIN — CALCIUM CARBONATE (ANTACID) CHEW TAB 500 MG 1000 MG: 500 CHEW TAB at 21:35

## 2025-05-01 RX ADMIN — ASPIRIN 81 MG: 81 TABLET, COATED ORAL at 09:08

## 2025-05-01 RX ADMIN — HEPARIN SODIUM 5000 UNITS: 5000 INJECTION INTRAVENOUS; SUBCUTANEOUS at 21:35

## 2025-05-01 RX ADMIN — GABAPENTIN 100 MG: 100 CAPSULE ORAL at 09:08

## 2025-05-01 RX ADMIN — GABAPENTIN 100 MG: 100 CAPSULE ORAL at 21:35

## 2025-05-01 RX ADMIN — HEPARIN SODIUM 5000 UNITS: 5000 INJECTION INTRAVENOUS; SUBCUTANEOUS at 16:04

## 2025-05-01 RX ADMIN — GABAPENTIN 100 MG: 100 CAPSULE ORAL at 16:04

## 2025-05-01 RX ADMIN — CEFTRIAXONE SODIUM 2000 MG: 10 INJECTION, POWDER, FOR SOLUTION INTRAVENOUS at 02:00

## 2025-05-01 RX ADMIN — RISPERIDONE 0.25 MG: 0.25 TABLET, FILM COATED ORAL at 17:30

## 2025-05-01 RX ADMIN — TRAZODONE HYDROCHLORIDE 25 MG: 50 TABLET ORAL at 21:35

## 2025-05-01 NOTE — ASSESSMENT & PLAN NOTE
Lab Results   Component Value Date    EGFR 57 05/01/2025    EGFR 77 04/30/2025    EGFR 70 04/29/2025    CREATININE 0.93 05/01/2025    CREATININE 0.73 04/30/2025    CREATININE 0.79 04/29/2025   Baseline creatinine 0.9-1  Creatinine at baseline  Monitor

## 2025-05-01 NOTE — ASSESSMENT & PLAN NOTE
Likely contributing to chest pain, evaluated with echocardiogram, cardiology and cardiothoracic surgery evaluations, will be managed with medication as patient reportedly stating that she would manage with medications prior to today.  Latest echo done 2/21/2024 showed EF of 65% and severe aortic stenosis  Repeat echo noted  Monitor volume status  CT surgery consulted for TAVR workup.  Patient now 5/1 reporting that she was transferred to [place] for this procedure and has interest.  Discussed with cardiology patient will follow-up outpatient.  However patient's daughter reports that she does not feel this is a viable option.

## 2025-05-01 NOTE — PLAN OF CARE
Problem: PAIN - ADULT  Goal: Verbalizes/displays adequate comfort level or baseline comfort level  Description: Interventions:- Encourage patient to monitor pain and request assistance- Assess pain using appropriate pain scale- Administer analgesics based on type and severity of pain and evaluate response- Implement non-pharmacological measures as appropriate and evaluate response- Consider cultural and social influences on pain and pain management- Notify physician/advanced practitioner if interventions unsuccessful or patient reports new pain  Outcome: Progressing     Problem: INFECTION - ADULT  Goal: Absence or prevention of progression during hospitalization  Description: INTERVENTIONS:- Assess and monitor for signs and symptoms of infection- Monitor lab/diagnostic results- Monitor all insertion sites, i.e. indwelling lines, tubes, and drains- Monitor endotracheal if appropriate and nasal secretions for changes in amount and color- [place] appropriate cooling/warming therapies per order- Administer medications as ordered- Instruct and encourage patient and family to use good hand hygiene technique- Identify and instruct in appropriate isolation precautions for identified infection/condition  Outcome: Progressing  Goal: Absence of fever/infection during neutropenic period  Description: INTERVENTIONS:- Monitor WBC  Outcome: Progressing     Problem: SAFETY ADULT  Goal: Maintain or return to baseline ADL function  Description: INTERVENTIONS:-  Assess patient's ability to carry out ADLs; assess patient's baseline for ADL function and identify physical deficits which impact ability to perform ADLs (bathing, care of mouth/teeth, toileting, grooming, dressing, etc.)- Assess/evaluate cause of self-care deficits - Assess range of motion- Assess patient's mobility; develop plan if impaired- Assess patient's need for assistive devices and provide as appropriate- Encourage maximum independence but intervene and supervise  when necessary- Involve family in performance of ADLs- Assess for home care needs following discharge - Consider OT consult to assist with ADL evaluation and planning for discharge- Provide patient education as appropriate  Outcome: Progressing     Problem: DISCHARGE PLANNING  Goal: Discharge to home or other facility with appropriate resources  Description: INTERVENTIONS:- Identify barriers to discharge w/patient and caregiver- Arrange for needed discharge resources and transportation as appropriate- Identify discharge learning needs (meds, wound care, etc.)- Arrange for interpretive services to assist at discharge as needed- Refer to Case Management Department for coordinating discharge planning if the patient needs post-hospital services based on physician/advanced practitioner order or complex needs related to functional status, cognitive ability, or social support system  Outcome: Progressing     Problem: Knowledge Deficit  Goal: Patient/family/caregiver demonstrates understanding of disease process, treatment plan, medications, and discharge instructions  Description: Complete learning assessment and assess knowledge base.Interventions:- Provide teaching at level of understanding- Provide teaching via preferred learning methods  Outcome: Progressing     Problem: SAFETY,RESTRAINT: NV/NON-SELF DESTRUCTIVE BEHAVIOR  Goal: Remains free of harm/injury (restraint for non violent/non self-detsructive behavior)  Description: INTERVENTIONS:- Instruct patient/family regarding restraint use - Assess and monitor physiologic and psychological status - Provide interventions and comfort measures to meet assessed patient needs - Identify and implement measures to help patient regain control- Assess readiness for release of restraint   Outcome: Progressing  Goal: Returns to optimal restraint-free functioning  Description: INTERVENTIONS:- Assess the patient's behavior and symptoms that indicate continued need for restraint-  Identify and implement measures to help patient regain control- Assess readiness for release of restraint   Outcome: Progressing     Problem: CARDIOVASCULAR - ADULT  Goal: Maintains optimal cardiac output and hemodynamic stability  Description: INTERVENTIONS:- Monitor I/O, vital signs and rhythm- Monitor for S/S and trends of decreased cardiac output- Administer and titrate ordered vasoactive medications to optimize hemodynamic stability- Assess quality of pulses, skin color and temperature- Assess for signs of decreased coronary artery perfusion- Instruct patient to report change in severity of symptoms  Outcome: Progressing  Goal: Absence of cardiac dysrhythmias or at baseline rhythm  Description: INTERVENTIONS:- Continuous cardiac monitoring, vital signs, obtain 12 lead EKG if ordered- Administer antiarrhythmic and heart rate control medications as ordered- Monitor electrolytes and administer replacement therapy as ordered  Outcome: Progressing     Problem: RESPIRATORY - ADULT  Goal: Achieves optimal ventilation and oxygenation  Description: INTERVENTIONS:- Assess for changes in respiratory status- Assess for changes in mentation and behavior- Position to facilitate oxygenation and minimize respiratory effort- Oxygen administered by appropriate delivery if ordered- Initiate smoking cessation education as indicated- Encourage broncho-pulmonary hygiene including cough, deep breathe, Incentive Spirometry- Assess the need for suctioning and aspirate as needed- Assess and instruct to report SOB or any respiratory difficulty- Respiratory Therapy support as indicated  Outcome: Progressing

## 2025-05-01 NOTE — ASSESSMENT & PLAN NOTE
Presented to [facility] on 4/28/25 with progressively worsening right-sided chest pain since 1.5 months, initially with exertion and since 3 days with rest  EKG showed diffuse ST depression  Troponin - negative  Per cardiology no invasive workup at this time considering patient's altered mental status metabolic encephalopathy.  Discussed again today 5/1/2025 with cardiology as patient now reporting that she has interest in having TAVR.  Cardiology recommending patient follow-up in CT surgical office establish consistent messaging.  Patient continues to be very weak and would need to be optimized prior to potential procedure.  This was discussed 5/1/2025 with patient's daughter who reports that she is not in agreement with procedure at this time.  Transferred to [facility] 4/28 for cardiac catheterization  Not completed due to patient's refusal  Ct ASA, statin  Echo - EF65%.  Consistent grade 1 relaxation.  Moderate but possibly severe aortic stenosis.  Monitor on telemetry  Given patient's acute encephalopathy and history of medication non adherence, decision was made not to pursue cardiac cath. Continue medical management  Discussed again on 5/1/2025 at patient's request.  No further testing to follow-up outpatient

## 2025-05-01 NOTE — ASSESSMENT & PLAN NOTE
Chronic  With altered circadian rhythm  Medications restarted on admission which should help, along with routine, daytime meals, daytime interactions  Continue melatonin  Will add HS trazodone

## 2025-05-01 NOTE — ASSESSMENT & PLAN NOTE
Lactic acid elevated at 4.4 on initial presentation to [facility] and normalized with IV fluids

## 2025-05-01 NOTE — ASSESSMENT & PLAN NOTE
Single blood culture with Strep, also GPRs in setting of poor venous access.  No apparent source of infection.  No SSTI.  Edentulous.  UA, CXR negative.  Consider endocarditis in setting of AS but only one set positive.  Repeat blood cultures 4/29 negative.  JAN (technically difficult) negative for vegetation.  Suspect contaminant.  Clinically stable without sepsis.      Continue ceftriaxone for now  Follow up ID of GPCs to determine if further work-up or treatment indicated  Follow up repeat blood cultures  Follow temperatures closely  Supportive care as per the primary service

## 2025-05-01 NOTE — ASSESSMENT & PLAN NOTE
1 of 2 blood cultures from initial presentation to [facility] positive for gram positive cocci in chains. Blood culture ID panel -  strep  Suspect contaminant  Had reported dysuria and chills for 3 days  UA - nitrite positive but 0-5 WBC's hence culture not done  CXR - negative  Continue Ceftriaxone ID of GPC's to determine if further workup or treatment indicated - possible contaminant  Repeat BC negative at 48 hours  ID following and input appreciated

## 2025-05-01 NOTE — MALNUTRITION/BMI
This medical record reflects one or more clinical indicators suggestive of malnutrition.    Malnutrition Findings:   Adult Malnutrition type: Chronic illness  Adult Degree of Malnutrition: Malnutrition of moderate degree  Malnutrition Characteristics: Fat loss, Muscle loss, Weight loss                360 Statement: moderate malnutrition r/t chronic illness as evidenced by weight decrease of 12 lbs / 8.9% x2 months, evident mild to moderate wasting in temples and buccal fat pad. Treatment: oral diet and oral nutrition supplements    BMI Findings:           Body mass index is 19.26 kg/m².     See Nutrition note dated 5/1/25 for additional details.  Completed nutrition assessment is viewable in the nutrition documentation.

## 2025-05-01 NOTE — PHYSICAL THERAPY NOTE
PHYSICAL THERAPY NOTE          Patient Name: Daily Schaeffer  Today's Date: 5/1/2025 05/01/25 0952   PT Last Visit   PT Visit Date 05/01/25   Note Type   Note Type Treatment   Pain Assessment   Pain Assessment Tool 0-10   Pain Score 6   Pain Location/Orientation Location: Shoulder;Orientation: Bilateral   Patient's Stated Pain Goal No pain   Hospital Pain Intervention(s) Ambulation/increased activity;Repositioned   Restrictions/Precautions   Weight Bearing Precautions Per Order No   Other Precautions Cognitive;1:1;Chair Alarm;Bed Alarm;Telemetry;Fall Risk;Pain   General   Chart Reviewed Yes   Response to Previous Treatment Patient with no complaints from previous session.   Family/Caregiver Present Yes  (Daughter at end of session)   Cognition   Overall Cognitive Status Impaired   Arousal/Participation Alert;Responsive;Cooperative   Attention Attends with cues to redirect   Orientation Level Oriented to person;Oriented to place;Oriented to time;Disoriented to situation   Memory Within functional limits   Following Commands Follows one step commands with increased time or repetition   Subjective   Subjective pt is pleasant and cooperative throughout therapy session. pt recieved supine in bed.   Bed Mobility   Supine to Sit 4  Minimal assistance   Additional items Assist x 1;Increased time required;Verbal cues   Sit to Supine Unable to assess   Additional Comments pt OOB in recliner upon end of session   Transfers   Sit to Stand 3  Moderate assistance   Additional items Assist x 1;Increased time required;Verbal cues;Armrests   Stand to Sit 3  Moderate assistance   Additional items Assist x 1;Increased time required;Verbal cues;Armrests   Additional Comments Transfers with RW. Pt required verbal cues for safe hand placements and to lean forward   Ambulation/Elevation   Gait pattern Improper Weight shift;Retropulsion;Narrow  ANAID;Decreased foot clearance;Shuffling;Redundant gait at times;Short stride;Excessively slow   Gait Assistance 3  Moderate assist   Additional items Assist x 1;Verbal cues;Tactile cues   Assistive Device Rolling walker   Distance 20' + 20'  (no rest breaks required)   Stair Management Assistance Not tested   Ambulation/Elevation Additional Comments pt able to ambulate with no compaints of SOB or dizziness. Shoulder/chest discomfort increased when standing but decreased with ambulation.   Balance   Static Sitting Fair -   Dynamic Sitting Poor +   Static Standing Poor   Dynamic Standing Poor   Ambulatory Poor   Endurance Deficit   Endurance Deficit Yes   Endurance Deficit Description Generalized weakness & deconditioning   Activity Tolerance   Activity Tolerance Patient limited by fatigue;Patient tolerated treatment well   Medical Staff Made Aware PT [place]   Nurse Made Aware RN cleared & updated   Exercises   Hip Flexion Sitting;10 reps;AROM;Bilateral   Hip Abduction Sitting;10 reps;AROM;Bilateral   Hip Adduction Sitting;10 reps;AROM;Bilateral   Knee AROM Long Arc Quad Sitting;10 reps;AROM;Bilateral   Ankle Pumps Sitting;15 reps;AROM;Bilateral   Assessment   Prognosis Fair   Problem List Decreased strength;Decreased endurance;Decreased mobility;Impaired balance;Decreased coordination;Decreased cognition;Impaired judgement;Decreased safety awareness   Assessment Patient presents pleasantly and agreeable to therapy session today. Session consisted of bed mobility, functional transfers, ambulation, therapeutic exercises, and reassessing endurance levels. Patient requires min assist x 1 for bed mobility, and mod assist x 1 for sit to stand transfers and ambulation with RW. Patient reports 6/10 bilateral shoulder pain, which increased with exertion but had decreased back to baseline by the end of session. Patient demonstrates limited endurance, decreased LE strength, limited functional mobility, and impaired prolonged  ambulation. Therefore, pt will benefit from skilled therapy in order to improve pain modulation techniques, increase BLE strength, improve overall endurance levels, and to promote increased independence and safety at home. Based on these findings and the pt's AM-PAC score of 13, we recommend level II to allow for optimal recovery and return to baseline function ADLs. As long as pt is admitted, therapy will follow for 2-3 days/week to address the aforementioned deficits to allow for optimal re-integration into household and community ADLs and improved quality of life.   Barriers to Discharge None   Goals   Patient Goals To spend time with her daughter   STG Expiration Date 05/13/25   PT Treatment Day 1   Plan   Treatment/Interventions ADL retraining;Functional transfer training;LE strengthening/ROM;Elevations;Therapeutic exercise;Endurance training;Cognitive reorientation;Bed mobility;Gait training;Spoke to nursing;OT   Progress Progressing toward goals   PT Frequency 2-3x/wk   Discharge Recommendation   Rehab Resource Intensity Level, PT II (Moderate Resource Intensity)   AM-PAC Basic Mobility Inpatient   Turning in Flat Bed Without Bedrails 3   Lying on Back to Sitting on Edge of Flat Bed Without Bedrails 3   Moving Bed to Chair 2   Standing Up From Chair Using Arms 2   Walk in Room 2   Climb 3-5 Stairs With Railing 1   Basic Mobility Inpatient Raw Score 13   Basic Mobility Standardized Score 33.99   [facility] Highest Level Of Mobility   -[facility] Goal 4: Move to chair/commode   -[facility] Achieved 7: Walk 25 feet or more   Education   Education Provided Mobility training;Home exercise program   Patient Demonstrates acceptance/verbal understanding   End of Consult   Patient Position at End of Consult Bedside chair;Bed/Chair alarm activated;All needs within reach     Harlan Cortez, SPT

## 2025-05-01 NOTE — ASSESSMENT & PLAN NOTE
Patient disoriented, impulsive on 1:1.  Required Haldol, Zyprexa overnight also received scheduled risperidone.  D/W daughter, patient has not been taking meds at home, sleep wake cycle disrupted, not eating.   Reports both he and his wife up all night sleep during the day  Daughter also reports alcohol use at home, though  denies that she has been drinking. States her mental status declined that last time she was in the hospital and she did better in rehab. Once she got home, she did well for about a year, then she declined again. Patient's  reported that she has been getting more confused the last few weeks.  Patient with improved mental status 5/1/2025  De-escalated one-to-one to virtual one-to-one 5/1/2025  PT OT recommended rehab, will discuss further over the next couple of days.  Continue Risperdal  Prn zyprexa for severe agitation  Appreciate geriatrics.   Continue Celexa 20 mg daily, per geriatrics

## 2025-05-01 NOTE — PROGRESS NOTES
Progress Note - Infectious Disease   Name: Daiyl Schaeffer 79 y.o. female I MRN: [id number]  Unit/Bed#: [facility] 422-01 I Date of Admission: 4/28/2025   Date of Service: 5/1/2025 I Hospital Day: 3    Assessment & Plan  Bacteremia  Single blood culture with Strep, also GPRs in setting of poor venous access.  No apparent source of infection.  No SSTI.  Edentulous.  UA, CXR negative.  Consider endocarditis in setting of AS but only one set positive.  Repeat blood cultures 4/29 negative.  JAN (technically difficult) negative for vegetation.  Suspect contaminant.  Clinically stable without sepsis.      Continue ceftriaxone for now  Follow up ID of GPCs to determine if further work-up or treatment indicated  Follow up repeat blood cultures  Follow temperatures closely  Supportive care as per the primary service  Chest pain  Reason for initial presentation.  In setting of sever AS, diffuse ST depression on ECG.  Cardiology evaluation ongoing  Severe aortic stenosis  With symptoms.  TAVR recommended but patient declining.        Antibiotics:  Ceftriaxone #4    Subjective   Patient awake and alert this AM eating breakfast.  No complaints.  Denies CP or SOB.    Objective :  Temp:  [96.8 °F (36 °C)-97.9 °F (36.6 °C)] 97.6 °F (36.4 °C)  HR:  [54-67] 54  BP: ()/(43-60) 114/48  Resp:  [17-20] 17  SpO2:  [96 %-100 %] 98 %  O2 Device: None (Room air)    General:  No acute distress  Psychiatric:  Awake and alert  Pulmonary:  Normal respiratory excursion without accessory muscle use  Abdomen:  Soft, nontender  Extremities:  No edema  Skin:  No rashes      Lab Results: I have reviewed the following results:  Results from last 7 days   Lab Units 05/01/25  0557 04/30/25  0529 04/29/25  0713   WBC Thousand/uL 5.71 4.44 6.38   HEMOGLOBIN g/dL 13.7 13.7 14.3   PLATELETS Thousands/uL 141* 166 147*     Results from last 7 days   Lab Units 05/01/25  0557 04/30/25  0529 04/29/25  0713 04/28/25  0047   SODIUM mmol/L 138 141 139 139   POTASSIUM  mmol/L 4.1 4.7 3.6 4.1   CHLORIDE mmol/L 105 106 103 101   CO2 mmol/L 26 29 28 23   BUN mg/dL 15 9 14 25   CREATININE mg/dL 0.93 0.73 0.79 0.98   EGFR ml/min/1.73sq m 57 77 70 54   CALCIUM mg/dL 9.2 9.6 9.6 10.7*   AST U/L  --   --   --  24   ALT U/L  --   --   --  13   ALK PHOS U/L  --   --   --  52   ALBUMIN g/dL  --   --   --  4.4     Results from last 7 days   Lab Units 04/29/25  0057 04/28/25  0125 04/28/25  0047   BLOOD CULTURE  No Growth at 48 hrs.  No Growth at 48 hrs.  --  No Growth at 72 hrs.   GRAM STAIN RESULT   --  Gram positive cocci in chains*  Gram positive rods*  --      Results from last 7 days   Lab Units 04/28/25  0047   PROCALCITONIN ng/ml 0.08

## 2025-05-01 NOTE — ASSESSMENT & PLAN NOTE
Unclear if true infection. UA did not reflex to culture.   Patient did report some dysuria on chart review.   Ceftriaxone as per ID

## 2025-05-01 NOTE — PROGRESS NOTES
Progress Note - Geriatric Medicine   Name: Daily Schaeffer 79 y.o. female I MRN: [id number]  Unit/Bed#: [facility] 422-01 I Date of Admission: 4/28/2025   Date of Service: 5/1/2025 I Hospital Day: 3    Assessment & Plan  Chest pain  Cardiology on consult  Acute metabolic encephalopathy  Current mentation: MARTHA  Patient requiring haldol, zyprexa overnight, also received scheduled risperidone   Sleepy on exam in the morning and afternoon  Requiring 1:1 sitter for safety    Multifactorial risk factors: infection, hospitalization, in the setting of underlying cognitive impairment, hx of metabolic encephalopathy with prior hospitalization/UTI, altered sleep/wake cycle  Maintain delirium precautions:  Provide frequent redirection, reorientation, distraction techniques  Avoid deliriogenic medications such as tramadol, benzodiazepines, anticholinergics,  Benadryl  Treat pain, See geriatric pain protocol  Monitor for constipation and urinary retention  Encourage early and frequent moblization, OOB  Encourage Hydration/ Nutrition  Implement sleep hygiene, limit night time interuptions, group activities    If all other interventions are unsuccessful for acute agitation and behaviors, would recommend zyprexa 2.5 mg IM/Po Q8 hours as needed  Would avoid benzodiazepines such as Ativan as these can worsen delirium     QTc interval 457 on 04/29/25  Cognitive impairment  Progressing, likely progression of underlying dementia  TSH 1.344 (4/28/25)  Vitamin B 12 level (06/21/24) 270  Recommend vitamin b12 supplementation for goal level > 400  CT head (07/29/24) moderate chronic microangiopathic changes  Pt previously evaluated by Dr Sierra (6/25/25) performed in impaired range of functioning, does not have capacity  Although at present pt likely has component of increased confusion suspect that at baseline pt has progressing cognitive decline, leading to medication non compliance and altered circadian rhythm at home  Recommend cognitive  testing as outpatient     Acquired hypothyroidism  Per pharmacy last refill of sythroid was in October  TSH 1.344 (4/28/25) ? Ongoing evaluation in the setting of questionable medication compliance, synthroid restarted this admission    Neuropathy  Chronic  Was previously on lyrica in January switched to gabapentin  Agree with low dose gabapentin 100 mg po TID    Bacteremia  ID on consult  Single BC with strep, consider contaminant  No apparent source of infection  Repeat TTE on 04/30/24  Repeat blood cultures pending, no growth at 24 hours  Continue ceftriaxone for now    Frailty  Clinical Frail Scale: 6- Moderately Frail  Lives with , per family both need increased support   Grandson checks in every 2 weeks  Case management on consult  PT/OT  Recommend increased support home with daily checks versus assisted living  Depression  Chronic  Exacerbated by death of daughter from overdose  conitnue celexa 20 mg daily  Alcohol use  Unclear how much has been consumed  Grandson reports he has seen wine and whiskey on the counter  Ongoing observation  Insomnia  Chronic  With altered circadian rhythm  Medications restarted on admission which should help, along with routine, daytime meals, daytime interactions  Continue melatonin  Will add HS trazodone  Ambulatory dysfunction  Exacerbated by hospitalization   At risk secondary to age, gait instability, cognitive impairment  Fall precautions  PT/OT    Subjective   She is lying in bed on 1:1 she is calm cooperative, oriented x 2. Daughter at bedside, pt is talkative.   Per nursing pt did not sleep well last night, needed prn zyprexa for increased confusion, agitation    Objective :  Temp:  [97.3 °F (36.3 °C)-98.4 °F (36.9 °C)] 98.4 °F (36.9 °C)  HR:  [54-67] 57  BP: ()/(43-60) 106/47  Resp:  [16-20] 16  SpO2:  [95 %-100 %] 97 %  O2 Device: None (Room air)    Physical Exam  Vitals and nursing note reviewed.   HENT:      Head: Normocephalic.      Nose: No congestion.       Mouth/Throat:      Mouth: Mucous membranes are moist.   Eyes:      General:         Right eye: No discharge.         Left eye: No discharge.   Cardiovascular:      Rate and Rhythm: Normal rate.   Pulmonary:      Effort: Pulmonary effort is normal.      Breath sounds: Normal breath sounds.   Abdominal:      General: Bowel sounds are normal.      Palpations: Abdomen is soft.   Musculoskeletal:         General: Normal range of motion.      Cervical back: Normal range of motion.   Skin:     General: Skin is warm and dry.   Neurological:      Mental Status: She is alert. Mental status is at baseline.   Psychiatric:         Mood and Affect: Mood normal.           Lab Results: I have reviewed the following results:CBC/BMP:   .     05/01/25  0557   WBC 5.71   HGB 13.7   HCT 43.2   *   SODIUM 138   K 4.1      CO2 26   BUN 15   CREATININE 0.93   GLUC 104   MG 2.1          Therapies:   Basic Mobility Inpatient Raw Score: 12  -[facility] Goal: 4: Move to chair/commode  -[facility] Achieved: 5: Stand (1 or more minutes)      VTE Prophylaxis: Sequential compression device (Venodyne)     Code Status: Level 1 - Full Code      Family and Social Support:   No data recorded    Goals of Care: full code

## 2025-05-01 NOTE — PROGRESS NOTES
Progress Note - Hospitalist   Name: Daily Schaeffer 79 y.o. female I MRN: [id number]  Unit/Bed#: [facility] 422-01 I Date of Admission: 4/28/2025   Date of Service: 5/1/2025 I Hospital Day: 3    Assessment & Plan  Chest pain  Presented to [facility] on 4/28/25 with progressively worsening right-sided chest pain since 1.5 months, initially with exertion and since 3 days with rest  EKG showed diffuse ST depression  Troponin - negative  Per cardiology no invasive workup at this time considering patient's altered mental status metabolic encephalopathy.  Discussed again today 5/1/2025 with cardiology as patient now reporting that she has interest in having TAVR.  Cardiology recommending patient follow-up in CT surgical office establish consistent messaging.  Patient continues to be very weak and would need to be optimized prior to potential procedure.  This was discussed 5/1/2025 with patient's daughter who reports that she is not in agreement with procedure at this time.  Transferred to [facility] 4/28 for cardiac catheterization  Not completed due to patient's refusal  Ct ASA, statin  Echo - EF65%.  Consistent grade 1 relaxation.  Moderate but possibly severe aortic stenosis.  Monitor on telemetry  Given patient's acute encephalopathy and history of medication non adherence, decision was made not to pursue cardiac cath. Continue medical management  Discussed again on 5/1/2025 at patient's request.  No further testing to follow-up outpatient  Acute metabolic encephalopathy  Patient disoriented, impulsive on 1:1.  Required Haldol, Zyprexa overnight also received scheduled risperidone.  D/W daughter, patient has not been taking meds at home, sleep wake cycle disrupted, not eating.   Reports both he and his wife up all night sleep during the day  Daughter also reports alcohol use at home, though  denies that she has been drinking. States her mental status declined that last time she was in the hospital and  she did better in rehab. Once she got home, she did well for about a year, then she declined again. Patient's  reported that she has been getting more confused the last few weeks.  Patient with improved mental status 5/1/2025  De-escalated one-to-one to virtual one-to-one 5/1/2025  PT OT recommended rehab, will discuss further over the next couple of days.  Continue Risperdal  Prn zyprexa for severe agitation  Appreciate geriatrics.   Continue Celexa 20 mg daily, per geriatrics   Possible UTI (urinary tract infection)  Unclear if true infection. UA did not reflex to culture.   Patient did report some dysuria on chart review.   Ceftriaxone as per ID  Bacteremia  1 of 2 blood cultures from initial presentation to [facility] positive for gram positive cocci in chains. Blood culture ID panel -  strep  Suspect contaminant  Had reported dysuria and chills for 3 days  UA - nitrite positive but 0-5 WBC's hence culture not done  CXR - negative  Continue Ceftriaxone ID of GPC's to determine if further workup or treatment indicated - possible contaminant  Repeat BC negative at 48 hours  ID following and input appreciated  Lactic acidosis  Lactic acid elevated at 4.4 on initial presentation to [facility] and normalized with IV fluids  Severe aortic stenosis  Likely contributing to chest pain, evaluated with echocardiogram, cardiology and cardiothoracic surgery evaluations, will be managed with medication as patient reportedly stating that she would manage with medications prior to today.  Latest echo done 2/21/2024 showed EF of 65% and severe aortic stenosis  Repeat echo noted  Monitor volume status  CT surgery consulted for TAVR workup.  Patient now 5/1 reporting that she was transferred to [place] for this procedure and has interest.  Discussed with cardiology patient will follow-up outpatient.  However patient's daughter reports that she does not feel this is a viable option.  Neuropathy  Continue  Gabapentin  Acquired hypothyroidism  TSH normal  Continue levothyroxine 75 mcg daily  Stage 3 chronic kidney disease (HCC)  Lab Results   Component Value Date    EGFR 57 05/01/2025    EGFR 77 04/30/2025    EGFR 70 04/29/2025    CREATININE 0.93 05/01/2025    CREATININE 0.73 04/30/2025    CREATININE 0.79 04/29/2025   Baseline creatinine 0.9-1  Creatinine at baseline  Monitor  Ambulatory dysfunction  PT recommending rehab. Family in agreement  Cognitive impairment  Needs formal outpatient cognitive testing  Dyslipidemia    Frailty    Depression    Alcohol use    Insomnia      VTE Pharmacologic Prophylaxis: VTE Score: 4 High Risk (Score >/= 5) - Pharmacological DVT Prophylaxis Ordered: heparin. Sequential Compression Devices Ordered.    Mobility:   Basic Mobility Inpatient Raw Score: 12  JH-HLM Goal: 4: Move to chair/commode  JH-HLM Achieved: 5: Stand (1 or more minutes)  JH-HLM Goal achieved. Continue to encourage appropriate mobility.    Patient Centered Rounds: I performed bedside rounds with nursing staff today.   Discussions with Specialists or Other Care Team Provider: nursing     Education and Discussions with Family / Patient: Updated  ( and daughter) via phone.  Discussed with patient's  Gilmar updated via phone.  He request that I update his daughter Kaila who is the spokesperson for the family she will update rest of the family she has medical background.  Initial reason calling patient's  was because patient requested that I call him.  Updated patient's other daughter Kayleen at bedside.  Mckayla called at around 4 PM and updated    Current Length of Stay: 3 day(s)  Current Patient Status: Inpatient   Certification Statement: The patient will continue to require additional inpatient hospital stay due to need for rehab and improved mental status   Discharge Plan: Anticipate discharge in 48-72 hrs to rehab facility.    Code Status: Level 1 - Full Code    Subjective   Patient laying  in bed.  Appears to have improved mental status this morning.  However per notation patient was agitated and on one-to-one overnight.  Patient with no recollection of last few days she believes that no providers have been in to see her.  She states that I am the first provider to see her today.  We discussed events leading to today.  She is requesting that she have cardiac procedure done we discussed cardiology's plan.  Patient to follow-up outpatient with cardiac thoracic surgery.    Objective :  Temp:  [97.3 °F (36.3 °C)-98.4 °F (36.9 °C)] 98.4 °F (36.9 °C)  HR:  [54-67] 57  BP: ()/(43-60) 106/47  Resp:  [16-20] 16  SpO2:  [95 %-100 %] 97 %  O2 Device: None (Room air)    Body mass index is 19.26 kg/m².     Input and Output Summary (last 24 hours):     Intake/Output Summary (Last 24 hours) at 5/1/2025 1159  Last data filed at 5/1/2025 0946  Gross per 24 hour   Intake 598 ml   Output 453 ml   Net 145 ml       Physical Exam  Constitutional:       General: She is not in acute distress.     Appearance: She is not ill-appearing, toxic-appearing or diaphoretic.   HENT:      Head: Normocephalic and atraumatic.   Eyes:      General:         Right eye: No discharge.         Left eye: No discharge.   Cardiovascular:      Rate and Rhythm: Normal rate.      Heart sounds: Murmur heard.      No friction rub. No gallop.   Abdominal:      General: There is no distension.      Palpations: There is no mass.      Tenderness: There is no abdominal tenderness. There is no guarding or rebound.      Hernia: No hernia is present.   Musculoskeletal:         General: No swelling, tenderness, deformity or signs of injury.      Right lower leg: No edema.      Left lower leg: No edema.   Skin:     Coloration: Skin is pale. Skin is not jaundiced.      Findings: No bruising, erythema, lesion or rash.   Neurological:      Mental Status: She is alert.      Comments: Alert 2 - 3 with improving mental status    Psychiatric:      Comments:  Pleasant        Lines/Drains:      Telemetry:  Telemetry Orders (From admission, onward)               24 Hour Telemetry Monitoring  Continuous x 24 Hours (Telem)        Expiring   Question:  Reason for 24 Hour Telemetry  Answer:  PCI/EP study (including pacer and ICD implementation), Cardiac surgery, MI, abnormal cardiac cath, and chest pain- rule out MI                     Telemetry Reviewed: Normal Sinus Rhythm  Indication for Continued Telemetry Use: Arrthymias requiring medical therapy               Lab Results: I have reviewed the following results:   Results from last 7 days   Lab Units 05/01/25 0557 04/30/25  0529 04/29/25 0713   WBC Thousand/uL 5.71   < > 6.38   HEMOGLOBIN g/dL 13.7   < > 14.3   HEMATOCRIT % 43.2   < > 42.7   PLATELETS Thousands/uL 141*   < > 147*   SEGS PCT %  --   --  44   LYMPHO PCT %  --   --  40   MONO PCT %  --   --  11   EOS PCT %  --   --  4    < > = values in this interval not displayed.     Results from last 7 days   Lab Units 05/01/25  0557 04/29/25 0713 04/28/25 0047   SODIUM mmol/L 138   < > 139   POTASSIUM mmol/L 4.1   < > 4.1   CHLORIDE mmol/L 105   < > 101   CO2 mmol/L 26   < > 23   BUN mg/dL 15   < > 25   CREATININE mg/dL 0.93   < > 0.98   ANION GAP mmol/L 7   < > 15*   CALCIUM mg/dL 9.2   < > 10.7*   ALBUMIN g/dL  --   --  4.4   TOTAL BILIRUBIN mg/dL  --   --  0.82   ALK PHOS U/L  --   --  52   ALT U/L  --   --  13   AST U/L  --   --  24   GLUCOSE RANDOM mg/dL 104   < > 116    < > = values in this interval not displayed.     Results from last 7 days   Lab Units 04/28/25  0047   INR  0.92     Results from last 7 days   Lab Units 04/28/25  2208   POC GLUCOSE mg/dl 104     Results from last 7 days   Lab Units 04/29/25  0713   HEMOGLOBIN A1C % 5.4     Results from last 7 days   Lab Units 04/28/25  0445 04/28/25 0047   LACTIC ACID mmol/L 0.9 4.4*   PROCALCITONIN ng/ml  --  0.08       Recent Cultures (last 7 days):   Results from last 7 days   Lab Units 04/29/25  0052  04/28/25  0125 04/28/25  0047   BLOOD CULTURE  No Growth at 48 hrs.  No Growth at 48 hrs.  --  No Growth at 72 hrs.   GRAM STAIN RESULT   --  Gram positive cocci in chains*  Gram positive rods*  --        Imaging Results Review: No pertinent imaging studies reviewed.  Other Study Results Review: No additional pertinent studies reviewed.    Last 24 Hours Medication List:     Current Facility-Administered Medications:     acetaminophen (TYLENOL) tablet 650 mg, Q6H PRN    aspirin (ECOTRIN LOW STRENGTH) EC tablet 81 mg, Daily    atorvastatin (LIPITOR) tablet 40 mg, Daily With Dinner    cefTRIAXone (ROCEPHIN) 2,000 mg in dextrose 5 % 50 mL IVPB, Q24H, Last Rate: 2,000 mg (05/01/25 0200)    gabapentin (NEURONTIN) capsule 100 mg, TID    heparin (porcine) subcutaneous injection 5,000 Units, Q8H NIECY    levothyroxine tablet 75 mcg, Early Morning    melatonin tablet 6 mg, HS PRN    morphine injection 2 mg, Once    OLANZapine (ZyPREXA) tablet 2.5 mg, BID PRN    risperiDONE (RisperDAL) tablet 0.25 mg, BID    traMADol (ULTRAM) tablet 50 mg, Q6H PRN    Administrative Statements   Today, Patient Was Seen By: MAJOR Morillo      **Please Note: This note may have been constructed using a voice recognition system.**

## 2025-05-01 NOTE — PLAN OF CARE
Problem: PHYSICAL THERAPY ADULT  Goal: Performs mobility at highest level of function for planned discharge setting.  See evaluation for individualized goals.  Description: Treatment/Interventions: ADL retraining, Functional transfer training, LE strengthening/ROM, Elevations, Therapeutic exercise, Endurance training, Cognitive reorientation, Patient/family training, Equipment eval/education, Bed mobility, Gait training, Compensatory technique education, Spoke to case management, Spoke to nursing, OT          See flowsheet documentation for full assessment, interventions and recommendations.  Outcome: Progressing  Note: Prognosis: Fair  Problem List: Decreased strength, Decreased endurance, Decreased mobility, Impaired balance, Decreased coordination, Decreased cognition, Impaired judgement, Decreased safety awareness  Assessment: Patient presents pleasantly and agreeable to therapy session today. Session consisted of bed mobility, functional transfers, ambulation, therapeutic exercises, and reassessing endurance levels. Patient requires min assist x 1 for bed mobility, and mod assist x 1 for sit to stand transfers and ambulation with RW. Patient reports 6/10 bilateral shoulder pain, which increased with exertion but had decreased back to baseline by the end of session. Patient demonstrates limited endurance, decreased LE strength, limited functional mobility, and impaired prolonged ambulation. Therefore, pt will benefit from skilled therapy in order to improve pain modulation techniques, increase BLE strength, improve overall endurance levels, and to promote increased independence and safety at home. Based on these findings and the pt's AM-PAC score of 13, we recommend level II to allow for optimal recovery and return to baseline function ADLs. As long as pt is admitted, therapy will follow for 2-3 days/week to address the aforementioned deficits to allow for optimal re-integration into household and community  ADLs and improved quality of life.  Barriers to Discharge: None     Rehab Resource Intensity Level, PT: II (Moderate Resource Intensity)    See flowsheet documentation for full assessment.

## 2025-05-02 PROBLEM — E44.0 MODERATE PROTEIN-CALORIE MALNUTRITION (HCC): Status: ACTIVE | Noted: 2025-05-02

## 2025-05-02 PROCEDURE — 97535 SELF CARE MNGMENT TRAINING: CPT

## 2025-05-02 PROCEDURE — 99232 SBSQ HOSP IP/OBS MODERATE 35: CPT | Performed by: NURSE PRACTITIONER

## 2025-05-02 PROCEDURE — G0545 PR INHERENT VISIT TO INPT: HCPCS | Performed by: INTERNAL MEDICINE

## 2025-05-02 PROCEDURE — 99232 SBSQ HOSP IP/OBS MODERATE 35: CPT | Performed by: INTERNAL MEDICINE

## 2025-05-02 PROCEDURE — 97530 THERAPEUTIC ACTIVITIES: CPT

## 2025-05-02 PROCEDURE — 99233 SBSQ HOSP IP/OBS HIGH 50: CPT | Performed by: INTERNAL MEDICINE

## 2025-05-02 RX ADMIN — GABAPENTIN 100 MG: 100 CAPSULE ORAL at 17:05

## 2025-05-02 RX ADMIN — RISPERIDONE 0.25 MG: 0.25 TABLET, FILM COATED ORAL at 08:40

## 2025-05-02 RX ADMIN — GABAPENTIN 100 MG: 100 CAPSULE ORAL at 08:40

## 2025-05-02 RX ADMIN — LEVOTHYROXINE SODIUM 75 MCG: 0.07 TABLET ORAL at 05:53

## 2025-05-02 RX ADMIN — RISPERIDONE 0.25 MG: 0.25 TABLET, FILM COATED ORAL at 17:05

## 2025-05-02 RX ADMIN — ATORVASTATIN CALCIUM 40 MG: 40 TABLET, FILM COATED ORAL at 17:05

## 2025-05-02 RX ADMIN — HEPARIN SODIUM 5000 UNITS: 5000 INJECTION INTRAVENOUS; SUBCUTANEOUS at 13:34

## 2025-05-02 RX ADMIN — HEPARIN SODIUM 5000 UNITS: 5000 INJECTION INTRAVENOUS; SUBCUTANEOUS at 21:49

## 2025-05-02 RX ADMIN — ACETAMINOPHEN 650 MG: 325 TABLET, FILM COATED ORAL at 10:41

## 2025-05-02 RX ADMIN — TRAZODONE HYDROCHLORIDE 25 MG: 50 TABLET ORAL at 21:49

## 2025-05-02 RX ADMIN — HEPARIN SODIUM 5000 UNITS: 5000 INJECTION INTRAVENOUS; SUBCUTANEOUS at 05:53

## 2025-05-02 RX ADMIN — CEFTRIAXONE SODIUM 2000 MG: 10 INJECTION, POWDER, FOR SOLUTION INTRAVENOUS at 01:20

## 2025-05-02 RX ADMIN — GABAPENTIN 100 MG: 100 CAPSULE ORAL at 21:49

## 2025-05-02 RX ADMIN — ACETAMINOPHEN 650 MG: 325 TABLET, FILM COATED ORAL at 21:51

## 2025-05-02 RX ADMIN — ASPIRIN 81 MG: 81 TABLET, COATED ORAL at 08:40

## 2025-05-02 NOTE — ASSESSMENT & PLAN NOTE
1 of 2 blood cultures from initial presentation to [facility] positive for gram positive cocci in chains. Blood culture ID panel -  strep  Likely contaminant  Had reported dysuria and chills for 3 days  UA - nitrite positive but 0-5 WBC's hence culture not done  CXR - negative  Continue Ceftriaxone ID of GPC's to determine if further workup or treatment indicated -likely contaminant  Repeat BC negative at 72 hours  ID following and input appreciated, signing off

## 2025-05-02 NOTE — ASSESSMENT & PLAN NOTE
Continue Gabapentin -reluctant to increase dosing due to mental status  Noted in bilateral hands/severe pain

## 2025-05-02 NOTE — PROGRESS NOTES
Progress Note - Hospitalist   Name: Daily Schaeffer 79 y.o. female I MRN: [id number]  [facility] I Date of Admission: 4/28/2025   Date of Service: 5/2/2025 I Hospital Day: 4    Assessment & Plan  Chest pain  Presented to [facility] on 4/28/25 with progressively worsening right-sided chest pain since 1.5 months, initially with exertion and since 3 days with rest  EKG showed diffuse ST depression  Troponin - negative  Per cardiology no invasive workup at this time considering patient's altered mental status metabolic encephalopathy.  Discussed again today 5/1/2025 with cardiology as patient now reporting that she has interest in having TAVR.  Cardiology recommending patient follow-up in CT surgical office establish consistent messaging.  Patient continues to be very weak and would need to be optimized prior to potential procedure.  This was discussed 5/1/2025 with patient's daughter who reports that she is not in agreement with procedure at this time.  Transferred to [facility] 4/28 for cardiac catheterization  Not completed due to patient's refusal  Ct ASA, statin  Echo - EF65%.  Consistent grade 1 relaxation.  Moderate but possibly severe aortic stenosis.  Monitor on telemetry  Given patient's acute encephalopathy and history of medication non adherence, decision was made not to pursue cardiac cath. Continue medical management  Discussed again on 5/1/2025 at patient's request.  No further testing inpatient but patient will continue to follow-up outpatient with surgery to determine if candidate for TAVR  Acute metabolic encephalopathy  Patient disoriented, impulsive on 1:1.  Required Haldol, Zyprexa overnight also received scheduled risperidone.  D/W daughter, patient has not been taking meds at home, sleep wake cycle disrupted, not eating.   Reports both he and his wife up all night sleep during the day  Daughter also reports alcohol use at home, though  denies that she has been  drinking. States her mental status declined that last time she was in the hospital and she did better in rehab. Once she got home, she did well for about a year, then she declined again. Patient's  reported that she has been getting more confused the last few weeks.  Patient with improved mental status 5/1/2025  De-escalated one-to-one to virtual one-to-one 5/1/2025 remove virtual one-to-one 5/25  PT OT recommended rehab, will discuss further over the next couple of days.  Continue Risperdal  Prn zyprexa for severe agitation  Appreciate geriatrics.   Continue Celexa 20 mg daily, per geriatrics   Possible UTI (urinary tract infection)  Unclear if true infection. UA did not reflex to culture.   Patient did report some dysuria on chart review.   Ceftriaxone as per ID now discontinued  Bacteremia  1 of 2 blood cultures from initial presentation to [facility] positive for gram positive cocci in chains. Blood culture ID panel -  strep  Likely contaminant  Had reported dysuria and chills for 3 days  UA - nitrite positive but 0-5 WBC's hence culture not done  CXR - negative  Continue Ceftriaxone ID of GPC's to determine if further workup or treatment indicated -likely contaminant  Repeat BC negative at 72 hours  ID following and input appreciated, signing off  Lactic acidosis  Lactic acid elevated at 4.4 on initial presentation to [facility] and normalized with IV fluids  Severe aortic stenosis  Likely contributing to chest pain, evaluated with echocardiogram, cardiology and cardiothoracic surgery evaluations, will be managed with medication as patient reportedly stating that she would manage with medications prior to today.  Latest echo done 2/21/2024 showed EF of 65% and severe aortic stenosis  Repeat echo noted  Monitor volume status  CT surgery consulted for TAVR workup.  Patient now 5/1 reporting that she was transferred to [place] for this procedure and has interest.  Discussed with cardiology  patient will follow-up outpatient.  However patient's daughter reports that she does not feel this is a viable option.  Neuropathy  Continue Gabapentin -reluctant to increase dosing due to mental status  Noted in bilateral hands/severe pain  Acquired hypothyroidism  TSH normal  Continue levothyroxine 75 mcg daily  Stage 3 chronic kidney disease (HCC)  Lab Results   Component Value Date    EGFR 57 05/01/2025    EGFR 77 04/30/2025    EGFR 70 04/29/2025    CREATININE 0.93 05/01/2025    CREATININE 0.73 04/30/2025    CREATININE 0.79 04/29/2025   Baseline creatinine 0.9-1  Creatinine at baseline  Monitor  Moderate protein-calorie malnutrition (HCC)  Malnutrition Findings:   Adult Malnutrition type: Chronic illness  Adult Degree of Malnutrition: Malnutrition of moderate degree  Malnutrition Characteristics: Fat loss, Muscle loss, Weight loss     360 Statement: moderate malnutrition r/t chronic illness as evidenced by weight decrease of 12 lbs / 8.9% x2 months, evident mild to moderate wasting in temples and buccal fat pad. Treatment: oral diet and oral nutrition supplements    BMI Findings:    Body mass index is 19.26 kg/m².   Dietary supplements Ensure vanilla for breakfast and chocolate for dinner  Ambulatory dysfunction  PT recommending rehab. Family in agreement  Discussed in detail with patient and case management  Patient agreeable  Cognitive impairment  Needs formal outpatient cognitive testing  Dyslipidemia    Frailty    Depression    Alcohol use    Insomnia      VTE Pharmacologic Prophylaxis: VTE Score: 4 High Risk (Score >/= 5) - Pharmacological DVT Prophylaxis Ordered: heparin. Sequential Compression Devices Ordered.    Mobility:   Basic Mobility Inpatient Raw Score: 16  JH-HLM Goal: 5: Stand one or more mins  JH-HLM Achieved: 6: Walk 10 steps or more  JH-HLM Goal achieved. Continue to encourage appropriate mobility.    Patient Centered Rounds: I performed bedside rounds with nursing staff today.   Discussions  with Specialists or Other Care Team Provider: Nursing    Education and Discussions with Family / Patient: Updated  (daughter) via phone.    Current Length of Stay: 4 day(s)  Current Patient Status: Inpatient   Certification Statement: The patient will continue to require additional inpatient hospital stay due to need for placement  Discharge Plan: Anticipate discharge in 48-72 hrs to rehab facility.    Code Status: Level 1 - Full Code    Subjective   Patient is sitting in chair.  Alert and oriented doing a lot better than day prior.  Mental status continues to improve patient with no real complaints.  Denies chest pain shortness of breath palpitations.  Remembers that she was confused and now feels a lot more coherent.  Does feel weak is agreeable to rehabilitation discussed in detail.    Objective :  Temp:  [97.8 °F (36.6 °C)-98.6 °F (37 °C)] 98.1 °F (36.7 °C)  HR:  [57-78] 67  BP: (101-124)/(50-56) 110/55  Resp:  [18-19] 19  SpO2:  [94 %-98 %] 98 %  O2 Device: None (Room air)    Body mass index is 19.26 kg/m².     Input and Output Summary (last 24 hours):     Intake/Output Summary (Last 24 hours) at 5/2/2025 1831  Last data filed at 5/2/2025 1827  Gross per 24 hour   Intake 1550 ml   Output 750 ml   Net 800 ml       Physical Exam  Constitutional:       General: She is not in acute distress.     Appearance: She is not ill-appearing, toxic-appearing or diaphoretic.   HENT:      Head: Normocephalic and atraumatic.   Eyes:      General:         Right eye: No discharge.         Left eye: No discharge.   Cardiovascular:      Rate and Rhythm: Normal rate.      Heart sounds: Murmur heard.      No friction rub. No gallop.   Pulmonary:      Effort: No respiratory distress.      Breath sounds: No stridor. No wheezing, rhonchi or rales.   Chest:      Chest wall: No tenderness.   Abdominal:      General: There is no distension.      Palpations: There is no mass.      Tenderness: There is no abdominal tenderness.  There is no guarding or rebound.      Hernia: No hernia is present.   Musculoskeletal:         General: No swelling, tenderness, deformity or signs of injury.      Right lower leg: No edema.      Left lower leg: No edema.   Skin:     Coloration: Skin is not jaundiced or pale.      Findings: No bruising, erythema, lesion or rash.   Neurological:      Mental Status: She is alert and oriented to person, place, and time.   Psychiatric:         Behavior: Behavior normal.           Lines/Drains:        Telemetry:  Telemetry Orders (From admission, onward)               24 Hour Telemetry Monitoring  Continuous x 24 Hours (Telem)        Expiring   Question:  Reason for 24 Hour Telemetry  Answer:  PCI/EP study (including pacer and ICD implementation), Cardiac surgery, MI, abnormal cardiac cath, and chest pain- rule out MI                     Telemetry Reviewed: Normal Sinus Rhythm  Indication for Continued Telemetry Use: No indication for continued use. Will discontinue.                Lab Results: I have reviewed the following results:   Results from last 7 days   Lab Units 05/01/25  0557 04/30/25  0529 04/29/25  0713   WBC Thousand/uL 5.71   < > 6.38   HEMOGLOBIN g/dL 13.7   < > 14.3   HEMATOCRIT % 43.2   < > 42.7   PLATELETS Thousands/uL 141*   < > 147*   SEGS PCT %  --   --  44   LYMPHO PCT %  --   --  40   MONO PCT %  --   --  11   EOS PCT %  --   --  4    < > = values in this interval not displayed.     Results from last 7 days   Lab Units 05/01/25  0557 04/29/25  0713 04/28/25  0047   SODIUM mmol/L 138   < > 139   POTASSIUM mmol/L 4.1   < > 4.1   CHLORIDE mmol/L 105   < > 101   CO2 mmol/L 26   < > 23   BUN mg/dL 15   < > 25   CREATININE mg/dL 0.93   < > 0.98   ANION GAP mmol/L 7   < > 15*   CALCIUM mg/dL 9.2   < > 10.7*   ALBUMIN g/dL  --   --  4.4   TOTAL BILIRUBIN mg/dL  --   --  0.82   ALK PHOS U/L  --   --  52   ALT U/L  --   --  13   AST U/L  --   --  24   GLUCOSE RANDOM mg/dL 104   < > 116    < > = values in  this interval not displayed.     Results from last 7 days   Lab Units 04/28/25  0047   INR  0.92     Results from last 7 days   Lab Units 04/28/25  2208   POC GLUCOSE mg/dl 104     Results from last 7 days   Lab Units 04/29/25  0713   HEMOGLOBIN A1C % 5.4     Results from last 7 days   Lab Units 04/28/25  0445 04/28/25  0047   LACTIC ACID mmol/L 0.9 4.4*   PROCALCITONIN ng/ml  --  0.08       Recent Cultures (last 7 days):   Results from last 7 days   Lab Units 04/29/25  0057 04/28/25  0125 04/28/25  0047   BLOOD CULTURE  No Growth at 72 hrs.  No Growth at 72 hrs. Streptococcus mitis oralis group*  Streptococcus parasanguinis*  Granulicatella adiacens*  Actinomyces oris* No Growth After 4 Days.   GRAM STAIN RESULT   --  Gram positive cocci in chains*  Gram positive rods*  --        Imaging Results Review: No pertinent imaging studies reviewed.  Other Study Results Review: No additional pertinent studies reviewed.    Last 24 Hours Medication List:     Current Facility-Administered Medications:     acetaminophen (TYLENOL) tablet 650 mg, Q6H PRN    aspirin (ECOTRIN LOW STRENGTH) EC tablet 81 mg, Daily    atorvastatin (LIPITOR) tablet 40 mg, Daily With Dinner    calcium carbonate (TUMS) chewable tablet 1,000 mg, TID PRN    gabapentin (NEURONTIN) capsule 100 mg, TID    heparin (porcine) subcutaneous injection 5,000 Units, Q8H NIECY    levothyroxine tablet 75 mcg, Early Morning    melatonin tablet 6 mg, HS PRN    morphine injection 2 mg, Once    OLANZapine (ZyPREXA) tablet 2.5 mg, BID PRN    risperiDONE (RisperDAL) tablet 0.25 mg, BID    traZODone (DESYREL) tablet 25 mg, HS    Administrative Statements   Today, Patient Was Seen By: MAJOR Morillo      **Please Note: This note may have been constructed using a voice recognition system.**

## 2025-05-02 NOTE — ASSESSMENT & PLAN NOTE
PT recommending rehab. Family in agreement  Discussed in detail with patient and case management  Patient agreeable

## 2025-05-02 NOTE — ASSESSMENT & PLAN NOTE
ID on consult  Single BC with strep, consider contaminant  No apparent source of infection  Repeat TTE on 04/30/24  Repeat blood cultures pending, no growth at 72 hours  Continue ceftriaxone for now

## 2025-05-02 NOTE — PLAN OF CARE
Problem: Prexisting or High Potential for Compromised Skin Integrity  Goal: Skin integrity is maintained or improved  Description: INTERVENTIONS:- Identify patients at risk for skin breakdown- Assess and monitor skin integrity- Assess and monitor nutrition and hydration status- Monitor labs - Assess for incontinence - Turn and reposition patient- Assist with mobility/ambulation- Relieve pressure over bony prominences- Avoid friction and shearing- Provide appropriate hygiene as needed including keeping skin clean and dry- Evaluate need for skin moisturizer/barrier cream- Collaborate with interdisciplinary team - Patient/family teaching- Consider wound care consult   Outcome: Progressing     Problem: PAIN - ADULT  Goal: Verbalizes/displays adequate comfort level or baseline comfort level  Description: Interventions:- Encourage patient to monitor pain and request assistance- Assess pain using appropriate pain scale- Administer analgesics based on type and severity of pain and evaluate response- Implement non-pharmacological measures as appropriate and evaluate response- Consider cultural and social influences on pain and pain management- Notify physician/advanced practitioner if interventions unsuccessful or patient reports new pain  Outcome: Progressing     Problem: INFECTION - ADULT  Goal: Absence or prevention of progression during hospitalization  Description: INTERVENTIONS:- Assess and monitor for signs and symptoms of infection- Monitor lab/diagnostic results- Monitor all insertion sites, i.e. indwelling lines, tubes, and drains- Monitor endotracheal if appropriate and nasal secretions for changes in amount and color- Allen appropriate cooling/warming therapies per order- Administer medications as ordered- Instruct and encourage patient and family to use good hand hygiene technique- Identify and instruct in appropriate isolation precautions for identified infection/condition  Outcome: Progressing  Goal:  Absence of fever/infection during neutropenic period  Description: INTERVENTIONS:- Monitor WBC  Outcome: Progressing     Problem: SAFETY ADULT  Goal: Patient will remain free of falls  Description: INTERVENTIONS:- Educate patient/family on patient safety including physical limitations- Instruct patient to call for assistance with activity - Consult OT/PT to assist with strengthening/mobility - Keep Call bell within reach- Keep bed low and locked with side rails adjusted as appropriate- Keep care items and personal belongings within reach- Initiate and maintain comfort rounds patients- Consider moving patient to room near nurses station  Outcome: Progressing  Goal: Maintain or return to baseline ADL function  Description: INTERVENTIONS:-  Assess patient's ability to carry out ADLs; assess patient's baseline for ADL function and identify physical deficits which impact ability to perform ADLs (bathing, care of mouth/teeth, toileting, grooming, dressing, etc.)- Assess/evaluate cause of self-care deficits - Assess range of motion- Assess patient's mobility; develop plan if impaired- Assess patient's need for assistive devices and provide as appropriate- Encourage maximum independence but intervene and supervise when necessary- Involve family in performance of ADLs- Assess for home care needs following discharge - Consider OT consult to assist with ADL evaluation and planning for discharge- Provide patient education as appropriate  Outcome: Progressing  Goal: Maintains/Returns to pre admission functional level  Description: INTERVENTIONS:- Perform AM-PAC 6 Click Basic Mobility/ Daily Activity assessment daily.- Set and communicate daily mobility goal to care team and patient/family/caregiver. - Collaborate with rehabilitation services on mobility goals if consulted-  Outcome: Progressing     Problem: DISCHARGE PLANNING  Goal: Discharge to home or other facility with appropriate resources  Description: INTERVENTIONS:-  Identify barriers to discharge w/patient and caregiver- Arrange for needed discharge resources and transportation as appropriate- Identify discharge learning needs (meds, wound care, etc.)- Arrange for interpretive services to assist at discharge as needed- Refer to Case Management Department for coordinating discharge planning if the patient needs post-hospital services based on physician/advanced practitioner order or complex needs related to functional status, cognitive ability, or social support system  Outcome: Progressing     Problem: Knowledge Deficit  Goal: Patient/family/caregiver demonstrates understanding of disease process, treatment plan, medications, and discharge instructions  Description: Complete learning assessment and assess knowledge base.Interventions:- Provide teaching at level of understanding- Provide teaching via preferred learning methods  Outcome: Progressing     Problem: SAFETY,RESTRAINT: NV/NON-SELF DESTRUCTIVE BEHAVIOR  Goal: Remains free of harm/injury (restraint for non violent/non self-detsructive behavior)  Description: INTERVENTIONS:- Instruct patient/family regarding restraint use - Assess and monitor physiologic and psychological status - Provide interventions and comfort measures to meet assessed patient needs - Identify and implement measures to help patient regain control- Assess readiness for release of restraint   Outcome: Progressing  Goal: Returns to optimal restraint-free functioning  Description: INTERVENTIONS:- Assess the patient's behavior and symptoms that indicate continued need for restraint- Identify and implement measures to help patient regain control- Assess readiness for release of restraint   Outcome: Progressing     Problem: CARDIOVASCULAR - ADULT  Goal: Maintains optimal cardiac output and hemodynamic stability  Description: INTERVENTIONS:- Monitor I/O, vital signs and rhythm- Monitor for S/S and trends of decreased cardiac output- Administer and titrate  ordered vasoactive medications to optimize hemodynamic stability- Assess quality of pulses, skin color and temperature- Assess for signs of decreased coronary artery perfusion- Instruct patient to report change in severity of symptoms  Outcome: Progressing  Goal: Absence of cardiac dysrhythmias or at baseline rhythm  Description: INTERVENTIONS:- Continuous cardiac monitoring, vital signs, obtain 12 lead EKG if ordered- Administer antiarrhythmic and heart rate control medications as ordered- Monitor electrolytes and administer replacement therapy as ordered  Outcome: Progressing     Problem: RESPIRATORY - ADULT  Goal: Achieves optimal ventilation and oxygenation  Description: INTERVENTIONS:- Assess for changes in respiratory status- Assess for changes in mentation and behavior- Position to facilitate oxygenation and minimize respiratory effort- Oxygen administered by appropriate delivery if ordered- Initiate smoking cessation education as indicated- Encourage broncho-pulmonary hygiene including cough, deep breathe, Incentive Spirometry- Assess the need for suctioning and aspirate as needed- Assess and instruct to report SOB or any respiratory difficulty- Respiratory Therapy support as indicated  Outcome: Progressing     Problem: METABOLIC, FLUID AND ELECTROLYTES - ADULT  Goal: Electrolytes maintained within normal limits  Description: INTERVENTIONS:- Monitor labs and assess patient for signs and symptoms of electrolyte imbalances- Administer electrolyte replacement as ordered- Monitor response to electrolyte replacements, including repeat lab results as appropriate- Instruct patient on fluid and nutrition as appropriate  Outcome: Progressing  Goal: Fluid balance maintained  Description: INTERVENTIONS:- Monitor labs - Monitor I/O and WT- Instruct patient on fluid and nutrition as appropriate- Assess for signs & symptoms of volume excess or deficit  Outcome: Progressing  Goal: Glucose maintained within target  range  Description: INTERVENTIONS:- Monitor Blood Glucose as ordered- Assess for signs and symptoms of hyperglycemia and hypoglycemia- Administer ordered medications to maintain glucose within target range- Assess nutritional intake and initiate nutrition service referral as needed  Outcome: Progressing     Problem: Nutrition/Hydration-ADULT  Goal: Nutrient/Hydration intake appropriate for improving, restoring or maintaining nutritional needs  Description: Monitor and assess patient's nutrition/hydration status for malnutrition. Collaborate with interdisciplinary team and initiate plan and interventions as ordered.  Monitor patient's weight and dietary intake as ordered or per policy. Utilize nutrition screening tool and intervene as necessary. Determine patient's food preferences and provide high-protein, high-caloric foods as appropriate. INTERVENTIONS:- Monitor oral intake, urinary output, labs, and treatment plans- Assess nutrition and hydration status and recommend course of action- Evaluate amount of meals eaten- Assist patient with eating if necessary - Allow adequate time for meals- Recommend/ encourage appropriate diets, oral nutritional supplements, and vitamin/mineral supplements- Order, calculate, and assess calorie counts as needed- Recommend, monitor, and adjust tube feedings and TPN/PPN based on assessed needs- Assess need for intravenous fluids- Provide specific nutrition/hydration education as appropriate- Include patient/family/caregiver in decisions related to nutrition  Outcome: Progressing

## 2025-05-02 NOTE — OCCUPATIONAL THERAPY NOTE
Occupational Therapy Progress Note     Patient Name: Daily Schaeffer  Today's Date: 5/2/2025  Problem List  Principal Problem:    Chest pain  Active Problems:    Severe aortic stenosis    Dyslipidemia    Acquired hypothyroidism    Neuropathy    Stage 3 chronic kidney disease (HCC)    Bacteremia    Possible UTI (urinary tract infection)    Lactic acidosis    Acute metabolic encephalopathy    Cognitive impairment    Frailty    Depression    Alcohol use    Insomnia    Ambulatory dysfunction        05/02/25 0934   OT Last Visit   OT Visit Date 05/02/25   Note Type   Note Type Treatment   Pain Assessment   Pain Assessment Tool 0-10   Pain Score No Pain   Restrictions/Precautions   Weight Bearing Precautions Per Order No   Other Precautions Cognitive;Chair Alarm;Bed Alarm;Telemetry;Fall Risk   Lifestyle   Autonomy Pt ? historian, pt reports (I) with ADLs and functional mobility using rollator. pt requires assistance for IADLs. Pt -  and retired   Reciprocal Relationships family   Service to Others retired   ADL   Where Assessed Edge of bed   Eating Assistance 4  Minimal Assistance   Eating Deficit Increased time to complete;Setup   Eating Comments Pt requires MIN A and setup to eat seated EOB   UB Dressing Assistance 4  Minimal Assistance   UB Dressing Deficit Increased time to complete;Thread RUE;Thread LUE   UB Dressing Comments Pt requires MIN A for gown management   LB Dressing Assistance 2  Maximal Assistance   LB Dressing Deficit Increased time to complete;Don/doff L sock;Don/doff R sock   LB Dressing Comments Pt requires MAX A to don socks   Toileting Assistance  4  Minimal Assistance   Toileting Deficit Increased time to complete;Perineal hygiene   Toileting Comments Pt requires MIN A to complete mirna hygiene after urinary in contience   Bed Mobility   Supine to Sit 5  Supervision   Additional items Increased time required;Verbal cues;LE management   Transfers   Sit to Stand 4  Minimal assistance    Additional items Assist x 1;Increased time required;Verbal cues   Stand to Sit 4  Minimal assistance   Additional items Assist x 1;Increased time required;Verbal cues   Additional Comments with rw   Functional Mobility   Functional Mobility 4  Minimal assistance   Additional Comments Pt requires MIN A to take steps with rw   Additional items Rolling walker   Cognition   Overall Cognitive Status Impaired   Arousal/Participation Alert;Responsive;Cooperative   Attention Attends with cues to redirect   Orientation Level Oriented X4   Memory Within functional limits   Following Commands Follows one step commands with increased time or repetition   Comments Pt agreeable to therapy. Pt with improving cognition, pt continues to require occasional cues for safety and redirection   Activity Tolerance   Activity Tolerance Patient tolerated treatment well   Medical Staff Made Aware RN Cleared   Assessment   Assessment Pt was seen on 5/2/2025 to address ADL retraining, functional transfer training, and activity tolerance/endurance. Pt with active OT orders and activity orders. Pt demonstrating improvements and currently requires MAX A to don socks. Pt requires MIN A to complete seated eating, to don hospital gown, toileting, functional transfers, and to take steps with rw. Pt is limited by decreased ADL status, functional transfers, functional mobility, and activity tolerance. Pt supine in bed at beginning of session and seated in bedside chair at end of session with alarm set and all items within reach. The patient's raw score on the AM-PAC Daily Activity Inpatient Short Form is 16. A raw score of less than 19 suggests the patient may benefit from discharge to post-acute rehabilitation services. Please refer to the recommendation of the Occupational Therapist for safe discharge planning. Recommend Level II moderate intensity OT services at d/c to maximize pt function.   Plan   Treatment Interventions ADL retraining;Functional  transfer training;UE strengthening/ROM;Endurance training;Cognitive reorientation;Patient/family training;Equipment evaluation/education;Neuromuscular reeducation;Fine motor coordination activities;Compensatory technique education;Continued evaluation;Energy conservation;Activityengagement   Goal Expiration Date 05/13/25   OT Treatment Day 1   OT Frequency 2-3x/wk   Discharge Recommendation   Rehab Resource Intensity Level, OT II (Moderate Resource Intensity)   AM-PAC Daily Activity Inpatient   Lower Body Dressing 2   Bathing 2   Toileting 3   Upper Body Dressing 3   Grooming 3   Eating 3   Daily Activity Raw Score 16   Daily Activity Standardized Score (Calc for Raw Score >=11) 35.96   AM-PAC Applied Cognition Inpatient   Following a Speech/Presentation 3   Understanding Ordinary Conversation 4   Taking Medications 2   Remembering Where Things Are Placed or Put Away 3   Remembering List of 4-5 Errands 2   Taking Care of Complicated Tasks 2   Applied Cognition Raw Score 16   Applied Cognition Standardized Score 35.03   End of Consult   Education Provided Yes   Patient Position at End of Consult Bedside chair;Bed/Chair alarm activated;All needs within reach   Nurse Communication Nurse aware of consult     JUANITO Cee, OTR/L

## 2025-05-02 NOTE — CASE MANAGEMENT
Case Management Discharge Planning Note    Patient name Daily Schaeffer  Location [facility] 427/[facility] 427-01 MRN [id number]  : 1945 Date 2025       Current Admission Date: 2025  Current Admission Diagnosis:Chest pain   Patient Active Problem List    Diagnosis Date Noted Date Diagnosed    Bacteremia 2025     Possible UTI (urinary tract infection) 2025     Lactic acidosis 2025     Acute metabolic encephalopathy 2025     Cognitive impairment 2025     Frailty 2025     Depression 2025     Alcohol use 2025     Insomnia 2025     Ambulatory dysfunction 2025     Chest pain 2025     Sinus tachycardia 2024     Arthralgia 2024     Abnormal brain MRI 2024     Abnormal MRI of the head 2024     Stress due to marital problems 2024     Low hemoglobin 2024     Acute toxic encephalopathy 2024     Debility 2024     Stage 3 chronic kidney disease (HCC) 2024     Exertional dyspnea 2024     Hypomagnesemia 2023     Hypercalcemia 2023     Abnormal CT scan, pelvis 2023     Neuropathy 2023     Severe aortic stenosis 2023     Acquired hypothyroidism 2017    Dyslipidemia 2016    Crohn disease (HCC) 2016    Anxiety and depression 2016    Hyperglycemia 2016    Crohn's disease of jejunum (HCC) 2016     CKD (chronic kidney disease) stage 2, GFR 60-89 ml/min 2016       LOS (days): 4  Geometric Mean LOS (GMLOS) (days): 3.9  Days to GMLOS:0.1     OBJECTIVE:  Risk of Unplanned Readmission Score: 17.06         Current admission status: Inpatient   Preferred Pharmacy:   [facility] [place] - [address]  [place] [address]  Phone: [phone] Fax: [phone]    Primary Care Provider: Alanna Moreau DO    Primary Insurance:  MEDICARE  Secondary Insurance: [employer]    DISCHARGE DETAILS:    Discharge planning discussed with:: Patient  Freedom of Choice: Yes  Comments - Freedom of Choice: CM met with patient at [facility] and discussed rehab recommendation. Patient reports that she was at [facility] in the past and does not want to go back there. Patient is agreeable to having blanket STR referrals placed. CM placed referrals in [place].         Other Referral/Resources/Interventions Provided:  Interventions: Short Term Rehab  Referral Comments: [place] [facility] referral in [place]

## 2025-05-02 NOTE — ASSESSMENT & PLAN NOTE
Unclear if true infection. UA did not reflex to culture.   Patient did report some dysuria on chart review.   Ceftriaxone as per ID now discontinued

## 2025-05-02 NOTE — PROGRESS NOTES
Progress Note - Geriatric Medicine   Name: Daily Schaeffer 79 y.o. female I MRN: [id number]  Unit/Bed#: [facility] 427-01 I Date of Admission: 4/28/2025   Date of Service: 5/2/2025 I Hospital Day: 4    Assessment & Plan  Chest pain  Cardiology on consult  Acute metabolic encephalopathy  Current mentation: alert and oriented x 3  Resolving, patient's mentation greatly improved, speaking clearly, appropriate conversations, able to state days of week and months backwards  During admission patient requiring haldol, zyprexa overnight, also received scheduled risperidone     Multifactorial risk factors: infection, hospitalization, in the setting of underlying cognitive impairment, hx of metabolic encephalopathy with prior hospitalization/UTI, altered sleep/wake cycle  Maintain delirium precautions:  Provide frequent redirection, reorientation, distraction techniques  Avoid deliriogenic medications such as tramadol, benzodiazepines, anticholinergics,  Benadryl  Treat pain, See geriatric pain protocol  Monitor for constipation and urinary retention  Encourage early and frequent moblization, OOB  Encourage Hydration/ Nutrition  Implement sleep hygiene, limit night time interuptions, group activities    If all other interventions are unsuccessful for acute agitation and behaviors, would recommend zyprexa 2.5 mg IM/Po Q8 hours as needed  Would avoid benzodiazepines such as Ativan as these can worsen delirium     QTc interval 457 on 04/29/25  Cognitive impairment  Progressing, likely progression of underlying dementia  TSH 1.344 (4/28/25)  Vitamin B 12 level (06/21/24) 270  Recommend vitamin b12 supplementation for goal level > 400  CT head (07/29/24) moderate chronic microangiopathic changes  Although at present pt likely has component of increased confusion suspect that at baseline pt has progressing cognitive decline, leading to medication non compliance and altered circadian rhythm at home  Recommend cognitive testing as  outpatient     Acquired hypothyroidism  Per pharmacy last refill of sythroid was in October  TSH 1.344 (4/28/25) ? Ongoing evaluation in the setting of questionable medication compliance, synthroid restarted this admission    Neuropathy  Chronic  Was previously on lyrica in January switched to gabapentin  Agree with low dose gabapentin 100 mg po TID    Bacteremia  ID on consult  Single BC with strep, consider contaminant  No apparent source of infection  Repeat TTE on 04/30/24  Repeat blood cultures pending, no growth at 72 hours  Continue ceftriaxone for now    Frailty  Clinical Frail Scale: 6- Moderately Frail  Lives with , per family both need increased support   Grandson checks in every 2 weeks  Case management on consult  PT/OT  Recommend increased support home with daily checks versus assisted living  Depression  Chronic  Exacerbated by death of daughter from overdose  continue celexa 20 mg daily  Alcohol use  Unclear how much has been consumed  Grandson reports he has seen wine and whiskey on the counter  Ongoing observation  Insomnia  Chronic  With altered circadian rhythm  Medications restarted on admission which should help, along with routine, daytime meals, daytime interactions  Continue melatonin  Will add HS trazodone  Ambulatory dysfunction  Exacerbated by hospitalization   At risk secondary to age, gait instability, cognitive impairment  Fall precautions  PT/OT    Thad Ambrosio is seen today for a geriatric follow up. Upon entering the room she is laying in bed comfortable.  Alert and oriented x 3, pleasant, cooperative, answering questions appropriately.  Currently denies pain.  States she is feeling much better.  States she was up walking, was sitting in the recliner earlier.  Has been sleeping well overnight.  States she has been eating and drinking well.  While assessment is being completed she states she has a murmur and she has reconsidered having a TAVR procedure done.  She states  she spoke with her  and they discussed having it done.      Objective :  Temp:  [97.8 °F (36.6 °C)-98.6 °F (37 °C)] 98.6 °F (37 °C)  HR:  [57-78] 78  BP: (101-124)/(50-56) 104/55  Resp:  [18-19] 19  SpO2:  [94 %-97 %] 94 %  O2 Device: None (Room air)    Physical Exam  Vitals and nursing note reviewed.   Constitutional:       Appearance: Normal appearance.   HENT:      Head: Normocephalic.      Mouth/Throat:      Pharynx: No oropharyngeal exudate.   Eyes:      General:         Right eye: No discharge.         Left eye: No discharge.   Cardiovascular:      Rate and Rhythm: Normal rate and regular rhythm.      Pulses: Normal pulses.      Heart sounds: Murmur heard.   Pulmonary:      Effort: Pulmonary effort is normal.      Breath sounds: Normal breath sounds.   Abdominal:      General: Bowel sounds are normal.   Musculoskeletal:         General: Normal range of motion.      Cervical back: Normal range of motion.   Skin:     General: Skin is warm and dry.   Neurological:      Mental Status: She is alert and oriented to person, place, and time. Mental status is at baseline.   Psychiatric:         Mood and Affect: Mood normal.         Behavior: Behavior normal.         Thought Content: Thought content normal.           Lab Results: I have reviewed the following results:CBC/BMP: No new results in last 24 hours.   Imaging Results Review: I reviewed radiology reports from this admission including: chest xray and xray(s).  Other Study Results Review: EKG was reviewed.     Therapies:   Basic Mobility Inpatient Raw Score: 16  -[facility] Goal: 5: Stand one or more mins  -[facility] Achieved: 6: Walk 10 steps or more      VTE Prophylaxis: VTE covered by:  heparin (porcine), Subcutaneous, 5,000 Units at 05/02/25 1334    and Sequential compression device (Venodyne)     Code Status: Level 1 - Full Code      Family and Social Support: Spouse: Gilmar, Daughters: Kaila and Kayleen  Discharge planning discussed with:: Patient  Freedom of  Choice: Yes      Goals of Care: TBD    I have spent a total time of 35 minutes in caring for this patient on the day of the visit/encounter including Diagnostic results, Prognosis, Risks and benefits of tx options, Instructions for management, Patient and family education, Importance of tx compliance, Risk factor reductions, Impressions, Counseling / Coordination of care, Documenting in the medical record, Reviewing/placing orders in the medical record (including tests, medications, and/or procedures), Obtaining or reviewing history  , and Communicating with other healthcare professionals .

## 2025-05-02 NOTE — ASSESSMENT & PLAN NOTE
Likely contributing to chest pain, evaluated with echocardiogram, cardiology and cardiothoracic surgery evaluations, will be managed with medication as patient reportedly stating that she would manage with medications prior to today.  Latest echo done 2/21/2024 showed EF of 65% and severe aortic stenosis  Repeat echo noted  Monitor volume status  CT surgery consulted for TAVR workup.  Patient now 5/1 reporting that she was transferred to [facility] for this procedure and has interest.  Discussed with cardiology patient will follow-up outpatient.  However patient's daughter reports that she does not feel this is a viable option.

## 2025-05-02 NOTE — PLAN OF CARE
Problem: OCCUPATIONAL THERAPY ADULT  Goal: Performs self-care activities at highest level of function for planned discharge setting.  See evaluation for individualized goals.  Description: Treatment Interventions: ADL retraining, Functional transfer training, UE strengthening/ROM, Endurance training, Cognitive reorientation, Patient/family training, Equipment evaluation/education, Neuromuscular reeducation, Fine motor coordination activities, Compensatory technique education, Continued evaluation, Energy conservation, Activityengagement          See flowsheet documentation for full assessment, interventions and recommendations.   Outcome: Progressing  Note: Limitation: Decreased ADL status, Decreased Safe judgement during ADL, Decreased cognition, Decreased endurance, Decreased self-care trans, Decreased high-level ADLs  Prognosis: Fair  Assessment: Pt was seen on 5/2/2025 to address ADL retraining, functional transfer training, and activity tolerance/endurance. Pt with active OT orders and activity orders. Pt demonstrating improvements and currently requires MAX A to don socks. Pt requires MIN A to complete seated eating, to don hospital gown, toileting, functional transfers, and to take steps with rw. Pt is limited by decreased ADL status, functional transfers, functional mobility, and activity tolerance. Pt supine in bed at beginning of session and seated in bedside chair at end of session with alarm set and all items within reach. The patient's raw score on the AM-PAC Daily Activity Inpatient Short Form is 16. A raw score of less than 19 suggests the patient may benefit from discharge to post-acute rehabilitation services. Please refer to the recommendation of the Occupational Therapist for safe discharge planning. Recommend Level II moderate intensity OT services at d/c to maximize pt function.     Rehab Resource Intensity Level, OT: II (Moderate Resource Intensity)

## 2025-05-02 NOTE — ASSESSMENT & PLAN NOTE
Patient disoriented, impulsive on 1:1.  Required Haldol, Zyprexa overnight also received scheduled risperidone.  D/W daughter, patient has not been taking meds at home, sleep wake cycle disrupted, not eating.   Reports both he and his wife up all night sleep during the day  Daughter also reports alcohol use at home, though  denies that she has been drinking. States her mental status declined that last time she was in the hospital and she did better in rehab. Once she got home, she did well for about a year, then she declined again. Patient's  reported that she has been getting more confused the last few weeks.  Patient with improved mental status 5/1/2025  De-escalated one-to-one to virtual one-to-one 5/1/2025 remove virtual one-to-one 5/25  PT OT recommended rehab, will discuss further over the next couple of days.  Continue Risperdal  Prn zyprexa for severe agitation  Appreciate geriatrics.   Continue Celexa 20 mg daily, per geriatrics    or rn or alka Saldaña

## 2025-05-02 NOTE — ASSESSMENT & PLAN NOTE
Presented to [facility] on 4/28/25 with progressively worsening right-sided chest pain since 1.5 months, initially with exertion and since 3 days with rest  EKG showed diffuse ST depression  Troponin - negative  Per cardiology no invasive workup at this time considering patient's altered mental status metabolic encephalopathy.  Discussed again today 5/1/2025 with cardiology as patient now reporting that she has interest in having TAVR.  Cardiology recommending patient follow-up in CT surgical office establish consistent messaging.  Patient continues to be very weak and would need to be optimized prior to potential procedure.  This was discussed 5/1/2025 with patient's daughter who reports that she is not in agreement with procedure at this time.  Transferred to [facility] 4/28 for cardiac catheterization  Not completed due to patient's refusal  Ct ASA, statin  Echo - EF65%.  Consistent grade 1 relaxation.  Moderate but possibly severe aortic stenosis.  Monitor on telemetry  Given patient's acute encephalopathy and history of medication non adherence, decision was made not to pursue cardiac cath. Continue medical management  Discussed again on 5/1/2025 at patient's request.  No further testing inpatient but patient will continue to follow-up outpatient with surgery to determine if candidate for TAVR

## 2025-05-02 NOTE — ASSESSMENT & PLAN NOTE
Malnutrition Findings:   Adult Malnutrition type: Chronic illness  Adult Degree of Malnutrition: Malnutrition of moderate degree  Malnutrition Characteristics: Fat loss, Muscle loss, Weight loss     360 Statement: moderate malnutrition r/t chronic illness as evidenced by weight decrease of 12 lbs / 8.9% x2 months, evident mild to moderate wasting in temples and buccal fat pad. Treatment: oral diet and oral nutrition supplements    BMI Findings:    Body mass index is 19.26 kg/m².   Dietary supplements Ensure vanilla for breakfast and chocolate for dinner

## 2025-05-02 NOTE — ASSESSMENT & PLAN NOTE
Progressing, likely progression of underlying dementia  TSH 1.344 (4/28/25)  Vitamin B 12 level (06/21/24) 270  Recommend vitamin b12 supplementation for goal level > 400  CT head (07/29/24) moderate chronic microangiopathic changes  Although at present pt likely has component of increased confusion suspect that at baseline pt has progressing cognitive decline, leading to medication non compliance and altered circadian rhythm at home  Recommend cognitive testing as outpatient

## 2025-05-02 NOTE — ASSESSMENT & PLAN NOTE
Current mentation: alert and oriented x 3  Resolving, patient's mentation greatly improved, speaking clearly, appropriate conversations, able to state days of week and months backwards  During admission patient requiring haldol, zyprexa overnight, also received scheduled risperidone     Multifactorial risk factors: infection, hospitalization, in the setting of underlying cognitive impairment, hx of metabolic encephalopathy with prior hospitalization/UTI, altered sleep/wake cycle  Maintain delirium precautions:  Provide frequent redirection, reorientation, distraction techniques  Avoid deliriogenic medications such as tramadol, benzodiazepines, anticholinergics,  Benadryl  Treat pain, See geriatric pain protocol  Monitor for constipation and urinary retention  Encourage early and frequent moblization, OOB  Encourage Hydration/ Nutrition  Implement sleep hygiene, limit night time interuptions, group activities    If all other interventions are unsuccessful for acute agitation and behaviors, would recommend zyprexa 2.5 mg IM/Po Q8 hours as needed  Would avoid benzodiazepines such as Ativan as these can worsen delirium     QTc interval 457 on 04/29/25

## 2025-05-02 NOTE — PROGRESS NOTES
Progress Note - Infectious Disease   Name: Daily Schaeffer 79 y.o. female I MRN: [id number]  Unit/Bed#: [facility] 427-01 I Date of Admission: 4/28/2025   Date of Service: 5/2/2025 I Hospital Day: 4    Assessment & Plan  Bacteremia  Single blood culture with multiple species of oral tanesha in setting of poor venous access.  Suspect contaminant.  No apparent source of infection.  No SSTI.  Edentulous.  UA, CXR negative.  Low clinical suspicion for endocarditis.  Repeat blood cultures 4/29 negative.  TTE (technically difficult) negative for vegetation.  Clinically stable without sepsis.      No further work-up or treatment from an ID perspective  Chest pain  Reason for initial presentation.  In setting of sever AS, diffuse ST depression on ECG.  Cardiology evaluation ongoing  Severe aortic stenosis  With symptoms.  TAVR recommended but patient declining.    I have discussed with FREDA José with [facility] the above plan to stop antibiotics. She agrees with the plan.  The patient is stable from an ID standpoint  We will sign off for now.  Please call with new questions.    Antibiotics:  Ceftriaxone #5    Subjective   Patient has no fever, chills, sweats; no nausea, vomiting, diarrhea; no cough, shortness of breath; no pain. No new symptoms.    Objective :  Temp:  [97.8 °F (36.6 °C)-98.4 °F (36.9 °C)] 97.8 °F (36.6 °C)  HR:  [57-66] 59  BP: (101-124)/(47-56) 124/56  Resp:  [16-18] 18  SpO2:  [94 %-97 %] 97 %  O2 Device: None (Room air)    General:  No acute distress  Psychiatric:  Awake and alert  Pulmonary:  Normal respiratory excursion without accessory muscle use  Abdomen:  Soft, nontender  Extremities:  No edema  Skin:  No rashes      Lab Results: I have reviewed the following results:  Results from last 7 days   Lab Units 05/01/25  0557 04/30/25  0529 04/29/25  0713   WBC Thousand/uL 5.71 4.44 6.38   HEMOGLOBIN g/dL 13.7 13.7 14.3   PLATELETS Thousands/uL 141* 166 147*     Results from last 7 days   Lab Units 05/01/25  0557  04/30/25  0529 04/29/25  0713 04/28/25  0047   SODIUM mmol/L 138 141 139 139   POTASSIUM mmol/L 4.1 4.7 3.6 4.1   CHLORIDE mmol/L 105 106 103 101   CO2 mmol/L 26 29 28 23   BUN mg/dL 15 9 14 25   CREATININE mg/dL 0.93 0.73 0.79 0.98   EGFR ml/min/1.73sq m 57 77 70 54   CALCIUM mg/dL 9.2 9.6 9.6 10.7*   AST U/L  --   --   --  24   ALT U/L  --   --   --  13   ALK PHOS U/L  --   --   --  52   ALBUMIN g/dL  --   --   --  4.4     Results from last 7 days   Lab Units 04/29/25  0057 04/28/25  0125 04/28/25 0047   BLOOD CULTURE  No Growth at 72 hrs.  No Growth at 72 hrs. Streptococcus mitis oralis group*  Streptococcus parasanguinis*  Granulicatella adiacens* No Growth After 4 Days.   GRAM STAIN RESULT   --  Gram positive cocci in chains*  Gram positive rods*  --      Results from last 7 days   Lab Units 04/28/25  0047   PROCALCITONIN ng/ml 0.08

## 2025-05-02 NOTE — ASSESSMENT & PLAN NOTE
Single blood culture with multiple species of oral tanesha in setting of poor venous access.  Suspect contaminant.  No apparent source of infection.  No SSTI.  Edentulous.  UA, CXR negative.  Low clinical suspicion for endocarditis.  Repeat blood cultures 4/29 negative.  TTE (technically difficult) negative for vegetation.  Clinically stable without sepsis.      No further work-up or treatment from an ID perspective

## 2025-05-03 LAB — BACTERIA BLD CULT: NORMAL

## 2025-05-03 PROCEDURE — 99232 SBSQ HOSP IP/OBS MODERATE 35: CPT | Performed by: NURSE PRACTITIONER

## 2025-05-03 PROCEDURE — 93005 ELECTROCARDIOGRAM TRACING: CPT

## 2025-05-03 RX ORDER — SODIUM CHLORIDE, SODIUM GLUCONATE, SODIUM ACETATE, POTASSIUM CHLORIDE, MAGNESIUM CHLORIDE, SODIUM PHOSPHATE, DIBASIC, AND POTASSIUM PHOSPHATE .53; .5; .37; .037; .03; .012; .00082 G/100ML; G/100ML; G/100ML; G/100ML; G/100ML; G/100ML; G/100ML
250 INJECTION, SOLUTION INTRAVENOUS ONCE
Status: COMPLETED | OUTPATIENT
Start: 2025-05-03 | End: 2025-05-03

## 2025-05-03 RX ORDER — LACTULOSE 10 G/15ML
30 SOLUTION ORAL DAILY PRN
Status: DISCONTINUED | OUTPATIENT
Start: 2025-05-03 | End: 2025-05-05 | Stop reason: HOSPADM

## 2025-05-03 RX ORDER — POLYETHYLENE GLYCOL 3350 17 G/17G
17 POWDER, FOR SOLUTION ORAL DAILY
Status: DISCONTINUED | OUTPATIENT
Start: 2025-05-03 | End: 2025-05-05 | Stop reason: HOSPADM

## 2025-05-03 RX ADMIN — GABAPENTIN 100 MG: 100 CAPSULE ORAL at 22:38

## 2025-05-03 RX ADMIN — GABAPENTIN 100 MG: 100 CAPSULE ORAL at 16:23

## 2025-05-03 RX ADMIN — ATORVASTATIN CALCIUM 40 MG: 40 TABLET, FILM COATED ORAL at 16:23

## 2025-05-03 RX ADMIN — LEVOTHYROXINE SODIUM 75 MCG: 0.07 TABLET ORAL at 05:42

## 2025-05-03 RX ADMIN — HEPARIN SODIUM 5000 UNITS: 5000 INJECTION INTRAVENOUS; SUBCUTANEOUS at 05:42

## 2025-05-03 RX ADMIN — HEPARIN SODIUM 5000 UNITS: 5000 INJECTION INTRAVENOUS; SUBCUTANEOUS at 13:22

## 2025-05-03 RX ADMIN — TRAZODONE HYDROCHLORIDE 25 MG: 50 TABLET ORAL at 22:38

## 2025-05-03 RX ADMIN — RISPERIDONE 0.25 MG: 0.25 TABLET, FILM COATED ORAL at 17:14

## 2025-05-03 RX ADMIN — RISPERIDONE 0.25 MG: 0.25 TABLET, FILM COATED ORAL at 08:48

## 2025-05-03 RX ADMIN — GABAPENTIN 100 MG: 100 CAPSULE ORAL at 08:48

## 2025-05-03 RX ADMIN — ACETAMINOPHEN 650 MG: 325 TABLET, FILM COATED ORAL at 22:39

## 2025-05-03 RX ADMIN — ASPIRIN 81 MG: 81 TABLET, COATED ORAL at 08:48

## 2025-05-03 RX ADMIN — ACETAMINOPHEN 650 MG: 325 TABLET, FILM COATED ORAL at 16:23

## 2025-05-03 RX ADMIN — HEPARIN SODIUM 5000 UNITS: 5000 INJECTION INTRAVENOUS; SUBCUTANEOUS at 22:38

## 2025-05-03 RX ADMIN — SODIUM CHLORIDE, SODIUM GLUCONATE, SODIUM ACETATE, POTASSIUM CHLORIDE AND MAGNESIUM CHLORIDE 250 ML: 526; 502; 368; 37; 30 INJECTION, SOLUTION INTRAVENOUS at 11:09

## 2025-05-03 RX ADMIN — POLYETHYLENE GLYCOL 3350 17 G: 17 POWDER, FOR SOLUTION ORAL at 11:09

## 2025-05-03 NOTE — ASSESSMENT & PLAN NOTE
Likely contributing to chest pain, evaluated with echocardiogram, cardiology and cardiothoracic surgery evaluations, will be managed with medication as patient reportedly stating that she would manage with medications prior to today.  Latest echo done 2/21/2024 showed EF of 65% and severe aortic stenosis  Repeat echo noted  Monitor volume status  CT surgery consulted for TAVR workup.  Patient now 5/1 reporting that she was transferred to [place] for this procedure and has interest.  Discussed with cardiology patient will follow-up outpatient.  However patient's daughter reports that she does not feel this is a viable option.

## 2025-05-03 NOTE — ASSESSMENT & PLAN NOTE
Patient disoriented, impulsive on 1:1.  Required Haldol, Zyprexa overnight also received scheduled risperidone.  D/W daughter, patient has not been taking meds at home, sleep wake cycle disrupted, not eating.   Reports both he and his wife up all night sleep during the day  Daughter also reports alcohol use at home, though  denies that she has been drinking. States her mental status declined that last time she was in the hospital and she did better in rehab. Once she got home, she did well for about a year, then she declined again. Patient's  reported that she has been getting more confused the last few weeks.  Patient with improved mental status 5/1/2025 - now at her baseline  De-escalated one-to-one to virtual one-to-one 5/1/2025 remove virtual one-to-one 5/25  PT OT recommended rehab, will discuss further over the next couple of days.  Continue Risperdal  Prn zyprexa for severe agitation  Appreciate geriatrics.   Continue Celexa 20 mg daily, per geriatrics

## 2025-05-03 NOTE — RESTORATIVE TECHNICIAN NOTE
Restorative Technician Note      Patient Name: Daily RUSHING [facility] Tech Visit Date: 05/03/25  Note Type: Mobility  Patient Position Upon Consult: Supine  Activity Performed: Ambulated  Assistive Device: Roller walker  Patient Position at End of Consult: Bed/Chair alarm activated; All needs within reach; Bedside chair

## 2025-05-03 NOTE — PROGRESS NOTES
Progress Note - Hospitalist   Name: Daily Schaeffer 79 y.o. female I MRN: [id number]  [facility] I Date of Admission: 4/28/2025   Date of Service: 5/3/2025 I Hospital Day: 5    Assessment & Plan  Chest pain  Presented to [facility] on 4/28/25 with progressively worsening right-sided chest pain since 1.5 months, initially with exertion and since 3 days with rest  EKG showed diffuse ST depression  Troponin - negative  Per cardiology no invasive workup at this time considering patient's altered mental status metabolic encephalopathy.  Discussed again today 5/1/2025 with cardiology as patient now reporting that she has interest in having TAVR.  Cardiology recommending patient follow-up in CT surgical office establish consistent messaging.  Patient continues to be very weak and would need to be optimized prior to potential procedure.  This was discussed 5/1/2025 with patient's daughter who reports that she is not in agreement with procedure at this time.  Transferred to [facility] 4/28 for cardiac catheterization  Not completed due to patient's refusal  Ct ASA, statin  Echo - EF65%.  Consistent grade 1 relaxation.  Moderate but possibly severe aortic stenosis.  Monitor on telemetry  Given patient's acute encephalopathy and history of medication non adherence, decision was made not to pursue cardiac cath. Continue medical management  Discussed again on 5/1/2025 at patient's request.  No further testing inpatient but patient will continue to follow-up outpatient with surgery to determine if candidate for TAVR  Acute metabolic encephalopathy  Patient disoriented, impulsive on 1:1.  Required Haldol, Zyprexa overnight also received scheduled risperidone.  D/W daughter, patient has not been taking meds at home, sleep wake cycle disrupted, not eating.   Reports both he and his wife up all night sleep during the day  Daughter also reports alcohol use at home, though  denies that she has been  drinking. States her mental status declined that last time she was in the hospital and she did better in rehab. Once she got home, she did well for about a year, then she declined again. Patient's  reported that she has been getting more confused the last few weeks.  Patient with improved mental status 5/1/2025 - now at her baseline  De-escalated one-to-one to virtual one-to-one 5/1/2025 remove virtual one-to-one 5/25  PT OT recommended rehab, will discuss further over the next couple of days.  Continue Risperdal  Prn zyprexa for severe agitation  Appreciate geriatrics.   Continue Celexa 20 mg daily, per geriatrics   Possible UTI (urinary tract infection)  Unclear if true infection. UA did not reflex to culture.   Patient did report some dysuria on chart review.   Ceftriaxone as per ID now discontinued  Bacteremia  1 of 2 blood cultures from initial presentation to [facility] positive for gram positive cocci in chains. Blood culture ID panel -  strep  Likely contaminant  Had reported dysuria and chills for 3 days  UA - nitrite positive but 0-5 WBC's hence culture not done  CXR - negative  Discontinued Ceftriaxone per ID -suspect contaminant  Repeat BC negative at 4 days  ID following and input appreciated, signing off  Lactic acidosis  Lactic acid elevated at 4.4 on initial presentation to [facility] and normalized with IV fluids  Severe aortic stenosis  Likely contributing to chest pain, evaluated with echocardiogram, cardiology and cardiothoracic surgery evaluations, will be managed with medication as patient reportedly stating that she would manage with medications prior to today.  Latest echo done 2/21/2024 showed EF of 65% and severe aortic stenosis  Repeat echo noted  Monitor volume status  CT surgery consulted for TAVR workup.  Patient now 5/1 reporting that she was transferred to [place] for this procedure and has interest.  Discussed with cardiology patient will follow-up outpatient.   However patient's daughter reports that she does not feel this is a viable option.  Neuropathy  Continue Gabapentin -reluctant to increase dosing due to recent mental status change  Would consider additional 100 mg dose at bedtime if pain uncontrolled  Noted in bilateral hands/severe pain  Acquired hypothyroidism  TSH normal  Continue levothyroxine 75 mcg daily  Stage 3 chronic kidney disease (HCC)  Lab Results   Component Value Date    EGFR 57 05/01/2025    EGFR 77 04/30/2025    EGFR 70 04/29/2025    CREATININE 0.93 05/01/2025    CREATININE 0.73 04/30/2025    CREATININE 0.79 04/29/2025   Baseline creatinine 0.9-1  Creatinine at baseline  Monitor  Obtain labs in a.m.  Moderate protein-calorie malnutrition (HCC)  Malnutrition Findings:   Adult Malnutrition type: Chronic illness  Adult Degree of Malnutrition: Malnutrition of moderate degree  Malnutrition Characteristics: Fat loss, Muscle loss, Weight loss     360 Statement: moderate malnutrition r/t chronic illness as evidenced by weight decrease of 12 lbs / 8.9% x2 months, evident mild to moderate wasting in temples and buccal fat pad. Treatment: oral diet and oral nutrition supplements    BMI Findings:    Body mass index is 19.26 kg/m².   Dietary supplements Ensure vanilla for breakfast and chocolate for dinner  Ambulatory dysfunction  PT recommending rehab. Family in agreement  Discussed in detail with patient and case management  Patient agreeable  Cognitive impairment  Needs formal outpatient cognitive testing  Dyslipidemia    Frailty    Depression    Alcohol use    Insomnia      VTE Pharmacologic Prophylaxis: VTE Score: 4 High Risk (Score >/= 5) - Pharmacological DVT Prophylaxis Ordered: heparin. Sequential Compression Devices Ordered.    Mobility:   Basic Mobility Inpatient Raw Score: 16  JH-HLM Goal: 5: Stand one or more mins  JH-HLM Achieved: 6: Walk 10 steps or more  JH-HLM Goal achieved. Continue to encourage appropriate mobility.    Patient Centered  Rounds: I performed bedside rounds with nursing staff today.   Discussions with Specialists or Other Care Team Provider: nursing     Education and Discussions with Family / Patient: Updated  () via phone.    Current Length of Stay: 5 day(s)  Current Patient Status: Inpatient   Certification Statement: The patient will continue to require additional inpatient hospital stay due to need for rehab   Discharge Plan: Anticipate discharge in 48-72 hrs to rehab facility.    Code Status: Level 1 - Full Code    Subjective   Extensive conversation had with patient at bedside.  She had reported that she had had some chest discomfort.  Upon arrival to room patient feels well.  No further chest discomfort she reports that she had had some sensation in her right anterior chest.  No shortness of breath no chest pain or palpitations.  Did not last long has disappeared she reports that it was not reflux after questioning.  Currently with no shortness of breath mentation doing very well improving each day.  Patient very pleasant    Objective :  Temp:  [98.1 °F (36.7 °C)-98.6 °F (37 °C)] 98.2 °F (36.8 °C)  HR:  [65-78] 75  BP: (104-131)/(55-64) 128/64  Resp:  [17-19] 17  SpO2:  [94 %-99 %] 99 %  O2 Device: None (Room air)    Body mass index is 19.26 kg/m².     Input and Output Summary (last 24 hours):     Intake/Output Summary (Last 24 hours) at 5/3/2025 1011  Last data filed at 5/3/2025 0600  Gross per 24 hour   Intake 300 ml   Output 800 ml   Net -500 ml       Physical Exam  Constitutional:       General: She is not in acute distress.     Appearance: She is not ill-appearing, toxic-appearing or diaphoretic.   HENT:      Head: Normocephalic and atraumatic.      Nose: No congestion.   Eyes:      General:         Right eye: No discharge.         Left eye: No discharge.   Cardiovascular:      Rate and Rhythm: Normal rate.      Heart sounds: Murmur heard.      No friction rub. No gallop.   Pulmonary:      Effort: No  respiratory distress.      Breath sounds: No stridor. No wheezing, rhonchi or rales.   Chest:      Chest wall: No tenderness.   Abdominal:      General: There is no distension.      Palpations: There is no mass.      Tenderness: There is no abdominal tenderness. There is no guarding or rebound.      Hernia: No hernia is present.   Musculoskeletal:         General: Tenderness (bl hands with neuropathy, some arthritis unable to fully grasp) present. No swelling, deformity or signs of injury.      Right lower leg: No edema.      Left lower leg: No edema.   Skin:     Coloration: Skin is not jaundiced or pale.      Findings: No bruising, erythema, lesion or rash.   Neurological:      Mental Status: She is alert and oriented to person, place, and time.   Psychiatric:         Behavior: Behavior normal.         Lines/Drains:        Lab Results: I have reviewed the following results:   Results from last 7 days   Lab Units 05/01/25 0557 04/30/25 0529 04/29/25 0713   WBC Thousand/uL 5.71   < > 6.38   HEMOGLOBIN g/dL 13.7   < > 14.3   HEMATOCRIT % 43.2   < > 42.7   PLATELETS Thousands/uL 141*   < > 147*   SEGS PCT %  --   --  44   LYMPHO PCT %  --   --  40   MONO PCT %  --   --  11   EOS PCT %  --   --  4    < > = values in this interval not displayed.     Results from last 7 days   Lab Units 05/01/25 0557 04/29/25  0713 04/28/25 0047   SODIUM mmol/L 138   < > 139   POTASSIUM mmol/L 4.1   < > 4.1   CHLORIDE mmol/L 105   < > 101   CO2 mmol/L 26   < > 23   BUN mg/dL 15   < > 25   CREATININE mg/dL 0.93   < > 0.98   ANION GAP mmol/L 7   < > 15*   CALCIUM mg/dL 9.2   < > 10.7*   ALBUMIN g/dL  --   --  4.4   TOTAL BILIRUBIN mg/dL  --   --  0.82   ALK PHOS U/L  --   --  52   ALT U/L  --   --  13   AST U/L  --   --  24   GLUCOSE RANDOM mg/dL 104   < > 116    < > = values in this interval not displayed.     Results from last 7 days   Lab Units 04/28/25  0047   INR  0.92     Results from last 7 days   Lab Units 04/28/25  2208   POC  GLUCOSE mg/dl 104     Results from last 7 days   Lab Units 04/29/25  0713   HEMOGLOBIN A1C % 5.4     Results from last 7 days   Lab Units 04/28/25  0445 04/28/25  0047   LACTIC ACID mmol/L 0.9 4.4*   PROCALCITONIN ng/ml  --  0.08       Recent Cultures (last 7 days):   Results from last 7 days   Lab Units 04/29/25  0057 04/28/25  0125 04/28/25  0047   BLOOD CULTURE  No Growth After 4 Days.  No Growth After 4 Days. Streptococcus mitis oralis group*  Streptococcus parasanguinis*  Granulicatella adiacens*  Actinomyces oris* No Growth After 5 Days.   GRAM STAIN RESULT   --  Gram positive cocci in chains*  Gram positive rods*  --              Last 24 Hours Medication List:     Current Facility-Administered Medications:     acetaminophen (TYLENOL) tablet 650 mg, Q6H PRN    aspirin (ECOTRIN LOW STRENGTH) EC tablet 81 mg, Daily    atorvastatin (LIPITOR) tablet 40 mg, Daily With Dinner    calcium carbonate (TUMS) chewable tablet 1,000 mg, TID PRN    gabapentin (NEURONTIN) capsule 100 mg, TID    heparin (porcine) subcutaneous injection 5,000 Units, Q8H NIECY    levothyroxine tablet 75 mcg, Early Morning    melatonin tablet 6 mg, HS PRN    morphine injection 2 mg, Once    OLANZapine (ZyPREXA) tablet 2.5 mg, BID PRN    risperiDONE (RisperDAL) tablet 0.25 mg, BID    traZODone (DESYREL) tablet 25 mg, HS    Administrative Statements   Today, Patient Was Seen By: MAJOR Morillo      **Please Note: This note may have been constructed using a voice recognition system.**

## 2025-05-03 NOTE — ASSESSMENT & PLAN NOTE
Lab Results   Component Value Date    EGFR 57 05/01/2025    EGFR 77 04/30/2025    EGFR 70 04/29/2025    CREATININE 0.93 05/01/2025    CREATININE 0.73 04/30/2025    CREATININE 0.79 04/29/2025   Baseline creatinine 0.9-1  Creatinine at baseline  Monitor  Obtain labs in a.m.

## 2025-05-03 NOTE — ASSESSMENT & PLAN NOTE
Continue Gabapentin -reluctant to increase dosing due to recent mental status change  Would consider additional 100 mg dose at bedtime if pain uncontrolled  Noted in bilateral hands/severe pain

## 2025-05-03 NOTE — ASSESSMENT & PLAN NOTE
Lactic acid elevated at 4.4 on initial presentation to [facility] and normalized with IV fluids

## 2025-05-03 NOTE — ASSESSMENT & PLAN NOTE
Presented to [facility] on 4/28/25 with progressively worsening right-sided chest pain since 1.5 months, initially with exertion and since 3 days with rest  EKG showed diffuse ST depression  Troponin - negative  Per cardiology no invasive workup at this time considering patient's altered mental status metabolic encephalopathy.  Discussed again today 5/1/2025 with cardiology as patient now reporting that she has interest in having TAVR.  Cardiology recommending patient follow-up in CT surgical office establish consistent messaging.  Patient continues to be very weak and would need to be optimized prior to potential procedure.  This was discussed 5/1/2025 with patient's daughter who reports that she is not in agreement with procedure at this time.  Transferred to [facility] 4/28 for cardiac catheterization  Not completed due to patient's refusal  Ct ASA, statin  Echo - EF65%.  Consistent grade 1 relaxation.  Moderate but possibly severe aortic stenosis.  Monitor on telemetry  Given patient's acute encephalopathy and history of medication non adherence, decision was made not to pursue cardiac cath. Continue medical management  Discussed again on 5/1/2025 at patient's request.  No further testing inpatient but patient will continue to follow-up outpatient with surgery to determine if candidate for TAVR

## 2025-05-03 NOTE — ASSESSMENT & PLAN NOTE
1 of 2 blood cultures from initial presentation to [facility] positive for gram positive cocci in chains. Blood culture ID panel -  strep  Likely contaminant  Had reported dysuria and chills for 3 days  UA - nitrite positive but 0-5 WBC's hence culture not done  CXR - negative  Discontinued Ceftriaxone per ID -suspect contaminant  Repeat BC negative at 4 days  ID following and input appreciated, signing off

## 2025-05-04 DIAGNOSIS — I35.0 SEVERE AORTIC STENOSIS: Primary | ICD-10-CM

## 2025-05-04 LAB
ALBUMIN SERPL BCG-MCNC: 3.1 G/DL (ref 3.5–5)
ALL TARGETS: NOT DETECTED
ALP SERPL-CCNC: 40 U/L (ref 34–104)
ALT SERPL W P-5'-P-CCNC: 13 U/L (ref 7–52)
ANION GAP SERPL CALCULATED.3IONS-SCNC: 7 MMOL/L (ref 4–13)
AST SERPL W P-5'-P-CCNC: 22 U/L (ref 13–39)
ATRIAL RATE: 65 BPM
BACTERIA BLD CULT: ABNORMAL
BACTERIA BLD CULT: NORMAL
BACTERIA BLD CULT: NORMAL
BASOPHILS # BLD AUTO: 0.02 THOUSANDS/ÂΜL (ref 0–0.1)
BASOPHILS NFR BLD AUTO: 0 % (ref 0–1)
BILIRUB SERPL-MCNC: 0.31 MG/DL (ref 0.2–1)
BUN SERPL-MCNC: 21 MG/DL (ref 5–25)
CALCIUM ALBUM COR SERPL-MCNC: 10 MG/DL (ref 8.3–10.1)
CALCIUM SERPL-MCNC: 9.3 MG/DL (ref 8.4–10.2)
CHLORIDE SERPL-SCNC: 105 MMOL/L (ref 96–108)
CO2 SERPL-SCNC: 28 MMOL/L (ref 21–32)
CREAT SERPL-MCNC: 0.89 MG/DL (ref 0.6–1.3)
EOSINOPHIL # BLD AUTO: 0.38 THOUSAND/ÂΜL (ref 0–0.61)
EOSINOPHIL NFR BLD AUTO: 6 % (ref 0–6)
ERYTHROCYTE [DISTWIDTH] IN BLOOD BY AUTOMATED COUNT: 12.6 % (ref 11.6–15.1)
GFR SERPL CREATININE-BSD FRML MDRD: 60 ML/MIN/1.73SQ M
GLUCOSE SERPL-MCNC: 124 MG/DL (ref 65–140)
GRAM STN SPEC: ABNORMAL
GRAM STN SPEC: ABNORMAL
HCT VFR BLD AUTO: 34 % (ref 34.8–46.1)
HGB BLD-MCNC: 11.4 G/DL (ref 11.5–15.4)
IMM GRANULOCYTES # BLD AUTO: 0.02 THOUSAND/UL (ref 0–0.2)
IMM GRANULOCYTES NFR BLD AUTO: 0 % (ref 0–2)
LYMPHOCYTES # BLD AUTO: 2.81 THOUSANDS/ÂΜL (ref 0.6–4.47)
LYMPHOCYTES NFR BLD AUTO: 41 % (ref 14–44)
MCH RBC QN AUTO: 33.6 PG (ref 26.8–34.3)
MCHC RBC AUTO-ENTMCNC: 33.5 G/DL (ref 31.4–37.4)
MCV RBC AUTO: 100 FL (ref 82–98)
MONOCYTES # BLD AUTO: 0.93 THOUSAND/ÂΜL (ref 0.17–1.22)
MONOCYTES NFR BLD AUTO: 13 % (ref 4–12)
NEUTROPHILS # BLD AUTO: 2.77 THOUSANDS/ÂΜL (ref 1.85–7.62)
NEUTS SEG NFR BLD AUTO: 40 % (ref 43–75)
NRBC BLD AUTO-RTO: 0 /100 WBCS
P AXIS: 44 DEGREES
PLATELET # BLD AUTO: 133 THOUSANDS/UL (ref 149–390)
PMV BLD AUTO: 10.2 FL (ref 8.9–12.7)
POTASSIUM SERPL-SCNC: 3.9 MMOL/L (ref 3.5–5.3)
PR INTERVAL: 214 MS
PROT SERPL-MCNC: 5.2 G/DL (ref 6.4–8.4)
QRS AXIS: 23 DEGREES
QRSD INTERVAL: 72 MS
QT INTERVAL: 420 MS
QTC INTERVAL: 437 MS
RBC # BLD AUTO: 3.39 MILLION/UL (ref 3.81–5.12)
SODIUM SERPL-SCNC: 140 MMOL/L (ref 135–147)
T WAVE AXIS: 80 DEGREES
URATE SERPL-MCNC: 5.7 MG/DL (ref 2–7.5)
VENTRICULAR RATE: 65 BPM
WBC # BLD AUTO: 6.93 THOUSAND/UL (ref 4.31–10.16)

## 2025-05-04 PROCEDURE — 85025 COMPLETE CBC W/AUTO DIFF WBC: CPT | Performed by: NURSE PRACTITIONER

## 2025-05-04 PROCEDURE — 99232 SBSQ HOSP IP/OBS MODERATE 35: CPT | Performed by: NURSE PRACTITIONER

## 2025-05-04 PROCEDURE — 80053 COMPREHEN METABOLIC PANEL: CPT | Performed by: NURSE PRACTITIONER

## 2025-05-04 PROCEDURE — 93010 ELECTROCARDIOGRAM REPORT: CPT | Performed by: INTERNAL MEDICINE

## 2025-05-04 PROCEDURE — 84550 ASSAY OF BLOOD/URIC ACID: CPT | Performed by: NURSE PRACTITIONER

## 2025-05-04 RX ADMIN — GABAPENTIN 100 MG: 100 CAPSULE ORAL at 21:14

## 2025-05-04 RX ADMIN — ATORVASTATIN CALCIUM 40 MG: 40 TABLET, FILM COATED ORAL at 17:47

## 2025-05-04 RX ADMIN — RISPERIDONE 0.25 MG: 0.25 TABLET, FILM COATED ORAL at 08:33

## 2025-05-04 RX ADMIN — HEPARIN SODIUM 5000 UNITS: 5000 INJECTION INTRAVENOUS; SUBCUTANEOUS at 21:16

## 2025-05-04 RX ADMIN — POLYETHYLENE GLYCOL 3350 17 G: 17 POWDER, FOR SOLUTION ORAL at 08:33

## 2025-05-04 RX ADMIN — TRAZODONE HYDROCHLORIDE 25 MG: 50 TABLET ORAL at 21:14

## 2025-05-04 RX ADMIN — LEVOTHYROXINE SODIUM 75 MCG: 0.07 TABLET ORAL at 05:39

## 2025-05-04 RX ADMIN — HEPARIN SODIUM 5000 UNITS: 5000 INJECTION INTRAVENOUS; SUBCUTANEOUS at 13:00

## 2025-05-04 RX ADMIN — GABAPENTIN 100 MG: 100 CAPSULE ORAL at 08:33

## 2025-05-04 RX ADMIN — CALCIUM CARBONATE (ANTACID) CHEW TAB 500 MG 1000 MG: 500 CHEW TAB at 02:44

## 2025-05-04 RX ADMIN — GABAPENTIN 100 MG: 100 CAPSULE ORAL at 17:47

## 2025-05-04 RX ADMIN — ASPIRIN 81 MG: 81 TABLET, COATED ORAL at 08:33

## 2025-05-04 RX ADMIN — RISPERIDONE 0.25 MG: 0.25 TABLET, FILM COATED ORAL at 17:47

## 2025-05-04 RX ADMIN — Medication 6 MG: at 21:14

## 2025-05-04 RX ADMIN — ACETAMINOPHEN 650 MG: 325 TABLET, FILM COATED ORAL at 10:47

## 2025-05-04 RX ADMIN — HEPARIN SODIUM 5000 UNITS: 5000 INJECTION INTRAVENOUS; SUBCUTANEOUS at 05:39

## 2025-05-04 NOTE — ASSESSMENT & PLAN NOTE
1 of 2 blood cultures from initial presentation to [facility] positive for gram positive cocci in chains. Blood culture ID panel -  strep  Likely contaminant  Had reported dysuria and chills for 3 days  UA - nitrite positive but 0-5 WBC's hence culture not done  CXR - negative  Discontinued Ceftriaxone per ID -suspect contaminant  Repeat BC negative at 4 days  ID following and input appreciated, signing off

## 2025-05-04 NOTE — ASSESSMENT & PLAN NOTE
Lactic acid elevated at 4.4 on initial presentation to [facility] and normalized with IV fluids

## 2025-05-04 NOTE — ASSESSMENT & PLAN NOTE
Likely contributing to chest pain, evaluated with echocardiogram, cardiology and cardiothoracic surgery evaluations, will be managed with medication as patient reportedly stating that she would manage with medications prior to today.  Latest echo done 2/21/2024 showed EF of 65% and severe aortic stenosis  Repeat echo noted  Monitor volume status  CT surgery consulted for TAVR workup.  Patient now 5/1 reporting that she was transferred to [place] for this procedure and has interest.  Discussed with cardiology patient will follow-up outpatient.  However patient's daughter reports that she does not feel this is a viable option.

## 2025-05-04 NOTE — ASSESSMENT & PLAN NOTE
Presented to [facility] on 4/28/25 with progressively worsening right-sided chest pain since 1.5 months, initially with exertion and since 3 days with rest  EKG showed diffuse ST depression  Troponin - negative  Per cardiology no invasive workup at this time considering patient's altered mental status metabolic encephalopathy.  Discussed again today 5/1/2025 with cardiology as patient now reporting that she has interest in having TAVR.  Cardiology recommending patient follow-up in CT surgical office establish consistent messaging.  Patient continues to be very weak and would need to be optimized prior to potential procedure.  This was discussed 5/1/2025 with patient's daughter who reports that she is not in agreement with procedure at this time.  Transferred to [facility] 4/28 for cardiac catheterization  Not completed due to patient's refusal  Ct ASA, statin  Echo - EF65%.  Consistent grade 1 relaxation.  Moderate but possibly severe aortic stenosis.  Monitor on telemetry  Given patient's acute encephalopathy and history of medication non adherence, decision was made not to pursue cardiac cath. Continue medical management  Discussed again on 5/1/2025 at patient's request.  No further testing inpatient but patient will continue to follow-up outpatient with surgery to determine if candidate for TAVR

## 2025-05-04 NOTE — ASSESSMENT & PLAN NOTE
Lab Results   Component Value Date    EGFR 60 05/04/2025    EGFR 57 05/01/2025    EGFR 77 04/30/2025    CREATININE 0.89 05/04/2025    CREATININE 0.93 05/01/2025    CREATININE 0.73 04/30/2025   Baseline creatinine 0.9-1  Creatinine at baseline  Monitor  Obtain labs in a.m.

## 2025-05-04 NOTE — PROGRESS NOTES
Patient:    MRN:  [id number]    Denis Request ID:  [id number]    Level of care reserved:  Skilled Nursing Facility    Partner Reserved:  [facility], [place] [address] [phone]    Clinical needs requested:    Geography searched:  15 miles around [id number]    Start of Service:    Request sent:  2:50pm EDT on 5/2/2025 by Julieth Singleton    Partner reserved:  12:33pm EDT on 5/4/2025 by Christa Hood    Choice list shared:

## 2025-05-04 NOTE — ASSESSMENT & PLAN NOTE
1 of 2 blood cultures from initial presentation to [facility] positive for gram positive cocci in chains. Blood culture ID panel -  strep  Likely contaminant  Had reported dysuria and chills for 3 days  UA - nitrite positive but 0-5 WBC's hence culture not done  CXR - negative  Discontinued Ceftriaxone per ID -suspect contaminant  Repeat BC negative at 4 days  ID following and input appreciated, signing off

## 2025-05-04 NOTE — ASSESSMENT & PLAN NOTE
Lactic acid elevated at 4.4 on initial presentation to [facility] and normalized with IV fluids

## 2025-05-04 NOTE — ASSESSMENT & PLAN NOTE
Patient was noted on admission to be disoriented, impulsive on 1:1.  Required Haldol, Zyprexa also received scheduled risperidone.  Patient has done very well since Friday 5/2 alert and oriented and doing really well very pleasant and cooperative   Iv abx completed   Patient with improved mental status 5/1/2025 - now at her baseline  De-escalated one-to-one to virtual one-to-one 5/1/2025 remove virtual one-to-one 5/2/25 Friday  PT OT recommended rehab,pt agreeable she is wanting to get strong and not be a burden to her  in terms of care. She has been very determined and ambulating with assist to the bathroom .   Continue Risperdal  Prn zyprexa for severe agitation  Appreciate geriatrics.   Continue Celexa 20 mg daily, per geriatrics

## 2025-05-04 NOTE — ASSESSMENT & PLAN NOTE
Presented to [facility] on 4/28/25 with progressively worsening right-sided chest pain since 1.5 months, initially with exertion and since 3 days with rest  EKG showed diffuse ST depression  Troponin - negative  Per cardiology no invasive workup at this time considering patient's altered mental status metabolic encephalopathy.  Discussed again today 5/1/2025 with cardiology as patient now reporting that she has interest in having TAVR.  Cardiology recommending patient follow-up in CT surgical office establish consistent messaging.  Patient continues to be very weak and would need to be optimized prior to potential procedure.  This was discussed 5/1/2025 with patient's daughter who reports that she is not in agreement with procedure at this time.  Transferred to [facility] 4/28 for cardiac catheterization  Not completed due to patient's refusal  Ct ASA, statin  Echo - EF65%.  Consistent grade 1 relaxation.  Moderate but possibly severe aortic stenosis.  Monitor on telemetry  Given patient's acute encephalopathy and history of medication non adherence, decision was made not to pursue cardiac cath. Continue medical management  Discussed again on 5/1/2025 at patient's request.  No further testing inpatient but patient will continue to follow-up outpatient with surgery to determine if candidate for TAVR

## 2025-05-04 NOTE — PROGRESS NOTES
Progress Note - Hospitalist   Name: Daily Schaeffer 79 y.o. female I MRN: [id number]  Unit/Bed#: [facility] 427-01 I Date of Admission: 4/28/2025   Date of Service: 5/4/2025 I Hospital Day: 6    Assessment & Plan  Chest pain  Presented to [facility] on 4/28/25 with progressively worsening right-sided chest pain since 1.5 months, initially with exertion and since 3 days with rest  EKG showed diffuse ST depression  Troponin - negative  Per cardiology no invasive workup at this time considering patient's altered mental status metabolic encephalopathy.  Discussed again today 5/1/2025 with cardiology as patient now reporting that she has interest in having TAVR.  Cardiology recommending patient follow-up in CT surgical office establish consistent messaging.  Patient continues to be very weak and would need to be optimized prior to potential procedure.  This was discussed 5/1/2025 with patient's daughter who reports that she is not in agreement with procedure at this time.  Transferred to [facility] 4/28 for cardiac catheterization  Not completed due to patient's refusal  Ct ASA, statin  Echo - EF65%.  Consistent grade 1 relaxation.  Moderate but possibly severe aortic stenosis.  Monitor on telemetry  Given patient's acute encephalopathy and history of medication non adherence, decision was made not to pursue cardiac cath. Continue medical management  Discussed again on 5/1/2025 at patient's request.  No further testing inpatient but patient will continue to follow-up outpatient with surgery to determine if candidate for TAVR  Acute metabolic encephalopathy  Patient was noted on admission to be disoriented, impulsive on 1:1.  Required Haldol, Zyprexa also received scheduled risperidone.  Patient has done very well since Friday 5/2 alert and oriented and doing really well very pleasant and cooperative   Iv abx completed   Patient with improved mental status 5/1/2025 - now at her baseline  De-escalated one-to-one  to virtual one-to-one 5/1/2025 remove virtual one-to-one 5/2/25 Friday  PT OT recommended rehab,pt agreeable she is wanting to get strong and not be a burden to her  in terms of care. She has been very determined and ambulating with assist to the bathroom .   Continue Risperdal  Prn zyprexa for severe agitation  Appreciate geriatrics.   Continue Celexa 20 mg daily, per geriatrics   Possible UTI (urinary tract infection)  Unclear if true infection. UA did not reflex to culture.   Patient did report some dysuria on chart review.   Ceftriaxone as per ID now discontinued  Bacteremia  1 of 2 blood cultures from initial presentation to [facility] positive for gram positive cocci in chains. Blood culture ID panel -  strep  Likely contaminant  Had reported dysuria and chills for 3 days  UA - nitrite positive but 0-5 WBC's hence culture not done  CXR - negative  Discontinued Ceftriaxone per ID -suspect contaminant  Repeat BC negative at 4 days  ID following and input appreciated, signing off  Lactic acidosis  Lactic acid elevated at 4.4 on initial presentation to [facility] and normalized with IV fluids  Severe aortic stenosis  Likely contributing to chest pain, evaluated with echocardiogram, cardiology and cardiothoracic surgery evaluations, will be managed with medication as patient reportedly stating that she would manage with medications prior to today.  Latest echo done 2/21/2024 showed EF of 65% and severe aortic stenosis  Repeat echo noted  Monitor volume status  CT surgery consulted for TAVR workup.  Patient now 5/1 reporting that she was transferred to [place] for this procedure and has interest.  Discussed with cardiology patient will follow-up outpatient.  However patient's daughter reports that she does not feel this is a viable option.  Neuropathy  Continue Gabapentin -reluctant to increase dosing due to recent mental status change  Would consider additional 100 mg dose at bedtime if pain  uncontrolled  Noted in bilateral hands/severe pain  Acquired hypothyroidism  TSH normal  Continue levothyroxine 75 mcg daily  Stage 3 chronic kidney disease (HCC)  Lab Results   Component Value Date    EGFR 60 05/04/2025    EGFR 57 05/01/2025    EGFR 77 04/30/2025    CREATININE 0.89 05/04/2025    CREATININE 0.93 05/01/2025    CREATININE 0.73 04/30/2025   Baseline creatinine 0.9-1  Creatinine at baseline  Monitor  Obtain labs in a.m.  Moderate protein-calorie malnutrition (HCC)  Malnutrition Findings:   Adult Malnutrition type: Chronic illness  Adult Degree of Malnutrition: Malnutrition of moderate degree  Malnutrition Characteristics: Fat loss, Muscle loss, Weight loss     360 Statement: moderate malnutrition r/t chronic illness as evidenced by weight decrease of 12 lbs / 8.9% x2 months, evident mild to moderate wasting in temples and buccal fat pad. Treatment: oral diet and oral nutrition supplements    BMI Findings:    Body mass index is 19.26 kg/m².   Dietary supplements Ensure vanilla for breakfast and chocolate for dinner  Ambulatory dysfunction  PT recommending rehab. Family in agreement  Discussed in detail with patient and case management  Patient agreeable  Cognitive impairment  Needs formal outpatient cognitive testing  Dyslipidemia    Frailty    Depression    Alcohol use    Insomnia      VTE Pharmacologic Prophylaxis: VTE Score: 4 High Risk (Score >/= 5) - Pharmacological DVT Prophylaxis Ordered: heparin. Sequential Compression Devices Ordered.    Mobility:   Basic Mobility Inpatient Raw Score: 16  JH-HLM Goal: 5: Stand one or more mins  JH-HLM Achieved: 1: Laying in bed  JH-HLM Goal NOT achieved. Continue with multidisciplinary rounding and encourage appropriate mobility to improve upon JH-HLM goals.    Patient Centered Rounds: I performed bedside rounds with nursing staff today.   Discussions with Specialists or Other Care Team Provider: nursing     Education and Discussions with Family / Patient:  Updated  () via phone.    Current Length of Stay: 6 day(s)  Current Patient Status: Inpatient   Certification Statement: The patient will continue to require additional inpatient hospital stay due to need for rehab at  [facility] [place]   Discharge Plan: Anticipate discharge tomorrow to rehab facility.    Code Status: Level 1 - Full Code    Subjective   Pt is doing well just had a very large bm and feels better. She did walk to the bathroom to have a bm. She has ongoing neuropathic pain in her bl upper hands. No shortness of breath at all when ambulating . She is eager to go to rehab .     Objective :  Temp:  [97.7 °F (36.5 °C)-98.1 °F (36.7 °C)] 98 °F (36.7 °C)  HR:  [66-73] 66  BP: (115-127)/(50-60) 115/60  Resp:  [17-18] 18  SpO2:  [97 %-99 %] 97 %  O2 Device: None (Room air)    Body mass index is 19.26 kg/m².     Input and Output Summary (last 24 hours):     Intake/Output Summary (Last 24 hours) at 5/4/2025 1401  Last data filed at 5/4/2025 1300  Gross per 24 hour   Intake 1136 ml   Output 1550 ml   Net -414 ml       Physical Exam  Constitutional:       General: She is not in acute distress.     Appearance: She is not ill-appearing, toxic-appearing or diaphoretic.   HENT:      Head: Normocephalic and atraumatic.      Nose: No congestion.   Eyes:      General:         Right eye: No discharge.         Left eye: No discharge.   Cardiovascular:      Rate and Rhythm: Normal rate.      Heart sounds: Murmur heard.      No friction rub. No gallop.   Pulmonary:      Effort: No respiratory distress.      Breath sounds: No stridor. No wheezing, rhonchi or rales.   Chest:      Chest wall: No tenderness.   Abdominal:      General: There is no distension.      Palpations: There is no mass.      Tenderness: There is no abdominal tenderness. There is no guarding or rebound.      Hernia: No hernia is present.   Musculoskeletal:         General: No swelling, tenderness, deformity or signs of  injury.      Right lower leg: No edema.      Left lower leg: No edema.   Skin:     Coloration: Skin is not jaundiced or pale.      Findings: No bruising, erythema, lesion or rash.   Neurological:      Mental Status: She is alert and oriented to person, place, and time.   Psychiatric:         Behavior: Behavior normal.         Lines/Drains:              Lab Results: I have reviewed the following results:   Results from last 7 days   Lab Units 05/04/25  0534   WBC Thousand/uL 6.93   HEMOGLOBIN g/dL 11.4*   HEMATOCRIT % 34.0*   PLATELETS Thousands/uL 133*   SEGS PCT % 40*   LYMPHO PCT % 41   MONO PCT % 13*   EOS PCT % 6     Results from last 7 days   Lab Units 05/04/25  0534   SODIUM mmol/L 140   POTASSIUM mmol/L 3.9   CHLORIDE mmol/L 105   CO2 mmol/L 28   BUN mg/dL 21   CREATININE mg/dL 0.89   ANION GAP mmol/L 7   CALCIUM mg/dL 9.3   ALBUMIN g/dL 3.1*   TOTAL BILIRUBIN mg/dL 0.31   ALK PHOS U/L 40   ALT U/L 13   AST U/L 22   GLUCOSE RANDOM mg/dL 124     Results from last 7 days   Lab Units 04/28/25  0047   INR  0.92     Results from last 7 days   Lab Units 04/28/25  2208   POC GLUCOSE mg/dl 104     Results from last 7 days   Lab Units 04/29/25  0713   HEMOGLOBIN A1C % 5.4     Results from last 7 days   Lab Units 04/28/25  0445 04/28/25  0047   LACTIC ACID mmol/L 0.9 4.4*   PROCALCITONIN ng/ml  --  0.08       Recent Cultures (last 7 days):   Results from last 7 days   Lab Units 04/29/25  0057 04/28/25  0125 04/28/25  0047   BLOOD CULTURE  No Growth After 5 Days.  No Growth After 5 Days. Streptococcus mitis oralis group*  Streptococcus parasanguinis*  Granulicatella adiacens*  Actinomyces oris* No Growth After 5 Days.   GRAM STAIN RESULT   --  Gram positive cocci in chains*  Gram positive rods*  --        Imaging Results Review: No pertinent imaging studies reviewed.  Other Study Results Review: No additional pertinent studies reviewed.    Last 24 Hours Medication List:     Current Facility-Administered  Medications:     acetaminophen (TYLENOL) tablet 650 mg, Q6H PRN    aspirin (ECOTRIN LOW STRENGTH) EC tablet 81 mg, Daily    atorvastatin (LIPITOR) tablet 40 mg, Daily With Dinner    calcium carbonate (TUMS) chewable tablet 1,000 mg, TID PRN    gabapentin (NEURONTIN) capsule 100 mg, TID    heparin (porcine) subcutaneous injection 5,000 Units, Q8H NIECY    lactulose (CHRONULAC) oral solution 30 g, Daily PRN    levothyroxine tablet 75 mcg, Early Morning    melatonin tablet 6 mg, HS PRN    morphine injection 2 mg, Once    OLANZapine (ZyPREXA) tablet 2.5 mg, BID PRN    polyethylene glycol (MIRALAX) packet 17 g, Daily    risperiDONE (RisperDAL) tablet 0.25 mg, BID    traZODone (DESYREL) tablet 25 mg, HS    Administrative Statements   Today, Patient Was Seen By: MAJOR Morillo      **Please Note: This note may have been constructed using a voice recognition system.**

## 2025-05-04 NOTE — ASSESSMENT & PLAN NOTE
Likely contributing to chest pain, evaluated with echocardiogram, cardiology and cardiothoracic surgery evaluations, will be managed with medication as patient reportedly stating that she would manage with medications prior to today.  Latest echo done 2/21/2024 showed EF of 65% and severe aortic stenosis  Repeat echo noted  Monitor volume status  CT surgery consulted for TAVR workup.  Patient now 5/1 reporting that she was transferred to [place] for this procedure and has interest.  Discussed with cardiology patient will follow-up outpatient.  However patient's daughter reports that she does not feel this is a viable option.

## 2025-05-04 NOTE — CASE MANAGEMENT
Case Management Discharge Planning Note    Patient name Daily Schaeffer  Location [facility] 427/[facility] 427-01 MRN [id number]  : 1945 Date 2025       Current Admission Date: 2025  Current Admission Diagnosis:Chest pain   Patient Active Problem List    Diagnosis Date Noted Date Diagnosed    Moderate protein-calorie malnutrition (HCC) 2025     Bacteremia 2025     Possible UTI (urinary tract infection) 2025     Lactic acidosis 2025     Acute metabolic encephalopathy 2025     Cognitive impairment 2025     Frailty 2025     Depression 2025     Alcohol use 2025     Insomnia 2025     Ambulatory dysfunction 2025     Chest pain 2025     Sinus tachycardia 2024     Arthralgia 2024     Abnormal brain MRI 2024     Abnormal MRI of the head 2024     Stress due to marital problems 2024     Low hemoglobin 2024     Acute toxic encephalopathy 2024     Debility 2024     Stage 3 chronic kidney disease (HCC) 2024     Exertional dyspnea 2024     Hypomagnesemia 2023     Hypercalcemia 2023     Abnormal CT scan, pelvis 2023     Neuropathy 2023     Severe aortic stenosis 2023     Acquired hypothyroidism 2017    Dyslipidemia 2016    Crohn disease (HCC) 2016    Anxiety and depression 2016    Hyperglycemia 2016    Crohn's disease of jejunum (HCC) 2016     CKD (chronic kidney disease) stage 2, GFR 60-89 ml/min 2016       LOS (days): 6  Geometric Mean LOS (GMLOS) (days): 3.9  Days to GMLOS:-1.9     OBJECTIVE:  Risk of Unplanned Readmission Score: 19.78         Current admission status: Inpatient   Preferred Pharmacy:   [facility] [place] [address]  [place] [address]  Phone: [phone] Fax: [phone]    Primary Care Provider:  Alanna Moreau,     Primary Insurance: MEDICARE  Secondary Insurance: [employer]    DISCHARGE DETAILS:    Discharge planning discussed with:: Patient and spouse  Freedom of Choice: Yes  Comments - [place] of Choice: [facility] is choice.  Reserved.  CM contacted family/caregiver?: Yes  Were Treatment Team discharge recommendations reviewed with patient/caregiver?: Yes  Did patient/caregiver verbalize understanding of patient care needs?: N/A- going to facility       Contacts  Patient Contacts: [place](spouse) & Kayleen Andrew (daughter)  Relationship to Patient:: Family  Contact Method: Phone  Phone Number: spouse [phone]  &  Kayleen(daughter) [phone]  Reason/Outcome: Discharge Planning    Requested Home Health Care         Is the patient interested in HHC at discharge?: No    DME Referral Provided  Referral made for DME?: No    Other Referral/Resources/Interventions Provided:  Interventions: SNF, Short Term Rehab  Referral Comments: CM met with pt at bedside.  [facility] is choice per pt, she requested we call spouse to confirm agreement.  Spouse was called, he is in agreement. Facility reserved.  They stated they can take Monday or possible Tuesday, they will let us know Monday.  CM following

## 2025-05-05 ENCOUNTER — TELEPHONE (OUTPATIENT)
Dept: CARDIAC SURGERY | Facility: CLINIC | Age: 80
End: 2025-05-05

## 2025-05-05 VITALS
HEIGHT: 67 IN | OXYGEN SATURATION: 100 % | HEART RATE: 67 BPM | SYSTOLIC BLOOD PRESSURE: 110 MMHG | BODY MASS INDEX: 19.3 KG/M2 | WEIGHT: 123 LBS | RESPIRATION RATE: 16 BRPM | DIASTOLIC BLOOD PRESSURE: 52 MMHG | TEMPERATURE: 97.7 F

## 2025-05-05 PROBLEM — E87.20 LACTIC ACIDOSIS: Status: RESOLVED | Noted: 2025-04-29 | Resolved: 2025-05-05

## 2025-05-05 PROCEDURE — 99239 HOSP IP/OBS DSCHRG MGMT >30: CPT | Performed by: NURSE PRACTITIONER

## 2025-05-05 PROCEDURE — 97530 THERAPEUTIC ACTIVITIES: CPT

## 2025-05-05 PROCEDURE — 97116 GAIT TRAINING THERAPY: CPT

## 2025-05-05 RX ORDER — TRAZODONE HYDROCHLORIDE 50 MG/1
25 TABLET ORAL
Start: 2025-05-05

## 2025-05-05 RX ORDER — POLYETHYLENE GLYCOL 3350 17 G/17G
17 POWDER, FOR SOLUTION ORAL DAILY
Start: 2025-05-05

## 2025-05-05 RX ADMIN — ATORVASTATIN CALCIUM 40 MG: 40 TABLET, FILM COATED ORAL at 16:06

## 2025-05-05 RX ADMIN — GABAPENTIN 100 MG: 100 CAPSULE ORAL at 09:30

## 2025-05-05 RX ADMIN — HEPARIN SODIUM 5000 UNITS: 5000 INJECTION INTRAVENOUS; SUBCUTANEOUS at 16:06

## 2025-05-05 RX ADMIN — HEPARIN SODIUM 5000 UNITS: 5000 INJECTION INTRAVENOUS; SUBCUTANEOUS at 06:22

## 2025-05-05 RX ADMIN — RISPERIDONE 0.25 MG: 0.25 TABLET, FILM COATED ORAL at 09:30

## 2025-05-05 RX ADMIN — ASPIRIN 81 MG: 81 TABLET, COATED ORAL at 09:30

## 2025-05-05 RX ADMIN — CALCIUM CARBONATE (ANTACID) CHEW TAB 500 MG 1000 MG: 500 CHEW TAB at 00:32

## 2025-05-05 RX ADMIN — GABAPENTIN 100 MG: 100 CAPSULE ORAL at 16:06

## 2025-05-05 RX ADMIN — LEVOTHYROXINE SODIUM 75 MCG: 0.07 TABLET ORAL at 06:22

## 2025-05-05 NOTE — RESTORATIVE TECHNICIAN NOTE
Restorative Technician Note      Patient Name: Daily RUSHING [facility] Tech Visit Date: 05/05/25  Note Type: Mobility  Patient Position Upon Consult: Supine  Activity Performed: Transferred  Assistive Device: Roller walker  Patient Position at End of Consult: Bed/Chair alarm activated; All needs within reach; Bedside chair

## 2025-05-05 NOTE — ASSESSMENT & PLAN NOTE
Patient was noted on admission to be disoriented, impulsive on 1:1.  Required Haldol, Zyprexa also received scheduled risperidone.  Geriatrics consult appreciated   Continue risperdal and celexa   As of 5/2 patient has returned to baseline mentation   Iv abx completed   PT/OT recommended rehab,pt agreeable she is wanting to get strong and not be a burden to her  in terms of care. She has been very determined and ambulating with assist to the bathroom

## 2025-05-05 NOTE — DISCHARGE SUMMARY
Discharge Summary - Hospitalist   Name: Daily Shcaeffer 79 y.o. female I MRN: [id number]  Unit/Bed#: [facility] 427-01 I Date of Admission: 4/28/2025   Date of Service: 5/5/2025 I Hospital Day: 7     Assessment & Plan  Chest pain  Presented to [facility] on 4/28/25 with progressively worsening right-sided chest pain since 1.5 months, initially with exertion and since 3 days with rest  EKG showed diffuse ST depression  Troponin - negative  Transferred to [facility] 4/28 for cardiac catheterization  decision was made not to pursue cardiac cath d/t patient's refusal, acute encephalopathy and history of medication non adherence, . Continue medical management  Pt is considering TAVR. Cardiology recommending patient follow-up in CT surgery outpatient for TAVR workup.   She will need to be optimized prior to potential procedure.  Ct ASA, statin  Echo - EF65%.  Consistent grade 1 relaxation.  Moderate but possibly severe aortic stenosis.  Severe aortic stenosis  Likely contributing to chest pain, evaluated with echocardiogram, cardiology and cardiothoracic surgery evaluations, will be managed with medication as patient reportedly stating that she would manage with medications prior to today.  Latest echo done 2/21/2024 showed EF of 65% and severe aortic stenosis  Repeat echo noted  Monitor volume status  CT surgery consulted for TAVR workup.  Patient now 5/1 reporting that she was transferred to [place] for this procedure and has interest.  Discussed with cardiology patient will follow-up outpatient.  However patient's daughter reports that she does not feel this is a viable option.  Bacteremia  1 of 2 blood cultures from initial presentation to [facility] positive for gram positive cocci in chains. Blood culture ID panel -  strep  Likely contaminant  Had reported dysuria and chills for 3 days  UA - nitrite positive but 0-5 WBC's hence culture not done  CXR - negative  Discontinued Ceftriaxone per ID  -suspect contaminant  Repeat BC negative at 5 days  ID following and input appreciated  Possible UTI (urinary tract infection)  Unclear if true infection. UA did not reflex to culture.   Patient did report some dysuria on chart review.   Ceftriaxone as per ID now discontinued  Acute metabolic encephalopathy  Patient was noted on admission to be disoriented, impulsive on 1:1.  Required Haldol, Zyprexa also received scheduled risperidone.  Geriatrics consult appreciated   Continue risperdal and celexa   As of 5/2 patient has returned to baseline mentation   Iv abx completed   PT/OT recommended rehab,pt agreeable she is wanting to get strong and not be a burden to her  in terms of care. She has been very determined and ambulating with assist to the bathroom  Lactic acidosis (Resolved: 5/5/2025)  Lactic acid elevated at 4.4 on initial presentation to [facility] and normalized with IV fluids  Neuropathy  Continue Gabapentin -reluctant to increase dosing due to recent mental status change  Would consider additional 100 mg dose at bedtime if pain uncontrolled  Noted in bilateral hands/severe pain  Acquired hypothyroidism  TSH normal  Continue levothyroxine 75 mcg daily  Stage 3 chronic kidney disease (HCC)  Lab Results   Component Value Date    EGFR 60 05/04/2025    EGFR 57 05/01/2025    EGFR 77 04/30/2025    CREATININE 0.89 05/04/2025    CREATININE 0.93 05/01/2025    CREATININE 0.73 04/30/2025   Baseline creatinine 0.9-1  Creatinine at baseline  Monitor  Moderate protein-calorie malnutrition (HCC)  Malnutrition Findings:   Adult Malnutrition type: Chronic illness  Adult Degree of Malnutrition: Malnutrition of moderate degree  Malnutrition Characteristics: Fat loss, Muscle loss, Weight loss     360 Statement: moderate malnutrition r/t chronic illness as evidenced by weight decrease of 12 lbs / 8.9% x2 months, evident mild to moderate wasting in temples and buccal fat pad. Treatment: oral diet and oral  nutrition supplements    BMI Findings:    Body mass index is 19.26 kg/m².   Dietary supplements Ensure vanilla for breakfast and chocolate for dinner  Ambulatory dysfunction  PT recommending rehab. Family and patient in agreement  Cognitive impairment  Needs formal outpatient cognitive testing  Alcohol use  Reported by simba   Encourage cessation   Insomnia  C/w trazadone      Medical Problems       Resolved Problems  Date Reviewed: 4/28/2025   None       Discharging Physician / Practitioner: MAJOR Kemp  PCP: Alanna Moreau DO  Admission Date:   Admission Orders (From admission, onward)       Ordered        04/28/25 2007  INPATIENT ADMISSION  Once                          Discharge Date: 05/05/25    Consultations During Hospital Stay:  Geriatrics   ID     Procedures Performed:   4/30 Echo- EF 60%, AV severely thickened, severe stenosis and reduced mobility. MV moderate calcification and trace regurgitation   4/29 blood cultures negative 5 days   4/28 1/2 blood culture + strep other bottle negative 5 days   Flu/rsv/covid negative   4/29/2025 [facility]-patient is a dentulous  4/28/2025 chest x-ray-no acute pulmonary disease  A1c 5.4  TSH 1.344    Significant Findings / Test Results:   Chest pain, severe aortic stenosis, encephalopathy, possible UTI/bactermia     Incidental Findings:   None     Test Results Pending at Discharge (will require follow up):   None      Outpatient Tests Requested:  BMP and cbc in one week   TAVR procedure workup    Complications:  none     Reason for Admission: right sided chest pain     Hospital Course:   Daily Schaeffer is a 79 y.o. female patient PMH of severe aortic stenosis, hypothyroidism, neuropathy, CKD 3, type 2 diabetes not on medications who originally presented to the hospital on 4/28/2025 due to  right sided chest pain. The patient presented to the [facility] with reports of chest pain and shortness of breath that has been ongoing for months.  When  "she arrived to the emergency room she was noted to have ST depression and transferred to [facility] for possible cardiac catheterization.  She had a single blood culture that came back at that time growing strep which was felt to be contamination but was started on ceftriaxone and an infectious disease consultation was obtained.  There was concern for possible UTI as well.  When the patient arrived she was encephalopathic she was seen by geriatrics and her encephalopathy improved throughout her stay.  She was started on trazodone for insomnia while she was hospitalized.  She was seen by cardiology and cardiothoracic surgery and decision was made for her to pursue inpatient physical therapy and short-term rehab to work on her generalized weakness and improving her strength to optimize her for possible consideration of TAVR procedure outpatient.  She will need to have the workup done after discharge for TAVR.    Please see above list of diagnoses and related plan for additional information.     Condition at Discharge: good    Discharge Day Visit / Exam:   Subjective:  pt is lying in bed, denies any complaints at this time overall feeling well. No events per RN     Vitals: Blood Pressure: 110/52 (05/05/25 0800)  Pulse: 67 (05/05/25 0800)  Temperature: 97.7 °F (36.5 °C) (05/05/25 0800)  Temp Source: Oral (05/05/25 0800)  Respirations: 16 (05/05/25 0800)  Height: 5' 7\" (170.2 cm) (04/30/25 1123)  Weight - Scale: 55.8 kg (123 lb) (04/30/25 1123)  SpO2: 100 % (05/05/25 0800)  Physical Exam  Constitutional:       General: She is not in acute distress.     Appearance: She is well-developed. She is not ill-appearing.   Cardiovascular:      Rate and Rhythm: Normal rate and regular rhythm.      Heart sounds: Murmur heard.   Pulmonary:      Effort: Pulmonary effort is normal. No respiratory distress.      Breath sounds: Normal breath sounds. No wheezing or rales.   Abdominal:      General: Bowel sounds are normal. There is no " distension.      Palpations: Abdomen is soft.      Tenderness: There is no abdominal tenderness.   Musculoskeletal:         General: No swelling or tenderness.   Skin:     General: Skin is warm and dry.      Findings: No erythema or rash.   Neurological:      Mental Status: She is alert and oriented to person, place, and time.      Motor: Weakness (generalized) present.   Psychiatric:         Mood and Affect: Mood normal.          Discussion with Family: Updated  () via phone.    Discharge instructions/Information to patient and family:   See after visit summary for information provided to patient and family.      Provisions for Follow-Up Care:  See after visit summary for information related to follow-up care and any pertinent home health orders.      Mobility at time of Discharge:   Basic Mobility Inpatient Raw Score: 18  JH-HLM Goal: 6: Walk 10 steps or more  JH-HLM Achieved: 7: Walk 25 feet or more  HLM Goal NOT achieved. Continue to encourage mobility in post discharge setting.     Disposition:   Other Skilled Nursing Facility at [place]     Planned Readmission: none     Discharge Medications:  See after visit summary for reconciled discharge medications provided to patient and/or family.      Administrative Statements   Discharge Statement:  I have spent a total time of 60 minutes in caring for this patient on the day of the visit/encounter. >30 minutes of time was spent on: Diagnostic results, Risks and benefits of tx options, Patient and family education, Importance of tx compliance, Risk factor reductions, Impressions, Counseling / Coordination of care, Documenting in the medical record, Reviewing / ordering tests, medicine, procedures  , and Communicating with other healthcare professionals .    **Please Note: This note may have been constructed using a voice recognition system**

## 2025-05-05 NOTE — CASE MANAGEMENT
Case Management Discharge Planning Note    Patient name Daily Schaeffer  [facility]-01 MRN [id number]  : 1945 Date 2025       Current Admission Date: 2025  Current Admission Diagnosis:Chest pain   Patient Active Problem List    Diagnosis Date Noted Date Diagnosed    Moderate protein-calorie malnutrition (HCC) 2025     Bacteremia 2025     Possible UTI (urinary tract infection) 2025     Acute metabolic encephalopathy 2025     Cognitive impairment 2025     Frailty 2025     Depression 2025     Alcohol use 2025     Insomnia 2025     Ambulatory dysfunction 2025     Chest pain 2025     Sinus tachycardia 2024     Arthralgia 2024     Abnormal brain MRI 2024     Abnormal MRI of the head 2024     Stress due to marital problems 2024     Low hemoglobin 2024     Acute toxic encephalopathy 2024     Debility 2024     Stage 3 chronic kidney disease (HCC) 2024     Exertional dyspnea 2024     Hypomagnesemia 2023     Hypercalcemia 2023     Abnormal CT scan, pelvis 2023     Neuropathy 2023     Severe aortic stenosis 2023     Acquired hypothyroidism 2017    Dyslipidemia 2016    Crohn disease (HCC) 2016    Anxiety and depression 2016    Hyperglycemia 2016    Crohn's disease of jejunum (HCC) 2016     CKD (chronic kidney disease) stage 2, GFR 60-89 ml/min 2016       LOS (days): 7  Geometric Mean LOS (GMLOS) (days): 3.9  Days to GMLOS:-2.9     OBJECTIVE:  Risk of Unplanned Readmission Score: 19.86         Current admission status: Inpatient   Preferred Pharmacy:   [facility] [place] - [facility]  [place] [address]  Phone: [phone] Fax: [phone]    Primary Care Provider: Alanna Moreau,  DO    Primary Insurance: MEDICARE  Secondary Insurance: [employer]    DISCHARGE DETAILS:    Discharge planning discussed with:: Patient and patient's daughter, Kayleen, at bedside  [place] of Choice: Yes  Comments - [place] of Choice: Cm recieved message from [facility] that they do not have a bed available for patient. They informed that there is a bed available at their sister site, [facility]. CM met with patient at bedside and discussed this. Patient is agreeable to go to [place]. CM requested transportation. CM called patient's spouse to provide update. CM left message for call back.  CM contacted family/caregiver?: Yes  Were Treatment Team discharge recommendations reviewed with patient/caregiver?: Yes  Did patient/caregiver verbalize understanding of patient care needs?: N/A- going to facility  Were patient/caregiver advised of the risks associated with not following Treatment Team discharge recommendations?: Yes        Other Referral/Resources/Interventions Provided:  Interventions: Short Term Rehab  Referral Comments: [facility]         Treatment Team Recommendation: Short Term Rehab  Discharge Destination Plan:: Short Term Rehab  Transport at Discharge : Wheelchair van        Transported by (Company and Unit #): [facility]  ETA of Transport (Date): 05/05/25  ETA of Transport (Time): 1630          IMM Given (Date):: 05/05/25  IMM Given to:: Patient     IMM reviewed with patient, patient agrees with discharge determination.       Additional Comments: Cm updated pateint's nurse of  time. CM met with patient and patient's daughter and informed of  time. CM sent message in [facility] to [place] to inform of  time.    Accepting Facility Name, City & State : [facility]  Receiving Facility/Agency Phone Number: [phone]  Facility/Agency Fax Number: [phone]

## 2025-05-05 NOTE — ASSESSMENT & PLAN NOTE
1 of 2 blood cultures from initial presentation to [facility] positive for gram positive cocci in chains. Blood culture ID panel -  strep  Likely contaminant  Had reported dysuria and chills for 3 days  UA - nitrite positive but 0-5 WBC's hence culture not done  CXR - negative  Discontinued Ceftriaxone per ID -suspect contaminant  Repeat BC negative at 5 days  ID following and input appreciated

## 2025-05-05 NOTE — PROGRESS NOTES
Patient:    MRN:  [id number]    Denis Request ID:  [id number]    Level of care reserved:  Skilled Nursing Facility    Partner Reserved:  [facility], [place] [address] [phone]    Clinical needs requested:    Geography searched:  15 miles around [id number]    Start of Service:    Request sent:  2:50pm EDT on 5/2/2025 by Julieth Singleton    Partner reserved:  11:51am EDT on 5/5/2025 by Julieth Singleton    Choice list shared:

## 2025-05-05 NOTE — PLAN OF CARE
Problem: PHYSICAL THERAPY ADULT  Goal: Performs mobility at highest level of function for planned discharge setting.  See evaluation for individualized goals.  Description: Treatment/Interventions: ADL retraining, Functional transfer training, LE strengthening/ROM, Elevations, Therapeutic exercise, Endurance training, Cognitive reorientation, Patient/family training, Equipment eval/education, Bed mobility, Gait training, Compensatory technique education, Spoke to case management, Spoke to nursing, OT          See flowsheet documentation for full assessment, interventions and recommendations.  Outcome: Progressing  Note: Prognosis: Fair  Problem List: Decreased strength, Decreased endurance, Decreased mobility, Impaired balance, Decreased coordination, Decreased cognition, Impaired judgement, Decreased safety awareness  Assessment: Pt seen for PT treatment session this date. Therapy session focused on gait training, transfer training and functional strength training in order to improve overall mobility and independence. Pt requires C SUP for transfers and ambulation using RW. Occasional verbal cues during mobility for safety and proper use of RW w/ fair carryover demonstrated. Pt completed 5x STS assessment w/ an average score of 27s using BUEs. Scores greater than 18s w/o use of UEs indicate in increased risk for future falls in community dwelling adults. Pt making good progress toward goals. Pt was left sitting in chair at the end of PT session with all needs in reach. Pt would benefit from continued PT services while in hospital to address remaining limitations. PT to continue treating pt and recommends level II rehab. The patient's AM-PAC Basic Mobility Inpatient Short Form Raw Score is 18. A Raw score of greater than 16 suggests the patient may benefit from discharge to home. Please also refer to the recommendation of the Physical Therapist for safe discharge planning.  Barriers to Discharge: None     Rehab  Resource Intensity Level, PT: II (Moderate Resource Intensity)    See flowsheet documentation for full assessment.

## 2025-05-05 NOTE — ASSESSMENT & PLAN NOTE
Presented to [facility] on 4/28/25 with progressively worsening right-sided chest pain since 1.5 months, initially with exertion and since 3 days with rest  EKG showed diffuse ST depression  Troponin - negative  Transferred to [facility] 4/28 for cardiac catheterization  decision was made not to pursue cardiac cath d/t patient's refusal, acute encephalopathy and history of medication non adherence, . Continue medical management  Pt is considering TAVR. Cardiology recommending patient follow-up in CT surgery outpatient for TAVR workup.   She will need to be optimized prior to potential procedure.  Ct ASA, statin  Echo - EF65%.  Consistent grade 1 relaxation.  Moderate but possibly severe aortic stenosis.

## 2025-05-05 NOTE — PLAN OF CARE
Problem: SAFETY ADULT  Goal: Patient will remain free of falls  Description: INTERVENTIONS:- Educate patient/family on patient safety including physical limitations- Instruct patient to call for assistance with activity - Consult OT/PT to assist with strengthening/mobility - Keep Call bell within reach- Keep bed low and locked with side rails adjusted as appropriate- Keep care items and personal belongings within reach- Initiate and maintain comfort rounds- Make Fall Risk Sign visible to staff- - Apply yellow socks and bracelet for high fall risk patients- Consider moving patient to room near nurses station  Outcome: Progressing     Problem: Knowledge Deficit  Goal: Patient/family/caregiver demonstrates understanding of disease process, treatment plan, medications, and discharge instructions  Description: Complete learning assessment and assess knowledge base.Interventions:- Provide teaching at level of understanding- Provide teaching via preferred learning methods  Outcome: Progressing

## 2025-05-05 NOTE — TELEPHONE ENCOUNTER
Spoke with patient's  Gilmar, he will call me after patient is discharged from rehab to discuss next pre TAVR steps.

## 2025-05-05 NOTE — ASSESSMENT & PLAN NOTE
Lab Results   Component Value Date    EGFR 60 05/04/2025    EGFR 57 05/01/2025    EGFR 77 04/30/2025    CREATININE 0.89 05/04/2025    CREATININE 0.93 05/01/2025    CREATININE 0.73 04/30/2025   Baseline creatinine 0.9-1  Creatinine at baseline  Monitor

## 2025-05-05 NOTE — PHYSICAL THERAPY NOTE
PHYSICAL THERAPY NOTE      Patient Name: Daily Schaeffer  Today's Date: 5/5/2025 05/05/25 0907   PT Last Visit   PT Visit Date 05/05/25   Note Type   Note Type Treatment   Pain Assessment   Pain Assessment Tool 0-10   Pain Score No Pain   Restrictions/Precautions   Weight Bearing Precautions Per Order No   Other Precautions Cognitive;Chair Alarm;Bed Alarm;Fall Risk   General   Chart Reviewed Yes   Response to Previous Treatment Patient with no complaints from previous session.   Family/Caregiver Present No   Cognition   Overall Cognitive Status Impaired   Arousal/Participation Alert;Responsive;Cooperative   Attention Attends with cues to redirect   Orientation Level Oriented X4   Memory Decreased short term memory   Following Commands Follows one step commands with increased time or repetition   Comments pt very pleasant and cooperative   Five Times Sit To Stand   Time (Seconds) 27 Seconds  (Trial 1: 23s; Trial 2: 32s; Average: 27s. Required use of BUEs)   Bed Mobility   Supine to Sit Unable to assess   Sit to Supine Unable to assess   Additional Comments pt sitting in chair on arrival and retuned to chair following PT session   Transfers   Sit to Stand 5  Supervision   Additional items Assist x 1;Increased time required;Verbal cues   Stand to Sit 5  Supervision   Additional items Assist x 1;Increased time required;Verbal cues   Toilet transfer 4  Minimal assistance   Additional items Assist x 1;Increased time required;Verbal cues;Commode  (placed over toilet)   Additional Comments Transfers w/ RW   Ambulation/Elevation   Gait pattern Decreased foot clearance;Excessively slow;Short stride   Gait Assistance 5  Supervision   Additional items Assist x 1;Verbal cues   Assistive Device Rolling walker   Distance 150 ft   Stair Management Assistance Not tested   Endurance Deficit   Endurance Deficit Yes   Endurance Deficit Description  generalized weakness, deconditioning\   Activity Tolerance   Activity Tolerance Patient tolerated treatment well;Patient limited by fatigue   Nurse Made Aware RN cleared   Exercises   Neuro re-ed 2x5 consecutive STS   Assessment   Prognosis Fair   Problem List Decreased strength;Decreased endurance;Decreased mobility;Impaired balance;Decreased coordination;Decreased cognition;Impaired judgement;Decreased safety awareness   Assessment Pt seen for PT treatment session this date. Therapy session focused on gait training, transfer training and functional strength training in order to improve overall mobility and independence. Pt requires C SUP for transfers and ambulation using RW. Occasional verbal cues during mobility for safety and proper use of RW w/ fair carryover demonstrated. Pt completed 5x STS assessment w/ an average score of 27s using BUEs. Scores greater than 18s w/o use of UEs indicate in increased risk for future falls in community dwelling adults. Pt making good progress toward goals. Pt was left sitting in chair at the end of PT session with all needs in reach. Pt would benefit from continued PT services while in hospital to address remaining limitations. PT to continue treating pt and recommends level II rehab. The patient's AM-PAC Basic Mobility Inpatient Short Form Raw Score is 18. A Raw score of greater than 16 suggests the patient may benefit from discharge to home. Please also refer to the recommendation of the Physical Therapist for safe discharge planning.   Barriers to Discharge None   Goals   Patient Goals to get better   STG Expiration Date 05/13/25   PT Treatment Day 2   Plan   Treatment/Interventions Functional transfer training;LE strengthening/ROM;Elevations;Therapeutic exercise;Endurance training;Cognitive reorientation;Patient/family training;Equipment eval/education;Bed mobility;Gait training;Spoke to nursing;Spoke to case management;OT   Progress Progressing toward goals   PT Frequency  2-3x/wk   Discharge Recommendation   Rehab Resource Intensity Level, PT II (Moderate Resource Intensity)   AM-PAC Basic Mobility Inpatient   Turning in Flat Bed Without Bedrails 3   Lying on Back to Sitting on Edge of Flat Bed Without Bedrails 3   Moving Bed to Chair 3   Standing Up From Chair Using Arms 3   Walk in Room 3   Climb 3-5 Stairs With Railing 3   Basic Mobility Inpatient Raw Score 18   Basic Mobility Standardized Score 41.05   [facility] Highest Level Of Mobility   JH-HLM Goal 6: Walk 10 steps or more   -HLM Achieved 7: Walk 25 feet or more   Education   Education Provided Mobility training;Home exercise program   Patient Demonstrates acceptance/verbal understanding   End of Consult   Patient Position at End of Consult Bedside chair;Bed/Chair alarm activated;All needs within reach     Nina [place] PT, DPT

## 2025-05-05 NOTE — ASSESSMENT & PLAN NOTE
Likely contributing to chest pain, evaluated with echocardiogram, cardiology and cardiothoracic surgery evaluations, will be managed with medication as patient reportedly stating that she would manage with medications prior to today.  Latest echo done 2/21/2024 showed EF of 65% and severe aortic stenosis  Repeat echo noted  Monitor volume status  CT surgery consulted for TAVR workup.  Patient now 5/1 reporting that she was transferred to [facility] for this procedure and has interest.  Discussed with cardiology patient will follow-up outpatient.  However patient's daughter reports that she does not feel this is a viable option.

## 2025-05-05 NOTE — ASSESSMENT & PLAN NOTE
Lactic acid elevated at 4.4 on initial presentation to [facility] and normalized with IV fluids

## 2025-05-06 ENCOUNTER — PATIENT OUTREACH (OUTPATIENT)
Dept: CASE MANAGEMENT | Facility: OTHER | Age: 80
End: 2025-05-06

## 2025-05-06 ENCOUNTER — TRANSITIONAL CARE MANAGEMENT (OUTPATIENT)
Dept: FAMILY MEDICINE CLINIC | Facility: CLINIC | Age: 80
End: 2025-05-06

## 2025-05-06 NOTE — UTILIZATION REVIEW
NOTIFICATION OF INPATIENT ADMISSION   AUTHORIZATION REQUEST   SERVICING FACILITY:   [facility]  Address: [address] [place] [address]  Tax ID: [id number]  NPI: [id number] ATTENDING PROVIDER:  Attending Name and NPI#: Adryan Gallegos Md [[id number]]  Address: [address] [place] [address]  Phone: [phone]   ADMISSION INFORMATION:  Place of Service: Inpatient [facility]  Place of Service Code: 21  Inpatient Admission Date/Time: 4/28/25  7:37 PM  Discharge Date/Time: 5/5/2025  4:52 PM  Admitting Diagnosis Code/Description:  Chest pain [R07.9]     UTILIZATION REVIEW CONTACT:  [facility] Utilization   Network Utilization Review Department  Phone: [phone]  Fax: [phone]  Email: [email or web address]  Contact for approvals/pending authorizations, clinical reviews, and discharge.     PHYSICIAN ADVISORY SERVICES:  Medical Necessity Denial & Ssxx-iq-Ffel Review  Phone: [phone]  Fax: [phone]  Email: [email or web address]     DISCHARGE SUPPORT TEAM:  For Patients Discharge Needs & Updates  Phone: [phone] opt. 2 Fax: [phone]  Email: [email or web address]

## 2025-05-07 ENCOUNTER — PATIENT OUTREACH (OUTPATIENT)
Dept: CASE MANAGEMENT | Facility: OTHER | Age: 80
End: 2025-05-07

## 2025-05-07 NOTE — PROGRESS NOTES
Outpatient care management referral via HRR report 5/6/25. Discharged to [facility] 5/5/25. Email sent to facility to inform them I will be following the patient during their skilled stay.  This Admin Coordinator will continue to monitor via chart review.

## 2025-05-14 ENCOUNTER — PATIENT OUTREACH (OUTPATIENT)
Dept: CASE MANAGEMENT | Facility: OTHER | Age: 80
End: 2025-05-14

## 2025-05-15 ENCOUNTER — PATIENT OUTREACH (OUTPATIENT)
Dept: CASE MANAGEMENT | Facility: OTHER | Age: 80
End: 2025-05-15

## 2025-05-15 NOTE — PROGRESS NOTES
Update obtained from [facility] the patient discharged 5/14/25 to Home. I updated the care coordination note, removed myself as the responsible staff, added the appropriate responsible staff.  A email was sent to the facility requesting discharge instructions. When Admin Coordinator has received the Discharge paperwork  Admin Coordinator will attach to this encounter.

## 2025-05-16 ENCOUNTER — PATIENT OUTREACH (OUTPATIENT)
Dept: CASE MANAGEMENT | Facility: OTHER | Age: 80
End: 2025-05-16

## 2025-05-16 NOTE — PROGRESS NOTES
Phone call placed to Daily,  Gilmar answered and said she was not available.Explained why I was calling. Gilmar reports Daily is doing okay.   They need to pick medications up from pharmacy.   PCP appointment set for 5/27/25. Gilmar also have information to call cardiac surgeon .  I offered further phone calls but he declined at this time.

## 2025-05-27 ENCOUNTER — OFFICE VISIT (OUTPATIENT)
Dept: FAMILY MEDICINE CLINIC | Facility: CLINIC | Age: 80
End: 2025-05-27
Payer: MEDICARE

## 2025-05-27 VITALS
HEART RATE: 106 BPM | OXYGEN SATURATION: 99 % | HEIGHT: 67 IN | DIASTOLIC BLOOD PRESSURE: 86 MMHG | BODY MASS INDEX: 20.88 KG/M2 | WEIGHT: 133 LBS | SYSTOLIC BLOOD PRESSURE: 138 MMHG

## 2025-05-27 DIAGNOSIS — G47.00 INSOMNIA: ICD-10-CM

## 2025-05-27 DIAGNOSIS — Z99.89 USES WALKER: ICD-10-CM

## 2025-05-27 DIAGNOSIS — R53.1 GENERALIZED WEAKNESS: ICD-10-CM

## 2025-05-27 DIAGNOSIS — F32.A ANXIETY AND DEPRESSION: ICD-10-CM

## 2025-05-27 DIAGNOSIS — F41.9 ANXIETY AND DEPRESSION: ICD-10-CM

## 2025-05-27 DIAGNOSIS — N18.2 CKD (CHRONIC KIDNEY DISEASE) STAGE 2, GFR 60-89 ML/MIN: ICD-10-CM

## 2025-05-27 DIAGNOSIS — G62.9 NEUROPATHY: ICD-10-CM

## 2025-05-27 DIAGNOSIS — E03.9 ACQUIRED HYPOTHYROIDISM: ICD-10-CM

## 2025-05-27 DIAGNOSIS — E03.9 HYPOTHYROIDISM, UNSPECIFIED TYPE: ICD-10-CM

## 2025-05-27 DIAGNOSIS — I35.0 SEVERE AORTIC STENOSIS: Primary | ICD-10-CM

## 2025-05-27 PROBLEM — F10.90 ALCOHOL USE: Status: RESOLVED | Noted: 2025-04-29 | Resolved: 2025-05-27

## 2025-05-27 PROCEDURE — 99214 OFFICE O/P EST MOD 30 MIN: CPT | Performed by: FAMILY MEDICINE

## 2025-05-27 PROCEDURE — G2211 COMPLEX E/M VISIT ADD ON: HCPCS | Performed by: FAMILY MEDICINE

## 2025-05-27 RX ORDER — TRAZODONE HYDROCHLORIDE 50 MG/1
50 TABLET ORAL
Qty: 90 TABLET | Refills: 1 | Status: SHIPPED | OUTPATIENT
Start: 2025-05-27

## 2025-05-27 RX ORDER — LEVOTHYROXINE SODIUM 75 UG/1
75 TABLET ORAL
Qty: 90 TABLET | Refills: 3 | Status: SHIPPED | OUTPATIENT
Start: 2025-05-27

## 2025-05-27 RX ORDER — CITALOPRAM HYDROBROMIDE 20 MG/1
20 TABLET ORAL DAILY
Qty: 90 TABLET | Refills: 1 | Status: SHIPPED | OUTPATIENT
Start: 2025-05-27

## 2025-05-27 RX ORDER — GABAPENTIN 100 MG/1
CAPSULE ORAL
Qty: 120 CAPSULE | Refills: 3 | Status: SHIPPED | OUTPATIENT
Start: 2025-05-27

## 2025-05-27 NOTE — ASSESSMENT & PLAN NOTE
Lab Results   Component Value Date    EGFR 60 05/04/2025    EGFR 57 05/01/2025    EGFR 77 04/30/2025    CREATININE 0.89 05/04/2025    CREATININE 0.93 05/01/2025    CREATININE 0.73 04/30/2025

## 2025-05-27 NOTE — ASSESSMENT & PLAN NOTE
Increasing total daily dose of gabapentin by 100 mg, she will take 2 pills at bedtime.  Orders:  •  gabapentin (Neurontin) 100 mg capsule; 1 PO in the a.m., I PO at noon and 2 PO QHS

## 2025-05-27 NOTE — PROGRESS NOTES
Transition of Care Visit:  Name: Daily Schaeffer      : 1945      MRN: [id number]  Encounter Provider: Alanna Moreau DO  Encounter Date: 2025   Encounter department: [facility]    Assessment & Plan  Severe aortic stenosis  Patient assures me she will contact CT surgery to discuss possibility of TAVR.       Neuropathy  Increasing total daily dose of gabapentin by 100 mg, she will take 2 pills at bedtime.  Orders:  •  gabapentin (Neurontin) 100 mg capsule; 1 PO in the a.m., I PO at noon and 2 PO QHS    Anxiety and depression    Restart Celexa, she had some relief of symptoms with this medication in the past.  Less side effects versus other meds tried.  Orders:  •  citalopram (CeleXA) 20 mg tablet; Take 1 tablet (20 mg total) by mouth daily    Acquired hypothyroidism  TSH checked end of April and within normal limits, continue same dose.       Hypothyroidism, unspecified type    Orders:  •  levothyroxine 75 mcg tablet; Take 1 tablet (75 mcg total) by mouth daily in the early morning Take 1 tablet (75 mg) by oral route five days a week.    Insomnia  Wants increase trazodone to 50 mg nightly.  Orders:  •  traZODone (DESYREL) 50 mg tablet; Take 1 tablet (50 mg total) by mouth daily at bedtime    CKD (chronic kidney disease) stage 2, GFR 60-89 ml/min  Lab Results   Component Value Date    EGFR 60 2025    EGFR 57 2025    EGFR 77 2025    CREATININE 0.89 2025    CREATININE 0.93 2025    CREATININE 0.73 2025            Generalized weakness         Uses walker              History of Present Illness     Transitional Care Management Review:   Daily Schaeffer is a 79 y.o. female here for TCM follow up.     During the TCM phone call patient stated:  TCM Call (since 2025)     None      TCM Call (since 2025)     None        TCM:   hospitalized from 2025 through 2025 with the admitting diagnosis of chest pain and ST depression.  She was transferred to [facility]. She was noted to be encephalopathic with 1/2 BC + strep felt to be a contaminant but also cocerned for possible UTI so treated with CTX per ID. She was seen by CTS and cards and recommended for STR at [facility] and then possible workup for TAVR outpatient, the decision was made not to have a cardiac cath d/t initial refusal, concern for medication compliance.     Attended rehab in [place].     Medication Changes:   Trazadone added    Recommended outpatient follow-up with CT surgery to discuss possible TAVR.  Still hesitant, somewhat anxious about having a procedure on her heart.  She was previously on Celexa.  This was discontinued earlier this month, I cannot find a reason why, her house while she was in rehab.  Patient does report significant depression today, in hopes of restarting this medication or trying something different.  Her neuropathy has also been quite bothersome, we have tried several different agents.  Workup only partially completed due to patient noncompliance.  She has difficulty getting out of the house, she no longer drives nor does her .  She keeps up with light housework but tires easily.        Review of Systems   Constitutional:  Negative for activity change, chills, fever and unexpected weight change.   HENT:  Negative for ear pain and sore throat.    Eyes:  Negative for pain and visual disturbance.   Respiratory:  Negative for cough, chest tightness, shortness of breath and wheezing.    Cardiovascular:  Negative for chest pain and palpitations.   Gastrointestinal:  Negative for abdominal pain and vomiting.   Genitourinary:  Negative for dysuria and hematuria.   Musculoskeletal:  Positive for arthralgias and gait problem. Negative for back pain.   Skin:  Positive for pallor. Negative for color change and rash.   Neurological:  Positive for weakness. Negative for seizures and syncope.   Psychiatric/Behavioral:  Positive for decreased concentration and  "dysphoric mood. Negative for sleep disturbance. The patient is nervous/anxious.    All other systems reviewed and are negative.    Objective   /86 (BP Location: Left arm, Patient Position: Sitting, Cuff Size: Adult)   Pulse (!) 106   Ht 5' 7\" (1.702 m)   Wt 60.3 kg (133 lb)   SpO2 99%   BMI 20.83 kg/m²     Physical Exam  Vitals and nursing note reviewed.   Constitutional:       Appearance: Normal appearance. She is normal weight.      Comments: Frail appearing   HENT:      Head: Normocephalic.     Eyes:      Pupils: Pupils are equal, round, and reactive to light.       Cardiovascular:      Rate and Rhythm: Normal rate and regular rhythm.      Pulses:           Radial pulses are 2+ on the right side and 2+ on the left side.      Heart sounds: Murmur heard.      Systolic murmur is present with a grade of 5/6.   Pulmonary:      Effort: Pulmonary effort is normal.      Breath sounds: Normal breath sounds.   Abdominal:      General: Abdomen is flat.     Musculoskeletal:      Cervical back: Normal range of motion and neck supple.      Right lower leg: No edema.      Left lower leg: No edema.     Skin:     Capillary Refill: Capillary refill takes less than 2 seconds.      Comments: cool     Neurological:      Mental Status: She is alert.     Psychiatric:         Mood and Affect: Mood normal.         Behavior: Behavior normal.         Thought Content: Thought content normal.         Judgment: Judgment normal.       Medications have been reviewed by provider in current encounter      "

## 2025-05-27 NOTE — ASSESSMENT & PLAN NOTE
Wants increase trazodone to 50 mg nightly.  Orders:  •  traZODone (DESYREL) 50 mg tablet; Take 1 tablet (50 mg total) by mouth daily at bedtime

## 2025-05-27 NOTE — ASSESSMENT & PLAN NOTE
Restart Celexa, she had some relief of symptoms with this medication in the past.  Less side effects versus other meds tried.  Orders:  •  citalopram (CeleXA) 20 mg tablet; Take 1 tablet (20 mg total) by mouth daily

## 2025-05-29 PROBLEM — N39.0 UTI (URINARY TRACT INFECTION): Status: RESOLVED | Noted: 2025-04-29 | Resolved: 2025-05-29

## 2025-06-11 ENCOUNTER — TELEPHONE (OUTPATIENT)
Age: 80
End: 2025-06-11

## 2025-06-11 DIAGNOSIS — G62.9 NEUROPATHY: Primary | ICD-10-CM

## 2025-06-11 NOTE — TELEPHONE ENCOUNTER
Patients  called in stating that he was taking journavx and it was helping with his neuropathy. He states that his wife also has neuropathy and wants to know if she would able to be prescribed that medication as well please advise     If this prescription is sent they would [facility]

## 2025-06-13 ENCOUNTER — TELEPHONE (OUTPATIENT)
Age: 80
End: 2025-06-13

## 2025-06-13 NOTE — TELEPHONE ENCOUNTER
PA for Suzetrigine 50 MG TABS SUBMITTED to     via    []CMM-KEY:   [x]Surescripts-Case ID # [id number]   []Availity-Auth ID # NDC #   []Faxed to plan   []Other website   []Phone call Case ID #     [x]PA sent as URGENT    All office notes, labs and other pertaining documents and studies sent. Clinical questions answered. Awaiting determination from insurance company.     Turnaround time for your insurance to make a decision on your Prior Authorization can take 7-21 business days.

## 2025-06-13 NOTE — TELEPHONE ENCOUNTER
Cristin from [facility] called and stated that a prior auth is needed for Suzetrigine 50 MG TABS and she stated that she started a electronic one for patient through cover my meds.    Please advise    Key: [id number]

## 2025-06-13 NOTE — TELEPHONE ENCOUNTER
Pt's , Gilmar called and said he received a message from [facility] regarding the journavx script.  He stated [facility] sent a message to Dr. Moreau that they needed more information from her to fill the script.  Gilmar is asking if that was received.  He would like a call back with an update on this.

## 2025-06-13 NOTE — TELEPHONE ENCOUNTER
Cristin from [facility] called and stated that a prior auth is needed for Suzetrigine 50 MG TABS and she stated that she started a electronic one for patient through cover my meds.     Please advise     Key: [id number]

## 2025-06-13 NOTE — TELEPHONE ENCOUNTER
Received a fax from [facility] asking for alternative medications (Diclofenac sod, ibuprofen, meloxicam and nabumetone). I called patient's spouse Gilmar to see if either of these medications patient has tried or if willing to try. He does not understand how he is able to get this script and never tried any alternatives so why would it not be going through for patient. I told him unfortunatley I do not know that is something he can reach out to insurance to find out. He is asking if we can do a prior auth to see if we can get it approved because she is using this medication for neuropathy which patient has tried medications for that and failed on. I told him I would forward to prior auth dept but could not guarantee coverage.

## 2025-06-16 NOTE — TELEPHONE ENCOUNTER
PA for Suzetrigine 50 MG TABS  DENIED    Reason:(Screenshot if applicable)        Message sent to office clinical pool Yes    Denial letter scanned into Media Yes    We can gladly do an appeal but the process can take about 30-60 days to provide determination. Please have the office staff schedule a Peer to Peer at phone [phone] . If an appeal is truly warranted please have Provider send clinical documentation to the PA department to support the appeal.     **Please follow up with your patient regarding denial and next steps**

## 2025-07-07 ENCOUNTER — OFFICE VISIT (OUTPATIENT)
Dept: FAMILY MEDICINE CLINIC | Facility: CLINIC | Age: 80
End: 2025-07-07
Payer: MEDICARE

## 2025-07-07 VITALS
WEIGHT: 128.2 LBS | BODY MASS INDEX: 20.12 KG/M2 | HEART RATE: 79 BPM | TEMPERATURE: 97.2 F | SYSTOLIC BLOOD PRESSURE: 122 MMHG | HEIGHT: 67 IN | OXYGEN SATURATION: 97 % | DIASTOLIC BLOOD PRESSURE: 72 MMHG

## 2025-07-07 DIAGNOSIS — Z00.00 MEDICARE ANNUAL WELLNESS VISIT, INITIAL: Primary | ICD-10-CM

## 2025-07-07 DIAGNOSIS — R26.2 AMBULATORY DYSFUNCTION: ICD-10-CM

## 2025-07-07 DIAGNOSIS — N18.2 CKD (CHRONIC KIDNEY DISEASE) STAGE 2, GFR 60-89 ML/MIN: ICD-10-CM

## 2025-07-07 DIAGNOSIS — R53.81 DEBILITY: ICD-10-CM

## 2025-07-07 DIAGNOSIS — E03.9 ACQUIRED HYPOTHYROIDISM: ICD-10-CM

## 2025-07-07 DIAGNOSIS — G62.9 NEUROPATHY: ICD-10-CM

## 2025-07-07 DIAGNOSIS — I35.0 SEVERE AORTIC STENOSIS: ICD-10-CM

## 2025-07-07 DIAGNOSIS — F41.9 ANXIETY AND DEPRESSION: ICD-10-CM

## 2025-07-07 DIAGNOSIS — E78.5 DYSLIPIDEMIA: ICD-10-CM

## 2025-07-07 DIAGNOSIS — K50.00 CROHN'S DISEASE OF JEJUNUM WITHOUT COMPLICATION (HCC): ICD-10-CM

## 2025-07-07 DIAGNOSIS — G47.00 INSOMNIA, UNSPECIFIED TYPE: ICD-10-CM

## 2025-07-07 DIAGNOSIS — R25.1 TREMOR: ICD-10-CM

## 2025-07-07 DIAGNOSIS — F32.A ANXIETY AND DEPRESSION: ICD-10-CM

## 2025-07-07 DIAGNOSIS — E55.9 VITAMIN D DEFICIENCY: ICD-10-CM

## 2025-07-07 DIAGNOSIS — R73.09 ABNORMAL GLUCOSE: ICD-10-CM

## 2025-07-07 PROBLEM — M54.12 CERVICAL RADICULOPATHY: Status: ACTIVE | Noted: 2025-07-07

## 2025-07-07 PROBLEM — R78.81 BACTEREMIA: Status: RESOLVED | Noted: 2025-04-29 | Resolved: 2025-07-07

## 2025-07-07 PROCEDURE — G0537 PR RISK ASCVD TST ONCE PR 12 MO: HCPCS | Performed by: FAMILY MEDICINE

## 2025-07-07 PROCEDURE — G0439 PPPS, SUBSEQ VISIT: HCPCS | Performed by: FAMILY MEDICINE

## 2025-07-07 PROCEDURE — 99214 OFFICE O/P EST MOD 30 MIN: CPT | Performed by: FAMILY MEDICINE

## 2025-07-07 NOTE — ASSESSMENT & PLAN NOTE
Partial workup has been completed, patient continues to decline further testings including orthopedic referral, EMGs, etc. Will continue to take gabapentin for now.   Orders:  •  Suzetrigine 50 MG TABS; Take 50 mg by mouth 2 (two) times a day

## 2025-07-07 NOTE — ASSESSMENT & PLAN NOTE
Lab Results   Component Value Date    EGFR 60 05/04/2025    EGFR 57 05/01/2025    EGFR 77 04/30/2025    CREATININE 0.89 05/04/2025    CREATININE 0.93 05/01/2025    CREATININE 0.73 04/30/2025       Orders:  •  Comprehensive metabolic panel

## 2025-07-07 NOTE — ASSESSMENT & PLAN NOTE
Etiology not entirely clear.  Perhaps multifactorial. Perhaps related to neuropathy. Perhaps related to gabapentin, patient notes symptoms worsened upon reinitiation with medication.  I did advise if she was able to receive suzetrigine, may consider down dosing the gabapentin or discontinuing altogether and monitoring response.

## 2025-07-07 NOTE — ASSESSMENT & PLAN NOTE
Declines referral to cardiothoracic surgeon, aware if untreated may lead to worsening disease/symptoms, death.   Orders:  •  CBC and differential

## 2025-07-07 NOTE — PROGRESS NOTES
Name: Daily Schaeffer      : 1945      MRN: [id number]  Encounter Provider: Alanna Moreau DO  Encounter Date: 2025   Encounter department: [facility]  :  Assessment & Plan  Medicare annual wellness visit, initial  Discussed age appropriate preventative recommendations.          Severe aortic stenosis  Declines referral to cardiothoracic surgeon, aware if untreated may lead to worsening disease/symptoms, death.   Orders:  •  CBC and differential    Crohn's disease of jejunum without complication (HCC)  Asymptomatic.       Acquired hypothyroidism    Orders:  •  TSH, 3rd generation  •  T4, free; Future    Neuropathy  Partial workup has been completed, patient continues to decline further testings including orthopedic referral, EMGs, etc. Will continue to take gabapentin for now.   Orders:  •  Suzetrigine 50 MG TABS; Take 50 mg by mouth 2 (two) times a day    CKD (chronic kidney disease) stage 2, GFR 60-89 ml/min  Lab Results   Component Value Date    EGFR 60 2025    EGFR 57 2025    EGFR 77 2025    CREATININE 0.89 2025    CREATININE 0.93 2025    CREATININE 0.73 2025       Orders:  •  Comprehensive metabolic panel    Anxiety and depression  Feeling better on current regimen which includes Celexa 20 mg daily, trazodone 50 mg at bedtime.         Ambulatory dysfunction         Insomnia, unspecified type  Moderate improvement with trazodone at bedtime.       Debility  Declines referral to PT.       Vitamin D deficiency    Orders:  •  Vitamin D 25 hydroxy    Abnormal glucose    Orders:  •  Hemoglobin A1C    Dyslipidemia  On statin therapy.  Due for labs.  Orders:  •  Lipid panel    Tremor  Etiology not entirely clear.  Perhaps multifactorial. Perhaps related to neuropathy. Perhaps related to gabapentin, patient notes symptoms worsened upon reinitiation with medication.  I did advise if she was able to receive suzetrigine, may consider down  dosing the gabapentin or discontinuing altogether and monitoring response.         Urinary Incontinence Plan of Care: counseling topics discussed: practice Kegel (pelvic floor strengthening) exercises.       Preventive health issues were discussed with patient, and age appropriate screening tests were ordered as noted in patient's After Visit Summary. Personalized health advice and appropriate referrals for health education or preventive services given if needed, as noted in patient's After Visit Summary.    History of Present Illness       Mammography--declines.  Labs--due.  DEXA--declines.    Aortic stenosis- had prior w/u with cardio,  echo revealed worsening AS. She was referred to CT surg. Schedulers called but pt declined appt. Has baseline SOB and exertional dyspnea.  Patient is concerned about making the trip to the [facility].  More recently, she reports her baseline shortness of breath and exertional dyspnea have improved.  Today, she continues to decline referral to chest surgeon.    Hypothyroidism-due for blood work.     Neuropathy-previous therapies included Lyrica, gabapentin, Pamelor, Cymbalta.  She stopped her Lyrica for period of time as symptoms worsened she believes she had been experiencing some side effects of medication.  Workup for the symptoms has only been partial and incomplete, she believes her bilateral upper extremity neuropathy is secondary to the COVID-vaccine.  We had looked into a neurology referral and some EMG studies which she declined.  Numbness from B/L forearms into hands. Hands are cold, and now with tremors. Pain radaites from R hand into R shoulder.  Tremor has been present since roughly Thi.  We reinitiated therapy with gabapentin earlier this year, she is unsure if it is making any improvements in her symptoms.  She does note that her tremor has increased since restarting the gabapentin, unsure if related.  Her  recently started therapy with suzetrigine  which has given him some relief with his chronic low back pain/vertebral fracture/neuropathy.  I had previously written a prescription for the medication, however it was denied by her insurance.    Generalized anxiety disorder w depressive symptoms-previously with issues leaving the house, typically somewhat anxious in our office and hospital setting.  She was taking Cymbalta for a period of time but then discontinued secondary to possible side effects.  She had been in and out of the ER on hospital setting throughout the summer with some behavioral issues, mood disturbance, possible hallucinations.  I had a telehealth visit with her last summer, as she was requesting something to help with her severe anxiety.  I started her on a low-dose of Celexa.  She was also taking Risperdal at bedtime for period of time.  This was later discontinued and she was switched to trazodone.  She reports her sleep is still broken but somewhat improved.    Dyslipidemia- on statin.  Due for lipid panel.    Hyperglycemia-due for A1c.         Patient Care Team:  Alanna Moreau DO as PCP - General (Family Medicine)    Review of Systems   Constitutional:  Negative for activity change, chills, fever and unexpected weight change.   HENT:  Negative for ear pain and sore throat.    Eyes:  Negative for pain and visual disturbance.   Respiratory:  Negative for cough, chest tightness, shortness of breath and wheezing.    Cardiovascular:  Negative for chest pain and palpitations.   Gastrointestinal:  Negative for abdominal pain and vomiting.   Genitourinary:  Negative for dysuria and hematuria.   Musculoskeletal:  Positive for arthralgias and gait problem. Negative for back pain.   Skin:  Positive for pallor. Negative for color change and rash.   Neurological:  Positive for weakness. Negative for seizures and syncope.   Psychiatric/Behavioral:  Positive for decreased concentration and dysphoric mood. Negative for sleep disturbance. The  patient is nervous/anxious.    All other systems reviewed and are negative.    Medical History Reviewed by provider this encounter:  Meds  Problems       Annual Wellness Visit Questionnaire   Daily is here for her Subsequent Wellness visit.     Health Risk Assessment:   Patient rates overall health as fair. Patient feels that their physical health rating is slightly worse. Patient is satisfied with their life. Eyesight was rated as same. Hearing was rated as same. Patient feels that their emotional and mental health rating is same. Patients states they are sometimes angry. Patient states they are sometimes unusually tired/fatigued. Pain experienced in the last 7 days has been some. Patient's pain rating has been 10/10. Patient states that she has experienced no weight loss or gain in last 6 months.     Fall Risk Screening:   In the past year, patient has experienced: no history of falling in past year      Urinary Incontinence Screening:   Patient has not leaked urine accidently in the last six months.     Home Safety:  Patient does not have trouble with stairs inside or outside of their home. Patient has working smoke alarms and has working carbon monoxide detector. Home safety hazards include: none.     Nutrition:   Current diet is Regular.     Medications:   Patient is currently taking over-the-counter supplements. OTC medications include: see medication list. Patient is able to manage medications.     Activities of Daily Living (ADLs)/Instrumental Activities of Daily Living (IADLs):   Walk and transfer into and out of bed and chair?: Yes  Dress and groom yourself?: Yes    Bathe or shower yourself?: Yes    Feed yourself? Yes  Do your laundry/housekeeping?: Yes  Manage your money, pay your bills and track your expenses?: Yes  Make your own meals?: Yes    Do your own shopping?: No    Advance Care Planning:   Living will: No    Durable POA for healthcare: No    Advanced directive: No      Cognitive Screening:    Provider or family/friend/caregiver concerned regarding cognition?: No    Preventive Screenings      Cardiovascular Screening:    General: Screening Current      Diabetes Screening:     General: Screening Not Indicated and History Diabetes      Colorectal Cancer Screening:     General: Screening Not Indicated      Breast Cancer Screening:     General: Risks and Benefits Discussed and Patient Declines      Cervical Cancer Screening:    General: Screening Not Indicated      Osteoporosis Screening:    General: Patient Declines and Risks and Benefits Discussed      Lung Cancer Screening:     General: Screening Not Indicated    Immunizations:  - Immunizations due: Zoster (Shingrix)  - Risks/benefits immunizations discussed      Cardiovascular Risk Assessment:  Patient does not have underlying ASCVD and their cardiovascular risk was assessed today. Their cardiovascular risk factors include: CKD/proteinuria.     The ASCVD Risk score (Joshua VALENTE, et al., 2019) failed to calculate for the following reasons:    The 2019 ASCVD risk score is only valid for ages 40 to 79    Time spent assessing cardiovascular risk: 5 minutes.     Other Counseling Topics:   Regular weightbearing exercise and calcium and vitamin D intake.     Social Drivers of Health     Financial Resource Strain: Low Risk  (12/4/2023)    Overall Financial Resource Strain (CARDIA)    • Difficulty of Paying Living Expenses: Not hard at all   Food Insecurity: No Food Insecurity (7/7/2025)    Nursing - Inadequate Food Risk Classification    • Worried About Running Out of Food in the Last Year: Never true    • Ran Out of Food in the Last Year: Never true    • Ran Out of Food in the Last Year: Never true   Transportation Needs: No Transportation Needs (7/7/2025)    PRAPARE - Transportation    • Lack of Transportation (Medical): No    • Lack of Transportation (Non-Medical): No   Housing Stability: Low Risk  (7/7/2025)    Housing Stability Vital Sign    • Unable to Pay  "for Housing in the Last Year: No    • Number of Times Moved in the Last Year: 0    • Homeless in the Last Year: No   Utilities: Not At Risk (7/7/2025)    [employer]    • Threatened with loss of utilities: No     No results found.    Objective   /72 (BP Location: Left arm, Patient Position: Sitting, Cuff Size: Adult)   Pulse 79   Temp (!) 97.2 °F (36.2 °C) (Tympanic)   Ht 5' 7\" (1.702 m)   Wt 58.2 kg (128 lb 3.2 oz)   SpO2 97%   BMI 20.08 kg/m²     Physical Exam  Vitals and nursing note reviewed.   Constitutional:       Appearance: Normal appearance. She is normal weight.      Comments: Frail appearing   HENT:      Head: Normocephalic.     Eyes:      Pupils: Pupils are equal, round, and reactive to light.       Cardiovascular:      Rate and Rhythm: Normal rate and regular rhythm.      Pulses:           Radial pulses are 2+ on the right side and 2+ on the left side.      Heart sounds: Murmur heard.      Systolic murmur is present with a grade of 5/6.   Pulmonary:      Effort: Pulmonary effort is normal.      Breath sounds: Normal breath sounds.   Abdominal:      General: Abdomen is flat.     Musculoskeletal:      Cervical back: Normal range of motion and neck supple.      Right lower leg: No edema.      Left lower leg: No edema.     Skin:     Capillary Refill: Capillary refill takes less than 2 seconds.      Comments: cool     Neurological:      Mental Status: She is alert.     Psychiatric:         Mood and Affect: Mood normal.         Behavior: Behavior normal.         Thought Content: Thought content normal.         Judgment: Judgment normal.         "

## 2025-07-07 NOTE — PATIENT INSTRUCTIONS
Medicare Preventive Visit Patient Instructions  Thank you for completing your Welcome to Medicare Visit or Medicare Annual Wellness Visit today. Your next wellness visit will be due in one year (7/8/2026).  The screening/preventive services that you may require over the next 5-10 years are detailed below. Some tests may not apply to you based off risk factors and/or age. Screening tests ordered at today's visit but not completed yet may show as past due. Also, please note that scanned in results may not display below.  Preventive Screenings:  Service Recommendations Previous Testing/Comments   Colorectal Cancer Screening  * Colonoscopy    * Fecal Occult Blood Test (FOBT)/Fecal Immunochemical Test (FIT)  * Fecal DNA/Cologuard Test  * Flexible Sigmoidoscopy Age: 45-75 years old   Colonoscopy: every 10 years (may be performed more frequently if at higher risk)  OR  FOBT/FIT: every 1 year  OR  Cologuard: every 3 years  OR  Sigmoidoscopy: every 5 years  Screening may be recommended earlier than age 45 if at higher risk for colorectal cancer. Also, an individualized decision between you and your healthcare provider will decide whether screening between the ages of 76-85 would be appropriate. Colonoscopy: Not on file  FOBT/FIT: Not on file  Cologuard: Not on file  Sigmoidoscopy: Not on file    Screening Not Indicated     Breast Cancer Screening Age: 40+ years old  Frequency: every 1-2 years  Not required if history of left and right mastectomy Mammogram: Not on file    Risks and Benefits Discussed  Patient Declines   Cervical Cancer Screening Between the ages of 21-29, pap smear recommended once every 3 years.   Between the ages of 30-65, can perform pap smear with HPV co-testing every 5 years.   Recommendations may differ for women with a history of total hysterectomy, cervical cancer, or abnormal pap smears in past. Pap Smear: Not on file    Screening Not Indicated   Hepatitis C Screening Once for adults born between  1945 and 1965  More frequently in patients at high risk for Hepatitis C Hep C Antibody: Not on file        Diabetes Screening 1-2 times per year if you're at risk for diabetes or have pre-diabetes Fasting glucose: 103 mg/dL (11/8/2024)  A1C: 5.4 % (4/29/2025)  Screening Not Indicated  History Diabetes   Cholesterol Screening Once every 5 years if you don't have a lipid disorder. May order more often based on risk factors. Lipid panel: 09/21/2023    Screening Current     Other Preventive Screenings Covered by Medicare:  Abdominal Aortic Aneurysm (AAA) Screening: covered once if your at risk. You're considered to be at risk if you have a family history of AAA.  Lung Cancer Screening: covers low dose CT scan once per year if you meet all of the following conditions: (1) Age 55-77; (2) No signs or symptoms of lung cancer; (3) Current smoker or have quit smoking within the last 15 years; (4) You have a tobacco smoking history of at least 20 pack years (packs per day multiplied by number of years you smoked); (5) You get a written order from a healthcare provider.  Glaucoma Screening: covered annually if you're considered high risk: (1) You have diabetes OR (2) Family history of glaucoma OR (3)  aged 50 and older OR (4)  American aged 65 and older  Osteoporosis Screening: covered every 2 years if you meet one of the following conditions: (1) You're estrogen deficient and at risk for osteoporosis based off medical history and other findings; (2) Have a vertebral abnormality; (3) On glucocorticoid therapy for more than 3 months; (4) Have primary hyperparathyroidism; (5) On osteoporosis medications and need to assess response to drug therapy.   Last bone density test (DXA Scan): Not on file.  HIV Screening: covered annually if you're between the age of 15-65. Also covered annually if you are younger than 15 and older than 65 with risk factors for HIV infection. For pregnant patients, it is covered up  to 3 times per pregnancy.    Immunizations:  Immunization Recommendations   Influenza Vaccine Annual influenza vaccination during flu season is recommended for all persons aged >= 6 months who do not have contraindications   Pneumococcal Vaccine   * Pneumococcal conjugate vaccine = PCV13 (Prevnar 13), PCV15 (Vaxneuvance), PCV20 (Prevnar 20)  * Pneumococcal polysaccharide vaccine = PPSV23 (Pneumovax) Adults 19-63 yo with certain risk factors or if 65+ yo  If never received any pneumonia vaccine: recommend Prevnar 20 (PCV20)  Give PCV20 if previously received 1 dose of PCV13 or PPSV23   Hepatitis B Vaccine 3 dose series if at intermediate or high risk (ex: diabetes, end stage renal disease, liver disease)   Respiratory syncytial virus (RSV) Vaccine - COVERED BY MEDICARE PART D  * RSVPreF3 (Arexvy) CDC recommends that adults 60 years of age and older may receive a single dose of RSV vaccine using shared clinical decision-making (SCDM)   Tetanus (Td) Vaccine - COST NOT COVERED BY MEDICARE PART B Following completion of primary series, a booster dose should be given every 10 years to maintain immunity against tetanus. Td may also be given as tetanus wound prophylaxis.   Tdap Vaccine - COST NOT COVERED BY MEDICARE PART B Recommended at least once for all adults. For pregnant patients, recommended with each pregnancy.   Shingles Vaccine (Shingrix) - COST NOT COVERED BY MEDICARE PART B  2 shot series recommended in those 19 years and older who have or will have weakened immune systems or those 50 years and older     Health Maintenance Due:  There are no preventive care reminders to display for this patient.  Immunizations Due:      Topic Date Due   • COVID-19 Vaccine (3 - 2024-25 season) 09/01/2024   • Influenza Vaccine (1) 09/01/2025     Advance Directives   What are advance directives?  Advance directives are legal documents that state your wishes and plans for medical care. These plans are made ahead of time in case you  lose your ability to make decisions for yourself. Advance directives can apply to any medical decision, such as the treatments you want, and if you want to donate organs.   What are the types of advance directives?  There are many types of advance directives, and each state has rules about how to use them. You may choose a combination of any of the following:  Living will:  This is a written record of the treatment you want. You can also choose which treatments you do not want, which to limit, and which to stop at a certain time. This includes surgery, medicine, IV fluid, and tube feedings.   Durable power of  for healthcare (DPAHC):  This is a written record that states who you want to make healthcare choices for you when you are unable to make them for yourself. This person, called a proxy, is usually a family member or a friend. You may choose more than 1 proxy.  Do not resuscitate (DNR) order:  A DNR order is used in case your heart stops beating or you stop breathing. It is a request not to have certain forms of treatment, such as CPR. A DNR order may be included in other types of advance directives.  Medical directive:  This covers the care that you want if you are in a coma, near death, or unable to make decisions for yourself. You can list the treatments you want for each condition. Treatment may include pain medicine, surgery, blood transfusions, dialysis, IV or tube feedings, and a ventilator (breathing machine).  Values history:  This document has questions about your views, beliefs, and how you feel and think about life. This information can help others choose the care that you would choose.  Why are advance directives important?  An advance directive helps you control your care. Although spoken wishes may be used, it is better to have your wishes written down. Spoken wishes can be misunderstood, or not followed. Treatments may be given even if you do not want them. An advance directive may make  it easier for your family to make difficult choices about your care.   Urinary Incontinence   Urinary incontinence (UI)  is when you lose control of your bladder. UI develops because your bladder cannot store or empty urine properly. The 3 most common types of UI are stress incontinence, urge incontinence, or both.  Medicines:   May be given to help strengthen your bladder control. Report any side effects of medication to your healthcare provider.  Do pelvic muscle exercises often:  Your pelvic muscles help you stop urinating. Squeeze these muscles tight for 5 seconds, then relax for 5 seconds. Gradually work up to squeezing for 10 seconds. Do 3 sets of 15 repetitions a day, or as directed. This will help strengthen your pelvic muscles and improve bladder control.  Train your bladder:  Go to the bathroom at set times, such as every 2 hours, even if you do not feel the urge to go. You can also try to hold your urine when you feel the urge to go. For example, hold your urine for 5 minutes when you feel the urge to go. As that becomes easier, hold your urine for 10 minutes.   Self-care:   Keep a UI record.  Write down how often you leak urine and how much you leak. Make a note of what you were doing when you leaked urine.  Drink liquids as directed. You may need to limit the amount of liquid you drink to help control your urine leakage. Do not drink any liquid right before you go to bed. Limit or do not have drinks that contain caffeine or alcohol.   Prevent constipation.  Eat a variety of high-fiber foods. Good examples are high-fiber cereals, beans, vegetables, and whole-grain breads. Walking is the best way to trigger your intestines to have a bowel movement.  Exercise regularly and maintain a healthy weight.  Weight loss and exercise will decrease pressure on your bladder and help you control your leakage.   Use a catheter as directed  to help empty your bladder. A catheter is a tiny, plastic tube that is put into  your bladder to drain your urine.   Go to behavior therapy as directed.  Behavior therapy may be used to help you learn to control your urge to urinate.     © Copyright [employer] 2018 Information is for End User's use only and may not be sold, redistributed or otherwise used for commercial purposes. All illustrations and images included in CareNotes® are the copyrighted property of [employer] or [employer]

## 2025-07-07 NOTE — ASSESSMENT & PLAN NOTE
Feeling better on current regimen which includes Celexa 20 mg daily, trazodone 50 mg at bedtime.

## 2025-07-15 ENCOUNTER — TELEPHONE (OUTPATIENT)
Dept: FAMILY MEDICINE CLINIC | Facility: CLINIC | Age: 80
End: 2025-07-15

## 2025-07-15 ENCOUNTER — TELEPHONE (OUTPATIENT)
Age: 80
End: 2025-07-15

## 2025-07-21 NOTE — TELEPHONE ENCOUNTER
Gilmar,  of pt, called in reporting he spoke with Eusebio from [facility] in regards to the following medication: Suzetrigine (JOURNAVX) 50 MG TABS      states they will not fill the medication for 180 tabs, only for 15, but that Eusebio sent over fax today for Dr. Moreau to review.     Gilmar requests if we could please give him a call with an update on this. Expressed pt has shown improvement with taking this medication. States he is isn't sure why they won't fill the medication for her when they fill it for him with no issue?    Gilmar: [phone]

## 2025-07-22 DIAGNOSIS — G62.9 NEUROPATHY: Primary | ICD-10-CM

## 2025-07-22 DIAGNOSIS — M25.562 ARTHRALGIA OF LEFT KNEE: ICD-10-CM

## 2025-07-22 RX ORDER — MELOXICAM 7.5 MG/1
7.5 TABLET ORAL DAILY PRN
Qty: 30 TABLET | Refills: 3 | Status: SHIPPED | OUTPATIENT
Start: 2025-07-22